# Patient Record
Sex: FEMALE | Race: BLACK OR AFRICAN AMERICAN | NOT HISPANIC OR LATINO | Employment: FULL TIME | URBAN - METROPOLITAN AREA
[De-identification: names, ages, dates, MRNs, and addresses within clinical notes are randomized per-mention and may not be internally consistent; named-entity substitution may affect disease eponyms.]

---

## 2017-01-05 ENCOUNTER — PATIENT OUTREACH (OUTPATIENT)
Dept: ADMINISTRATIVE | Facility: HOSPITAL | Age: 52
End: 2017-01-05

## 2017-01-06 ENCOUNTER — HOSPITAL ENCOUNTER (OUTPATIENT)
Dept: RADIOLOGY | Facility: HOSPITAL | Age: 52
Discharge: HOME | End: 2017-01-06
Attending: INTERNAL MEDICINE
Payer: COMMERCIAL

## 2017-01-06 DIAGNOSIS — Z29.9 PREVENTIVE MEASURE: ICD-10-CM

## 2017-01-06 PROCEDURE — 77067 SCR MAMMO BI INCL CAD: CPT | Mod: TC

## 2017-01-06 PROCEDURE — 77067 SCR MAMMO BI INCL CAD: CPT | Mod: 26,,, | Performed by: RADIOLOGY

## 2017-01-13 ENCOUNTER — OFFICE VISIT (OUTPATIENT)
Dept: INTERNAL MEDICINE | Facility: CLINIC | Age: 52
End: 2017-01-13
Payer: COMMERCIAL

## 2017-01-13 VITALS
BODY MASS INDEX: 48.82 KG/M2 | WEIGHT: 293 LBS | DIASTOLIC BLOOD PRESSURE: 90 MMHG | SYSTOLIC BLOOD PRESSURE: 132 MMHG | OXYGEN SATURATION: 97 % | TEMPERATURE: 95 F | HEART RATE: 104 BPM | HEIGHT: 65 IN

## 2017-01-13 DIAGNOSIS — E66.01 MORBID OBESITY WITH BMI OF 50.0-59.9, ADULT: ICD-10-CM

## 2017-01-13 DIAGNOSIS — N39.41 URGENCY INCONTINENCE: ICD-10-CM

## 2017-01-13 DIAGNOSIS — Z00.00 ENCOUNTER FOR PREVENTIVE HEALTH EXAMINATION: ICD-10-CM

## 2017-01-13 DIAGNOSIS — E78.00 PURE HYPERCHOLESTEROLEMIA: ICD-10-CM

## 2017-01-13 DIAGNOSIS — F41.8 MIXED ANXIETY AND DEPRESSIVE DISORDER: Primary | ICD-10-CM

## 2017-01-13 DIAGNOSIS — R60.0 BILATERAL EDEMA OF LOWER EXTREMITY: ICD-10-CM

## 2017-01-13 DIAGNOSIS — E11.9 NEW ONSET TYPE 2 DIABETES MELLITUS: ICD-10-CM

## 2017-01-13 PROCEDURE — 99215 OFFICE O/P EST HI 40 MIN: CPT | Mod: S$GLB,,, | Performed by: INTERNAL MEDICINE

## 2017-01-13 PROCEDURE — 3060F POS MICROALBUMINURIA REV: CPT | Mod: S$GLB,,, | Performed by: INTERNAL MEDICINE

## 2017-01-13 PROCEDURE — 99999 PR PBB SHADOW E&M-EST. PATIENT-LVL III: CPT | Mod: PBBFAC,,, | Performed by: INTERNAL MEDICINE

## 2017-01-13 PROCEDURE — 1159F MED LIST DOCD IN RCRD: CPT | Mod: S$GLB,,, | Performed by: INTERNAL MEDICINE

## 2017-01-13 PROCEDURE — 3044F HG A1C LEVEL LT 7.0%: CPT | Mod: S$GLB,,, | Performed by: INTERNAL MEDICINE

## 2017-01-13 RX ORDER — PRAVASTATIN SODIUM 20 MG/1
20 TABLET ORAL DAILY
Qty: 90 TABLET | Refills: 3 | Status: SHIPPED | OUTPATIENT
Start: 2017-01-13 | End: 2017-10-11 | Stop reason: SDUPTHER

## 2017-01-13 NOTE — PROGRESS NOTES
"Subjective:       Patient ID: Ac Hayden is a 51 y.o. female.    Chief Complaint: Follow-up    HPI Comments: Here for f/u medical problems and preventive exam, and f/u new dx diabetes and full female exam with pelvic.  Stress doing ok.  Working on diet.  Lost 6#.  No f/c/sw.  Some cough and congestion x 1d.  Taking flonase. No cp/sob/aplp.  BMs and urine normal.  Taking vit D.  Edema doing well on hctz.    HM: 11/16 fluvax, 11/16 [id number], 12/10 TDaP, 12/16 mmg, 7/11 Cscope rep 5y, 6/16 HCV neg, 3/15 Eye Dr. Ayala.          Review of Systems   Constitutional: Negative for chills, diaphoresis and fever.   Respiratory: Negative for cough and shortness of breath.    Cardiovascular: Negative for chest pain, palpitations and leg swelling.   Gastrointestinal: Negative for blood in stool, constipation, diarrhea, nausea and vomiting.   Genitourinary: Negative for dysuria, frequency and hematuria.   Psychiatric/Behavioral: The patient is not nervous/anxious.        Objective:     Visit Vitals    BP (!) 132/90 (BP Location: Right arm)    Pulse 104    Temp (!) 95.4 °F (35.2 °C) (Tympanic)    Ht 5' 5" (1.651 m)    Wt (!) 154.8 kg (341 lb 4.4 oz)    SpO2 97%    BMI 56.79 kg/m2       Physical Exam   Constitutional: She is oriented to person, place, and time. She appears well-developed and well-nourished.   HENT:   Right Ear: External ear normal. Tympanic membrane is not injected.   Left Ear: External ear normal. Tympanic membrane is not injected.   Mouth/Throat: Oropharynx is clear and moist.   Eyes: Conjunctivae are normal.   Neck: Normal range of motion. Neck supple. Carotid bruit is not present. No thyroid mass and no thyromegaly present.   Cardiovascular: Normal rate, regular rhythm and intact distal pulses.  Exam reveals no gallop and no friction rub.    No murmur heard.  Pulses:       Dorsalis pedis pulses are 2+ on the right side, and 2+ on the left side.        Posterior tibial pulses are 2+ on the right " side, and 2+ on the left side.   Pulmonary/Chest: Effort normal and breath sounds normal. She has no wheezes. She has no rales. Right breast exhibits no mass, no nipple discharge, no skin change and no tenderness. Left breast exhibits no mass, no nipple discharge, no skin change and no tenderness.   Abdominal: Soft. Bowel sounds are normal. She exhibits no mass. There is no hepatosplenomegaly. There is no tenderness.   Genitourinary: Vagina normal and uterus normal. There is no lesion on the right labia. There is no lesion on the left labia. Uterus is not tender. Cervix exhibits no motion tenderness and no discharge. Right adnexum displays no mass, no tenderness and no fullness. Left adnexum displays no mass, no tenderness and no fullness. No tenderness in the vagina. No vaginal discharge found.   Musculoskeletal: She exhibits no edema.   Feet:   Right Foot:   Protective Sensation: 10 sites tested. 10 sites sensed.   Skin Integrity: Negative for ulcer, blister, skin breakdown, erythema, warmth, callus or dry skin.   Left Foot:   Protective Sensation: 10 sites tested. 10 sites sensed.   Skin Integrity: Negative for ulcer, blister, skin breakdown, erythema, warmth, callus or dry skin.   Lymphadenopathy:     She has no cervical adenopathy.     She has no axillary adenopathy.   Neurological: She is alert and oriented to person, place, and time.   Skin: Skin is warm. No rash noted.   Psychiatric: She has a normal mood and affect.     Results for AMY ESTEBAN (MRN [id number]) as of 1/13/2017 14:30   Ref. Range 1/6/2017 08:26 1/6/2017 08:40   WBC Latest Ref Range: 3.90 - 12.70 K/uL  7.79   RBC Latest Ref Range: 4.00 - 5.40 M/uL  4.47   Hemoglobin Latest Ref Range: 12.0 - 16.0 g/dL  13.8   Hematocrit Latest Ref Range: 37.0 - 48.5 %  41.1   MCV Latest Ref Range: 82 - 98 fL  92   MCH Latest Ref Range: 27.0 - 31.0 pg  30.9   MCHC Latest Ref Range: 32.0 - 36.0 %  33.6   RDW Latest Ref Range: 11.5 - 14.5 %  12.6   Platelets  Latest Ref Range: 150 - 350 K/uL  346   MPV Latest Ref Range: 9.2 - 12.9 fL  10.2   Gran% Latest Ref Range: 38.0 - 73.0 %  65.1   Gran # Latest Ref Range: 1.8 - 7.7 K/uL  5.1   Lymph% Latest Ref Range: 18.0 - 48.0 %  27.1   Lymph # Latest Ref Range: 1.0 - 4.8 K/uL  2.1   Mono% Latest Ref Range: 4.0 - 15.0 %  6.8   Mono # Latest Ref Range: 0.3 - 1.0 K/uL  0.5   Eosinophil% Latest Ref Range: 0.0 - 8.0 %  0.5   Eos # Latest Ref Range: 0.0 - 0.5 K/uL  0.0   Basophil% Latest Ref Range: 0.0 - 1.9 %  0.1   Baso # Latest Ref Range: 0.00 - 0.20 K/uL  0.01   Sodium Latest Ref Range: 136 - 145 mmol/L  140   Potassium Latest Ref Range: 3.5 - 5.1 mmol/L  4.1   Chloride Latest Ref Range: 95 - 110 mmol/L  106   CO2 Latest Ref Range: 23 - 29 mmol/L  28   Anion Gap Latest Ref Range: 8 - 16 mmol/L  6 (L)   BUN, Bld Latest Ref Range: 6 - 20 mg/dL  10   Creatinine Latest Ref Range: 0.5 - 1.4 mg/dL  0.8   eGFR if non African American Latest Ref Range: >60 mL/min/1.73 m^2  >60.0   eGFR if African American Latest Ref Range: >60 mL/min/1.73 m^2  >60.0   Glucose Latest Ref Range: 70 - 110 mg/dL  95   Calcium Latest Ref Range: 8.7 - 10.5 mg/dL  9.0   Alkaline Phosphatase Latest Ref Range: 55 - 135 U/L  114   Total Protein Latest Ref Range: 6.0 - 8.4 g/dL  7.4   Albumin Latest Ref Range: 3.5 - 5.2 g/dL  3.2 (L)   Total Bilirubin Latest Ref Range: 0.1 - 1.0 mg/dL  0.2   AST Latest Ref Range: 10 - 40 U/L  20   ALT Latest Ref Range: 10 - 44 U/L  21   Triglycerides Latest Ref Range: 30 - 150 mg/dL  111   Cholesterol Latest Ref Range: 120 - 199 mg/dL  188   HDL Latest Ref Range: 40 - 75 mg/dL  38 (L)   LDL Cholesterol Latest Ref Range: 63.0 - 159.0 mg/dL  127.8   Total Cholesterol/HDL Ratio Latest Ref Range: 2.0 - 5.0   4.9   Vit D, 25-Hydroxy Latest Ref Range: 30 - 96 ng/mL  31   Hemoglobin A1C Latest Ref Range: 4.5 - 6.2 %  6.8 (H)   Estimated Avg Glucose Latest Ref Range: 68 - 131 mg/dL  148 (H)   TSH Latest Ref Range: 0.400 - 4.000 uIU/mL   0.945   Microalbum.,U,Random Latest Units: ug/mL 26.0    Microalb Creat Ratio Latest Ref Range: 0.0 - 30.0 ug/mg 18.4      Assessment:       1. Mixed anxiety and depressive disorder    2. New onset type 2 diabetes mellitus    3. Encounter for preventive health examination    4. Morbid obesity with BMI of 50.0-59.9, adult    5. Urgency incontinence    6. Bilateral edema of lower extremity    7. Pure hypercholesterolemia        Plan:       Ac was seen today for follow-up.    Diagnoses and all orders for this visit:    Mixed anxiety and depressive disorder- cont present regimen/ doing well.    New onset type 2 diabetes mellitus- cont diet, add metformin and increase as tolerated.  -     Hemoglobin A1c; Future    Encounter for preventive health examination- utd.    HLP- start statin, recheck 3 mo.    Morbid obesity with BMI of 50.0-59.9, adult- cont wt loss.    Urgency incontinence- doing well on rx, cont.    rTC 3 mo.

## 2017-01-13 NOTE — MR AVS SNAPSHOT
[facility] Internal Medicine  [address] [place] [address]  Phone: [phone]  Fax: [phone]                  Ac Hayden   2017 2:00 PM   Office Visit    Description:  Female : 1965   Provider:  Tati Hansen MD   Department:  [facility] - Internal Medicine           Reason for Visit     Follow-up           Diagnoses this Visit        Comments    Mixed anxiety and depressive disorder    -  Primary     New onset type 2 diabetes mellitus         Encounter for preventive health examination         Morbid obesity with BMI of 50.0-59.9, adult         Urgency incontinence         Bilateral edema of lower extremity         Pure hypercholesterolemia                To Do List           Future Appointments        Provider Department Dept Phone    3/22/2017 8:00 AM EVA Ayala OD [facility] Ophthalmology [phone]    2017 8:30 AM LABORATORY, [facility] [phone]    2017 3:40 PM Tati Hansen MD [facility] Internal Medicine [phone]      Goals (5 Years of Data)     None      Follow-Up and Disposition     Return in about 3 months (around 2017).    Follow-up and Disposition History       These Medications        Disp Refills Start End    pravastatin (PRAVACHOL) 20 MG tablet 90 tablet 3 2017    Take 1 tablet (20 mg total) by mouth once daily. - Oral    Pharmacy: [address] [place] [address] Ph #: [phone]         [facility]     [facility] Nurse Care Line -  Assistance  Registered nurses in the [facility] provide clinical advisement, health education, appointment booking, and other advisory services.  Call for this free service at [phone].             Medications           Message regarding Medications     Verify the changes and/or additions to your medication regime listed below are the same as discussed with your clinician  today.  If any of these changes or additions are incorrect, please notify your healthcare provider.        START taking these NEW medications        Refills    pravastatin (PRAVACHOL) 20 MG tablet 3    Sig: Take 1 tablet (20 mg total) by mouth once daily.    Class: Normal    Route: Oral           Verify that the below list of medications is an accurate representation of the medications you are currently taking.  If none reported, the list may be blank. If incorrect, please contact your healthcare provider. Carry this list with you in case of emergency.           Current Medications     albuterol 90 mcg/actuation inhaler Inhale 1-2 puffs into the lungs every 6 (six) hours as needed for Wheezing.    blood sugar diagnostic (ONETOUCH VERIO) Strp 1 strip by Misc.(Non-Drug; Combo Route) route once daily.    cholecalciferol, vitamin D3, 1,000 unit capsule Take 1 capsule (1,000 Units total) by mouth 2 (two) times daily.    fluticasone (FLONASE) 50 mcg/actuation nasal spray USE TWO SPRAY(S) IN EACH NOSTRIL ONCE DAILY    hydrochlorothiazide (HYDRODIURIL) 25 MG tablet TAKE ONE TABLET BY MOUTH ONCE DAILY    lancets 33 gauge Misc 1 lancet by Misc.(Non-Drug; Combo Route) route once daily.    metformin (GLUCOPHAGE) 1000 MG tablet TAKE ONE TABLET BY MOUTH TWICE DAILY WITH MEALS    mirabegron 50 mg Tb24 Take 2 tablets (100 mg total) by mouth every morning.    naproxen (NAPROSYN) 500 MG tablet Take 1 tablet (500 mg total) by mouth 2 (two) times daily with meals.    ondansetron (ZOFRAN-ODT) 4 MG TbDL Take 1 tablet (4 mg total) by mouth every 4 to 6 hours as needed.    oxycodone-acetaminophen (PERCOCET) 7.5-325 mg per tablet Take 1 tablet by mouth every 4 to 6 hours as needed for Pain.    promethazine (PHENERGAN) 25 MG tablet Take 25 mg by mouth Every 8 hours as needed. 1 Tablet Oral Every 8 hours as needed.    tramadol (ULTRAM) 50 mg tablet Take 50 mg by mouth 2 (two) times daily.    venlafaxine 150 mg TR24 TAKE TWO TABLETS BY MOUTH  "ONCE DAILY    zafirlukast (ACCOLATE) 20 MG tablet TAKE ONE TABLET BY MOUTH TWICE DAILY    pravastatin (PRAVACHOL) 20 MG tablet Take 1 tablet (20 mg total) by mouth once daily.    triamcinolone acetonide 0.025% (KENALOG) 0.025 % cream Apply topically 2 (two) times daily as needed.           Clinical Reference Information           Vital Signs - Last Recorded  Most recent update: 1/13/2017  1:58 PM by Ekaterina James MA    BP Pulse Temp Ht Wt SpO2    (!) 132/90 (BP Location: Right arm) 104 (!) 95.4 °F (35.2 °C) (Tympanic) 5' 5" (1.651 m) (!) 154.8 kg (341 lb 4.4 oz) 97%    BMI                56.79 kg/m2          Blood Pressure          Most Recent Value    BP  (!)  132/90      Allergies as of 1/13/2017     Codeine    Pcn [Penicillins]    Sulfa (Sulfonamide Antibiotics)      Immunizations Administered on Date of Encounter - 1/13/2017     None      Orders Placed During Today's Visit     Future Labs/Procedures Expected by Expires    ALT (SGPT)  1/13/2017 3/14/2018    Hemoglobin A1c  1/13/2017 3/14/2018    Lipid panel  1/13/2017 1/13/2018      "

## 2017-01-16 ENCOUNTER — PATIENT MESSAGE (OUTPATIENT)
Dept: URGENT CARE | Facility: CLINIC | Age: 52
End: 2017-01-16

## 2017-01-16 ENCOUNTER — TELEPHONE (OUTPATIENT)
Dept: INTERNAL MEDICINE | Facility: CLINIC | Age: 52
End: 2017-01-16

## 2017-01-16 DIAGNOSIS — J01.00 ACUTE MAXILLARY SINUSITIS, RECURRENCE NOT SPECIFIED: ICD-10-CM

## 2017-01-16 RX ORDER — DOXYCYCLINE HYCLATE 100 MG
100 TABLET ORAL EVERY 12 HOURS
Qty: 20 TABLET | Refills: 0 | Status: SHIPPED | OUTPATIENT
Start: 2017-01-16 | End: 2017-01-26

## 2017-01-16 NOTE — TELEPHONE ENCOUNTER
Pt c/o productive cough with green sputum,states it has been on-going since Saturday 1/14/17. No other issues. Asking for the medication that has been previously prescribed for this issue.

## 2017-01-16 NOTE — TELEPHONE ENCOUNTER
----- Message from Teresa Rojas sent at 1/16/2017  2:35 PM CST -----  Contact: Patient  Patient is requesting a prescription for a dry cough- [facility]. Please call patient back if needed at [phone]. Thank you

## 2017-07-23 RX ORDER — HYDROCHLOROTHIAZIDE 25 MG/1
TABLET ORAL
Qty: 30 TABLET | Refills: 11 | Status: SHIPPED | OUTPATIENT
Start: 2017-07-23 | End: 2018-09-12

## 2017-07-23 RX ORDER — ZAFIRLUKAST 20 MG/1
TABLET, FILM COATED ORAL
Qty: 60 TABLET | Refills: 11 | Status: SHIPPED | OUTPATIENT
Start: 2017-07-23 | End: 2020-01-22

## 2017-07-23 RX ORDER — VENLAFAXINE HYDROCHLORIDE 150 MG/1
TABLET, EXTENDED RELEASE ORAL
Qty: 180 EACH | Refills: 3 | Status: SHIPPED | OUTPATIENT
Start: 2017-07-23 | End: 2017-10-04 | Stop reason: ALTCHOICE

## 2017-09-20 ENCOUNTER — PATIENT OUTREACH (OUTPATIENT)
Dept: ADMINISTRATIVE | Facility: HOSPITAL | Age: 52
End: 2017-09-20

## 2017-09-20 NOTE — LETTER
September 20, 2017    Ac Hayden  [address] [address]             [facility]  [address]  [facility] [address]  Phone: [phone] Dear Mrs. Hayden:    [facility] is committed to your overall health.  To help you get the most out of each of your visits, we will review your information to make sure you are up to date on all of your recommended tests and/or procedures.      Tati Luis MD has found that you may be due for    Pneumococcal PPSV23 (Medium Risk) (1)    Colonoscopy     Influenza Vaccine         If you have had any of the above done at another facility, please bring the records or information with you so that your record at [facility] will be complete.    If you are currently taking medication, please bring it with you to your appointment for review.    We will be happy to assist you with scheduling any necessary appointments or you may contact the [facility] appointment desk at [phone] to schedule at your convenience.     Thank you for choosing [facility] for your healthcare needs,      EDGAR Good  Care Coordination Department  [facility]  [phone]

## 2017-09-27 ENCOUNTER — LAB VISIT (OUTPATIENT)
Dept: LAB | Facility: HOSPITAL | Age: 52
End: 2017-09-27
Attending: INTERNAL MEDICINE
Payer: COMMERCIAL

## 2017-09-27 ENCOUNTER — OFFICE VISIT (OUTPATIENT)
Dept: OPHTHALMOLOGY | Facility: CLINIC | Age: 52
End: 2017-09-27
Payer: COMMERCIAL

## 2017-09-27 DIAGNOSIS — Z13.5 SCREENING FOR GLAUCOMA: ICD-10-CM

## 2017-09-27 DIAGNOSIS — E78.00 PURE HYPERCHOLESTEROLEMIA: ICD-10-CM

## 2017-09-27 DIAGNOSIS — H52.7 REFRACTIVE ERROR: ICD-10-CM

## 2017-09-27 DIAGNOSIS — E11.9 DIABETES MELLITUS TYPE 2 WITHOUT RETINOPATHY: Primary | ICD-10-CM

## 2017-09-27 DIAGNOSIS — E11.9 NEW ONSET TYPE 2 DIABETES MELLITUS: ICD-10-CM

## 2017-09-27 LAB
ALT SERPL W/O P-5'-P-CCNC: 18 U/L
CHOLEST SERPL-MCNC: 185 MG/DL
CHOLEST/HDLC SERPL: 5.8 {RATIO}
ESTIMATED AVG GLUCOSE: 212 MG/DL
HBA1C MFR BLD HPLC: 9 %
HDLC SERPL-MCNC: 32 MG/DL
HDLC SERPL: 17.3 %
LDLC SERPL CALC-MCNC: 119.8 MG/DL
NONHDLC SERPL-MCNC: 153 MG/DL
TRIGL SERPL-MCNC: 166 MG/DL

## 2017-09-27 PROCEDURE — 80061 LIPID PANEL: CPT

## 2017-09-27 PROCEDURE — 83036 HEMOGLOBIN GLYCOSYLATED A1C: CPT

## 2017-09-27 PROCEDURE — 92014 COMPRE OPH EXAM EST PT 1/>: CPT | Mod: S$GLB,,, | Performed by: OPTOMETRIST

## 2017-09-27 PROCEDURE — 99999 PR PBB SHADOW E&M-EST. PATIENT-LVL I: CPT | Mod: PBBFAC,,, | Performed by: OPTOMETRIST

## 2017-09-27 PROCEDURE — 36415 COLL VENOUS BLD VENIPUNCTURE: CPT | Mod: PO

## 2017-09-27 PROCEDURE — 92015 DETERMINE REFRACTIVE STATE: CPT | Mod: S$GLB,,, | Performed by: OPTOMETRIST

## 2017-09-27 PROCEDURE — 84460 ALANINE AMINO (ALT) (SGPT): CPT

## 2017-09-27 NOTE — PROGRESS NOTES
HPI     Last [facility] exam 03/21/2016  Diabetic/NIDDM  Screening for glaucoma  RE  Update CL Rx   No visual complaints    Last edited by Neno Loomis MA on 9/27/2017  2:07 PM. (History)            Assessment /Plan     For exam results, see Encounter Report.    Diabetes mellitus type 2 without retinopathy    Screening for glaucoma    Refractive error      No diabetic retinopathy OU.  OH OK OU.  Spec and CL Rx given.  RTC one year.

## 2017-10-04 ENCOUNTER — TELEPHONE (OUTPATIENT)
Dept: INTERNAL MEDICINE | Facility: CLINIC | Age: 52
End: 2017-10-04

## 2017-10-04 ENCOUNTER — OFFICE VISIT (OUTPATIENT)
Dept: INTERNAL MEDICINE | Facility: CLINIC | Age: 52
End: 2017-10-04
Payer: COMMERCIAL

## 2017-10-04 ENCOUNTER — HOSPITAL ENCOUNTER (OUTPATIENT)
Dept: RADIOLOGY | Facility: HOSPITAL | Age: 52
Discharge: HOME | End: 2017-10-04
Attending: PHYSICIAN ASSISTANT
Payer: COMMERCIAL

## 2017-10-04 VITALS
DIASTOLIC BLOOD PRESSURE: 94 MMHG | SYSTOLIC BLOOD PRESSURE: 142 MMHG | OXYGEN SATURATION: 96 % | BODY MASS INDEX: 48.82 KG/M2 | HEART RATE: 96 BPM | HEIGHT: 65 IN | TEMPERATURE: 96 F | WEIGHT: 293 LBS

## 2017-10-04 DIAGNOSIS — R30.0 DYSURIA: ICD-10-CM

## 2017-10-04 DIAGNOSIS — F41.8 MIXED ANXIETY AND DEPRESSIVE DISORDER: ICD-10-CM

## 2017-10-04 DIAGNOSIS — R19.8 ABNORMAL BOWEL HABITS: ICD-10-CM

## 2017-10-04 DIAGNOSIS — R10.9 ABDOMINAL PAIN, UNSPECIFIED ABDOMINAL LOCATION: ICD-10-CM

## 2017-10-04 DIAGNOSIS — M54.2 NECK PAIN ON RIGHT SIDE: ICD-10-CM

## 2017-10-04 DIAGNOSIS — E11.9 NEW ONSET TYPE 2 DIABETES MELLITUS: Primary | ICD-10-CM

## 2017-10-04 DIAGNOSIS — M79.605 LEFT LEG PAIN: ICD-10-CM

## 2017-10-04 DIAGNOSIS — Z12.11 SCREEN FOR COLON CANCER: ICD-10-CM

## 2017-10-04 DIAGNOSIS — E78.00 PURE HYPERCHOLESTEROLEMIA: ICD-10-CM

## 2017-10-04 DIAGNOSIS — R11.2 NAUSEA AND VOMITING, INTRACTABILITY OF VOMITING NOT SPECIFIED, UNSPECIFIED VOMITING TYPE: ICD-10-CM

## 2017-10-04 DIAGNOSIS — N39.0 URINARY TRACT INFECTION WITHOUT HEMATURIA, SITE UNSPECIFIED: Primary | ICD-10-CM

## 2017-10-04 DIAGNOSIS — Z23 NEED FOR 23-POLYVALENT PNEUMOCOCCAL POLYSACCHARIDE VACCINE: ICD-10-CM

## 2017-10-04 PROCEDURE — 99215 OFFICE O/P EST HI 40 MIN: CPT | Mod: 25,S$GLB,, | Performed by: PHYSICIAN ASSISTANT

## 2017-10-04 PROCEDURE — 90732 PPSV23 VACC 2 YRS+ SUBQ/IM: CPT | Mod: S$GLB,,, | Performed by: PHYSICIAN ASSISTANT

## 2017-10-04 PROCEDURE — 99999 PR PBB SHADOW E&M-EST. PATIENT-LVL V: CPT | Mod: PBBFAC,,, | Performed by: PHYSICIAN ASSISTANT

## 2017-10-04 PROCEDURE — 74020 XR ABDOMEN FLAT AND ERECT: CPT | Mod: TC,PO

## 2017-10-04 PROCEDURE — 90686 IIV4 VACC NO PRSV 0.5 ML IM: CPT | Mod: S$GLB,,, | Performed by: PHYSICIAN ASSISTANT

## 2017-10-04 PROCEDURE — 90471 IMMUNIZATION ADMIN: CPT | Mod: S$GLB,,, | Performed by: PHYSICIAN ASSISTANT

## 2017-10-04 PROCEDURE — 90472 IMMUNIZATION ADMIN EACH ADD: CPT | Mod: S$GLB,,, | Performed by: PHYSICIAN ASSISTANT

## 2017-10-04 PROCEDURE — 74020 XR ABDOMEN FLAT AND ERECT: CPT | Mod: 26,,, | Performed by: RADIOLOGY

## 2017-10-04 RX ORDER — DULOXETIN HYDROCHLORIDE 60 MG/1
60 CAPSULE, DELAYED RELEASE ORAL DAILY
Qty: 30 CAPSULE | Refills: 0 | Status: SHIPPED | OUTPATIENT
Start: 2017-10-04 | End: 2017-11-05 | Stop reason: SDUPTHER

## 2017-10-04 RX ORDER — NITROFURANTOIN 25; 75 MG/1; MG/1
100 CAPSULE ORAL 2 TIMES DAILY
Qty: 14 CAPSULE | Refills: 0 | Status: SHIPPED | OUTPATIENT
Start: 2017-10-04 | End: 2017-10-11

## 2017-10-04 NOTE — PROGRESS NOTES
"Subjective:       Patient ID: Ac Hayden is a 51 y.o. female.    Chief Complaint: Follow-up    51 year old female presents to clinic for f/u appt after labs ordered by PCP Dr. Hansen (ALT, lipid panel, and A1C). Pt last saw PCP Jan 2017 and is now returning for her 3 month f/u appt. Pt was late to PCP appt today and so is here today for that appt. Lab results reviewed with pt today. She has new onset DM and reports she has not been checking her glucose at home. She reports having intermittent N/V and soft/loose stools X one year. She reports every time she eats or urinates, she has to have a BM. She reports being afraid to eat so has been going all day without eating. She reports grabbing a snack occasionally and drinks Sprite regularly because it eases her GI discomfort. She reports spicy and red-colored foods worsen her sxs. She reports intermittent diffuse lower abdominal pain as well. She reports no blood in stool, known injury, CP, SOB, or other associated sxs. She stopped her metformin Nov 2016 without improvement of sxs. She had Cscope in 2011 with recommended repeat in 5 years - pt agreeable to getting this scheduled. She also reports intermittent R posterior upper leg / hip pain X one month. She reports this pain worsens when she gets up (from sitting) or with the action of sitting down. She reports walking and weight bearing does not worsen her pain. She reports no radiation of the pain, spine pain, known injury/falls, swelling, rash, numbness/tingling, muscular weakness, or other associated sxs. She also c/o knot to R neck under jaw line X 2 weeks. She reports having "sinuses" during this time - reports sinus sxs and knot have significantly improved since onset. She still feels the knot a little and reports associated TTP. She reports no sore throat, fever, chills, cough, CP, SOB, ear pain, other masses elsewhere, or other associated sxs. She requests thyroid U/S. She also c/o possible " "intermittent dysuria vs lower abdominal "soreness" X 1.5 months. She reports possible B flank discomfort at times. She reports having urinary retention in the past and feels she needs to see urology again (saw Dr. Jain in past). She reports no hematuria, urinary urgency/frequency, fever, chills, or other associated sxs. She also reports having a lot of stress currently and is not sleeping well. She reports feeling as though her anxiety and depression are not well-controlled. She would like to change her venlafaxine to another medication. She reports no SI/HI. She reports having access to counseling at work but is not currently participating in it. She would like her flu and pneumonia vaccines today while in clinic.    Patient Active Problem List:     Degeneration of lumbar or lumbosacral intervertebral disc     Mixed anxiety and depressive disorder     Rotator cuff tendinitis     Impingement syndrome, shoulder     Osteoarthritis of acromioclavicular joint     Urgency-frequency syndrome     Urgency incontinence     New onset type 2 diabetes mellitus     Morbid obesity with BMI of 50.0-59.9, adult     Pulmonary nodules     Bilateral edema of lower extremity     Pure hypercholesterolemia      Review of Systems   Constitutional: Negative for chills and fever.   Respiratory: Negative for cough and shortness of breath.    Cardiovascular: Negative for chest pain, palpitations and leg swelling.   Gastrointestinal: Positive for abdominal pain, diarrhea, nausea and vomiting. Negative for blood in stool and constipation.   Genitourinary: Positive for dysuria and flank pain. Negative for frequency, hematuria and urgency.   Skin: Negative for rash.   Neurological: Negative for dizziness, weakness, numbness and headaches.   Psychiatric/Behavioral: Negative for confusion and suicidal ideas.       Objective:       Vitals:    10/04/17 0857   BP: (!) 142/94   BP Location: Right arm   Patient Position: Sitting   BP Method: Small " "(Manual)   Pulse: 96   Temp: (!) 95.9 °F (35.5 °C)   TempSrc: Tympanic   SpO2: 96%   Weight: (!) 155.5 kg (342 lb 13 oz)   Height: 5' 5" (1.651 m)     Physical Exam   Constitutional: She is oriented to person, place, and time. She appears well-developed and well-nourished. No distress.   HENT:   Head: Normocephalic and atraumatic.   Mouth/Throat: Oropharynx is clear and moist and mucous membranes are normal. No oropharyngeal exudate.   No abnormal cervical masses; no parotid swelling or TTP   Eyes: EOM are normal. No scleral icterus.   Neck: Neck supple.   Cardiovascular: Normal rate and regular rhythm.    Pulmonary/Chest: Effort normal and breath sounds normal. No respiratory distress. She has no decreased breath sounds. She has no wheezes. She has no rhonchi. She has no rales.   Abdominal: Soft. Bowel sounds are normal. She exhibits no distension and no mass. There is generalized tenderness (mild). There is no rigidity, no rebound, no guarding, no CVA tenderness, no tenderness at McBurney's point and negative Renner's sign.   Musculoskeletal: Normal range of motion. She exhibits no edema.   FROM BLEs; no midline spine TTP or step-offs; mild TTP to lateral proximal upper L leg; NV intact; 5+ strength   Lymphadenopathy:     She has no cervical adenopathy.   Neurological: She is alert and oriented to person, place, and time. She has normal strength. No cranial nerve deficit.   Skin: Skin is warm and dry. No rash noted.   Psychiatric: She has a normal mood and affect. Her speech is normal and behavior is normal. Judgment and thought content normal.       Component      Latest Ref Rng & Units 9/27/2017   Cholesterol      120 - 199 mg/dL 185   Triglycerides      30 - 150 mg/dL 166 (H)   HDL      40 - 75 mg/dL 32 (L)   LDL Cholesterol      63.0 - 159.0 mg/dL 119.8   HDL/Chol Ratio      20.0 - 50.0 % 17.3 (L)   Total Cholesterol/HDL Ratio      2.0 - 5.0 5.8 (H)   Non-HDL Cholesterol      mg/dL 153   Hemoglobin A1C      " 4.0 - 5.6 % 9.0 (H)   Estimated Avg Glucose      68 - 131 mg/dL 212 (H)   ALT      10 - 44 U/L 18     Assessment:       1. New onset type 2 diabetes mellitus    2. Pure hypercholesterolemia    3. Abdominal pain, unspecified abdominal location    4. Nausea and vomiting, intractability of vomiting not specified, unspecified vomiting type    5. Abnormal bowel habits    6. Left leg pain    7. Neck pain on right side    8. Dysuria    9. Screen for colon cancer    10. Need for 23-polyvalent pneumococcal polysaccharide vaccine    11. Mixed anxiety and depressive disorder        Plan:         Ac was seen today for follow-up.    Diagnoses and all orders for this visit:    New onset type 2 diabetes mellitus  -     Comprehensive metabolic panel; Future  Monitor glucose and keep record. Healthy diet as discussed. F/u with PCP next week for glucose review and further management.    Pure hypercholesterolemia  Healthy diet. Pt taking statin.    Abdominal pain, Nausea and vomiting, Abnormal bowel habits  -     CBC auto differential; Future  -     Comprehensive metabolic panel; Future  -     X-Ray Abdomen Flat And Erect; Future  -     Ambulatory referral to Gastroenterology  -     Ambulatory referral to Urology  -     Urinalysis; Future  -     Urine culture; Future    Left leg pain  Monitor. Rest and NSAID PRN. F/u with PCP next week for recheck and further management.    Neck pain on right side  -     CBC auto differential; Future  -     Comprehensive metabolic panel; Future  -     US Soft Tissue Head Neck Thyroid per pt request  Monitor and f/u with PCP next week for recheck. May consider ENT if pt feels mass does not resolve.    Dysuria  -     Ambulatory referral to Urology  -     Urinalysis; Future  -     Urine culture; Future  Stay hydrated with water.     Screen for colon cancer  -     Case request GI: COLONOSCOPY    Need for 23-polyvalent pneumococcal polysaccharide vaccine, Other orders  -     (In Office Administered)  Pneumococcal Polysaccharide Vaccine (23 Valent) (SQ/IM)        -     Influenza - Quadrivalent (3 years & older) (PF)    Mixed anxiety and depressive disorder  -     TSH; Future  -     duloxetine (CYMBALTA) 60 MG capsule; Take 1 capsule (60 mg total) by mouth once daily.  Discussed with PCP. Stop venlafaxine and start Cymbalta. Avoid taking Ultram while taking Cymbalta. Monitor sxs and f/u with PCP next week for further management. Refer to counseling - pt reports she will participate in counseling provided by her employer.    Pt would like to change PCPs. Establish care with PCP next week for further management of above. Appt scheduled with Dr. Esparza for pt. ER if any severe sxs occur.

## 2017-10-04 NOTE — TELEPHONE ENCOUNTER
----- Message from BHAVESH Washburn sent at 10/4/2017 12:52 PM CDT -----  Urine shows infection. I sent in the antibiotic Macrobid for this. Urine culture pending.

## 2017-10-05 ENCOUNTER — TELEPHONE (OUTPATIENT)
Dept: INTERNAL MEDICINE | Facility: CLINIC | Age: 52
End: 2017-10-05

## 2017-10-05 ENCOUNTER — HOSPITAL ENCOUNTER (OUTPATIENT)
Dept: RADIOLOGY | Facility: HOSPITAL | Age: 52
Discharge: HOME | End: 2017-10-05
Attending: PHYSICIAN ASSISTANT
Payer: COMMERCIAL

## 2017-10-05 DIAGNOSIS — M54.2 NECK PAIN ON RIGHT SIDE: ICD-10-CM

## 2017-10-05 PROCEDURE — 76536 US EXAM OF HEAD AND NECK: CPT | Mod: 26,,, | Performed by: RADIOLOGY

## 2017-10-05 PROCEDURE — 76536 US EXAM OF HEAD AND NECK: CPT | Mod: TC,PO

## 2017-10-05 NOTE — TELEPHONE ENCOUNTER
----- Message from BHAVESH Washburn sent at 10/5/2017  8:09 AM CDT -----  Blood work normal except for elevated sugar at 174. Thyroid level normal.

## 2017-10-06 ENCOUNTER — TELEPHONE (OUTPATIENT)
Dept: INTERNAL MEDICINE | Facility: CLINIC | Age: 52
End: 2017-10-06

## 2017-10-06 DIAGNOSIS — R59.0 ENLARGED LYMPH NODE IN NECK: Primary | ICD-10-CM

## 2017-10-06 NOTE — TELEPHONE ENCOUNTER
----- Message from BHAVESH Washburn sent at 10/6/2017  8:03 AM CDT -----  Neck ultrasound shows enlarged lymph node. Recommend ENT consult soon. May need further imaging. Referral placed.

## 2017-10-11 ENCOUNTER — OFFICE VISIT (OUTPATIENT)
Dept: GASTROENTEROLOGY | Facility: CLINIC | Age: 52
End: 2017-10-11
Payer: COMMERCIAL

## 2017-10-11 ENCOUNTER — OFFICE VISIT (OUTPATIENT)
Dept: OTOLARYNGOLOGY | Facility: CLINIC | Age: 52
End: 2017-10-11
Payer: COMMERCIAL

## 2017-10-11 ENCOUNTER — TELEPHONE (OUTPATIENT)
Dept: INTERNAL MEDICINE | Facility: CLINIC | Age: 52
End: 2017-10-11

## 2017-10-11 ENCOUNTER — OFFICE VISIT (OUTPATIENT)
Dept: INTERNAL MEDICINE | Facility: CLINIC | Age: 52
End: 2017-10-11
Payer: COMMERCIAL

## 2017-10-11 VITALS
TEMPERATURE: 98 F | WEIGHT: 293 LBS | HEART RATE: 76 BPM | BODY MASS INDEX: 56.5 KG/M2 | SYSTOLIC BLOOD PRESSURE: 152 MMHG | DIASTOLIC BLOOD PRESSURE: 98 MMHG

## 2017-10-11 VITALS
DIASTOLIC BLOOD PRESSURE: 94 MMHG | SYSTOLIC BLOOD PRESSURE: 148 MMHG | HEIGHT: 65 IN | WEIGHT: 293 LBS | BODY MASS INDEX: 48.82 KG/M2 | HEART RATE: 68 BPM

## 2017-10-11 VITALS
DIASTOLIC BLOOD PRESSURE: 86 MMHG | BODY MASS INDEX: 48.82 KG/M2 | TEMPERATURE: 96 F | SYSTOLIC BLOOD PRESSURE: 136 MMHG | HEIGHT: 65 IN | WEIGHT: 293 LBS

## 2017-10-11 DIAGNOSIS — E11.69 HYPERLIPIDEMIA ASSOCIATED WITH TYPE 2 DIABETES MELLITUS: Chronic | ICD-10-CM

## 2017-10-11 DIAGNOSIS — E78.00 PURE HYPERCHOLESTEROLEMIA: ICD-10-CM

## 2017-10-11 DIAGNOSIS — J30.89 CHRONIC NON-SEASONAL ALLERGIC RHINITIS, UNSPECIFIED TRIGGER: ICD-10-CM

## 2017-10-11 DIAGNOSIS — Z86.010 HISTORY OF COLON POLYPS: ICD-10-CM

## 2017-10-11 DIAGNOSIS — R19.7 DIARRHEA, UNSPECIFIED TYPE: Primary | ICD-10-CM

## 2017-10-11 DIAGNOSIS — F41.8 MIXED ANXIETY AND DEPRESSIVE DISORDER: Chronic | ICD-10-CM

## 2017-10-11 DIAGNOSIS — N30.01 ACUTE CYSTITIS WITH HEMATURIA: ICD-10-CM

## 2017-10-11 DIAGNOSIS — Z12.11 COLON CANCER SCREENING: ICD-10-CM

## 2017-10-11 DIAGNOSIS — E11.65 TYPE 2 DIABETES MELLITUS WITH HYPERGLYCEMIA, WITHOUT LONG-TERM CURRENT USE OF INSULIN: Primary | ICD-10-CM

## 2017-10-11 DIAGNOSIS — E66.01 MORBID OBESITY WITH BMI OF 50.0-59.9, ADULT: ICD-10-CM

## 2017-10-11 DIAGNOSIS — R11.2 NON-INTRACTABLE VOMITING WITH NAUSEA, UNSPECIFIED VOMITING TYPE: ICD-10-CM

## 2017-10-11 DIAGNOSIS — E78.5 HYPERLIPIDEMIA ASSOCIATED WITH TYPE 2 DIABETES MELLITUS: Chronic | ICD-10-CM

## 2017-10-11 DIAGNOSIS — R59.0 REACTIVE CERVICAL LYMPHADENOPATHY: Primary | ICD-10-CM

## 2017-10-11 PROCEDURE — 99204 OFFICE O/P NEW MOD 45 MIN: CPT | Mod: S$GLB,,, | Performed by: NURSE PRACTITIONER

## 2017-10-11 PROCEDURE — 99215 OFFICE O/P EST HI 40 MIN: CPT | Mod: S$GLB,,, | Performed by: FAMILY MEDICINE

## 2017-10-11 PROCEDURE — 99999 PR PBB SHADOW E&M-EST. PATIENT-LVL IV: CPT | Mod: PBBFAC,,, | Performed by: PHYSICIAN ASSISTANT

## 2017-10-11 PROCEDURE — 99999 PR PBB SHADOW E&M-EST. PATIENT-LVL III: CPT | Mod: PBBFAC,,, | Performed by: NURSE PRACTITIONER

## 2017-10-11 PROCEDURE — 99243 OFF/OP CNSLTJ NEW/EST LOW 30: CPT | Mod: S$GLB,,, | Performed by: PHYSICIAN ASSISTANT

## 2017-10-11 PROCEDURE — 99999 PR PBB SHADOW E&M-EST. PATIENT-LVL III: CPT | Mod: PBBFAC,,, | Performed by: FAMILY MEDICINE

## 2017-10-11 RX ORDER — ASPIRIN 81 MG/1
81 TABLET ORAL DAILY
Refills: 0
Start: 2017-10-11 | End: 2018-09-12

## 2017-10-11 RX ORDER — OMEPRAZOLE 40 MG/1
40 CAPSULE, DELAYED RELEASE ORAL DAILY
Qty: 30 CAPSULE | Refills: 3 | Status: SHIPPED | OUTPATIENT
Start: 2017-10-11 | End: 2017-10-24 | Stop reason: SDUPTHER

## 2017-10-11 RX ORDER — SODIUM, POTASSIUM,MAG SULFATES 17.5-3.13G
SOLUTION, RECONSTITUTED, ORAL ORAL
Qty: 354 ML | Refills: 0 | Status: ON HOLD | OUTPATIENT
Start: 2017-10-11 | End: 2017-10-19 | Stop reason: CLARIF

## 2017-10-11 RX ORDER — PRAVASTATIN SODIUM 40 MG/1
40 TABLET ORAL DAILY
Qty: 90 TABLET | Refills: 0 | Status: SHIPPED | OUTPATIENT
Start: 2017-10-11 | End: 2018-01-09 | Stop reason: SDUPTHER

## 2017-10-11 NOTE — PROGRESS NOTES
"Subjective:       Patient ID: Ac Hayden is a 51 y.o. female.    Chief Complaint: Consult (swollen lymph node in right side of neck x3wks)    Patient is a very pleasant 51 year old female here to see me today for the first time in consultation at the request of Jody Barbour PA-C for evaluation of right cervical lymphadenopathy.  Patient reports obvious swelling of right neck with a palpable lymph node, noticed about 3 weeks ago.  She denies any associated tenderness but says when she palpated the node, she felt mild soreness "inside" her neck but denies sore throat.  She denies any redness of the overlying skin or any drainage thru the skin.  She says her allergies had flared up about the same time that she first noticed the lymph node.  She says it's already decreased in size and she's no longer noticing any swelling when looking in mirror.  Denies any dysphagia or odynophagia with solids or liquids.  Denies respiratory distress.  Denies fever.  She says her weight has fluctuated 10# up and down over past 6 months.  She's scheduled to see GI later today for recurrent vomiting and diarrhea for past 12 months.  She traveled to [place] to [facility] in April; denies exposure or visiting any high-risk TB populations.  Denies ear pain or drainage.  She previously smoked; quit 17 years ago (smoked < 0.5 packs per day for 15 years).  She denies any previous issues with neck swelling or lymphadenopathy.      Review of Systems   Constitutional: Negative for activity change, appetite change, diaphoresis, fever and unexpected weight change (fluctuates 10#).   HENT: Positive for congestion, nosebleeds, postnasal drip (occasional), rhinorrhea and sneezing. Negative for ear discharge, ear pain, hearing loss, sinus pain, sinus pressure, sore throat, tinnitus, trouble swallowing and voice change.    Eyes: Negative for discharge.   Respiratory: Negative for cough and shortness of breath.    Cardiovascular: Negative for chest pain and " palpitations.   Gastrointestinal: Positive for diarrhea, nausea and vomiting.   Musculoskeletal: Positive for arthralgias and back pain.   Allergic/Immunologic: Positive for environmental allergies. Negative for food allergies.   Neurological: Negative for dizziness, light-headedness and headaches.   Hematological: Positive for adenopathy (right cervical per HPI).   Psychiatric/Behavioral: The patient is nervous/anxious (anxiety).        Objective:      Physical Exam   Constitutional: She is oriented to person, place, and time. She appears well-developed and well-nourished. She is cooperative. No distress.   HENT:   Head: Normocephalic and atraumatic.   Right Ear: Tympanic membrane, external ear and ear canal normal. No middle ear effusion.   Left Ear: Tympanic membrane, external ear and ear canal normal.  No middle ear effusion.   Nose: Mucosal edema present. No rhinorrhea, nasal deformity or septal deviation. No epistaxis. Right sinus exhibits no maxillary sinus tenderness and no frontal sinus tenderness. Left sinus exhibits no maxillary sinus tenderness and no frontal sinus tenderness.   Mouth/Throat: Uvula is midline, oropharynx is clear and moist and mucous membranes are normal. Mucous membranes are not pale and not dry. No trismus in the jaw. Normal dentition. No uvula swelling. No oropharyngeal exudate or posterior oropharyngeal erythema.   Eyes: Conjunctivae, EOM and lids are normal. Pupils are equal, round, and reactive to light. Right eye exhibits no chemosis. Left eye exhibits no chemosis. Right conjunctiva is not injected. Left conjunctiva is not injected. No scleral icterus. Right eye exhibits normal extraocular motion and no nystagmus. Left eye exhibits normal extraocular motion and no nystagmus.   Neck: Trachea normal and phonation normal. No tracheal tenderness present. No tracheal deviation present. No thyroid mass and no thyromegaly present.   Cardiovascular: Intact distal pulses.     Pulmonary/Chest: Effort normal. No stridor. No respiratory distress.   Abdominal: She exhibits no distension.   Lymphadenopathy:        Head (right side): No submental, no submandibular, no preauricular and no posterior auricular adenopathy present.        Head (left side): No submental, no submandibular, no preauricular and no posterior auricular adenopathy present.     She has cervical adenopathy.        Right cervical: Superficial cervical (anterior < 1cm; nontender) adenopathy present.   Neurological: She is alert and oriented to person, place, and time. No cranial nerve deficit.   Skin: Skin is warm and dry. No rash noted. No erythema.   Psychiatric: She has a normal mood and affect. Her behavior is normal.           Neck US dated 10/5/17:  Targeted soft tissue ultrasound was performed of the area palpable concern involving the right lateral neck.  There is a large lymph node in the right neck at the area of palpable concern which measures 2.7 x 0.9 x 1.6 cm.  No definite fatty hilum demonstrated.  At least 3 additional more morphologically normal appearing lymph node is noted in the right neck.      Assessment:       1. Reactive cervical lymphadenopathy        Plan:         Reviewed results of US Neck with patient and with Dr. Negron.  Patient has noted dramatic improvement in the lymph node size since her allergy symptoms have improved.  Discussed with her that her exam today is very reassuring and it's likely it was reactive lymphadenopathy which has already improved.  No need for further imaging at this time.  Recommend she RTC if any recurrence of her neck swelling; otherwise will see her back in clinic in 3 months for repeat exam.      Thanks for the referral Jody Barbour PA-C.  Report returned via EPIC.

## 2017-10-11 NOTE — LETTER
October 11, 2017      BHAVESH Washburn  [address] [place] [address]           [facility]  [address] [place] [address]  Phone: [phone]  Fax: [phone]          Patient: Ac Hayden   MR Number: [id number]   YOB: 1965   Date of Visit: 10/11/2017       Dear Jody Barbour:    Thank you for referring Ac Hayden to me for evaluation. Attached you will find relevant portions of my assessment and plan of care.    If you have questions, please do not hesitate to call me. I look forward to following Ac Hayden along with you.    Sincerely,    Dominique Floyd PA-C    Enclosure  CC:  No Recipients    If you would like to receive this communication electronically, please contact [email or web address] or [phone] to request more information on [employer] access.    For providers and/or their staff who would like to refer a patient to [facility], please contact us through our one-stop-shop provider referral line, [facility] , at [phone].    If you feel you have received this communication in error or would no longer like to receive these types of communications, please e-mail [email or web address]

## 2017-10-11 NOTE — PROGRESS NOTES
Subjective:   Patient ID: Ac Hayden is a 51 y.o. female.  Chief Complaint:  Establish Care; Diarrhea; and Nasal Congestion      Presents to establish care.  Multiple vague psychosomatic complaints.  He does chronic issue is GI related.  Has appointment with GI scheduled.  Review of recent labs and medical history shows:  Diabetes mellitus.  Uncontrolled.  A1c 9%.  Hyperlipidemia.  On pravastatin 20 mg daily.  No aspirin daily.  Not at goal LDL is 120.  Urinalysis with increased white cells and red cells.  Suspicion for infection.  Treated Macrobid.  Needs repeat UA and urine culture.  Recent TSH, CBC, microalbumin, and vitamin D level all normal.  Complains of chronic ALLERGIC rhinitis symptoms.  Only using Flonase sporadically.  Regarding diabetes.  Metformin previously uncomfortable due to GI side effects.        Current Outpatient Prescriptions:     albuterol 90 mcg/actuation inhaler, Inhale 1-2 puffs into the lungs every 6 (six) hours as needed for Wheezing., Disp: 1 Inhaler, Rfl: 0    cholecalciferol, vitamin D3, 1,000 unit capsule, Take 1 capsule (1,000 Units total) by mouth 2 (two) times daily., Disp: 100 capsule, Rfl: 0    duloxetine (CYMBALTA) 60 MG capsule, Take 1 capsule (60 mg total) by mouth once daily., Disp: 30 capsule, Rfl: 0    hydrochlorothiazide (HYDRODIURIL) 25 MG tablet, TAKE ONE TABLET BY MOUTH ONCE DAILY, Disp: 30 tablet, Rfl: 11    naproxen (NAPROSYN) 500 MG tablet, Take 1 tablet (500 mg total) by mouth 2 (two) times daily with meals., Disp: 40 tablet, Rfl: 2    nitrofurantoin, macrocrystal-monohydrate, (MACROBID) 100 MG capsule, Take 1 capsule (100 mg total) by mouth 2 (two) times daily., Disp: 14 capsule, Rfl: 0    pravastatin (PRAVACHOL) 40 MG tablet, Take 1 tablet (40 mg total) by mouth once daily., Disp: 90 tablet, Rfl: 0    zafirlukast (ACCOLATE) 20 MG tablet, TAKE ONE TABLET BY MOUTH TWICE DAILY, Disp: 60 tablet, Rfl: 11    aspirin (ECOTRIN) 81 MG EC tablet, Take 1  tablet (81 mg total) by mouth once daily., Disp: , Rfl: 0    dulaglutide (TRULICITY) 0.75 mg/0.5 mL PnIj, Inject 0.5 mLs (0.75 mg total) into the skin every 7 days., Disp: 4 Syringe, Rfl: 1    fluticasone (FLONASE) 50 mcg/actuation nasal spray, USE TWO SPRAY(S) IN EACH NOSTRIL ONCE DAILY, Disp: 1 Bottle, Rfl: 6    mirabegron 50 mg Tb24, Take 2 tablets (100 mg total) by mouth every morning., Disp: 60 tablet, Rfl: 11    ondansetron (ZOFRAN-ODT) 4 MG TbDL, Take 1 tablet (4 mg total) by mouth every 4 to 6 hours as needed., Disp: 12 tablet, Rfl: 1    tramadol (ULTRAM) 50 mg tablet, Take 50 mg by mouth 2 (two) times daily as needed. , Disp: , Rfl:     triamcinolone acetonide 0.025% (KENALOG) 0.025 % cream, Apply topically 2 (two) times daily as needed., Disp: 30 g, Rfl: 2     Review of Systems   Constitutional: Positive for fatigue. Negative for activity change, appetite change, chills, diaphoresis and fever.   HENT: Positive for congestion, postnasal drip and rhinorrhea. Negative for ear pain, sinus pressure, sore throat and trouble swallowing.    Eyes: Negative for visual disturbance.   Respiratory: Negative for cough, chest tightness, shortness of breath and wheezing.    Cardiovascular: Positive for leg swelling. Negative for chest pain and palpitations.   Gastrointestinal: Positive for abdominal pain, constipation, diarrhea and nausea. Negative for abdominal distention, blood in stool and vomiting.   Endocrine: Positive for polydipsia, polyphagia and polyuria.   Genitourinary: Positive for frequency. Negative for decreased urine volume, difficulty urinating, dysuria, enuresis, flank pain, hematuria, pelvic pain and urgency.   Musculoskeletal: Negative for arthralgias, back pain, myalgias and neck pain.   Skin: Negative for rash.   Neurological: Negative for dizziness, tremors, syncope, weakness, light-headedness, numbness and headaches.   Hematological: Negative for adenopathy.   Psychiatric/Behavioral: Negative  "for agitation, behavioral problems, confusion, decreased concentration, dysphoric mood, hallucinations, self-injury, sleep disturbance and suicidal ideas. The patient is not nervous/anxious and is not hyperactive.      Objective:   /86 (BP Location: Right arm, Patient Position: Sitting, BP Method: X-Large (Manual))   Temp 96 °F (35.6 °C) (Tympanic)   Ht 5' 5" (1.651 m)   Wt (!) 154.1 kg (339 lb 11.7 oz)   BMI 56.53 kg/m²     Physical Exam   Constitutional: She appears well-developed and well-nourished. No distress.   Morbid obesity   HENT:   Nose: Mucosal edema and rhinorrhea present. Right sinus exhibits no maxillary sinus tenderness and no frontal sinus tenderness. Left sinus exhibits no maxillary sinus tenderness and no frontal sinus tenderness.   Eyes: Right conjunctiva is injected. Left conjunctiva is injected. No scleral icterus.   Neck: No JVD present. Carotid bruit is not present. No thyroid mass and no thyromegaly present.   Cardiovascular: Normal rate, regular rhythm and normal heart sounds.  Exam reveals no gallop and no friction rub.    No murmur heard.  Pulmonary/Chest: Effort normal and breath sounds normal. She has no wheezes. She has no rhonchi. She has no rales.   Abdominal: Soft. Bowel sounds are normal. She exhibits no distension. There is no hepatosplenomegaly. There is generalized tenderness. There is no rebound, no guarding and no CVA tenderness.   Musculoskeletal: She exhibits no edema.   Neurological: She displays a negative Romberg sign. Coordination and gait normal.   Skin: Skin is warm and dry. No rash noted.   Psychiatric: Judgment and thought content normal. Her mood appears not anxious. Her affect is not angry, not blunt, not labile and not inappropriate. Her speech is delayed. Her speech is not rapid and/or pressured, not tangential and not slurred. She is slowed and withdrawn. She is not agitated, not aggressive, not hyperactive, not actively hallucinating and not combative. " Thought content is not paranoid and not delusional. Cognition and memory are normal. She does not express impulsivity or inappropriate judgment. She exhibits a depressed mood. She expresses no homicidal and no suicidal ideation. She is communicative. She is inattentive.   Nursing note and vitals reviewed.    Assessment:     1. Type 2 diabetes mellitus with hyperglycemia, without long-term current use of insulin    2. Morbid obesity with BMI of 50.0-59.9, adult    3. Hyperlipidemia associated with type 2 diabetes mellitus    4. Mixed anxiety and depressive disorder    5. Pure hypercholesterolemia    6. Acute cystitis with hematuria    7. Chronic non-seasonal allergic rhinitis, unspecified trigger      Plan:   Type 2 diabetes mellitus with hyperglycemia, without long-term current use of insulin  -     dulaglutide (TRULICITY) 0.75 mg/0.5 mL PnIj; Inject 0.5 mLs (0.75 mg total) into the skin every 7 days.  Dispense: 4 Syringe; Refill: 1  Uncontrolled diabetes.  Intolerant of metformin.  Trial Trulicity.    Morbid obesity with BMI of 50.0-59.9, adult  Discussed increased BMI, Increased health risks, Need for weight loss, Lifestyle modifications.    Hyperlipidemia associated with type 2 diabetes mellitus  -     pravastatin (PRAVACHOL) 40 MG tablet; Take 1 tablet (40 mg total) by mouth once daily.  Dispense: 90 tablet; Refill: 0  -     aspirin (ECOTRIN) 81 MG EC tablet; Take 1 tablet (81 mg total) by mouth once daily.; Refill: 0  Not at goal LDL less than 100 for diabetic.  Increase pravastatin 40 mg daily.  Recheck LFTs one month.  Recheck lipid panel and LFTs in 4 months.    Mixed anxiety and depressive disorder  Reasonable control on Cymbalta.    Acute cystitis with hematuria  -     Urinalysis; Future; Expected date: 10/11/2017  -     Urine culture; Future; Expected date: 10/11/2017  Repeat UA/urine culture 1 week.  Additional treatment or evaluation is indicated.    ARS stressed need for compliance with daily Flonase  and Accolate.    Return to clinic 1 month.

## 2017-10-11 NOTE — LETTER
October 15, 2017      BHAVESH Washburn  [address]  [place] [facility] [address]           [facility]  [id number] [place] [address]  Phone: [phone]  Fax: [phone]          Patient: Ac Hayden   MR Number: [id number]   YOB: 1965   Date of Visit: 10/11/2017       Dear Jody Barbour:    Thank you for referring Ac Hayden to me for evaluation. Attached you will find relevant portions of my assessment and plan of care.    If you have questions, please do not hesitate to call me. I look forward to following Ac Hayden along with you.    Sincerely,    Rayna Knight, NOEL    Enclosure  CC:  No Recipients    If you would like to receive this communication electronically, please contact [email or web address] or [phone] to request more information on [facility] [employer] access.    For providers and/or their staff who would like to refer a patient to [facility], please contact us through our one-stop-shop provider referral line, [facility], at [phone].    If you feel you have received this communication in error or would no longer like to receive these types of communications, please e-mail [email or web address]

## 2017-10-12 ENCOUNTER — TELEPHONE (OUTPATIENT)
Dept: GASTROENTEROLOGY | Facility: CLINIC | Age: 52
End: 2017-10-12

## 2017-10-12 NOTE — TELEPHONE ENCOUNTER
----- Message from Yamilet Knox sent at 10/11/2017  4:36 PM CDT -----  Call pt at [phone]//pt say she has a question please give her a call//eleno ht

## 2017-10-12 NOTE — TELEPHONE ENCOUNTER
Patient requesting a cheaper prep to be sent to [facility]

## 2017-10-13 ENCOUNTER — TELEPHONE (OUTPATIENT)
Dept: GASTROENTEROLOGY | Facility: CLINIC | Age: 52
End: 2017-10-13

## 2017-10-13 RX ORDER — POLYETHYLENE GLYCOL 3350, SODIUM SULFATE ANHYDROUS, SODIUM BICARBONATE, SODIUM CHLORIDE, POTASSIUM CHLORIDE 236; 22.74; 6.74; 5.86; 2.97 G/4L; G/4L; G/4L; G/4L; G/4L
POWDER, FOR SOLUTION ORAL
Qty: 4000 ML | Refills: 0 | Status: ON HOLD | OUTPATIENT
Start: 2017-10-13 | End: 2017-10-19 | Stop reason: CLARIF

## 2017-10-15 NOTE — PROGRESS NOTES
Clinic Consult:  [facility] Consultation Note    Reason for Consult:  The primary encounter diagnosis was Diarrhea, unspecified type. Diagnoses of Non-intractable vomiting with nausea, unspecified vomiting type, Colon cancer screening, and History of colon polyps were also pertinent to this visit.    PCP: Tati Luis   [address] / [place] [address]    HPI:  This is a 51 y.o. female here for evaluation of the above issues. She was referred to me by BHAVESH Breen. Onset of symptoms began 1 year ago. She reports having diarrhea that occurs after eating. Episodes are worse after eating spicy foods. No associated abdominal pain, hematochezia, or melena. She reports nausea and vomiting that is intermittent. Symptoms are worse in the morning. No recent NSAID use. She has had a colonoscopy before that was done 6 years ago. She reports polyps.     Review of Systems   Constitutional: Negative for fever, malaise/fatigue and weight loss.   HENT: Negative for sore throat.    Respiratory: Negative for cough and wheezing.    Cardiovascular: Negative for chest pain and palpitations.   Gastrointestinal: Positive for diarrhea, nausea and vomiting. Negative for abdominal pain, blood in stool, constipation, heartburn and melena.   Genitourinary: Negative for dysuria and frequency.   Musculoskeletal: Negative for back pain, joint pain, myalgias and neck pain.   Skin: Negative for itching and rash.   Neurological: Negative for dizziness, speech change, seizures, loss of consciousness and headaches.   Psychiatric/Behavioral: Negative for depression and substance abuse. The patient is not nervous/anxious.        Medical History:  has a past medical history of Allergic rhinitis; Colon polyps; Diabetes mellitus; Hypertension; Metabolic syndrome; Mixed anxiety and depressive disorder; Prediabetes; and Urine incontinence.    Surgical History:  has a past surgical history that includes Plantar fascia surgery; Partial  hysterectomy; and breast reduction.    Family History: family history includes Cancer in her father; Diabetes in her brother, mother, and sister; Eczema in her other; Lupus in her other; Stroke in her mother..     Social History:  reports that she has quit smoking. She has never used smokeless tobacco. She reports that she does not drink alcohol or use drugs.    Allergies: Reviewed    Home Medications:   Current Outpatient Prescriptions on File Prior to Visit   Medication Sig Dispense Refill    albuterol 90 mcg/actuation inhaler Inhale 1-2 puffs into the lungs every 6 (six) hours as needed for Wheezing. 1 Inhaler 0    cholecalciferol, vitamin D3, 1,000 unit capsule Take 1 capsule (1,000 Units total) by mouth 2 (two) times daily. 100 capsule 0    duloxetine (CYMBALTA) 60 MG capsule Take 1 capsule (60 mg total) by mouth once daily. 30 capsule 0    fluticasone (FLONASE) 50 mcg/actuation nasal spray USE TWO SPRAY(S) IN EACH NOSTRIL ONCE DAILY 1 Bottle 6    hydrochlorothiazide (HYDRODIURIL) 25 MG tablet TAKE ONE TABLET BY MOUTH ONCE DAILY 30 tablet 11    mirabegron 50 mg Tb24 Take 2 tablets (100 mg total) by mouth every morning. 60 tablet 11    naproxen (NAPROSYN) 500 MG tablet Take 1 tablet (500 mg total) by mouth 2 (two) times daily with meals. 40 tablet 2    ondansetron (ZOFRAN-ODT) 4 MG TbDL Take 1 tablet (4 mg total) by mouth every 4 to 6 hours as needed. 12 tablet 1    pravastatin (PRAVACHOL) 40 MG tablet Take 1 tablet (40 mg total) by mouth once daily. 90 tablet 0    tramadol (ULTRAM) 50 mg tablet Take 50 mg by mouth 2 (two) times daily as needed.       triamcinolone acetonide 0.025% (KENALOG) 0.025 % cream Apply topically 2 (two) times daily as needed. 30 g 2    zafirlukast (ACCOLATE) 20 MG tablet TAKE ONE TABLET BY MOUTH TWICE DAILY 60 tablet 11    aspirin (ECOTRIN) 81 MG EC tablet Take 1 tablet (81 mg total) by mouth once daily.  0    dulaglutide (TRULICITY) 0.75 mg/0.5 mL PnIj Inject 0.5 mLs  "(0.75 mg total) into the skin every 7 days. 4 Syringe 1     No current facility-administered medications on file prior to visit.        Physical Exam:  Vital Signs:  BP (!) 148/94   Pulse 68   Ht 5' 5" (1.651 m)   Wt (!) 153.9 kg (339 lb 4.6 oz)   BMI 56.46 kg/m²   Body mass index is 56.46 kg/m².  Physical Exam   Constitutional: She is oriented to person, place, and time and well-developed, well-nourished, and in no distress. No distress.   HENT:   Head: Normocephalic.   Eyes: Conjunctivae are normal. Pupils are equal, round, and reactive to light.   Cardiovascular: Normal rate, regular rhythm and normal heart sounds.    Pulmonary/Chest: Effort normal and breath sounds normal. No respiratory distress.   Abdominal: Soft. Bowel sounds are normal. She exhibits no distension. There is no tenderness.   Neurological: She is alert and oriented to person, place, and time. No cranial nerve deficit.   Skin: Skin is warm and dry. No rash noted.   Psychiatric: Mood and affect normal.       Labs: Pertinent labs reviewed.     CRC Screening: See HPI    Assessment:  1. Diarrhea, unspecified type    2. Non-intractable vomiting with nausea, unspecified vomiting type    3. Colon cancer screening    4. History of colon polyps           Recommendations:  Patient with diarrhea and nausea/vomiting. She is also due for colon cancer screening colonoscopy. Will plan for EGD/Colonoscopy for further evaluation.   -     Case request GI: COLONOSCOPY, ESOPHAGOGASTRODUODENOSCOPY (EGD)  -     SUPREP BOWEL PREP KIT 17.5-3.13-1.6 gram SolR; Use as directed  Dispense: 354 mL; Refill: 0    Return in about 6 weeks (around 11/22/2017).    Thank you so much for allowing me to participate in the care of POOJA Santiago  "

## 2017-10-19 ENCOUNTER — ANESTHESIA EVENT (OUTPATIENT)
Dept: ENDOSCOPY | Facility: HOSPITAL | Age: 52
End: 2017-10-19
Payer: COMMERCIAL

## 2017-10-19 ENCOUNTER — HOSPITAL ENCOUNTER (OUTPATIENT)
Facility: HOSPITAL | Age: 52
Discharge: HOME | End: 2017-10-19
Attending: INTERNAL MEDICINE | Admitting: INTERNAL MEDICINE
Payer: COMMERCIAL

## 2017-10-19 ENCOUNTER — SURGERY (OUTPATIENT)
Age: 52
End: 2017-10-19

## 2017-10-19 ENCOUNTER — ANESTHESIA (OUTPATIENT)
Dept: ENDOSCOPY | Facility: HOSPITAL | Age: 52
End: 2017-10-19
Payer: COMMERCIAL

## 2017-10-19 VITALS
WEIGHT: 293 LBS | BODY MASS INDEX: 48.82 KG/M2 | OXYGEN SATURATION: 97 % | SYSTOLIC BLOOD PRESSURE: 134 MMHG | TEMPERATURE: 98 F | DIASTOLIC BLOOD PRESSURE: 69 MMHG | RESPIRATION RATE: 16 BRPM | HEART RATE: 84 BPM | HEIGHT: 65 IN

## 2017-10-19 VITALS — RESPIRATION RATE: 13 BRPM

## 2017-10-19 DIAGNOSIS — R19.7 DIARRHEA: ICD-10-CM

## 2017-10-19 LAB — POCT GLUCOSE: 139 MG/DL (ref 70–110)

## 2017-10-19 PROCEDURE — 82962 GLUCOSE BLOOD TEST: CPT | Performed by: INTERNAL MEDICINE

## 2017-10-19 PROCEDURE — 45380 COLONOSCOPY AND BIOPSY: CPT | Performed by: INTERNAL MEDICINE

## 2017-10-19 PROCEDURE — 37000008 HC ANESTHESIA 1ST 15 MINUTES: Performed by: INTERNAL MEDICINE

## 2017-10-19 PROCEDURE — 27201089 HC SNARE, DISP (ANY): Performed by: INTERNAL MEDICINE

## 2017-10-19 PROCEDURE — 88305 TISSUE EXAM BY PATHOLOGIST: CPT | Mod: 26,,, | Performed by: PATHOLOGY

## 2017-10-19 PROCEDURE — 45380 COLONOSCOPY AND BIOPSY: CPT | Mod: 59,,, | Performed by: INTERNAL MEDICINE

## 2017-10-19 PROCEDURE — 25000003 PHARM REV CODE 250: Performed by: INTERNAL MEDICINE

## 2017-10-19 PROCEDURE — 43239 EGD BIOPSY SINGLE/MULTIPLE: CPT | Performed by: INTERNAL MEDICINE

## 2017-10-19 PROCEDURE — 27201012 HC FORCEPS, HOT/COLD, DISP: Performed by: INTERNAL MEDICINE

## 2017-10-19 PROCEDURE — 45385 COLONOSCOPY W/LESION REMOVAL: CPT | Performed by: INTERNAL MEDICINE

## 2017-10-19 PROCEDURE — 63600175 PHARM REV CODE 636 W HCPCS: Performed by: NURSE ANESTHETIST, CERTIFIED REGISTERED

## 2017-10-19 PROCEDURE — 88305 TISSUE EXAM BY PATHOLOGIST: CPT | Performed by: PATHOLOGY

## 2017-10-19 PROCEDURE — 37000009 HC ANESTHESIA EA ADD 15 MINS: Performed by: INTERNAL MEDICINE

## 2017-10-19 PROCEDURE — 43239 EGD BIOPSY SINGLE/MULTIPLE: CPT | Mod: 51,,, | Performed by: INTERNAL MEDICINE

## 2017-10-19 PROCEDURE — 45385 COLONOSCOPY W/LESION REMOVAL: CPT | Mod: 33,,, | Performed by: INTERNAL MEDICINE

## 2017-10-19 PROCEDURE — 25000003 PHARM REV CODE 250: Performed by: NURSE ANESTHETIST, CERTIFIED REGISTERED

## 2017-10-19 RX ORDER — SODIUM CHLORIDE, SODIUM LACTATE, POTASSIUM CHLORIDE, CALCIUM CHLORIDE 600; 310; 30; 20 MG/100ML; MG/100ML; MG/100ML; MG/100ML
INJECTION, SOLUTION INTRAVENOUS CONTINUOUS PRN
Status: DISCONTINUED | OUTPATIENT
Start: 2017-10-19 | End: 2017-10-19

## 2017-10-19 RX ORDER — SODIUM CHLORIDE, SODIUM LACTATE, POTASSIUM CHLORIDE, CALCIUM CHLORIDE 600; 310; 30; 20 MG/100ML; MG/100ML; MG/100ML; MG/100ML
INJECTION, SOLUTION INTRAVENOUS CONTINUOUS
Status: DISCONTINUED | OUTPATIENT
Start: 2017-10-19 | End: 2017-10-19 | Stop reason: HOSPADM

## 2017-10-19 RX ORDER — PROPOFOL 10 MG/ML
INJECTION, EMULSION INTRAVENOUS
Status: DISCONTINUED | OUTPATIENT
Start: 2017-10-19 | End: 2017-10-19

## 2017-10-19 RX ORDER — LIDOCAINE HCL/PF 100 MG/5ML
SYRINGE (ML) INTRAVENOUS
Status: DISCONTINUED | OUTPATIENT
Start: 2017-10-19 | End: 2017-10-19

## 2017-10-19 RX ADMIN — LIDOCAINE HYDROCHLORIDE 100 MG: 20 INJECTION, SOLUTION INTRAVENOUS at 11:10

## 2017-10-19 RX ADMIN — PROPOFOL 140 MG: 10 INJECTION, EMULSION INTRAVENOUS at 11:10

## 2017-10-19 RX ADMIN — PROPOFOL 60 MG: 10 INJECTION, EMULSION INTRAVENOUS at 11:10

## 2017-10-19 RX ADMIN — PROPOFOL 30 MG: 10 INJECTION, EMULSION INTRAVENOUS at 11:10

## 2017-10-19 RX ADMIN — SODIUM CHLORIDE, POTASSIUM CHLORIDE, SODIUM LACTATE AND CALCIUM CHLORIDE: 600; 310; 30; 20 INJECTION, SOLUTION INTRAVENOUS at 10:10

## 2017-10-19 RX ADMIN — PROPOFOL 40 MG: 10 INJECTION, EMULSION INTRAVENOUS at 11:10

## 2017-10-19 RX ADMIN — SODIUM CHLORIDE, SODIUM LACTATE, POTASSIUM CHLORIDE, AND CALCIUM CHLORIDE: 600; 310; 30; 20 INJECTION, SOLUTION INTRAVENOUS at 11:10

## 2017-10-19 NOTE — ANESTHESIA RELEASE NOTE
"Anesthesia Release from PACU Note    Patient: Ac Hayden    Procedure(s) Performed: Procedure(s) (LRB):  COLONOSCOPY (N/A)  ESOPHAGOGASTRODUODENOSCOPY (EGD) (N/A)    Anesthesia type: MAC    Post pain: Adequate analgesia    Post assessment: no apparent anesthetic complications, tolerated procedure well and no evidence of recall    Last Vitals:   Visit Vitals  /61 (BP Location: Left arm, Patient Position: Lying)   Pulse 88   Temp 36.8 °C (98.2 °F)   Resp 16   Ht 5' 5" (1.651 m)   Wt (!) 149.2 kg (329 lb)   SpO2 100%   Breastfeeding? No   BMI 54.75 kg/m²       Post vital signs: stable    Level of consciousness: awake, alert  and oriented    Nausea/Vomiting: no nausea/no vomiting    Complications: none    Airway Patency: patent    Respiratory: unassisted, spontaneous ventilation, room air    Cardiovascular: stable    Hydration: euvolemic  "

## 2017-10-19 NOTE — INTERVAL H&P NOTE
The patient has been examined and the H&P has been reviewed:    I concur with the findings and no changes have occurred since H&P was written.    Anesthesia/Surgery risks, benefits and alternative options discussed and understood by patient/family.          Active Hospital Problems    Diagnosis  POA    Diarrhea [R19.7]  Yes      Resolved Hospital Problems    Diagnosis Date Resolved POA   No resolved problems to display.

## 2017-10-19 NOTE — OR NURSING
Bed repositioned. Will begin colonoscopy.     Pt adequately sedated.  Final time out done and agreed by all staff.

## 2017-10-19 NOTE — ANESTHESIA POSTPROCEDURE EVALUATION
"Anesthesia Post Evaluation    Patient: Ac Hayden    Procedure(s) Performed: Procedure(s) (LRB):  COLONOSCOPY (N/A)  ESOPHAGOGASTRODUODENOSCOPY (EGD) (N/A)    Final Anesthesia Type: MAC  Patient location during evaluation: PACU  Patient participation: Yes- Able to Participate  Level of consciousness: oriented and awake and alert  Post-procedure vital signs: reviewed and stable  Pain management: adequate  Airway patency: patent  PONV status at discharge: No PONV  Anesthetic complications: no      Cardiovascular status: blood pressure returned to baseline  Respiratory status: unassisted, room air and spontaneous ventilation  Hydration status: euvolemic  Follow-up not needed.        Visit Vitals  /61 (BP Location: Left arm, Patient Position: Lying)   Pulse 88   Temp 36.8 °C (98.2 °F)   Resp 16   Ht 5' 5" (1.651 m)   Wt (!) 149.2 kg (329 lb)   SpO2 100%   Breastfeeding? No   BMI 54.75 kg/m²       Pain/Arianne Score: Pain Assessment Performed: Yes (10/19/2017 10:35 AM)  Presence of Pain: denies (10/19/2017 11:48 AM)  Arianne Score: 9 (10/19/2017 11:48 AM)      "

## 2017-10-19 NOTE — DISCHARGE INSTRUCTIONS
Understanding Colon and Rectal Polyps    The colon (also called the large intestine) is a muscular tube that forms the last part of the digestive tract. It absorbs water and stores food waste. The colon is about 4 to 6 feet long. The rectum is the last 6 inches of the colon. The colon and rectum have a smooth lining composed of millions of cells. Changes in these cells can lead to growths in the colon that can become cancerous and should be removed. Multiple tests are available to screen for colon cancer, but the colonoscopy is the most recommended test. During colonoscopy, these polyps can be removed. How often you need this test depends on many things including your condition, your family history, symptoms, and what the findings were at the previous colonoscopy.   When the colon lining changes  Changes that happen in the cells that line the colon or rectum can lead to growths called polyps. Over a period of years, polyps can turn cancerous. Removing polyps early may prevent cancer from ever forming.  Polyps  Polyps are fleshy clumps of tissue that form on the lining of the colon or rectum. Small polyps are usually benign (not cancerous). However, over time, cells in a polyp can change and become cancerous. Certain types of polyps known as adenomatous polyps are premalignant. The risk for invasive cancer increases with the size of the polyp and certain cell and gene features. This means that they can become cancerous if they're not removed. Hyperplastic polyps are benign. They can grow quite large and not turn cancerous.   Cancer  Almost all colorectal cancers start when polyp cells begin growing abnormally. As a cancerous tumor grows, it may involve more and more of the colon or rectum. In time, cancer can also grow beyond the colon or rectum and spread to nearby organs or to glands called lymph nodes. The cells can also travel to other parts of the body. This is known as metastasis. The earlier a cancerous  tumor is removed, the better the chance of preventing its spread.    Date Last Reviewed: 8/1/2016  © 6584-6611 [employer]. [address] [place] [address], [place] [address]. All rights reserved. This information is not intended as a substitute for professional medical care. Always follow your healthcare professional's instructions.        Diverticulosis    Diverticulosis means that small pouches have formed in the wall of your large intestine (colon). Most often, this problem causes no symptoms and is common as people age. But the pouches in the colon are at risk of becoming infected. When this happens, the condition is called diverticulitis. Although most people with diverticulosis never develop diverticulitis, it is still not uncommon. Rectal bleeding can also occur and in less common situations, a type of colon inflammation called colitis.  While most people do not have symptoms, some people with diverticulosis may have:  · Abdominal cramps and pain  · Bloating  · Constipation  · Change in bowel habits  Causes  The exact cause of diverticulosis (and diverticulitis) has not been proved, but a few things are associated with the condition:  · Low-fiber diet  · Constipation  · Lack of exercise  Your healthcare provider will talk with you about how to manage your condition. Diet changes may be all that are needed to help control diverticulosis and prevent progression to diverticulitis. If you develop diverticulitis, you will likely need other treatments.  Home care  You may be told to take fiber supplements daily. Fiber adds bulk to the stool so that it passes through the colon more easily. Stool softeners may be recommended. You may also be given medications for pain relief. Be sure to take all medications as directed.  In the past, people were told to avoid corn, nuts, and seeds. This is no longer necessary.  Follow these guidelines when caring for yourself at home:  · Eat unprocessed foods that are high in  fiber. Whole grains, fruits, and vegetables are good choices.  · Drink 6 to 8 glasses of water every day unless your healthcare provider has you limit how much fluid you should have.  · Watch for changes in your bowel movements. Tell your provider if you notice any changes.  · Begin an exercise program. Ask your provider how to get started. Generally, walking is the best.  · Get plenty of rest and sleep.  Follow-up care  Follow up with your healthcare provider, or as advised. Regular visits may be needed to check on your health. Sometimes special procedures such as colonoscopy, are needed after an episode of diverticulitis or blooding. Be sure to keep all your appointments.  If a stool sample was taken, or cultures were done, you should be told if they are positive, or if your treatment needs to be changed. You can call as directed for the results.  If X-rays were done, a radiologist will look at them. You will be told if there is a change in your treatment.  If antibiotics were prescribed, be sure to finish them all.  When to seek medical advice  Call your healthcare provider right away if any of these occur:  · Fever of 100.4°F (38°C) or higher, or as directed by your healthcare provider  · Severe cramps in the lower left side of the abdomen or pain that is getting worse  · Tenderness in the lower left side of the abdomen or worsening pain throughout the abdomen  · Diarrhea or constipation that doesn't get better within 24 hours  · Nausea and vomiting  · Bleeding from the rectum  Call 911  Call emergency services if any of the following occur:  · Trouble breathing  · Confusion  · Very drowsy or trouble awakening  · Fainting or loss of consciousness  · Rapid heart rate  · Chest pain  Date Last Reviewed: 12/30/2015  © 0966-2783 [employer]. [address] [place] [address], [place] [address]. All rights reserved. This information is not intended as a substitute for professional medical care. Always follow your  healthcare professional's instructions.

## 2017-10-19 NOTE — H&P (VIEW-ONLY)
Clinic Consult:  [facility] Consultation Note    Reason for Consult:  The primary encounter diagnosis was Diarrhea, unspecified type. Diagnoses of Non-intractable vomiting with nausea, unspecified vomiting type, Colon cancer screening, and History of colon polyps were also pertinent to this visit.    PCP: Tati Luis   [id number] / [place] [id number]    HPI:  This is a 51 y.o. female here for evaluation of the above issues. She was referred to me by BHAVESH Breen. Onset of symptoms began 1 year ago. She reports having diarrhea that occurs after eating. Episodes are worse after eating spicy foods. No associated abdominal pain, hematochezia, or melena. She reports nausea and vomiting that is intermittent. Symptoms are worse in the morning. No recent NSAID use. She has had a colonoscopy before that was done 6 years ago. She reports polyps.     Review of Systems   Constitutional: Negative for fever, malaise/fatigue and weight loss.   HENT: Negative for sore throat.    Respiratory: Negative for cough and wheezing.    Cardiovascular: Negative for chest pain and palpitations.   Gastrointestinal: Positive for diarrhea, nausea and vomiting. Negative for abdominal pain, blood in stool, constipation, heartburn and melena.   Genitourinary: Negative for dysuria and frequency.   Musculoskeletal: Negative for back pain, joint pain, myalgias and neck pain.   Skin: Negative for itching and rash.   Neurological: Negative for dizziness, speech change, seizures, loss of consciousness and headaches.   Psychiatric/Behavioral: Negative for depression and substance abuse. The patient is not nervous/anxious.        Medical History:  has a past medical history of Allergic rhinitis; Colon polyps; Diabetes mellitus; Hypertension; Metabolic syndrome; Mixed anxiety and depressive disorder; Prediabetes; and Urine incontinence.    Surgical History:  has a past surgical history that includes Plantar fascia surgery; Partial  hysterectomy; and breast reduction.    Family History: family history includes Cancer in her father; Diabetes in her brother, mother, and sister; Eczema in her other; Lupus in her other; Stroke in her mother..     Social History:  reports that she has quit smoking. She has never used smokeless tobacco. She reports that she does not drink alcohol or use drugs.    Allergies: Reviewed    Home Medications:   Current Outpatient Prescriptions on File Prior to Visit   Medication Sig Dispense Refill    albuterol 90 mcg/actuation inhaler Inhale 1-2 puffs into the lungs every 6 (six) hours as needed for Wheezing. 1 Inhaler 0    cholecalciferol, vitamin D3, 1,000 unit capsule Take 1 capsule (1,000 Units total) by mouth 2 (two) times daily. 100 capsule 0    duloxetine (CYMBALTA) 60 MG capsule Take 1 capsule (60 mg total) by mouth once daily. 30 capsule 0    fluticasone (FLONASE) 50 mcg/actuation nasal spray USE TWO SPRAY(S) IN EACH NOSTRIL ONCE DAILY 1 Bottle 6    hydrochlorothiazide (HYDRODIURIL) 25 MG tablet TAKE ONE TABLET BY MOUTH ONCE DAILY 30 tablet 11    mirabegron 50 mg Tb24 Take 2 tablets (100 mg total) by mouth every morning. 60 tablet 11    naproxen (NAPROSYN) 500 MG tablet Take 1 tablet (500 mg total) by mouth 2 (two) times daily with meals. 40 tablet 2    ondansetron (ZOFRAN-ODT) 4 MG TbDL Take 1 tablet (4 mg total) by mouth every 4 to 6 hours as needed. 12 tablet 1    pravastatin (PRAVACHOL) 40 MG tablet Take 1 tablet (40 mg total) by mouth once daily. 90 tablet 0    tramadol (ULTRAM) 50 mg tablet Take 50 mg by mouth 2 (two) times daily as needed.       triamcinolone acetonide 0.025% (KENALOG) 0.025 % cream Apply topically 2 (two) times daily as needed. 30 g 2    zafirlukast (ACCOLATE) 20 MG tablet TAKE ONE TABLET BY MOUTH TWICE DAILY 60 tablet 11    aspirin (ECOTRIN) 81 MG EC tablet Take 1 tablet (81 mg total) by mouth once daily.  0    dulaglutide (TRULICITY) 0.75 mg/0.5 mL PnIj Inject 0.5 mLs  "(0.75 mg total) into the skin every 7 days. 4 Syringe 1     No current facility-administered medications on file prior to visit.        Physical Exam:  Vital Signs:  BP (!) 148/94   Pulse 68   Ht 5' 5" (1.651 m)   Wt (!) 153.9 kg (339 lb 4.6 oz)   BMI 56.46 kg/m²   Body mass index is 56.46 kg/m².  Physical Exam   Constitutional: She is oriented to person, place, and time and well-developed, well-nourished, and in no distress. No distress.   HENT:   Head: Normocephalic.   Eyes: Conjunctivae are normal. Pupils are equal, round, and reactive to light.   Cardiovascular: Normal rate, regular rhythm and normal heart sounds.    Pulmonary/Chest: Effort normal and breath sounds normal. No respiratory distress.   Abdominal: Soft. Bowel sounds are normal. She exhibits no distension. There is no tenderness.   Neurological: She is alert and oriented to person, place, and time. No cranial nerve deficit.   Skin: Skin is warm and dry. No rash noted.   Psychiatric: Mood and affect normal.       Labs: Pertinent labs reviewed.     CRC Screening: See HPI    Assessment:  1. Diarrhea, unspecified type    2. Non-intractable vomiting with nausea, unspecified vomiting type    3. Colon cancer screening    4. History of colon polyps           Recommendations:  Patient with diarrhea and nausea/vomiting. She is also due for colon cancer screening colonoscopy. Will plan for EGD/Colonoscopy for further evaluation.   -     Case request GI: COLONOSCOPY, ESOPHAGOGASTRODUODENOSCOPY (EGD)  -     SUPREP BOWEL PREP KIT 17.5-3.13-1.6 gram SolR; Use as directed  Dispense: 354 mL; Refill: 0    Return in about 6 weeks (around 11/22/2017).    Thank you so much for allowing me to participate in the care of POOJA Santiago  "

## 2017-10-19 NOTE — ANESTHESIA PREPROCEDURE EVALUATION
10/19/2017  Ac Hayden is a 51 y.o., female.    Anesthesia Evaluation    I have reviewed the Patient Summary Reports.    I have reviewed the Nursing Notes.   I have reviewed the Medications.     Review of Systems  Anesthesia Hx:  No problems with previous Anesthesia    Social:  Former Smoker, Social Alcohol Use Quit smoking 17 years ago.   Hematology/Oncology:  Hematology Normal   Oncology Normal     Cardiovascular:   Hypertension, well controlled hyperlipidemia    Pulmonary:   Pneumonia Sleep Apnea, CPAP Pulmonary nodules  Snore   Renal/:  Renal/ Normal     Hepatic/GI:   Bowel Prep. GERD, poorly controlled 0838 last drink of fluid.   Musculoskeletal:   Arthritis     Neurological:  Neurology Normal    Endocrine:   Diabetes, poorly controlled, type 2    Psych:   Psychiatric History          Physical Exam  General:  Well nourished, Morbid Obesity    Airway/Jaw/Neck:  Airway Findings: Mallampati: I                Anesthesia Plan  Type of Anesthesia, risks & benefits discussed:  Anesthesia Type:  MAC  Patient's Preference:   Intra-op Monitoring Plan:   Intra-op Monitoring Plan Comments:   Post Op Pain Control Plan:   Post Op Pain Control Plan Comments:   Induction:   IV  Beta Blocker:  Patient is not currently on a Beta-Blocker (No further documentation required).       Informed Consent: Patient understands risks and agrees with Anesthesia plan.  Questions answered. Anesthesia consent signed with patient.  ASA Score: 3     Day of Surgery Review of History & Physical: I have interviewed and examined the patient. I have reviewed the patient's H&P dated: 10/19/17. There are no significant changes.  H&P update referred to the surgeon.         Ready For Surgery From Anesthesia Perspective.

## 2017-10-19 NOTE — TRANSFER OF CARE
"Anesthesia Transfer of Care Note    Patient: Ac Hayden    Procedure(s) Performed: Procedure(s) (LRB):  COLONOSCOPY (N/A)  ESOPHAGOGASTRODUODENOSCOPY (EGD) (N/A)    Patient location: PACU    Anesthesia Type: MAC    Transport from OR: Transported from OR on room air with adequate spontaneous ventilation    Post pain: adequate analgesia    Post assessment: no apparent anesthetic complications    Post vital signs: stable    Level of consciousness: awake and alert    Nausea/Vomiting: no nausea/vomiting    Complications: none    Transfer of care protocol was followed      Last vitals:   Visit Vitals  /61 (BP Location: Left arm, Patient Position: Lying)   Pulse 88   Temp 36.8 °C (98.2 °F)   Resp 16   Ht 5' 5" (1.651 m)   Wt (!) 149.2 kg (329 lb)   SpO2 100%   Breastfeeding? No   BMI 54.75 kg/m²     "

## 2017-10-24 DIAGNOSIS — B96.81 HELICOBACTER PYLORI GASTRITIS: Primary | ICD-10-CM

## 2017-10-24 DIAGNOSIS — R11.2 NON-INTRACTABLE VOMITING WITH NAUSEA, UNSPECIFIED VOMITING TYPE: ICD-10-CM

## 2017-10-24 DIAGNOSIS — K29.70 HELICOBACTER PYLORI GASTRITIS: Primary | ICD-10-CM

## 2017-10-24 RX ORDER — METRONIDAZOLE 500 MG/1
500 TABLET ORAL EVERY 12 HOURS
Qty: 28 TABLET | Refills: 0 | Status: SHIPPED | OUTPATIENT
Start: 2017-10-24 | End: 2017-11-07

## 2017-10-24 RX ORDER — OMEPRAZOLE 40 MG/1
40 CAPSULE, DELAYED RELEASE ORAL
Qty: 60 CAPSULE | Refills: 1 | Status: SHIPPED | OUTPATIENT
Start: 2017-10-24 | End: 2017-10-30 | Stop reason: ALTCHOICE

## 2017-10-24 RX ORDER — CLARITHROMYCIN 500 MG/1
500 TABLET, FILM COATED ORAL 2 TIMES DAILY
Qty: 20 TABLET | Refills: 0 | Status: SHIPPED | OUTPATIENT
Start: 2017-10-24 | End: 2017-11-03

## 2017-10-25 ENCOUNTER — PATIENT MESSAGE (OUTPATIENT)
Dept: GASTROENTEROLOGY | Facility: CLINIC | Age: 52
End: 2017-10-25

## 2017-10-30 ENCOUNTER — TELEPHONE (OUTPATIENT)
Dept: GASTROENTEROLOGY | Facility: CLINIC | Age: 52
End: 2017-10-30

## 2017-10-30 DIAGNOSIS — A04.8 H. PYLORI INFECTION: Primary | ICD-10-CM

## 2017-10-30 RX ORDER — PANTOPRAZOLE SODIUM 40 MG/1
40 TABLET, DELAYED RELEASE ORAL 2 TIMES DAILY
Qty: 28 TABLET | Refills: 0 | Status: SHIPPED | OUTPATIENT
Start: 2017-10-30 | End: 2018-05-30

## 2017-10-30 NOTE — TELEPHONE ENCOUNTER
Received fax from [employer] stating Omeprazole not covered by insurance but will cover Pantoprazole. Please advise.

## 2017-11-05 DIAGNOSIS — F41.8 MIXED ANXIETY AND DEPRESSIVE DISORDER: ICD-10-CM

## 2017-11-06 RX ORDER — DULOXETIN HYDROCHLORIDE 60 MG/1
60 CAPSULE, DELAYED RELEASE ORAL DAILY
Qty: 90 CAPSULE | Refills: 0 | Status: SHIPPED | OUTPATIENT
Start: 2017-11-06 | End: 2017-12-04 | Stop reason: SDUPTHER

## 2017-11-22 ENCOUNTER — OFFICE VISIT (OUTPATIENT)
Dept: INTERNAL MEDICINE | Facility: CLINIC | Age: 52
End: 2017-11-22
Payer: COMMERCIAL

## 2017-11-22 ENCOUNTER — LAB VISIT (OUTPATIENT)
Dept: LAB | Facility: HOSPITAL | Age: 52
End: 2017-11-22
Attending: FAMILY MEDICINE
Payer: COMMERCIAL

## 2017-11-22 VITALS
DIASTOLIC BLOOD PRESSURE: 80 MMHG | HEIGHT: 65 IN | SYSTOLIC BLOOD PRESSURE: 130 MMHG | HEART RATE: 91 BPM | WEIGHT: 293 LBS | BODY MASS INDEX: 48.82 KG/M2 | TEMPERATURE: 94 F

## 2017-11-22 DIAGNOSIS — E11.65 TYPE 2 DIABETES MELLITUS WITH HYPERGLYCEMIA, WITHOUT LONG-TERM CURRENT USE OF INSULIN: Chronic | ICD-10-CM

## 2017-11-22 DIAGNOSIS — E78.5 HYPERLIPIDEMIA ASSOCIATED WITH TYPE 2 DIABETES MELLITUS: Chronic | ICD-10-CM

## 2017-11-22 DIAGNOSIS — E66.01 MORBID OBESITY WITH BMI OF 50.0-59.9, ADULT: ICD-10-CM

## 2017-11-22 DIAGNOSIS — N30.01 ACUTE CYSTITIS WITH HEMATURIA: ICD-10-CM

## 2017-11-22 DIAGNOSIS — E11.65 TYPE 2 DIABETES MELLITUS WITH HYPERGLYCEMIA, WITHOUT LONG-TERM CURRENT USE OF INSULIN: Primary | Chronic | ICD-10-CM

## 2017-11-22 DIAGNOSIS — E11.69 HYPERLIPIDEMIA ASSOCIATED WITH TYPE 2 DIABETES MELLITUS: Chronic | ICD-10-CM

## 2017-11-22 LAB
ALBUMIN SERPL BCP-MCNC: 3 G/DL
ALP SERPL-CCNC: 105 U/L
ALT SERPL W/O P-5'-P-CCNC: 20 U/L
AST SERPL-CCNC: 26 U/L
BILIRUB DIRECT SERPL-MCNC: 0.1 MG/DL
BILIRUB SERPL-MCNC: 0.2 MG/DL
ESTIMATED AVG GLUCOSE: 183 MG/DL
HBA1C MFR BLD HPLC: 8 %
PROT SERPL-MCNC: 7.1 G/DL

## 2017-11-22 PROCEDURE — 83036 HEMOGLOBIN GLYCOSYLATED A1C: CPT

## 2017-11-22 PROCEDURE — 80076 HEPATIC FUNCTION PANEL: CPT

## 2017-11-22 PROCEDURE — 99214 OFFICE O/P EST MOD 30 MIN: CPT | Mod: S$GLB,,, | Performed by: FAMILY MEDICINE

## 2017-11-22 PROCEDURE — 99999 PR PBB SHADOW E&M-EST. PATIENT-LVL III: CPT | Mod: PBBFAC,,, | Performed by: FAMILY MEDICINE

## 2017-11-22 PROCEDURE — 36415 COLL VENOUS BLD VENIPUNCTURE: CPT | Mod: PO

## 2017-11-22 RX ORDER — VENLAFAXINE HYDROCHLORIDE 150 MG/1
300 TABLET, EXTENDED RELEASE ORAL DAILY
COMMUNITY
Start: 2017-10-26 | End: 2017-11-22

## 2017-11-22 NOTE — PROGRESS NOTES
Subjective:   Patient ID: Ac Hayden is a 51 y.o. female.  Chief Complaint:  Follow-up      One-month follow-up for LFTs after increasing pravastatin dose to better control LDL.  Also started Trulicity for uncontrolled diabetes.  Some nausea day of injection, otherwise tolerating both medicines without side effects.  No new complaints concerns today.  Due microalbumin.  Needs repeat urine culture for previous UTI.  Urinary difficulties not improved with presumed better hyper glycemic control or resolution of infection.  Has urology appointment scheduled in the month.      Diabetes   She has type 2 diabetes mellitus. No MedicAlert identification noted. The initial diagnosis of diabetes was made 2 months ago. Pertinent negatives for hypoglycemia include no confusion, dizziness, headaches, hunger, mood changes, pallor, seizures, speech difficulty or sweats. Pertinent negatives for diabetes include no blurred vision, no chest pain, no fatigue, no foot paresthesias, no foot ulcerations, no polydipsia, no polyphagia, no polyuria, no weakness and no weight loss. Pertinent negatives for hypoglycemia complications include no blackouts, no hospitalization, no required assistance and no required glucagon injection. Symptoms are improving. Pertinent negatives for diabetic complications include no autonomic neuropathy, CVA, heart disease, nephropathy, peripheral neuropathy or retinopathy. Risk factors for coronary artery disease include dyslipidemia. Current diabetic treatment includes insulin injections. She is compliant with treatment most of the time. She is currently taking insulin at bedtime. Insulin injections are given by patient. Rotation sites for injection include the thighs. Her weight is fluctuating minimally. She is following a generally unhealthy, low fat/cholesterol and low salt diet. Meal planning includes avoidance of concentrated sweets. She has not had a previous visit with a dietitian. She  "participates in exercise intermittently. She monitors blood glucose at home 1-2 x per day. She monitors urine at home <1 x per month. Blood glucose monitoring compliance is good. She does not see a podiatrist.Eye exam is current.        Review of Systems   Constitutional: Negative for chills, diaphoresis, fatigue, fever and weight loss.   Eyes: Negative for blurred vision and visual disturbance.   Respiratory: Negative for cough, chest tightness, shortness of breath and wheezing.    Cardiovascular: Negative for chest pain, palpitations and leg swelling.   Gastrointestinal: Negative for abdominal distention, abdominal pain, constipation, diarrhea, nausea and vomiting.   Endocrine: Negative for polydipsia, polyphagia and polyuria.   Genitourinary: Negative for difficulty urinating, dysuria, flank pain, frequency, hematuria and urgency.   Musculoskeletal: Negative for myalgias.   Skin: Negative for pallor and rash.   Neurological: Negative for dizziness, seizures, syncope, speech difficulty, weakness, light-headedness, numbness and headaches.   Psychiatric/Behavioral: Negative for confusion.     Objective:   /80   Pulse 91   Temp (!) 94.4 °F (34.7 °C) (Tympanic)   Ht 5' 5" (1.651 m)   Wt (!) 153.2 kg (337 lb 11.9 oz)   BMI 56.20 kg/m²     Physical Exam   Constitutional: Vital signs are normal. She appears well-developed and well-nourished. No distress.   Morbid obesity   Eyes: No scleral icterus.   Neck: No JVD present. Carotid bruit is not present.   Cardiovascular: Normal rate, regular rhythm and normal heart sounds.  Exam reveals no gallop and no friction rub.    No murmur heard.  Pulses:       Dorsalis pedis pulses are 2+ on the right side, and 2+ on the left side.        Posterior tibial pulses are 2+ on the right side, and 2+ on the left side.   Pulmonary/Chest: Effort normal and breath sounds normal. She has no wheezes. She has no rhonchi. She has no rales.   Abdominal: Soft. She exhibits no " distension. There is no hepatosplenomegaly. There is no tenderness. There is no rebound and no guarding.   Musculoskeletal: She exhibits no edema.        Right foot: There is normal range of motion and no deformity.        Left foot: There is normal range of motion and no deformity.   Feet:   Right Foot:   Protective Sensation: 10 sites tested. 10 sites sensed.   Skin Integrity: Negative for ulcer, blister, skin breakdown, erythema, warmth, callus or dry skin.   Left Foot:   Protective Sensation: 10 sites tested. 10 sites sensed.   Skin Integrity: Negative for ulcer, blister, skin breakdown, erythema, warmth, callus or dry skin.   Neurological: She displays a negative Romberg sign. Coordination and gait normal.   Skin: Skin is warm and dry. No rash noted.   Psychiatric: She has a normal mood and affect.   Nursing note and vitals reviewed.    Assessment:     1. Type 2 diabetes mellitus with hyperglycemia, without long-term current use of insulin    2. Hyperlipidemia associated with type 2 diabetes mellitus    3. Acute cystitis with hematuria    4. Morbid obesity with BMI of 50.0-59.9, adult      Plan:   Type 2 diabetes mellitus with hyperglycemia, without long-term current use of insulin  -     Hemoglobin A1c; Future; Expected date: 11/22/2017  -     Microalbumin/creatinine urine ratio; Future; Expected date: 11/22/2017  If A1c unchanged, increased Trulicity 1.5 weekly.  Start ACE inhibitor if microalbumin positive.     Hyperlipidemia associated with type 2 diabetes mellitus  -     Hepatic function panel; Future; Expected date: 11/22/2017  If LFTs stable, continue pravastatin 40 mg daily.  Recheck lipids and LFTs in 3 months.    Acute cystitis with hematuria  -     Urine culture; Future; Expected date: 11/22/2017  [place] with antibiotics if positive.  Keep appointment scheduled with urology.    Morbid obesity with BMI of 50.0-59.9, adult  Discussed based on BMI candidate for weight loss surgery or medication.  Based  on other comorbid conditions medications, Qsymia would be the only medication advised.  If at 3 month follow-up has not lost any weight on Trulicity, continue starting above.    Return to clinic 3 months.

## 2017-11-24 ENCOUNTER — OFFICE VISIT (OUTPATIENT)
Dept: GASTROENTEROLOGY | Facility: CLINIC | Age: 52
End: 2017-11-24
Payer: COMMERCIAL

## 2017-11-24 VITALS
DIASTOLIC BLOOD PRESSURE: 74 MMHG | HEART RATE: 78 BPM | SYSTOLIC BLOOD PRESSURE: 130 MMHG | WEIGHT: 293 LBS | BODY MASS INDEX: 48.82 KG/M2 | HEIGHT: 65 IN

## 2017-11-24 DIAGNOSIS — A04.8 H. PYLORI INFECTION: Primary | ICD-10-CM

## 2017-11-24 PROCEDURE — 99999 PR PBB SHADOW E&M-EST. PATIENT-LVL III: CPT | Mod: PBBFAC,,, | Performed by: NURSE PRACTITIONER

## 2017-11-24 PROCEDURE — 99213 OFFICE O/P EST LOW 20 MIN: CPT | Mod: S$GLB,,, | Performed by: NURSE PRACTITIONER

## 2017-11-24 NOTE — PROGRESS NOTES
Clinic Follow Up:  [facility] Follow Up Note    Reason for Follow Up:  The encounter diagnosis was H. pylori infection.    PCP: Siva Esparza       HPI:  This is a 51 y.o. female here for follow up of H. Pylori infection. She was diagnosed with H. Pylori from biopsy per EGD. She completed treatment regimen but was not ever on any PPIs. She reports all symptoms improved. No further GI complaints at this time.     Review of Systems   Constitutional: Negative for activity change and appetite change.        As per interval history above   Respiratory: Negative for cough and shortness of breath.    Cardiovascular: Negative for chest pain.   Gastrointestinal: Negative for abdominal pain, anal bleeding, blood in stool, constipation, diarrhea, nausea, rectal pain and vomiting.   Skin: Negative for color change and rash.       Medical History:  Past Medical History:   Diagnosis Date    Allergic rhinitis     Arthritis     Colon polyps     Diabetes mellitus     Hypertension     Metabolic syndrome     Mixed anxiety and depressive disorder     Prediabetes     Urine incontinence        Surgical History:   Past Surgical History:   Procedure Laterality Date    breast reduction      COLONOSCOPY N/A 10/19/2017    Procedure: COLONOSCOPY;  Surgeon: Jamal Cole MD;  Location: [facility];  Service: Endoscopy;  Laterality: N/A;    PARTIAL HYSTERECTOMY      PLANTAR FASCIA SURGERY         Family History:   Family History   Problem Relation Age of Onset    Diabetes Mother     Stroke Mother     Cancer Father     Diabetes Sister     Diabetes Brother     Lupus Other     Eczema Other     Melanoma Neg Hx     Psoriasis Neg Hx        Social History:   Social History   Substance Use Topics    Smoking status: Former Smoker     Packs/day: 0.25     Years: 13.00     Quit date: 10/19/2000    Smokeless tobacco: Never Used    Alcohol use No       Allergies:   Review of patient's allergies indicates:    Allergen Reactions    Codeine Edema and Itching    Pcn [penicillins] Rash    Sulfa (sulfonamide antibiotics) Edema and Rash       Home Medications:  Current Outpatient Prescriptions on File Prior to Visit   Medication Sig Dispense Refill    aspirin (ECOTRIN) 81 MG EC tablet Take 1 tablet (81 mg total) by mouth once daily.  0    cholecalciferol, vitamin D3, 1,000 unit capsule Take 1 capsule (1,000 Units total) by mouth 2 (two) times daily. 100 capsule 0    dulaglutide (TRULICITY) 0.75 mg/0.5 mL PnIj Inject 0.5 mLs (0.75 mg total) into the skin every 7 days. 4 Syringe 1    DULoxetine (CYMBALTA) 60 MG capsule Take 1 capsule (60 mg total) by mouth once daily. 90 capsule 0    fluticasone (FLONASE) 50 mcg/actuation nasal spray USE TWO SPRAY(S) IN EACH NOSTRIL ONCE DAILY 1 Bottle 6    hydrochlorothiazide (HYDRODIURIL) 25 MG tablet TAKE ONE TABLET BY MOUTH ONCE DAILY 30 tablet 11    naproxen (NAPROSYN) 500 MG tablet Take 1 tablet (500 mg total) by mouth 2 (two) times daily with meals. 40 tablet 2    ondansetron (ZOFRAN-ODT) 4 MG TbDL Take 1 tablet (4 mg total) by mouth every 4 to 6 hours as needed. 12 tablet 1    pravastatin (PRAVACHOL) 40 MG tablet Take 1 tablet (40 mg total) by mouth once daily. 90 tablet 0    tramadol (ULTRAM) 50 mg tablet Take 50 mg by mouth 2 (two) times daily as needed.       zafirlukast (ACCOLATE) 20 MG tablet TAKE ONE TABLET BY MOUTH TWICE DAILY 60 tablet 11    albuterol 90 mcg/actuation inhaler Inhale 1-2 puffs into the lungs every 6 (six) hours as needed for Wheezing. 1 Inhaler 0    mirabegron 50 mg Tb24 Take 2 tablets (100 mg total) by mouth every morning. 60 tablet 11    pantoprazole (PROTONIX) 40 MG tablet Take 1 tablet (40 mg total) by mouth 2 (two) times daily. 28 tablet 0    triamcinolone acetonide 0.025% (KENALOG) 0.025 % cream Apply topically 2 (two) times daily as needed. 30 g 2     No current facility-administered medications on file prior to visit.        Physical  "Exam:  Vital Signs:  /74   Pulse 78   Ht 5' 5" (1.651 m)   Wt (!) 154.9 kg (341 lb 7.9 oz)   BMI 56.83 kg/m²   Body mass index is 56.83 kg/m².  Physical Exam   Constitutional: She is oriented to person, place, and time and well-developed, well-nourished, and in no distress. No distress.   HENT:   Head: Normocephalic.   Eyes: Conjunctivae are normal. Pupils are equal, round, and reactive to light.   Cardiovascular: Normal rate, regular rhythm and normal heart sounds.    Pulmonary/Chest: Effort normal and breath sounds normal. No respiratory distress.   Abdominal: Soft. Bowel sounds are normal. She exhibits no distension. There is no tenderness.   Neurological: She is alert and oriented to person, place, and time. No cranial nerve deficit.   Skin: Skin is warm and dry. No rash noted.   Psychiatric: Mood and affect normal.       Labs: Pertinent labs reviewed.    Endoscopy:  See HPI    CRC Screening: Up to date    Assessment:  1. H. pylori infection        Recommendations:  Symptoms improved.   Completed ABX. Was never on PPI.  She needs repeat H. Pylori test via stool sample   -     H. pylori antigen, stool; Future; Expected date: 11/24/2017    Return to Clinic:  Return if symptoms worsen or fail to improve.    Thank you for the opportunity to participate in the care of this patient.  POOJA Leon      "

## 2017-11-27 ENCOUNTER — LAB VISIT (OUTPATIENT)
Dept: LAB | Facility: HOSPITAL | Age: 52
End: 2017-11-27
Attending: NURSE PRACTITIONER
Payer: COMMERCIAL

## 2017-11-27 DIAGNOSIS — E11.65 TYPE 2 DIABETES MELLITUS WITH HYPERGLYCEMIA, WITHOUT LONG-TERM CURRENT USE OF INSULIN: Primary | ICD-10-CM

## 2017-11-27 DIAGNOSIS — A04.8 H. PYLORI INFECTION: ICD-10-CM

## 2017-11-27 PROCEDURE — 87338 HPYLORI STOOL AG IA: CPT

## 2017-11-29 ENCOUNTER — TELEPHONE (OUTPATIENT)
Dept: INTERNAL MEDICINE | Facility: CLINIC | Age: 52
End: 2017-11-29

## 2017-11-29 NOTE — TELEPHONE ENCOUNTER
----- Message from Siva Esparza MD sent at 11/27/2017  8:21 AM CST -----  A1c improved, still elevated.  Increased Trulicity 1.5 weekly with next refill. New Rx sent  Recheck in 3 months    Liver Function Tests are all normal.  Your urine culture is negative. No bacteria are present. No need for any additional antibiotics.

## 2017-11-30 LAB — H PYLORI AG STL QL: NOT DETECTED

## 2017-12-01 ENCOUNTER — TELEPHONE (OUTPATIENT)
Dept: GASTROENTEROLOGY | Facility: CLINIC | Age: 52
End: 2017-12-01

## 2017-12-01 NOTE — TELEPHONE ENCOUNTER
----- Message from Yasmin Conner sent at 12/1/2017 12:08 PM CST -----  Contact: self [phone]  States that she is returning call please call back at [phone]//thank you acc

## 2017-12-04 DIAGNOSIS — F41.8 MIXED ANXIETY AND DEPRESSIVE DISORDER: ICD-10-CM

## 2017-12-05 RX ORDER — DULOXETIN HYDROCHLORIDE 60 MG/1
60 CAPSULE, DELAYED RELEASE ORAL DAILY
Qty: 90 CAPSULE | Refills: 0 | Status: SHIPPED | OUTPATIENT
Start: 2017-12-05 | End: 2018-05-25 | Stop reason: SDUPTHER

## 2017-12-07 ENCOUNTER — TELEPHONE (OUTPATIENT)
Dept: INTERNAL MEDICINE | Facility: CLINIC | Age: 52
End: 2017-12-07

## 2017-12-20 ENCOUNTER — TELEPHONE (OUTPATIENT)
Dept: INTERNAL MEDICINE | Facility: CLINIC | Age: 52
End: 2017-12-20

## 2018-01-09 DIAGNOSIS — E11.69 HYPERLIPIDEMIA ASSOCIATED WITH TYPE 2 DIABETES MELLITUS: Chronic | ICD-10-CM

## 2018-01-09 DIAGNOSIS — E78.5 HYPERLIPIDEMIA ASSOCIATED WITH TYPE 2 DIABETES MELLITUS: Chronic | ICD-10-CM

## 2018-01-10 RX ORDER — PRAVASTATIN SODIUM 40 MG/1
40 TABLET ORAL DAILY
Qty: 90 TABLET | Refills: 0 | Status: SHIPPED | OUTPATIENT
Start: 2018-01-10 | End: 2018-03-19 | Stop reason: SDUPTHER

## 2018-02-07 ENCOUNTER — PATIENT OUTREACH (OUTPATIENT)
Dept: ADMINISTRATIVE | Facility: HOSPITAL | Age: 53
End: 2018-02-07

## 2018-02-07 PROBLEM — Z86.0100 HISTORY OF COLON POLYPS: Status: ACTIVE | Noted: 2017-10-19

## 2018-02-07 PROBLEM — Z86.010 HISTORY OF COLON POLYPS: Status: ACTIVE | Noted: 2017-10-19

## 2018-02-08 ENCOUNTER — TELEPHONE (OUTPATIENT)
Dept: INTERNAL MEDICINE | Facility: CLINIC | Age: 53
End: 2018-02-08

## 2018-02-08 DIAGNOSIS — Z12.39 BREAST CANCER SCREENING: Primary | ICD-10-CM

## 2018-02-08 NOTE — TELEPHONE ENCOUNTER
----- Message from Mindy Wharton LPN sent at 2/8/2018  2:07 PM CST -----  Contact: pt      ----- Message -----  From: Katherine Knox  Sent: 2/8/2018  12:15 PM  To: Isaias RAMIREZ Staff    She's calling to have mammogram scheduled, please add orders and advise [phone] (home)

## 2018-02-12 ENCOUNTER — HOSPITAL ENCOUNTER (OUTPATIENT)
Dept: RADIOLOGY | Facility: HOSPITAL | Age: 53
Discharge: HOME | End: 2018-02-12
Attending: FAMILY MEDICINE
Payer: COMMERCIAL

## 2018-02-12 VITALS — BODY MASS INDEX: 48.82 KG/M2 | HEIGHT: 65 IN | WEIGHT: 293 LBS

## 2018-02-12 DIAGNOSIS — Z12.39 BREAST CANCER SCREENING: ICD-10-CM

## 2018-02-12 PROCEDURE — 77067 SCR MAMMO BI INCL CAD: CPT | Mod: 26,,, | Performed by: RADIOLOGY

## 2018-02-12 PROCEDURE — 77067 SCR MAMMO BI INCL CAD: CPT | Mod: TC,PO

## 2018-02-21 ENCOUNTER — LAB VISIT (OUTPATIENT)
Dept: LAB | Facility: HOSPITAL | Age: 53
End: 2018-02-21
Attending: FAMILY MEDICINE
Payer: COMMERCIAL

## 2018-02-21 ENCOUNTER — OFFICE VISIT (OUTPATIENT)
Dept: INTERNAL MEDICINE | Facility: CLINIC | Age: 53
End: 2018-02-21
Payer: COMMERCIAL

## 2018-02-21 VITALS
TEMPERATURE: 96 F | WEIGHT: 293 LBS | BODY MASS INDEX: 48.82 KG/M2 | DIASTOLIC BLOOD PRESSURE: 86 MMHG | HEIGHT: 65 IN | SYSTOLIC BLOOD PRESSURE: 128 MMHG

## 2018-02-21 DIAGNOSIS — E11.69 HYPERLIPIDEMIA ASSOCIATED WITH TYPE 2 DIABETES MELLITUS: Chronic | ICD-10-CM

## 2018-02-21 DIAGNOSIS — E11.65 TYPE 2 DIABETES MELLITUS WITH HYPERGLYCEMIA, WITHOUT LONG-TERM CURRENT USE OF INSULIN: Primary | Chronic | ICD-10-CM

## 2018-02-21 DIAGNOSIS — E11.65 TYPE 2 DIABETES MELLITUS WITH HYPERGLYCEMIA, WITHOUT LONG-TERM CURRENT USE OF INSULIN: Chronic | ICD-10-CM

## 2018-02-21 DIAGNOSIS — E78.5 HYPERLIPIDEMIA ASSOCIATED WITH TYPE 2 DIABETES MELLITUS: Chronic | ICD-10-CM

## 2018-02-21 DIAGNOSIS — E66.01 MORBID OBESITY WITH BMI OF 50.0-59.9, ADULT: ICD-10-CM

## 2018-02-21 LAB
ALBUMIN SERPL BCP-MCNC: 3.4 G/DL
ALP SERPL-CCNC: 93 U/L
ALT SERPL W/O P-5'-P-CCNC: 19 U/L
AST SERPL-CCNC: 21 U/L
BILIRUB DIRECT SERPL-MCNC: 0.2 MG/DL
BILIRUB SERPL-MCNC: 0.4 MG/DL
CHOLEST SERPL-MCNC: 161 MG/DL
CHOLEST/HDLC SERPL: 4 {RATIO}
ESTIMATED AVG GLUCOSE: 140 MG/DL
HBA1C MFR BLD HPLC: 6.5 %
HDLC SERPL-MCNC: 40 MG/DL
HDLC SERPL: 24.8 %
LDLC SERPL CALC-MCNC: 102.4 MG/DL
NONHDLC SERPL-MCNC: 121 MG/DL
PROT SERPL-MCNC: 7.8 G/DL
TRIGL SERPL-MCNC: 93 MG/DL

## 2018-02-21 PROCEDURE — 3008F BODY MASS INDEX DOCD: CPT | Mod: S$GLB,,, | Performed by: FAMILY MEDICINE

## 2018-02-21 PROCEDURE — 80061 LIPID PANEL: CPT

## 2018-02-21 PROCEDURE — 80076 HEPATIC FUNCTION PANEL: CPT

## 2018-02-21 PROCEDURE — 83036 HEMOGLOBIN GLYCOSYLATED A1C: CPT

## 2018-02-21 PROCEDURE — 36415 COLL VENOUS BLD VENIPUNCTURE: CPT | Mod: PO

## 2018-02-21 PROCEDURE — 99999 PR PBB SHADOW E&M-EST. PATIENT-LVL III: CPT | Mod: PBBFAC,,, | Performed by: FAMILY MEDICINE

## 2018-02-21 PROCEDURE — 99214 OFFICE O/P EST MOD 30 MIN: CPT | Mod: S$GLB,,, | Performed by: FAMILY MEDICINE

## 2018-02-21 NOTE — PROGRESS NOTES
Subjective:   Patient ID: Ac Hayden is a 52 y.o. female.  Chief Complaint:  Follow-up (3mo)      Three-month follow-up on diabetes and hyperlipidemia.  Last visit A1c 8.0%.  Increased Trulicity 1.5 mg weekly.  Microalbumin negative.  Patient reports compliance with change.  No symptoms of hypoglycemia. Still with hyperglycemia symptoms but improved.  No side effects increased dose.  Needs repeat A1c.  Recently started on pravastatin for hyperlipidemia.  LFTs normal last visit.  No side effects.  Tolerating medication.  Reports compliance.  Needs repeat lipids and LFTs.  Urine culture reviewed from last visit was negative.  No new complaints or concerns today.  Interested in bariatric surgery.  For insurance purposes needs form completed to confirm morbidly obese with diabetes and nonsmoker.      Diabetes   She has type 2 diabetes mellitus. No MedicAlert identification noted. The initial diagnosis of diabetes was made 5 months ago. Hypoglycemia symptoms include mood changes. Pertinent negatives for hypoglycemia include no confusion, dizziness, headaches, hunger, nervousness/anxiousness, pallor, seizures, sleepiness, speech difficulty, sweats or tremors. Associated symptoms include fatigue, polydipsia, polyuria and visual change. Pertinent negatives for diabetes include no blurred vision, no chest pain, no foot paresthesias, no foot ulcerations, no polyphagia, no weakness and no weight loss. Pertinent negatives for hypoglycemia complications include no blackouts, no hospitalization, no nocturnal hypoglycemia, no required assistance and no required glucagon injection. Symptoms are stable. Diabetic complications include impotence. Pertinent negatives for diabetic complications include no autonomic neuropathy, CVA, heart disease, nephropathy, peripheral neuropathy, PVD or retinopathy. Risk factors for coronary artery disease include dyslipidemia, obesity and sedentary lifestyle. Current diabetic treatment  "includes diet. She is compliant with treatment some of the time. Her weight is fluctuating minimally. She is following a generally unhealthy diet. Meal planning includes avoidance of concentrated sweets. She has not had a previous visit with a dietitian. She participates in exercise intermittently. She monitors blood glucose at home 1-2 x per day. She monitors urine at home <1 x per month. Blood glucose monitoring compliance is good. Her home blood glucose trend is fluctuating minimally. She does not see a podiatrist.Eye exam is current.       Review of Systems   Constitutional: Positive for fatigue. Negative for chills, diaphoresis, fever and weight loss.   Eyes: Negative for blurred vision and visual disturbance.   Respiratory: Negative for cough and shortness of breath.    Cardiovascular: Negative for chest pain and leg swelling.   Gastrointestinal: Negative for abdominal distention, abdominal pain, constipation, diarrhea, nausea and vomiting.   Endocrine: Positive for polydipsia and polyuria. Negative for polyphagia.   Genitourinary: Positive for impotence. Negative for difficulty urinating, dysuria, flank pain, frequency, hematuria and urgency.   Musculoskeletal: Negative for myalgias.   Skin: Negative for pallor and rash.   Neurological: Negative for dizziness, tremors, seizures, speech difficulty, weakness, light-headedness, numbness and headaches.   Psychiatric/Behavioral: Negative for confusion. The patient is not nervous/anxious.      Objective:   /86 (BP Location: Right arm, Patient Position: Sitting, BP Method: Large (Manual))   Temp (!) 95.9 °F (35.5 °C) (Tympanic)   Ht 5' 5" (1.651 m)   Wt (!) 149.8 kg (330 lb 4 oz)   BMI 54.96 kg/m²     Physical Exam   Constitutional: Vital signs are normal. She appears well-developed and well-nourished.   Morbid Obesity   Eyes: No scleral icterus.   Neck: No JVD present. Carotid bruit is not present.   Cardiovascular: Normal rate, regular rhythm and normal " heart sounds.  Exam reveals no gallop and no friction rub.    No murmur heard.  Pulmonary/Chest: Effort normal and breath sounds normal. She has no wheezes. She has no rhonchi. She has no rales.   Abdominal: Soft. She exhibits no distension. There is no hepatosplenomegaly. There is no tenderness. There is no rebound and no guarding.   Neurological: She displays a negative Romberg sign. Coordination and gait normal.   Skin: No rash noted.   Nursing note and vitals reviewed.    Assessment:     1. Type 2 diabetes mellitus with hyperglycemia, without long-term current use of insulin    2. Hyperlipidemia associated with type 2 diabetes mellitus    3. Morbid obesity with BMI of 50.0-59.9, adult      Plan:   Type 2 diabetes mellitus with hyperglycemia, without long-term current use of insulin  -     Hemoglobin A1c; Future; Expected date: 02/21/2018  Check A1c.  Goal less than 8%.  Continue Trulicity 1.5 mg weekly.  If greater than 8%, needs additional agent.    Hyperlipidemia associated with type 2 diabetes mellitus  -     Lipid panel; Future; Expected date: 02/21/2018  -     Hepatic function panel; Future; Expected date: 02/21/2018  Check lipid panel.  Goal LDL less than 100 and diabetic.  Adjust pravastatin 40 mg daily as needed.    Morbid obesity with BMI of 50.0-59.9, adult  Form completed to verify morbid obesity with BMI 55, type 2 diabetes, nonsmoker.    Return to clinic 3 months or sooner as needed.

## 2018-03-19 DIAGNOSIS — E78.5 HYPERLIPIDEMIA ASSOCIATED WITH TYPE 2 DIABETES MELLITUS: Chronic | ICD-10-CM

## 2018-03-19 DIAGNOSIS — E11.69 HYPERLIPIDEMIA ASSOCIATED WITH TYPE 2 DIABETES MELLITUS: Chronic | ICD-10-CM

## 2018-03-20 RX ORDER — PRAVASTATIN SODIUM 40 MG/1
40 TABLET ORAL DAILY
Qty: 90 TABLET | Refills: 3 | Status: SHIPPED | OUTPATIENT
Start: 2018-03-20 | End: 2018-09-21

## 2018-03-29 ENCOUNTER — OFFICE VISIT (OUTPATIENT)
Dept: UROLOGY | Facility: CLINIC | Age: 53
End: 2018-03-29
Payer: COMMERCIAL

## 2018-03-29 VITALS — HEIGHT: 65 IN | WEIGHT: 293 LBS | BODY MASS INDEX: 48.82 KG/M2

## 2018-03-29 DIAGNOSIS — N32.81 OAB (OVERACTIVE BLADDER): Primary | ICD-10-CM

## 2018-03-29 LAB
BILIRUB SERPL-MCNC: NORMAL MG/DL
BLOOD URINE, POC: NORMAL
COLOR, POC UA: YELLOW
GLUCOSE UR QL STRIP: NORMAL
KETONES UR QL STRIP: NORMAL
LEUKOCYTE ESTERASE URINE, POC: NORMAL
NITRITE, POC UA: NORMAL
PH, POC UA: 5
PROTEIN, POC: NORMAL
SPECIFIC GRAVITY, POC UA: 1.02
UROBILINOGEN, POC UA: NORMAL

## 2018-03-29 PROCEDURE — 99214 OFFICE O/P EST MOD 30 MIN: CPT | Mod: 25,S$GLB,, | Performed by: UROLOGY

## 2018-03-29 PROCEDURE — 81002 URINALYSIS NONAUTO W/O SCOPE: CPT | Mod: S$GLB,,, | Performed by: UROLOGY

## 2018-03-29 PROCEDURE — 99999 PR PBB SHADOW E&M-EST. PATIENT-LVL II: CPT | Mod: PBBFAC,,, | Performed by: UROLOGY

## 2018-03-29 NOTE — PROGRESS NOTES
Chief Complaint: OAB    HPI:   3/29/18: 51 yo woman seen by Dr. Jain and treated with botox for OAB, without problems of retention afterward.  Was on myrbetriq with mixed results before that.  The OAB symptoms have returned.  Myrbetriq was satisfactory in the past.    Allergies:  Codeine; Pcn [penicillins]; and Sulfa (sulfonamide antibiotics)    Medications: has a current medication list which includes the following prescription(s): albuterol, aspirin, cholecalciferol (vitamin d3), dulaglutide, duloxetine, fluticasone, hydrochlorothiazide, mirabegron, naproxen, ondansetron, pantoprazole, pravastatin, tramadol, triamcinolone acetonide 0.025%, and zafirlukast.    Review of Systems:  General: No fever, chills, fatigability, or weight loss.  Skin: No rashes, itching, or changes in color or texture of skin.  Chest: Denies DONALDSON, cyanosis, wheezing, cough, and sputum production.  Abdomen: Appetite fine. No weight loss. Denies diarrhea, abdominal pain, hematemesis, or blood in stool.  Musculoskeletal: No joint stiffness or swelling. Denies back pain.  : As above.  All other review of systems negative.    PMH:   has a past medical history of Allergic rhinitis; Arthritis; Colon polyps; Diabetes mellitus; Hypertension; Metabolic syndrome; Mixed anxiety and depressive disorder; Prediabetes; and Urine incontinence.    PSH:   has a past surgical history that includes Plantar fascia surgery; Partial hysterectomy; breast reduction; Colonoscopy (N/A, 10/19/2017); Total Reduction Mammoplasty; and Hysterectomy.    FamHx: family history includes Cancer in her father; Diabetes in her brother, mother, and sister; Eczema in her other; Lupus in her other; Stroke in her mother.    SocHx:  reports that she quit smoking about 17 years ago. She has a 3.25 pack-year smoking history. She has never used smokeless tobacco. She reports that she does not drink alcohol or use drugs.     Physical Exam:  Vitals: There were no vitals filed for this  visit.  General: A&Ox3. No apparent distress. No deformities.  Neck: No masses. Normal thyroid.  Lungs: normal inspiration. No use of accessory muscles.  Heart: normal pulse. No arrhythmias.  Abdomen: Soft. NT. ND.  Skin: The skin is warm and dry. No jaundice.  Ext: No c/c/e.  : deferred    Labs/Studies: Urinalysis performed in clinic, summary: UA normal    Impression/Plan:   1. Myrbetriq today as it does relieve her symptoms but is expensive.  2. To Dr. Machado to see about getting started on Botox again.

## 2018-04-09 ENCOUNTER — HOSPITAL ENCOUNTER (OUTPATIENT)
Dept: RADIOLOGY | Facility: HOSPITAL | Age: 53
Discharge: HOME | End: 2018-04-09
Attending: FAMILY MEDICINE
Payer: COMMERCIAL

## 2018-04-09 ENCOUNTER — CLINICAL SUPPORT (OUTPATIENT)
Dept: CARDIOLOGY | Facility: CLINIC | Age: 53
End: 2018-04-09
Payer: COMMERCIAL

## 2018-04-09 ENCOUNTER — OFFICE VISIT (OUTPATIENT)
Dept: INTERNAL MEDICINE | Facility: CLINIC | Age: 53
End: 2018-04-09
Payer: COMMERCIAL

## 2018-04-09 VITALS
WEIGHT: 293 LBS | OXYGEN SATURATION: 99 % | SYSTOLIC BLOOD PRESSURE: 138 MMHG | DIASTOLIC BLOOD PRESSURE: 88 MMHG | HEART RATE: 109 BPM | TEMPERATURE: 96 F | BODY MASS INDEX: 56.13 KG/M2

## 2018-04-09 DIAGNOSIS — E66.01 MORBID OBESITY WITH BMI OF 50.0-59.9, ADULT: ICD-10-CM

## 2018-04-09 DIAGNOSIS — E11.9 TYPE 2 DIABETES MELLITUS WITHOUT COMPLICATION, WITHOUT LONG-TERM CURRENT USE OF INSULIN: ICD-10-CM

## 2018-04-09 DIAGNOSIS — F41.8 MIXED ANXIETY AND DEPRESSIVE DISORDER: Chronic | ICD-10-CM

## 2018-04-09 DIAGNOSIS — E11.69 HYPERLIPIDEMIA ASSOCIATED WITH TYPE 2 DIABETES MELLITUS: Chronic | ICD-10-CM

## 2018-04-09 DIAGNOSIS — Z01.818 PREOP EXAMINATION: ICD-10-CM

## 2018-04-09 DIAGNOSIS — E66.01 MORBID OBESITY WITH BMI OF 50.0-59.9, ADULT: Primary | ICD-10-CM

## 2018-04-09 DIAGNOSIS — N39.41 URGENCY INCONTINENCE: ICD-10-CM

## 2018-04-09 DIAGNOSIS — R60.0 BILATERAL EDEMA OF LOWER EXTREMITY: ICD-10-CM

## 2018-04-09 DIAGNOSIS — E78.5 HYPERLIPIDEMIA ASSOCIATED WITH TYPE 2 DIABETES MELLITUS: Chronic | ICD-10-CM

## 2018-04-09 PROBLEM — R19.7 DIARRHEA: Status: RESOLVED | Noted: 2017-10-19 | Resolved: 2018-04-09

## 2018-04-09 PROCEDURE — 99214 OFFICE O/P EST MOD 30 MIN: CPT | Mod: S$GLB,,, | Performed by: FAMILY MEDICINE

## 2018-04-09 PROCEDURE — 71046 X-RAY EXAM CHEST 2 VIEWS: CPT | Mod: TC,FY,PO

## 2018-04-09 PROCEDURE — 71046 X-RAY EXAM CHEST 2 VIEWS: CPT | Mod: 26,,, | Performed by: RADIOLOGY

## 2018-04-09 PROCEDURE — 99999 PR PBB SHADOW E&M-EST. PATIENT-LVL III: CPT | Mod: PBBFAC,,, | Performed by: FAMILY MEDICINE

## 2018-04-09 PROCEDURE — 93000 ELECTROCARDIOGRAM COMPLETE: CPT | Mod: S$GLB,,, | Performed by: NUCLEAR MEDICINE

## 2018-04-09 RX ORDER — FLURANDRENOLIDE 0.5 MG/ML
LOTION TOPICAL
COMMUNITY
Start: 2018-04-04 | End: 2018-04-09

## 2018-04-09 RX ORDER — OXICONAZOLE NITRATE 10 MG/G
CREAM TOPICAL
COMMUNITY
Start: 2018-04-04 | End: 2018-04-09

## 2018-04-09 NOTE — PROGRESS NOTES
Subjective:   Patient ID:  Ac Hayden is a 52 y.o. female.    Chief Complaint:  Pre-op Exam    Past Medical History:   Diagnosis Date    Allergic rhinitis     Arthritis     Colon polyps     Diabetes mellitus     Hypertension     Metabolic syndrome     Mixed anxiety and depressive disorder     Prediabetes     Urine incontinence      Past Surgical History:   Procedure Laterality Date    breast reduction      COLONOSCOPY N/A 10/19/2017    Procedure: COLONOSCOPY;  Surgeon: Jamal Cole MD;  Location: [facility];  Service: Endoscopy;  Laterality: N/A;    HYSTERECTOMY      PARTIAL HYSTERECTOMY      PLANTAR FASCIA SURGERY      TOTAL REDUCTION MAMMOPLASTY       Family History   Problem Relation Age of Onset    Diabetes Mother     Stroke Mother     Cancer Father     Diabetes Sister     Diabetes Brother     Lupus Other     Eczema Other     Melanoma Neg Hx     Psoriasis Neg Hx      Review of patient's allergies indicates:   Allergen Reactions    Codeine Edema and Itching    Pcn [penicillins] Rash    Sulfa (sulfonamide antibiotics) Edema and Rash       Current Outpatient Prescriptions:     aspirin (ECOTRIN) 81 MG EC tablet, Take 1 tablet (81 mg total) by mouth once daily., Disp: , Rfl: 0    cholecalciferol, vitamin D3, 1,000 unit capsule, Take 1 capsule (1,000 Units total) by mouth 2 (two) times daily., Disp: 100 capsule, Rfl: 0    dulaglutide (TRULICITY) 1.5 mg/0.5 mL PnIj, Inject 1.5 mg into the skin every 7 days., Disp: 4 Syringe, Rfl: 3    DULoxetine (CYMBALTA) 60 MG capsule, Take 1 capsule (60 mg total) by mouth once daily., Disp: 90 capsule, Rfl: 0    fluticasone (FLONASE) 50 mcg/actuation nasal spray, USE TWO SPRAY(S) IN EACH NOSTRIL ONCE DAILY, Disp: 1 Bottle, Rfl: 6    hydrochlorothiazide (HYDRODIURIL) 25 MG tablet, TAKE ONE TABLET BY MOUTH ONCE DAILY, Disp: 30 tablet, Rfl: 11    mirabegron (MYRBETRIQ) 50 mg Tb24, Take 1 tablet (50 mg total) by mouth once daily., Disp:  30 tablet, Rfl: 11    ondansetron (ZOFRAN-ODT) 4 MG TbDL, Take 1 tablet (4 mg total) by mouth every 4 to 6 hours as needed., Disp: 12 tablet, Rfl: 1    pravastatin (PRAVACHOL) 40 MG tablet, Take 1 tablet (40 mg total) by mouth once daily., Disp: 90 tablet, Rfl: 3    tramadol (ULTRAM) 50 mg tablet, Take 50 mg by mouth 2 (two) times daily as needed. , Disp: , Rfl:     zafirlukast (ACCOLATE) 20 MG tablet, TAKE ONE TABLET BY MOUTH TWICE DAILY, Disp: 60 tablet, Rfl: 11    albuterol 90 mcg/actuation inhaler, Inhale 1-2 puffs into the lungs every 6 (six) hours as needed for Wheezing., Disp: 1 Inhaler, Rfl: 0    pantoprazole (PROTONIX) 40 MG tablet, Take 1 tablet (40 mg total) by mouth 2 (two) times daily., Disp: 28 tablet, Rfl: 0    Presents for preoperative exam.  Gastric sleeve.  Tentatively scheduled May 28.  Has order for lab work, chest x-ray, EKG from surgeon.  No specific complaints or concerns today.  Medical history includes:  Diabetes.  Last A1c 6.5%.  Trulicity 1.5 mg weekly.  No metformin.  Hyperlipidemia with  and normal LFTs previously.  Pravastatin 40 mg daily and aspirin 81 mg daily.  Bilateral lower extremity edema.  HCTZ 25 mg daily.  Stable.  Controlling symptoms.  Nonspecific urinary incontinence.  Myrbetriq 50 mg daily.  Vitamin D insufficiency.  Improved.  On daily 1000 international unit supplement.  Mixed anxiety/depressive.  On Cymbalta 60 mg daily.  Reports stable.  No active symptoms.  GERD.  Controlled with Protonix.  ALLERGIC rhinitis/asthma.  Compliant with daily Flonase and Accolate.  Albuterol when necessary.  Overall doing well.  No significant symptoms or recent exacerbations.  Tetanus and pneumonia vaccines up-to-date.  No cardiac or pulmonary active symptoms.  No additional complaints concerns today.        Review of Systems   Constitutional: Negative for activity change, appetite change, chills, diaphoresis, fatigue and fever.   HENT: Negative for congestion, dental  problem, ear pain, postnasal drip, rhinorrhea, sinus pressure and sore throat.    Eyes: Negative for visual disturbance.   Respiratory: Negative for cough, chest tightness, shortness of breath and wheezing.    Cardiovascular: Negative for chest pain, palpitations and leg swelling.   Gastrointestinal: Negative for abdominal distention, abdominal pain, blood in stool, constipation, diarrhea, nausea and vomiting.   Endocrine: Negative for polydipsia, polyphagia and polyuria.   Genitourinary: Negative for difficulty urinating, dysuria, flank pain, frequency, hematuria, pelvic pain and urgency.   Musculoskeletal: Negative for back pain, myalgias and neck pain.   Skin: Negative for rash.   Neurological: Negative for dizziness, tremors, syncope, weakness, light-headedness, numbness and headaches.   Hematological: Negative for adenopathy.   Psychiatric/Behavioral: Negative for agitation, behavioral problems, confusion, decreased concentration, dysphoric mood, hallucinations, self-injury, sleep disturbance and suicidal ideas. The patient is not nervous/anxious and is not hyperactive.        Objective:   /88 (BP Location: Right arm, Patient Position: Sitting, BP Method: Large (Manual))   Pulse 109   Temp 96.3 °F (35.7 °C) (Tympanic)   Wt (!) 153 kg (337 lb 4.9 oz)   SpO2 99%   BMI 56.13 kg/m²     Physical Exam   Constitutional: She is oriented to person, place, and time. Vital signs are normal. She appears well-developed and well-nourished. She is cooperative. No distress.   Morbid Obesity   Eyes: No scleral icterus.   Neck: No JVD present. Carotid bruit is not present. No thyromegaly present.   Cardiovascular: Normal rate, regular rhythm and normal heart sounds.  Exam reveals no gallop and no friction rub.    No murmur heard.  Pulmonary/Chest: Effort normal and breath sounds normal. She has no wheezes. She has no rhonchi. She has no rales.   Abdominal: Soft. She exhibits no distension. There is no  hepatosplenomegaly. There is no tenderness. There is no rebound, no guarding and no CVA tenderness.   Musculoskeletal: She exhibits edema.   Neurological: She is alert and oriented to person, place, and time. She displays a negative Romberg sign. Coordination and gait normal.   Skin: Skin is warm and dry. No rash noted. No erythema.   Psychiatric: She has a normal mood and affect. Her speech is normal and behavior is normal. Judgment and thought content normal. Her mood appears not anxious. Her affect is not angry, not blunt, not labile and not inappropriate. She is not agitated, not aggressive, not hyperactive, not slowed, not withdrawn, not actively hallucinating and not combative. Thought content is not paranoid and not delusional. Cognition and memory are normal. She does not exhibit a depressed mood. She expresses no homicidal and no suicidal ideation. She is attentive.   Nursing note and vitals reviewed.    Assessment:     1. Morbid obesity with BMI of 50.0-59.9, adult    2. Preop examination    3. Type 2 diabetes mellitus without complication, without long-term current use of insulin    4. Hyperlipidemia associated with type 2 diabetes mellitus    5. Bilateral edema of lower extremity    6. Urgency incontinence    7. Mixed anxiety and depressive disorder      Plan:   Morbid obesity with BMI of 50.0-59.9, adult  Preop examination  -     CBC auto differential; Future; Expected date: 04/09/2018  -     Comprehensive metabolic panel; Future; Expected date: 04/09/2018  -     TSH; Future; Expected date: 04/09/2018  -     Hemoglobin A1c; Future; Expected date: 04/09/2018  -     EKG 12-lead; Future; Expected date: 04/09/2018  -     X-Ray Chest PA And Lateral; Future; Expected date: 04/09/2018    CBC, CMP, TSH all normal  A1c 7.0%    EKG   Sinus rhythm with Premature atrial complexes    CXR   Lungs are clear, with normal appearance of pulmonary vasculature and no pleural effusion or pneumothorax.  The cardiac  silhouette is normal in size. The hilar and mediastinal contours are unremarkable. Bones are intact.    Type 2 diabetes mellitus without complication, without long-term current use of insulin  Hyperlipidemia associated with type 2 diabetes mellitus  Bilateral edema of lower extremity  Urgency incontinence  Mixed anxiety and depressive disorder  Chronic medical conditions all stable.  Continue all medications per Osman.    Disposition  Low to Moderate risk for surgery  Medically stable and Cleared for Surgery under general Anesthesia  Hold aspirin per surgery prior to procedure    Return to clinic 4 months.

## 2018-04-11 ENCOUNTER — TELEPHONE (OUTPATIENT)
Dept: INTERNAL MEDICINE | Facility: CLINIC | Age: 53
End: 2018-04-11

## 2018-04-11 NOTE — TELEPHONE ENCOUNTER
----- Message from Siva Esparza MD sent at 4/11/2018  7:25 AM CDT -----  Blood Counts are normal. No significant anemia.  Sugar, Kidney, Liver, and Electrolyte tests are all normal.  Thyroid testing is normal.  A1c level shows controlled diabetes.  Printout copy for preop paperwork.

## 2018-04-11 NOTE — TELEPHONE ENCOUNTER
----- Message from Siva Esparza MD sent at 4/9/2018  5:45 PM CDT -----  EKG acceptable for surgery.

## 2018-04-19 ENCOUNTER — OFFICE VISIT (OUTPATIENT)
Dept: SLEEP MEDICINE | Facility: CLINIC | Age: 53
End: 2018-04-19
Payer: COMMERCIAL

## 2018-04-19 ENCOUNTER — TELEPHONE (OUTPATIENT)
Dept: PULMONOLOGY | Facility: CLINIC | Age: 53
End: 2018-04-19

## 2018-04-19 VITALS
BODY MASS INDEX: 48.82 KG/M2 | DIASTOLIC BLOOD PRESSURE: 86 MMHG | HEIGHT: 65 IN | HEART RATE: 107 BPM | RESPIRATION RATE: 18 BRPM | OXYGEN SATURATION: 97 % | WEIGHT: 293 LBS | SYSTOLIC BLOOD PRESSURE: 134 MMHG

## 2018-04-19 DIAGNOSIS — R06.02 SOB (SHORTNESS OF BREATH): Primary | ICD-10-CM

## 2018-04-19 DIAGNOSIS — R91.8 PULMONARY NODULES: ICD-10-CM

## 2018-04-19 DIAGNOSIS — G47.33 OSA ON CPAP: Primary | ICD-10-CM

## 2018-04-19 DIAGNOSIS — R91.1 LUNG NODULE: ICD-10-CM

## 2018-04-19 DIAGNOSIS — E66.01 MORBID OBESITY WITH BMI OF 50.0-59.9, ADULT: ICD-10-CM

## 2018-04-19 PROCEDURE — 99999 PR PBB SHADOW E&M-EST. PATIENT-LVL III: CPT | Mod: PBBFAC,,, | Performed by: INTERNAL MEDICINE

## 2018-04-19 PROCEDURE — 99205 OFFICE O/P NEW HI 60 MIN: CPT | Mod: S$GLB,,, | Performed by: INTERNAL MEDICINE

## 2018-04-19 NOTE — PROGRESS NOTES
Subjective:       Patient ID: Ac Hayden is a 52 y.o. female.    Chief Complaint: Sleep Apnea    Ms. Ac Hayden is 53 years old  She is referred to me by Dr. Siva Esparza.  She is a manager at [employer]  She has previously seen Dr. Art Fournier in 2012  She has obstructive sleep apnea.    On original study done in April 2012 AHI was 5.6 events per hour.    She had 1 obstructive apnea and 34 hypopneas.    She weighed 334 pounds at that time.  She was prescribed CPAP 9 cm water pressure  Apparently she was lost to follow-up  In the interim she has continued use of machine until about early this year she has had trouble with supplies.  She also is anticipating to have gastric weight loss surgery gastric sleeve done in [place] May 28, 2018.  She has no cough no wheezing no shortness of breath  Immunizations are up-to-date  I reviewed all her records in detail  A copy of her historical sleep studies were given to patient  [place] sleepiness score is 13.  Bedtime is 9 PM wakeup time is 5 AM.  Patient tells me she benefits from using the machine.  Intervention by bariatric surgery/gastric weight loss is probably going to be potentially curative for this patient  Is also probably to a high insulin resistance and diabetes    I also reviewed her historical records which show that she had 2 pulmonary nodules on seen in a CAT scan in 2016 and a follow-up was recommended in 1 year.  Former smoker about 6 cigarettes a day for about 13 years she quit in 2002.  This is 18 years ago  I recommend a follow-up CT scan.          Review of Systems   Constitutional: Positive for weight gain.   HENT: Negative.    Eyes: Negative.    Respiratory: Positive for apnea. Negative for snoring, sputum production, shortness of breath and wheezing.    Cardiovascular: Negative.    Genitourinary: Negative.    Endocrine: endocrine negative   Musculoskeletal: Negative.    Skin: Negative.    Gastrointestinal: Negative.    Neurological:  "Negative.    Psychiatric/Behavioral: Positive for sleep disturbance.       Objective:       Vitals:    04/19/18 0922   BP: 134/86   Pulse: 107   Resp: 18   SpO2: 97%   Weight: (!) 151 kg (332 lb 14.3 oz)   Height: 5' 5" (1.651 m)       Physical Exam   Constitutional: She is oriented to person, place, and time. She appears well-developed and well-nourished. She is obese.   HENT:   Head: Normocephalic.   Nose: Nose normal.   Mouth/Throat: Oropharynx is clear and moist. No oropharyngeal exudate. Mallampati Score: IV.   Neck: Normal range of motion. Neck supple. No JVD present.   Cardiovascular: Normal rate, regular rhythm and normal heart sounds.    No murmur heard.  Pulmonary/Chest: Normal expansion, effort normal and breath sounds normal. No respiratory distress. She has no wheezes. She has no rhonchi. Negative for tactile fremitus.   Abdominal: Soft. Bowel sounds are normal.   Lymphadenopathy:     She has no cervical adenopathy.   Neurological: She is alert and oriented to person, place, and time.   Skin: Skin is warm and dry. No cyanosis. Nails show no clubbing.   Psychiatric: She has a normal mood and affect.   Nursing note and vitals reviewed.    Personal Diagnostic Review  {    Chest CT  Findings: 4.9 mm nodule in the anterior right upper lobe series 2 image 23 is overall stable. 4.2 mm nodule posterior to the minor fissure series 2 image 32 also stable. No new or enlarging parenchymal nodules. Stable mild discoid type atelectatic change in the lingula.  Stable arteriosclerotic disease. No new or enlarging adenopathy within the bar or mediastinum on noncontrasted scan. No new significant finding seen in limited imaging of the upper abdomen. No pleural effusions. Stable multilevel degenerative change in the spine.   Impression      No significant six-month change in appearance of 2 subcentimeter parenchymal nodules within the right lung. Per Fleischer Society guidelines for . In a high risk patient/smoker, next " follow-up CT chest in 12 months to exclude neoplasia. If stable at that time, no further follow-up needed.       No flowsheet data found.     Diagnostic PSG  Overall summary:  This diagnostic PSG showed the presence of mild CHRIS, reflected by an AHI of 5.6 event/hour  and a SaO2 jeffery of 83%. The CHRIS showed NREM predominance but no clear position  dependency.  Recommendation:  In the presence of daytime symptoms and metabolic disorders, a CPAP /BiPAP titration and  PAP treatment would be beneficial. CPAP of 7-13 cmH2O can be tested.  Avoid nasal airway obstruction.  Weight reduction.      CPAP titration  Overall summary:  This titration study suggests that a CPAP pressure of 9 cmH2O was sufficient to treat her mild  CHRIS in lateral REM, though 7 cmH2O was already sufficient in NREM sleep.  Recommendation:  CPAP of 9 cmH2O with mask fitting.  Avoid nasal airway obstruction.  Weight reduction.      Assessment:       Problem List Items Addressed This Visit     Morbid obesity with BMI of 50.0-59.9, adult     In process for bariatric sleeve gastrectomy           Pulmonary nodules     4.9 mm nodule in the anterior right upper lobe series 2 image 23 is overall stable. 4.2 mm nodule posterior to the minor fissure series 2 image 32 also stable. No new or enlarging parenchymal nodules.         Relevant Orders    CT Chest With Contrast    CHRIS on CPAP - Primary     Mild/Borderline CHRIS based on Study 2012  Likely potential cure after weight loss surgery         Relevant Orders    CPAP FOR HOME USE    [facility] MYCHART PATIENT ENTERED CPAP USAGE      Other Visit Diagnoses     Lung nodule        Relevant Orders    CT Chest With Contrast        Plan:           Follow-up in about 4 weeks (around 5/17/2018) for Download, CPAP supplies, Chest CT, PFT, ABG on RA, 6MWD test, Elizabeth Lejeune NP.    This note was prepared using voice recognition system and is likely to have sound alike errors that may have been overlooked even after proof  reading.  Please call me with any questions    Discussed diagnosis, its evaluation, treatment and usual course. All questions answered.    Thank you for the courtesy of participating in the care of this patient    Keo Tavera MD

## 2018-04-19 NOTE — PATIENT INSTRUCTIONS
Pulmonary Nodule      A pulmonary ( lung) nodule is small, round growth in the lung. The size of a pulmonary nodule can be as small as a pencil eraser (1/5 inch or 4 millmeters) to a little bigger than your biggest toenail (1 inch or 25 millimeters). A pulmonary nodule is usually an unplanned finding. It may be found on a chest X-ray or a computed tomography (CT) scan when you have imaging tests of your lungs done. When a pulmonary nodule is found, tests will be done to determine if the nodule is benign (not cancerous) or malignant (cancerous). Follow-up treatment or testing is based on the size of the pulmonary nodule and your risk of getting lung cancer.  CAUSES  Causes of pulmonary nodules can vary.  Benign pulmonary nodules can be caused from different things. Some of these things include:  Infection. This can be a common cause of a benign pulmonary nodule. The infection may be active (a current infection) or an old infection that is no longer active. Three types of infections can cause a pulmonary nodule. These are:   Bacterial Infection.   Fungal infection.   Viral Infections.   Hematoma. This is a bruise in the lung. A hematoma can happen from an injury to your chest.   Some common diseases can lead to benign pulmonary nodules. For example, rheumatoid arthritis can be a cause of a pulmonary nodule.   Other unusual things can cause a benign pulmonary nodule. These can include:   Having had tuberculosis.   Rare diseases, such as a lung cyst.   Malignant pulmonary nodules. These are cancerous growths. The cancer may have:  Started in the lung. Some lung cancers first detected as a pulmonary nodule.   Spread to the lung from cancer somewhere else in the body. This is called metastatic cancer.   Certain risk factors make a cancerous pulmonary nodule more likely. They include:   Age. As people get older, a pulmonary nodule is more likely to be cancerous.   Cancer history. If one of your immediate family members has  had cancer, you have a higher risk of developing cancer.   Smoking. This includes people who currently smoke and those who have quit.   DIAGNOSIS  To diagnose whether a pulmonary nodule is benign or malignant, a variety of tests will be done. This includes things such as:  Health history. Questions regarding your current health, past health, and family health will be asked.   Blood tests. Results of blood work can show:   Tumor markers for cancer.   Any type of infection.   A skin test called a tuberculin (TB) test may be done. This test can tell if you have been exposed to the germ that causes tuberculosis.   Imaging tests. These take pictures of your lungs. Types of imaging tests include:   Chest X-ray. This can help in several ways. An X-ray gives a close-up look at the pulmonary nodule. A new X-ray can be compared with any X-rays you have had in the past .   Computed tomography (CT) scan. This test shows smaller pulmonary nodules more clearly than an X-ray.   Positron emission tomography (PET) scan. This is a test that uses a radioactive substance to identify a pulmonary nodule. A safe amount of radioactive substance is injected into the blood stream. Then, the scan takes a picture of the pulmonary nodule. A malignant pulmonary nodule will absorb the substance faster than a benign pulmonary nodule. The radioactive substance is eliminated from your body in your urine.   Biopsy. This removes a tiny piece of the pulmonary nodule so it can be checked under a microscope. Medicine will be given to help keep you relaxed and pain free when a biopsy is done. Types of biopsies include:   Bronchoscopy . This is a surgical procedure. It can be used for pulmonary nodules that are close to the airways in the lung. It uses a scope (a thin tube) with a tiny camera and light on the end. The scope is put in the windpipe. Your caregiver can then see inside the lung. A tiny tool put through the scope is used to take a small sample  of the pulmonary nodule tissue.   Transthoracic needle aspiration . This method is used if the pulmonary nodule is far away from the air passages in the lung. A long, thin needle is put through the chest into the lung nodule. A CT scan is done at the same time which can make it easier to locate the pulmonary nodule.   Surgical lung biopsy . This is a surgical procedure in which the pulmonary nodule is removed. This is usually recommended when the pulmonary nodule is most likely malignant or a biopsy cannot be obtained by either bronchoscopy or transthoracic needle aspiration.   PULMONARY NODULE FOLLOW-UP RECOMMENDATIONS  The frequency of pulmonary nodule follow-up is based on your risk factors and size of the pulmonary nodule. If your caregiver suspects the pulmonary nodule is cancerous or the pulmonary nodule changes during any of the follow-up CT scans, additional testing or biopsies will be done.  If you have no or low risk of getting lung cancer (non-smoker, no personal cancer history), recommended follow-up is based on the following pulmonary nodule size:   A pulmonary nodule that is < 4 mm does not require any follow-up.   A pulmonary nodule that is 4 to 6 mm should be re-imaged by CT scan in 12 months.   A pulmonary nodule that is 6 to 8 mm should be re-imaged by CT scan at 6 to 12 months and then again at 18 to 24 months if no change in size.   A pulmonary nodule > 8 mm in size should be followed closely and re-imaged by CT scan at 3, 9, and 24 months.   If you are at risk of getting lung cancer (current or former smoker, family history of cancer), recommended follow-up is based on the following pulmonary nodule size:   A pulmonary nodule that is < 4 mm in size should be re-imaged by CT scan in 12 months.   A pulmonary nodule that is 4 to 6 mm in size should be re-imaged by CT scan at 6 to 12 months and again at 18 to 24 months.   A pulmonary nodule that is 6 to 8 mm in size should be re-imaged by CT scan at  3, 9, and 24 months.   A pulmonary nodule > 8 mm in size should be followed closely and re-imaged by CT scan at 3, 9, and 24 months.   SEEK MEDICAL CARE IF:  While waiting for test results to determine what type of pulmonary nodule you have, be sure to contact your caregiver if you:  Have trouble breathing when you are active.   Feel sick or unusually tired.   Do not feel like eating.   Lose weight without trying to.   Develop chills or night sweats.   Mild or moderate fevers generally have no long-term effects and often do not require treatment. There are a few exceptions (see below).   SEEK IMMEDIATE MEDICAL CARE IF:  You cannot catch your breath or you begin wheezing.   You cannot stop coughing.   You cough up blood.   You feel like you are going to pass out or become dizzy.   You have sudden chest pain.   You have a fever or persistent symptoms for more than 72 hours.   You have a fever and your symptoms suddenly get worse.   MAKE SURE YOU  Understand these instructions.   Will watch your condition.   Will get help right away if you are not doing well or get worse.       Please call office [phone] for any questions

## 2018-04-19 NOTE — ASSESSMENT & PLAN NOTE
4.9 mm nodule in the anterior right upper lobe series 2 image 23 is overall stable. 4.2 mm nodule posterior to the minor fissure series 2 image 32 also stable. No new or enlarging parenchymal nodules.

## 2018-04-19 NOTE — TELEPHONE ENCOUNTER
----- Message from Olga Mensah sent at 4/19/2018  2:08 PM CDT -----  Contact: pt  Call pt at [phone] (home)   Regarding ct pt has found paper work that she had a ct done on 04/10/2018

## 2018-04-28 DIAGNOSIS — E11.65 TYPE 2 DIABETES MELLITUS WITH HYPERGLYCEMIA, WITHOUT LONG-TERM CURRENT USE OF INSULIN: ICD-10-CM

## 2018-05-11 ENCOUNTER — TELEPHONE (OUTPATIENT)
Dept: RADIOLOGY | Facility: HOSPITAL | Age: 53
End: 2018-05-11

## 2018-05-14 ENCOUNTER — PATIENT OUTREACH (OUTPATIENT)
Dept: ADMINISTRATIVE | Facility: HOSPITAL | Age: 53
End: 2018-05-14

## 2018-05-14 ENCOUNTER — HOSPITAL ENCOUNTER (OUTPATIENT)
Dept: RADIOLOGY | Facility: HOSPITAL | Age: 53
Discharge: HOME | End: 2018-05-14
Attending: INTERNAL MEDICINE
Payer: COMMERCIAL

## 2018-05-14 DIAGNOSIS — R91.1 LUNG NODULE: ICD-10-CM

## 2018-05-14 DIAGNOSIS — R91.8 PULMONARY NODULES: ICD-10-CM

## 2018-05-14 PROCEDURE — 71260 CT THORAX DX C+: CPT | Mod: 26,,, | Performed by: RADIOLOGY

## 2018-05-14 PROCEDURE — 71260 CT THORAX DX C+: CPT | Mod: TC,PO

## 2018-05-14 PROCEDURE — 25500020 PHARM REV CODE 255: Mod: PO | Performed by: INTERNAL MEDICINE

## 2018-05-14 RX ADMIN — IOHEXOL 75 ML: 350 INJECTION, SOLUTION INTRAVENOUS at 09:05

## 2018-05-15 ENCOUNTER — TELEPHONE (OUTPATIENT)
Dept: PULMONOLOGY | Facility: CLINIC | Age: 53
End: 2018-05-15

## 2018-05-15 NOTE — TELEPHONE ENCOUNTER
----- Message from Keo Tavera MD sent at 5/14/2018  2:15 PM CDT -----  Need to see to discuss bronch    Keo Tavera MD    ----- Message -----  From: Preeti Lucero LPN  Sent: 5/14/2018   2:04 PM  To: Keo Tavera MD    Pt has appt 5/17/18 with Mariah.  ----- Message -----  From: Keo Tavera MD  Sent: 5/14/2018   1:36 PM  To: [facility] Staff    Will need  to see to review CT, will need bronch

## 2018-05-16 ENCOUNTER — LAB VISIT (OUTPATIENT)
Dept: LAB | Facility: HOSPITAL | Age: 53
End: 2018-05-16
Attending: INTERNAL MEDICINE
Payer: COMMERCIAL

## 2018-05-16 ENCOUNTER — TELEPHONE (OUTPATIENT)
Dept: PULMONOLOGY | Facility: CLINIC | Age: 53
End: 2018-05-16

## 2018-05-16 ENCOUNTER — OFFICE VISIT (OUTPATIENT)
Dept: PULMONOLOGY | Facility: CLINIC | Age: 53
End: 2018-05-16
Payer: COMMERCIAL

## 2018-05-16 VITALS
OXYGEN SATURATION: 97 % | DIASTOLIC BLOOD PRESSURE: 62 MMHG | BODY MASS INDEX: 48.82 KG/M2 | RESPIRATION RATE: 18 BRPM | WEIGHT: 293 LBS | HEART RATE: 95 BPM | SYSTOLIC BLOOD PRESSURE: 110 MMHG | HEIGHT: 65 IN

## 2018-05-16 DIAGNOSIS — R91.8 LUNG MASS: Primary | ICD-10-CM

## 2018-05-16 DIAGNOSIS — R91.8 PULMONARY NODULES: ICD-10-CM

## 2018-05-16 DIAGNOSIS — E66.01 MORBID OBESITY WITH BMI OF 50.0-59.9, ADULT: ICD-10-CM

## 2018-05-16 DIAGNOSIS — G47.33 OSA ON CPAP: ICD-10-CM

## 2018-05-16 DIAGNOSIS — R91.8 LUNG MASS: ICD-10-CM

## 2018-05-16 LAB
CRP SERPL-MCNC: 35.8 MG/L
ERYTHROCYTE [SEDIMENTATION RATE] IN BLOOD BY WESTERGREN METHOD: 46 MM/HR
PROCALCITONIN SERPL IA-MCNC: 0.03 NG/ML

## 2018-05-16 PROCEDURE — 99999 PR PBB SHADOW E&M-EST. PATIENT-LVL III: CPT | Mod: PBBFAC,,, | Performed by: INTERNAL MEDICINE

## 2018-05-16 PROCEDURE — 86255 FLUORESCENT ANTIBODY SCREEN: CPT

## 2018-05-16 PROCEDURE — 86140 C-REACTIVE PROTEIN: CPT

## 2018-05-16 PROCEDURE — 85651 RBC SED RATE NONAUTOMATED: CPT

## 2018-05-16 PROCEDURE — 86480 TB TEST CELL IMMUN MEASURE: CPT

## 2018-05-16 PROCEDURE — 86635 COCCIDIOIDES ANTIBODY: CPT

## 2018-05-16 PROCEDURE — 3008F BODY MASS INDEX DOCD: CPT | Mod: CPTII,S$GLB,, | Performed by: INTERNAL MEDICINE

## 2018-05-16 PROCEDURE — 86038 ANTINUCLEAR ANTIBODIES: CPT

## 2018-05-16 PROCEDURE — 99214 OFFICE O/P EST MOD 30 MIN: CPT | Mod: S$GLB,,, | Performed by: INTERNAL MEDICINE

## 2018-05-16 PROCEDURE — 84145 PROCALCITONIN (PCT): CPT

## 2018-05-16 PROCEDURE — 36415 COLL VENOUS BLD VENIPUNCTURE: CPT

## 2018-05-17 ENCOUNTER — PROCEDURE VISIT (OUTPATIENT)
Dept: PULMONOLOGY | Facility: CLINIC | Age: 53
End: 2018-05-17
Payer: COMMERCIAL

## 2018-05-17 VITALS — WEIGHT: 293 LBS | HEIGHT: 65 IN | BODY MASS INDEX: 48.82 KG/M2

## 2018-05-17 DIAGNOSIS — R06.02 SOB (SHORTNESS OF BREATH): ICD-10-CM

## 2018-05-17 LAB
ALLENS TEST: ABNORMAL
ANA SER QL IF: NORMAL
BF RES PRE: 70.76 BPM
BRPFT: ABNORMAL
DELSYS: ABNORMAL
DLCO ADJ PRE: 17.22 ML/(MIN*MMHG) (ref 19.02–30.49)
DLCO PRE: 17.63 ML/(MIN*MMHG) (ref 19.02–30.49)
DLCO SINGLE BREATH LLN: 19.02
DLCO SINGLE BREATH PRE REF: 71.2 %
DLCO SINGLE BREATH REF: 24.76
DLCOC SBVA LLN: 3.35
DLCOC SBVA PRE REF: 96.7 %
DLCOC SBVA REF: 4.79
DLCOC SINGLE BREATH LLN: 19.02
DLCOC SINGLE BREATH PRE REF: 69.6 %
DLCOC SINGLE BREATH REF: 24.76
DLCOVA LLN: 3.35
DLCOVA PRE REF: 99 %
DLCOVA PRE: 4.75 ML/(MIN*MMHG*L) (ref 3.35–6.23)
DLCOVA REF: 4.79
DLVAADJ PRE: 4.64 ML/(MIN*MMHG*L) (ref 3.35–6.23)
ERV LLN: 0.93
ERV PRE REF: 76 %
ERV PRE: 0.71 L (ref 0.93–0.93)
ERV REF: 0.93
ERVN2 LLN: 0.93
ERVN2 REF: 0.93
FEF 25 75 CHG: 27.7 %
FEF 25 75 LLN: 1.13
FEF 25 75 POST REF: 116.7 %
FEF 25 75 POST: 2.78 L/S (ref 1.13–3.63)
FEF 25 75 PRE REF: 91.4 %
FEF 25 75 PRE: 2.18 L/S (ref 1.13–3.63)
FEF 25 75 REF: 2.38
FET100 CHG: -16.7 %
FET100 POST: 7.6 SEC
FET100 PRE: 9.12 SEC
FEV1 CHG: 5.9 %
FEV1 FVC CHG: 5.1 %
FEV1 FVC LLN: 70
FEV1 FVC POST REF: 101.9 %
FEV1 FVC POST: 82.05 % (ref 69.54–91.45)
FEV1 FVC PRE REF: 97 %
FEV1 FVC PRE: 78.09 % (ref 69.54–91.45)
FEV1 FVC REF: 80
FEV1 LLN: 1.83
FEV1 POST REF: 99 %
FEV1 POST: 2.41 L (ref 1.83–3.05)
FEV1 PRE REF: 93.5 %
FEV1 PRE: 2.28 L (ref 1.83–3.05)
FEV1 REF: 2.44
FEV6 CHG: 1.6 %
FEV6 LLN: 2.19
FEV6 POST REF: 99.3 %
FEV6 POST: 2.9 L (ref 2.19–3.65)
FEV6 PRE REF: 97.7 %
FEV6 PRE: 2.86 L (ref 2.19–3.65)
FEV6 REF: 2.92
FIF50 CHG: 16.2 %
FIF50 POST: 3.29 L/S
FIF50 PRE: 2.83 L/S
FIO2: 21
FRCN2 LLN: 1.95
FRCN2 REF: 2.77
FRCPL PRE: 2.94 L
FRCPLETH LLN: 1.95
FRCPLETH PREREF: 106.2 %
FRCPLETH REF: 2.77
FVC CHG: 0.8 %
FVC LLN: 2.31
FVC POST REF: 96.7 %
FVC POST: 2.94 L (ref 2.31–3.78)
FVC PRE REF: 95.9 %
FVC PRE: 2.92 L (ref 2.31–3.78)
FVC REF: 3.04
GAW LLN: 0.33
GAW PRE REF: 159.3 %
GAW PRE: 0.52 (L/S)/CMH2O (ref 0.33–0.33)
GAW REF: 0.33
HCO3 UR-SCNC: 27.9 MMOL/L (ref 24–28)
ISOFEF 25 75 CHG: 36.9 %
ISOFEF POST: 2.98 L/S
ISOFEF PRE: 2.18 L/S
IVC PRE: 2.63 L (ref 2.31–3.78)
IVC SINGLE BREATH LLN: 2.31
IVC SINGLE BREATH PRE REF: 86.5 %
IVC SINGLE BREATH REF: 3.04
MODE: ABNORMAL
MVV LLN: 90
MVV REF: 106
PCO2 BLDA: 41.9 MMHG (ref 35–45)
PEF CHG: -4.9 %
PEF LLN: 4.22
PEF POST REF: 93.8 %
PEF POST: 5.92 L/S (ref 4.22–8.4)
PEF PRE REF: 98.7 %
PEF PRE: 6.22 L/S (ref 4.22–8.4)
PEF REF: 6.31
PH SMN: 7.43 [PH] (ref 7.35–7.45)
PO2 BLDA: 71 MMHG (ref 80–100)
POC BE: 4 MMOL/L
POC SATURATED O2: 94 % (ref 95–100)
RAW LLN: 3.06
RAW PRE REF: 62.8 %
RAW PRE: 1.92 CMH2O*S/L (ref 3.06–3.06)
RAW REF: 3.06
RV LLN: 1.26
RV PRE REF: 102.9 %
RV PRE: 1.89 L (ref 1.26–2.41)
RV REF: 1.84
RVN2 LLN: 1.26
RVN2 REF: 1.84
RVN2TLCN2 LLN: 27
RVN2TLCN2 REF: 37
RVTLC LLN: 27
RVTLC PRE REF: 103.9 %
RVTLC PRE: 38.08 % (ref 27.05–46.23)
RVTLC REF: 37
SAMPLE: ABNORMAL
SGAW LLN: 0.1
SGAW PRE REF: 173.6 %
SGAW PRE: 0.18 1/(CMH2O*S) (ref 0.1–0.1)
SGAW REF: 0.1
SITE: ABNORMAL
SRAW LLN: 9.81
SRAW PRE REF: 49.8 %
SRAW PRE: 4.88 CMH2O*S (ref 9.81–9.81)
SRAW REF: 9.81
TLC LLN: 4.18
TLC PRE REF: 96 %
TLC PRE: 4.96 L (ref 4.18–6.15)
TLC REF: 5.17
TLCN2 LLN: 4.18
TLCN2 REF: 5.17
VA PRE: 3.72 L (ref 4.18–6.39)
VA SINGLE BREATH LLN: 4.18
VA SINGLE BREATH PRE REF: 70.3 %
VA SINGLE BREATH REF: 5.28
VC LLN: 2.31
VC PRE REF: 101 %
VC PRE: 3.07 L (ref 2.31–3.78)
VC REF: 3.04
VCMAXN2 LLN: 2.31
VCMAXN2 REF: 3.04
VTGRAWPRE: 2.54 L

## 2018-05-17 PROCEDURE — 94060 EVALUATION OF WHEEZING: CPT | Mod: 59,S$GLB,, | Performed by: INTERNAL MEDICINE

## 2018-05-17 PROCEDURE — 94618 PULMONARY STRESS TESTING: CPT | Mod: S$GLB,,, | Performed by: INTERNAL MEDICINE

## 2018-05-17 PROCEDURE — 94729 DIFFUSING CAPACITY: CPT | Mod: S$GLB,,, | Performed by: INTERNAL MEDICINE

## 2018-05-17 PROCEDURE — 36600 WITHDRAWAL OF ARTERIAL BLOOD: CPT | Mod: 59,S$GLB,, | Performed by: INTERNAL MEDICINE

## 2018-05-17 PROCEDURE — 82803 BLOOD GASES ANY COMBINATION: CPT | Mod: S$GLB,,, | Performed by: INTERNAL MEDICINE

## 2018-05-17 PROCEDURE — 94726 PLETHYSMOGRAPHY LUNG VOLUMES: CPT | Mod: S$GLB,,, | Performed by: INTERNAL MEDICINE

## 2018-05-17 NOTE — PROCEDURES
"[facility]  Six Minute Walk     SUMMARY     Ordering Provider: Mahesh   Interpreting Provider: Mahesh  Performing nurse/tech/RT: Juancarlos Garcia-Remedios  Diagnosis: Shortness of Breath  Height: 5' 5" (165.1 cm)  Weight: (!) 146.5 kg (323 lb)  BMI (Calculated): 53.9   Patient Race:             Phase Oxygen Assessment Supplemental O2 Heart   Rate Blood Pressure Wu Dyspnea Scale Rating   Resting 94 % Room Air 108 bpm (!) 150/97 2   Exercise        Minute        1 93 % Room Air 109 bpm     2 94 % Room Air 130 bpm     3 92 % Room Air 110 bpm     4 93 % Room Air 115 bpm     5 90 % Room Air 103 bpm     6  90 % Room Air 107 bpm (!) 151/91 5-6   Recovery        Minute        1 92 % Room Air 158 bpm     2 93 % Room Air 136 bpm     3 96 % Room Air 124 bpm     4 98 % Room Air 113 bpm (!) 133/92       Six Minute Walk Summary  6MWT Status: completed without stopping  Patient Reported: Dyspnea     Interpretation:  Did the patient stop or pause?: No        Total Time Walked (Calculated): 360 seconds  Final Partial Lap Distance (feet): 0 feet  Total Distance Meters (Calculated): 426.72 meters  Predicted Distance Meters (Calculated): 379.29 meters  Percentage of Predicted (Calculated): 112.5  Peak VO2 (Calculated): 16.78  Mets: 4.79  Has The Patient Had a Previous Six Minute Walk Test?: No       Previous 6MWT Results  Has The Patient Had a Previous Six Minute Walk Test?: No     REPORT    CLINICAL INTERPRETATION:  Six minute walk distance is 426.72m (112.5 % predicted) with light dyspnea.  During exercise, there was significant desaturation  (SpO2 jeffery 90%)while breathing room air.  Blood pressure increased significantly with walking.  Hypertension was present prior to exercise.  The patient did not report non-pulmonary symptoms during exercise.  Based upon age and body mass index, exercise capacity is normal.    Keo Tavera MD    "

## 2018-05-18 LAB
ANCA AB TITR SER IF: NORMAL TITER
MITOGEN NIL: 1.46 IU/ML
NIL: 0.03 IU/ML
P-ANCA TITR SER IF: NORMAL TITER
TB ANTIGEN NIL: 0 IU/ML
TB ANTIGEN: 0.03 IU/ML
TB GOLD: NEGATIVE

## 2018-05-20 LAB
ASPERGILLUS AB SER QL ID: NORMAL
B DERMAT AB SER QL ID: NORMAL
C IMMITIS AB SER QL ID: NORMAL
H CAPSUL AB TITR SER ID: NORMAL {TITER}

## 2018-05-23 ENCOUNTER — HOSPITAL ENCOUNTER (OUTPATIENT)
Dept: RADIOLOGY | Facility: HOSPITAL | Age: 53
Discharge: HOME | End: 2018-05-23
Attending: INTERNAL MEDICINE
Payer: COMMERCIAL

## 2018-05-23 ENCOUNTER — HOSPITAL ENCOUNTER (OUTPATIENT)
Dept: RADIOLOGY | Facility: HOSPITAL | Age: 53
Discharge: HOME | End: 2018-05-23
Attending: RADIOLOGY
Payer: COMMERCIAL

## 2018-05-23 VITALS
DIASTOLIC BLOOD PRESSURE: 69 MMHG | HEART RATE: 78 BPM | TEMPERATURE: 98 F | RESPIRATION RATE: 21 BRPM | BODY MASS INDEX: 47.09 KG/M2 | OXYGEN SATURATION: 97 % | WEIGHT: 293 LBS | HEIGHT: 66 IN | SYSTOLIC BLOOD PRESSURE: 110 MMHG

## 2018-05-23 DIAGNOSIS — R91.8 LUNG INFILTRATE ON CT: Primary | ICD-10-CM

## 2018-05-23 DIAGNOSIS — R91.8 LUNG MASS: ICD-10-CM

## 2018-05-23 LAB — KOH PREP SPEC: NORMAL

## 2018-05-23 PROCEDURE — 87102 FUNGUS ISOLATION CULTURE: CPT

## 2018-05-23 PROCEDURE — 88305 TISSUE EXAM BY PATHOLOGIST: CPT | Performed by: PATHOLOGY

## 2018-05-23 PROCEDURE — 77012 CT SCAN FOR NEEDLE BIOPSY: CPT | Mod: TC

## 2018-05-23 PROCEDURE — 71045 X-RAY EXAM CHEST 1 VIEW: CPT | Mod: TC

## 2018-05-23 PROCEDURE — 88305 TISSUE EXAM BY PATHOLOGIST: CPT | Mod: 26,,, | Performed by: PATHOLOGY

## 2018-05-23 PROCEDURE — 87205 SMEAR GRAM STAIN: CPT

## 2018-05-23 PROCEDURE — 99152 MOD SED SAME PHYS/QHP 5/>YRS: CPT

## 2018-05-23 PROCEDURE — 87206 SMEAR FLUORESCENT/ACID STAI: CPT

## 2018-05-23 PROCEDURE — 63600175 PHARM REV CODE 636 W HCPCS: Performed by: RADIOLOGY

## 2018-05-23 PROCEDURE — 87210 SMEAR WET MOUNT SALINE/INK: CPT

## 2018-05-23 PROCEDURE — 87015 SPECIMEN INFECT AGNT CONCNTJ: CPT

## 2018-05-23 PROCEDURE — 87116 MYCOBACTERIA CULTURE: CPT

## 2018-05-23 PROCEDURE — 87070 CULTURE OTHR SPECIMN AEROBIC: CPT

## 2018-05-23 RX ORDER — MIDAZOLAM HYDROCHLORIDE 1 MG/ML
INJECTION INTRAMUSCULAR; INTRAVENOUS CODE/TRAUMA/SEDATION MEDICATION
Status: COMPLETED | OUTPATIENT
Start: 2018-05-23 | End: 2018-05-23

## 2018-05-23 RX ORDER — FENTANYL CITRATE 50 UG/ML
INJECTION, SOLUTION INTRAMUSCULAR; INTRAVENOUS CODE/TRAUMA/SEDATION MEDICATION
Status: COMPLETED | OUTPATIENT
Start: 2018-05-23 | End: 2018-05-23

## 2018-05-23 RX ORDER — LEVOFLOXACIN 750 MG/1
750 TABLET ORAL DAILY
Qty: 10 TABLET | Refills: 0 | Status: SHIPPED | OUTPATIENT
Start: 2018-05-23 | End: 2018-06-02

## 2018-05-23 RX ADMIN — MIDAZOLAM HYDROCHLORIDE 0.5 MG: 1 INJECTION, SOLUTION INTRAMUSCULAR; INTRAVENOUS at 10:05

## 2018-05-23 RX ADMIN — FENTANYL CITRATE 25 MCG: 50 INJECTION, SOLUTION INTRAMUSCULAR; INTRAVENOUS at 10:05

## 2018-05-23 NOTE — DISCHARGE INSTRUCTIONS
Recovery After Procedural Sedation (Adult)  You have been given medicine by vein to make you sleep during your surgery. This may have included both a pain medicine and sleeping medicine. Most of the effects have worn off. But you may still have some drowsiness for the next 6 to 8 hours.  Home care  Follow these guidelines when you get home:  · For the next 8 hours, you should be watched by a responsible adult. This person should make sure your condition is not getting worse.  · Don't drink any alcohol for the next 24 hours.  · Don't drive, operate dangerous machinery, or make important business or personal decisions during the next 24 hours.  Note: Your healthcare provider may tell you not to take any medicine by mouth for pain or sleep in the next 4 hours. These medicines may react with the medicines you were given in the hospital. This could cause a much stronger response than usual.  Follow-up care  Follow up with your healthcare provider if you are not alert and back to your usual level of activity within 12 hours.  When to seek medical advice  Call your healthcare provider right away if any of these occur:  · Drowsiness gets worse  · Weakness or dizziness gets worse  · Repeated vomiting  · You can't be awakened   Date Last Reviewed: 10/18/2016  © 3163-3335 [employer]. [address] [place] [address], [place] [address]. All rights reserved. This information is not intended as a substitute for professional medical care. Always follow your healthcare professional's instructions.        CT-Guided Lung Biopsy  CT-guided lung biopsy is a procedure to collect small tissue samples from an abnormal area in your lung. During the procedure, an imaging method called CT (computed tomography) is used to show live pictures of your lung. Then a thin needle is used to remove the tissue samples. The samples are tested in a lab for cancer and other problems.     For the biopsy, a thin needle is used to remove samples of  tissue from an abnormal area in the lung.   Getting ready for your procedure  Follow any instructions from your healthcare provider.  Tell your provider about any medicines you are taking. It is important for your provider to know if you are taking any blood-thinning medicines or have a bleeding disorder.  You may need to stop taking all or some medicine before the procedure. This includes:  · All prescription medicines  · Blood-thinning medicines (anticoagulants)  · Over-the-counter medicines such as aspirin or ibuprofen  · Street drugs  · Herbs, vitamins, and other supplements  Also tell your provider if you:  · Are pregnant or think you may be pregnant  · Are breastfeeding  · Are allergic to or have intolerances to any medicines  · Have a chronic cough, new cough, or other illness  · Use oxygen therapy at home  · Smoke or drink alcohol on a regular basis  Follow any directions youre given for not eating or drinking before the procedure.  The day of your procedure  The procedure takes about 60 minutes. The entire procedure (including time to prepare and recover) takes several hours. Youll likely go home the same day.  Before the procedure begins:  · An IV (intravenous) line may be put into a vein in your hand or arm. This line supplies fluids and medicines.  · To keep you free of pain during the procedure, you may be given anesthesia. Depending on the type of anesthesia used, you may be awake, drowsy, or in a deep sleep for the procedure.  During the procedure:  · Youll lie on a CT scan table. You may be on your back, side, or stomach. Pictures of your lung are then taken using the CT scanner. This helps your provider find the best place to position the needle in your lungs.  · A joselyn is made on your skin where the needle will be inserted (biopsy site). The site is injected with numbing medicine.  · Using the CT pictures as a guide, the needle is passed through the numbed skin between your ribs and into your  lung. Samples of tissue are then removed from the abnormal area in your lung. The samples are sent to a lab to be checked for problems.  · When the procedure is complete, the needle is removed. Pressure is applied to the biopsy site to help stop any bleeding. The site is then bandaged.  After the procedure:  · Youll be taken to a room to rest until the anesthesia wears off.  · A chest X-ray may be done. This is to make sure there was no damage to your lungs or the area where the needle was placed.  · When its time for you to go home, have an adult family member or friend ready to drive you.  Recovering at home  You may cough up a small amount of blood shortly after the procedure. Later, you may also have some soreness around the biopsy site. Once at home, follow any instructions youre given. Be sure to:  · Take all medicines as directed.  · Care for the biopsy site as instructed.  · Check for signs of infection at the biopsy site (see below).  · Do not bathe or shower until your provider says it's OK. If you wish, you may wash with a sponge or washcloth.  · Avoid heavy lifting and other strenuous activities as directed.  · If you plan any air travel, ask your provider when you can do so. You may be told not to fly for a few weeks. This is because pressure changes may affect your lungs.  When to call your provider  Call 911 or your local emergency number if you have sudden, severe difficulty breathing. This could be a life-threatening emergency.  Call your healthcare provider for less severe symptoms that are still concerning. If you are unable to speak with your provider, go to the emergency room if you have any of the following symptoms:  · Fever of 100.4°F (38°C) or higher, or as directed by your provider  · Sudden chest pain, shortness of breath, or fainting  · Coughing up increasing amounts of blood  · Signs of infection at the biopsy site, such as increased redness or swelling, warmth, more pain, bleeding,  or bad-smelling drainage   Follow-up  The provider who ordered your test will discuss the biopsy results with you during a follow-up visit. Results are usually ready within 1 to 2 weeks. If more tests or treatments are needed, your provider will discuss these with you.  Risks and possible complications  All procedures have some risk. Possible risks of this procedure include:  · An air leak in your lung (pneumothorax). This may require a stay in the hospital and treatment to re-inflate the lung.  · Bleeding into or around the lung  · Infection in the skin or lung  · Injury to other structures in the chest  · Risks of anesthesia. This will be discussed with you before the procedure.   Date Last Reviewed: 6/11/2015  © 0255-2306 [employer]. [address] [place] [address], [place] [address]. All rights reserved. This information is not intended as a substitute for professional medical care. Always follow your healthcare professional's instructions.

## 2018-05-23 NOTE — DISCHARGE SUMMARY
Sterile technique was performed in the posterior left thorax, lidocaine was used as a local anesthetic.  Multiple samples taken from left lung mass.  Pt tolerated the procedure well without immediate complications.  Please see radiologist report for details. F/u with PCP and/or ordering physician.

## 2018-05-23 NOTE — H&P (VIEW-ONLY)
"Subjective:       Patient ID: Ac Hayden is a 52 y.o. female.    Chief Complaint: Lung Mass    Ms. Ac Hayden is 52years old  Follow up to review chest CT, had prior nodule RUL  Scans show bilateral parachymal opacities worse on left lower lobe  Asymptomatic  No cough,No wheezing, No fever, No exposure  Discussed differentials  Will get CT Biopsy  She has Gastric weight loss surgery scheduled: would postpone elective procedure  She has CHRIS on CPAP using well  Immunizations are up-to-date  I reviewed all her records in detail  Former smoker about 6 cigarettes a day for about 13 years she quit in 2002.  This is 18 years ago            Review of Systems   Constitutional: Positive for weight gain.   HENT: Negative.    Eyes: Negative.    Respiratory: Positive for apnea. Negative for snoring, sputum production, shortness of breath and wheezing.    Cardiovascular: Negative.    Genitourinary: Negative.    Endocrine: endocrine negative   Musculoskeletal: Negative.    Skin: Negative.    Gastrointestinal: Negative.    Neurological: Negative.    Psychiatric/Behavioral: Positive for sleep disturbance.       Objective:       Vitals:    05/16/18 1036   BP: 110/62   Pulse: 95   Resp: 18   SpO2: 97%   Weight: (!) 147 kg (324 lb 1.2 oz)   Height: 5' 5" (1.651 m)       Physical Exam   Constitutional: She is oriented to person, place, and time. She appears well-developed and well-nourished. She is obese.   HENT:   Head: Normocephalic.   Nose: Nose normal.   Mouth/Throat: Oropharynx is clear and moist. No oropharyngeal exudate. Mallampati Score: IV.   Neck: Normal range of motion. Neck supple. No JVD present.   Cardiovascular: Normal rate, regular rhythm and normal heart sounds.    No murmur heard.  Pulmonary/Chest: Normal expansion, effort normal and breath sounds normal. No respiratory distress. She has no wheezes. She has no rhonchi. Negative for tactile fremitus.   Abdominal: Soft. Bowel sounds are normal. "   Lymphadenopathy:     She has no cervical adenopathy.   Neurological: She is alert and oriented to person, place, and time.   Skin: Skin is warm and dry. No cyanosis. Nails show no clubbing.   Psychiatric: She has a normal mood and affect.   Nursing note and vitals reviewed.    Personal Diagnostic Review    Chest CT      Lungs: Previously described small noncalcified pulmonary nodule in the right upper lobe along the surface of the minor fissure is unchanged and favored to represent a small benign-appearing intra fissural lymph node.  Additional small noncalcified nodular opacity in the right lower lobe along the surface of the major fissure is also unchanged and also favored to represent a benign-appearing intra fissural lymph node.  There has been interval development of numerous confluent and somewhat nodular appearing consolidative densities throughout the dependent portions of the bilateral lower lobes, more severe on the left.  No pleural effusions visualized.    Upper Abdomen: No acute findings.    Bones: No acute findings.    Miscellaneous: Somewhat nodular opacity in the left breast appears unchanged from CT dated 04/28/2016   Impression       1. Interval development of numerous confluent nodular consolidative densities throughout the dependent portions of the bilateral lung bases, more severe on the left.  Findings are favored to represent multifocal infectious/inflammatory process.  Clinical correlation recommended.  Recommend short interval follow-up following therapy to ensure resolution.  2. Previously described small nodular opacities in the right lung are unchanged and favored to represent benign intra fissural lymph nodes.  This report was flagged in Epic as abnormal         Assessment:       Problem List Items Addressed This Visit     Morbid obesity with BMI of 50.0-59.9, adult    Pulmonary nodules    CHRIS on CPAP    Lung mass - Primary    Relevant Orders    Sedimentation rate, manual    C-REACTIVE  PROTEIN    FUNGAL IMMUNODIFFUSION - BLOOD    Procalcitonin    JASON    ANTI-NEUTROPHILIC CYTOPLASMIC ANTIBODY    QUANTIFERON GOLD TB    CT Biopsy Lung (xpd)        Plan:       Differential;s Inflammatory or infectious  Agrees to CT biopsy  Will differ Gastric surgery until results back      Follow-up in about 4 weeks (around 6/13/2018) for Labs today, CT guided lung biopsy.    This note was prepared using voice recognition system and is likely to have sound alike errors that may have been overlooked even after proof reading.  Please call me with any questions    Discussed diagnosis, its evaluation, treatment and usual course. All questions answered.    Thank you for the courtesy of participating in the care of this patient    Keo Tavera MD    Spoke with Dr Gregorio Lund Staff  [facility]  [address] [place] [address]  [phone] tel  Fax [phone]

## 2018-05-23 NOTE — PLAN OF CARE
Ok to discharge to home per Dr. Fabian.  Pt d/c home in stable condition via wheelchair with ride.  Verbalized understanding of d/c instructions.  Pt voiced no complaints at this time, denied sob or pain. Pt stood at side of bed, walked steps with no new motor or sensory deficits.  Neurologically intact.

## 2018-05-23 NOTE — SEDATION DOCUMENTATION
Procedure complete, pt tolerated well.  Vss.  Pt supine on ct table at this time.  Pt denied pain, discomfort or sob.

## 2018-05-24 ENCOUNTER — TELEPHONE (OUTPATIENT)
Dept: PULMONOLOGY | Facility: CLINIC | Age: 53
End: 2018-05-24

## 2018-05-24 NOTE — TELEPHONE ENCOUNTER
----- Message from Keo Tavera MD sent at 5/23/2018  4:31 PM CDT -----  Called patient left message about abx sent  Please let her know

## 2018-05-25 DIAGNOSIS — F41.8 MIXED ANXIETY AND DEPRESSIVE DISORDER: ICD-10-CM

## 2018-05-25 RX ORDER — DULOXETIN HYDROCHLORIDE 60 MG/1
CAPSULE, DELAYED RELEASE ORAL
Qty: 90 CAPSULE | Refills: 0 | Status: SHIPPED | OUTPATIENT
Start: 2018-05-25 | End: 2018-12-04 | Stop reason: SDUPTHER

## 2018-05-28 LAB
BACTERIA THROAT CULT: NO GROWTH
GRAM STN SPEC: NORMAL
GRAM STN SPEC: NORMAL

## 2018-05-30 ENCOUNTER — HOSPITAL ENCOUNTER (EMERGENCY)
Facility: HOSPITAL | Age: 53
Discharge: HOME | End: 2018-05-30
Attending: EMERGENCY MEDICINE
Payer: COMMERCIAL

## 2018-05-30 VITALS
OXYGEN SATURATION: 100 % | TEMPERATURE: 98 F | BODY MASS INDEX: 53.25 KG/M2 | HEART RATE: 90 BPM | DIASTOLIC BLOOD PRESSURE: 57 MMHG | WEIGHT: 293 LBS | RESPIRATION RATE: 17 BRPM | SYSTOLIC BLOOD PRESSURE: 116 MMHG

## 2018-05-30 DIAGNOSIS — J18.9 PNEUMONIA OF LEFT LOWER LOBE DUE TO INFECTIOUS ORGANISM: Primary | ICD-10-CM

## 2018-05-30 DIAGNOSIS — R07.9 CHEST PAIN: ICD-10-CM

## 2018-05-30 LAB
ALBUMIN SERPL BCP-MCNC: 3.5 G/DL
ALP SERPL-CCNC: 88 U/L
ALT SERPL W/O P-5'-P-CCNC: 20 U/L
ANION GAP SERPL CALC-SCNC: 9 MMOL/L
AST SERPL-CCNC: 16 U/L
BASOPHILS # BLD AUTO: 0.01 K/UL
BASOPHILS NFR BLD: 0.1 %
BILIRUB SERPL-MCNC: 0.6 MG/DL
BILIRUB UR QL STRIP: NEGATIVE
BNP SERPL-MCNC: <10 PG/ML
BUN SERPL-MCNC: 11 MG/DL
CALCIUM SERPL-MCNC: 9.8 MG/DL
CHLORIDE SERPL-SCNC: 100 MMOL/L
CK SERPL-CCNC: 71 U/L
CLARITY UR: CLEAR
CO2 SERPL-SCNC: 30 MMOL/L
COLOR UR: YELLOW
CREAT SERPL-MCNC: 0.8 MG/DL
DIFFERENTIAL METHOD: ABNORMAL
EOSINOPHIL # BLD AUTO: 0 K/UL
EOSINOPHIL NFR BLD: 0.2 %
ERYTHROCYTE [DISTWIDTH] IN BLOOD BY AUTOMATED COUNT: 12.9 %
EST. GFR  (AFRICAN AMERICAN): >60 ML/MIN/1.73 M^2
EST. GFR  (NON AFRICAN AMERICAN): >60 ML/MIN/1.73 M^2
GLUCOSE SERPL-MCNC: 114 MG/DL
GLUCOSE UR QL STRIP: NEGATIVE
HCT VFR BLD AUTO: 41.8 %
HGB BLD-MCNC: 14.3 G/DL
HGB UR QL STRIP: NEGATIVE
KETONES UR QL STRIP: NEGATIVE
LEUKOCYTE ESTERASE UR QL STRIP: NEGATIVE
LYMPHOCYTES # BLD AUTO: 2.4 K/UL
LYMPHOCYTES NFR BLD: 23.3 %
MCH RBC QN AUTO: 31.2 PG
MCHC RBC AUTO-ENTMCNC: 34.2 G/DL
MCV RBC AUTO: 91 FL
MONOCYTES # BLD AUTO: 0.5 K/UL
MONOCYTES NFR BLD: 4.4 %
NEUTROPHILS # BLD AUTO: 7.5 K/UL
NEUTROPHILS NFR BLD: 72 %
NITRITE UR QL STRIP: NEGATIVE
PH UR STRIP: 6 [PH] (ref 5–8)
PLATELET # BLD AUTO: 330 K/UL
PMV BLD AUTO: 9.1 FL
POTASSIUM SERPL-SCNC: 3.5 MMOL/L
PROT SERPL-MCNC: 7.9 G/DL
PROT UR QL STRIP: NEGATIVE
RBC # BLD AUTO: 4.58 M/UL
SODIUM SERPL-SCNC: 139 MMOL/L
SP GR UR STRIP: >=1.03 (ref 1–1.03)
TROPONIN I SERPL DL<=0.01 NG/ML-MCNC: <0.006 NG/ML
URN SPEC COLLECT METH UR: ABNORMAL
UROBILINOGEN UR STRIP-ACNC: NEGATIVE EU/DL
WBC # BLD AUTO: 10.36 K/UL

## 2018-05-30 PROCEDURE — 85025 COMPLETE CBC W/AUTO DIFF WBC: CPT

## 2018-05-30 PROCEDURE — 83880 ASSAY OF NATRIURETIC PEPTIDE: CPT

## 2018-05-30 PROCEDURE — 96374 THER/PROPH/DIAG INJ IV PUSH: CPT

## 2018-05-30 PROCEDURE — 80053 COMPREHEN METABOLIC PANEL: CPT

## 2018-05-30 PROCEDURE — 63600175 PHARM REV CODE 636 W HCPCS: Performed by: EMERGENCY MEDICINE

## 2018-05-30 PROCEDURE — 25000003 PHARM REV CODE 250: Performed by: EMERGENCY MEDICINE

## 2018-05-30 PROCEDURE — 93010 ELECTROCARDIOGRAM REPORT: CPT | Mod: ,,, | Performed by: INTERNAL MEDICINE

## 2018-05-30 PROCEDURE — 99284 EMERGENCY DEPT VISIT MOD MDM: CPT | Mod: 25

## 2018-05-30 PROCEDURE — 82550 ASSAY OF CK (CPK): CPT

## 2018-05-30 PROCEDURE — 84484 ASSAY OF TROPONIN QUANT: CPT

## 2018-05-30 PROCEDURE — 93005 ELECTROCARDIOGRAM TRACING: CPT

## 2018-05-30 PROCEDURE — 81003 URINALYSIS AUTO W/O SCOPE: CPT

## 2018-05-30 RX ORDER — AZITHROMYCIN 250 MG/1
500 TABLET, FILM COATED ORAL
Status: COMPLETED | OUTPATIENT
Start: 2018-05-30 | End: 2018-05-30

## 2018-05-30 RX ORDER — AZITHROMYCIN 250 MG/1
500 TABLET, FILM COATED ORAL DAILY
Qty: 4 TABLET | Refills: 0 | Status: SHIPPED | OUTPATIENT
Start: 2018-05-30 | End: 2018-06-03

## 2018-05-30 RX ORDER — KETOROLAC TROMETHAMINE 30 MG/ML
15 INJECTION, SOLUTION INTRAMUSCULAR; INTRAVENOUS
Status: COMPLETED | OUTPATIENT
Start: 2018-05-30 | End: 2018-05-30

## 2018-05-30 RX ORDER — MOXIFLOXACIN HYDROCHLORIDE 400 MG/1
400 TABLET ORAL
Status: DISCONTINUED | OUTPATIENT
Start: 2018-05-30 | End: 2018-05-30

## 2018-05-30 RX ADMIN — KETOROLAC TROMETHAMINE 15 MG: 30 INJECTION, SOLUTION INTRAMUSCULAR; INTRAVENOUS at 03:05

## 2018-05-30 RX ADMIN — AZITHROMYCIN 500 MG: 250 TABLET, FILM COATED ORAL at 03:05

## 2018-05-30 NOTE — ED PROVIDER NOTES
SCRIBE #1 NOTE: I, Corinne Mack, am scribing for, and in the presence of, Lucas Lloyd MD. I have scribed the entire note.      History      Chief Complaint   Patient presents with    Chest Pain     began 7:30 this morning with SOB, radiates to back and neck; worse with movement       Review of patient's allergies indicates:   Allergen Reactions    Codeine Edema and Itching    Pcn [penicillins] Rash    Sulfa (sulfonamide antibiotics) Edema and Rash        HPI   HPI    5/30/2018, 2:09 PM   History obtained from the patient      History of Present Illness: Ac Hayden is a 52 y.o. female patient with PMHx of DM and HTN who presents to the Emergency Department for CP which onset gradually today at 7:30 AM. Symptoms are constant and moderate in severity. Pt's pain radiates to her neck, back, and LUE. No mitigating or exacerbating factors reported. Associated sxs include SOB. Patient denies any diaphoresis, N/V, abd pain, HA, dizziness, and all other sxs at this time. No prior Tx reported. Pt states she had a lung biopsy done a week ago. No further complaints or concerns at this time.         Arrival mode: Personal vehicle    PCP: Siva Esparza MD       Past Medical History:  Past Medical History:   Diagnosis Date    Allergic rhinitis     Arthritis     Colon polyps     Diabetes mellitus     Hypertension     Metabolic syndrome     Mixed anxiety and depressive disorder     Prediabetes     Urine incontinence        Past Surgical History:  Past Surgical History:   Procedure Laterality Date    breast reduction      COLONOSCOPY N/A 10/19/2017    Procedure: COLONOSCOPY;  Surgeon: Jamal Cole MD;  Location: [facility];  Service: Endoscopy;  Laterality: N/A;    HYSTERECTOMY      PARTIAL HYSTERECTOMY      PLANTAR FASCIA SURGERY      TOTAL REDUCTION MAMMOPLASTY           Family History:  Family History   Problem Relation Age of Onset    Diabetes Mother     Stroke Mother     Cancer  Father     Diabetes Sister     Diabetes Brother     Lupus Other     Eczema Other     Melanoma Neg Hx     Psoriasis Neg Hx        Social History:  Social History     Social History Main Topics    Smoking status: Former Smoker     Packs/day: 0.25     Years: 13.00     Quit date: 10/19/2000    Smokeless tobacco: Never Used    Alcohol use No    Drug use: No    Sexual activity: Not on file       ROS   Review of Systems   Constitutional: Negative for chills, diaphoresis and fever.   Respiratory: Positive for shortness of breath. Negative for cough.    Cardiovascular: Positive for chest pain. Negative for leg swelling.   Gastrointestinal: Negative for abdominal pain, diarrhea, nausea and vomiting.   Musculoskeletal: Positive for back pain, myalgias (LUE) and neck pain. Negative for neck stiffness.   Skin: Negative for rash and wound.   Neurological: Negative for dizziness, light-headedness, numbness and headaches.   All other systems reviewed and are negative.    Physical Exam      Initial Vitals   BP Pulse Resp Temp SpO2   05/30/18 1355 05/30/18 1355 05/30/18 1355 05/30/18 1355 05/30/18 1430   (!) 169/99 102 18 98.2 °F (36.8 °C) 99 %      MAP       05/30/18 1355       122.33          Physical Exam  Nursing Notes and Vital Signs Reviewed.  Constitutional: Patient is in no apparent distress. Well-developed and well-nourished.  Head: Atraumatic. Normocephalic.  Eyes: PERRL. EOM intact. Conjunctivae are not pale. No scleral icterus.  ENT: Mucous membranes are moist. Oropharynx is clear and symmetric.    Neck: Supple. Full ROM. No lymphadenopathy.  Cardiovascular: Regular rate. Regular rhythm. No murmurs, rubs, or gallops. Distal pulses are 2+ and symmetric.  Pulmonary/Chest: No respiratory distress. Clear to auscultation bilaterally. No wheezing or rales.  Abdominal: Soft and non-distended.  There is no tenderness.  No rebound, guarding, or rigidity.   Musculoskeletal: Moves all extremities. No obvious deformities.  No edema. Obese.  Skin: Warm and dry.  Neurological:  Alert, awake, and appropriate.  Normal speech.  No acute focal neurological deficits are appreciated.  Psychiatric: Normal affect. Good eye contact. Appropriate in content.    ED Course    Procedures  ED Vital Signs:  Vitals:    05/30/18 1355 05/30/18 1420 05/30/18 1430 05/30/18 1500   BP: (!) 169/99 133/75 (!) 153/82 126/76   Pulse: 102 95 103 91   Resp: 18  20 20   Temp: 98.2 °F (36.8 °C)      TempSrc: Oral      SpO2:   99% 100%   Weight: (!) 147.4 kg (324 lb 15.3 oz)          Abnormal Lab Results:  Labs Reviewed   CBC W/ AUTO DIFFERENTIAL - Abnormal; Notable for the following:        Result Value    MCH 31.2 (*)     MPV 9.1 (*)     All other components within normal limits   COMPREHENSIVE METABOLIC PANEL - Abnormal; Notable for the following:     CO2 30 (*)     Glucose 114 (*)     All other components within normal limits   URINALYSIS - Abnormal; Notable for the following:     Specific Gravity, UA >=1.030 (*)     All other components within normal limits   B-TYPE NATRIURETIC PEPTIDE   CK   TROPONIN I        All Lab Results:  Results for orders placed or performed during the hospital encounter of 05/30/18   CBC auto differential   Result Value Ref Range    WBC 10.36 3.90 - 12.70 K/uL    RBC 4.58 4.00 - 5.40 M/uL    Hemoglobin 14.3 12.0 - 16.0 g/dL    Hematocrit 41.8 37.0 - 48.5 %    MCV 91 82 - 98 fL    MCH 31.2 (H) 27.0 - 31.0 pg    MCHC 34.2 32.0 - 36.0 g/dL    RDW 12.9 11.5 - 14.5 %    Platelets 330 150 - 350 K/uL    MPV 9.1 (L) 9.2 - 12.9 fL    Gran # (ANC) 7.5 1.8 - 7.7 K/uL    Lymph # 2.4 1.0 - 4.8 K/uL    Mono # 0.5 0.3 - 1.0 K/uL    Eos # 0.0 0.0 - 0.5 K/uL    Baso # 0.01 0.00 - 0.20 K/uL    Gran% 72.0 38.0 - 73.0 %    Lymph% 23.3 18.0 - 48.0 %    Mono% 4.4 4.0 - 15.0 %    Eosinophil% 0.2 0.0 - 8.0 %    Basophil% 0.1 0.0 - 1.9 %    Differential Method Automated    Comprehensive metabolic panel   Result Value Ref Range    Sodium 139 136 - 145 mmol/L     Potassium 3.5 3.5 - 5.1 mmol/L    Chloride 100 95 - 110 mmol/L    CO2 30 (H) 23 - 29 mmol/L    Glucose 114 (H) 70 - 110 mg/dL    BUN, Bld 11 6 - 20 mg/dL    Creatinine 0.8 0.5 - 1.4 mg/dL    Calcium 9.8 8.7 - 10.5 mg/dL    Total Protein 7.9 6.0 - 8.4 g/dL    Albumin 3.5 3.5 - 5.2 g/dL    Total Bilirubin 0.6 0.1 - 1.0 mg/dL    Alkaline Phosphatase 88 55 - 135 U/L    AST 16 10 - 40 U/L    ALT 20 10 - 44 U/L    Anion Gap 9 8 - 16 mmol/L    eGFR if African American >60 >60 mL/min/1.73 m^2    eGFR if non African American >60 >60 mL/min/1.73 m^2   Urinalysis   Result Value Ref Range    Specimen UA Urine, Clean Catch     Color, UA Yellow Yellow, Straw, Lyn    Appearance, UA Clear Clear    pH, UA 6.0 5.0 - 8.0    Specific Gravity, UA >=1.030 (A) 1.005 - 1.030    Protein, UA Negative Negative    Glucose, UA Negative Negative    Ketones, UA Negative Negative    Bilirubin (UA) Negative Negative    Occult Blood UA Negative Negative    Nitrite, UA Negative Negative    Urobilinogen, UA Negative <2.0 EU/dL    Leukocytes, UA Negative Negative   Brain natriuretic peptide   Result Value Ref Range    BNP <10 0 - 99 pg/mL   CK   Result Value Ref Range    CPK 71 20 - 180 U/L   Troponin I   Result Value Ref Range    Troponin I <0.006 0.000 - 0.026 ng/mL       Imaging Results:  Imaging Results          X-Ray Chest PA And Lateral (Final result)  Result time 05/30/18 14:48:10    Final result by AMELIA Ball Sr., MD (05/30/18 14:48:10)                 Impression:      There is a mild amount of alveolar consolidation in the left lower lobe.  This is characteristic of pneumonia.      Electronically signed by: Arsalan Ball MD  Date:    05/30/2018  Time:    14:48             Narrative:    EXAMINATION:  XR CHEST PA AND LATERAL    CLINICAL HISTORY:  chest pain;    COMPARISON:  05/23/2018    FINDINGS:  The size and contour of the heart are normal.  There is a mild amount of alveolar consolidation in the left lower lobe.  The right lung is  clear.  There is no pneumothorax or pleural effusion.                                 The EKG was ordered, reviewed, and independently interpreted by the ED provider.  Interpretation time: 1418  Rate: 98 BPM  Rhythm: normal sinus rhythm  Interpretation: Septal infarct. No STEMI.             The Emergency Provider reviewed the vital signs and test results, which are outlined above.    ED Discussion     3:12 PM: Reassessed pt at this time. Pt is awake, alert, and in no distress. Discussed with pt all pertinent ED information and results. Discussed pt dx and plan of tx. Gave pt all f/u and return to the ED instructions. All questions and concerns were addressed at this time. Pt expresses understanding of information and instructions, and is comfortable with plan to discharge. Pt is stable for discharge.    I discussed with patient and/or family/caretaker that evaluation in the ED does not suggest any emergent or life threatening medical conditions requiring immediate intervention beyond what was provided in the ED, and I believe patient is safe for discharge.  Regardless, an unremarkable evaluation in the ED does not preclude the development or presence of a serious of life threatening condition. As such, patient was instructed to return immediately for any worsening or change in current symptoms.    I have discussed with patient and/or family/caretaker chest pain precautions, specifically to return for worsening chest pain, shortness of breath, fever, or any concern.  I have low suspicion for cardiopulmonary, vascular, infectious, respiratory, or other emergent medical condition based on my evaluation in the ED.      ED Medication(s):  Medications   ketorolac injection 15 mg (15 mg Intravenous Given 5/30/18 1519)   azithromycin tablet 500 mg (500 mg Oral Given 5/30/18 1519)       New Prescriptions    AZITHROMYCIN (Z-MELI) 250 MG TABLET    Take 2 tablets (500 mg total) by mouth once daily.       Follow-up Information      Siva Esparza MD In 2 days.    Specialty:  Family Medicine  Contact information:  [address]  [place] [id number] [phone]                     Medical Decision Making    Medical Decision Making:   Clinical Tests:   Lab Tests: Ordered and Reviewed  Radiological Study: Ordered and Reviewed  Medical Tests: Reviewed and Ordered           Scribe Attestation:   Scribe #1: I performed the above scribed service and the documentation accurately describes the services I performed. I attest to the accuracy of the note.    Attending:   Physician Attestation Statement for Scribe #1: I, Lucas Lloyd MD, personally performed the services described in this documentation, as scribed by Corinne Mack, in my presence, and it is both accurate and complete.          Clinical Impression       ICD-10-CM ICD-9-CM   1. Pneumonia of left lower lobe due to infectious organism J18.1 486   2. Chest pain R07.9 786.50       Disposition:   Disposition: Discharged  Condition: Stable           Lucas Lloyd MD  05/30/18 1528

## 2018-05-30 NOTE — ED NOTES
Patient c/o CP that started 0730 am today with SOB; report inhaler relieved SOB. While at work CP intensity increased and pain radiated to neck and back. Patient denies dizziness, lightlessness, and blurred vision. Neuro intact.  Recent left lung biopsy last Wednesday 5/23/2018. Patient currently experiencing chest tightness to left chest. Reports if I'm still it feels better; if I'm moving it makes it worse. Currently pain 7/10. Will inform MD. Patient A/Ox4, leona phelan,resp e/u.

## 2018-05-30 NOTE — ED NOTES
Pt c/o chest pain that radiates to L neck, L arm, and back that began at 0730 while driving to work this morning.  Pt reports associated SOB with initial onset of chest pain, relieved by albuterol inhaler.  Pt states she took some Gas-X, had a BM, but still has no relief of her chest pain.  Pt reports recent L lung bx on Wednesday of last week.

## 2018-05-31 ENCOUNTER — PATIENT MESSAGE (OUTPATIENT)
Dept: PULMONOLOGY | Facility: CLINIC | Age: 53
End: 2018-05-31

## 2018-05-31 ENCOUNTER — PES CALL (OUTPATIENT)
Dept: ADMINISTRATIVE | Facility: CLINIC | Age: 53
End: 2018-05-31

## 2018-06-06 ENCOUNTER — OFFICE VISIT (OUTPATIENT)
Dept: PULMONOLOGY | Facility: CLINIC | Age: 53
End: 2018-06-06
Payer: COMMERCIAL

## 2018-06-06 ENCOUNTER — HOSPITAL ENCOUNTER (OUTPATIENT)
Dept: RADIOLOGY | Facility: HOSPITAL | Age: 53
Discharge: HOME | End: 2018-06-06
Attending: INTERNAL MEDICINE
Payer: COMMERCIAL

## 2018-06-06 VITALS
WEIGHT: 293 LBS | HEIGHT: 65 IN | HEART RATE: 101 BPM | BODY MASS INDEX: 48.82 KG/M2 | OXYGEN SATURATION: 98 % | SYSTOLIC BLOOD PRESSURE: 148 MMHG | RESPIRATION RATE: 18 BRPM | DIASTOLIC BLOOD PRESSURE: 80 MMHG

## 2018-06-06 DIAGNOSIS — R91.8 LUNG INFILTRATE ON CT: ICD-10-CM

## 2018-06-06 DIAGNOSIS — R91.8 LUNG INFILTRATE ON CT: Primary | ICD-10-CM

## 2018-06-06 DIAGNOSIS — R03.0 ELEVATED BLOOD PRESSURE READING: ICD-10-CM

## 2018-06-06 DIAGNOSIS — G47.33 OSA ON CPAP: ICD-10-CM

## 2018-06-06 DIAGNOSIS — E66.01 MORBID OBESITY WITH BMI OF 50.0-59.9, ADULT: ICD-10-CM

## 2018-06-06 PROCEDURE — 71046 X-RAY EXAM CHEST 2 VIEWS: CPT | Mod: 26,,, | Performed by: RADIOLOGY

## 2018-06-06 PROCEDURE — 3008F BODY MASS INDEX DOCD: CPT | Mod: CPTII,S$GLB,, | Performed by: INTERNAL MEDICINE

## 2018-06-06 PROCEDURE — 99214 OFFICE O/P EST MOD 30 MIN: CPT | Mod: S$GLB,,, | Performed by: INTERNAL MEDICINE

## 2018-06-06 PROCEDURE — 71046 X-RAY EXAM CHEST 2 VIEWS: CPT | Mod: TC

## 2018-06-06 PROCEDURE — 99999 PR PBB SHADOW E&M-EST. PATIENT-LVL IV: CPT | Mod: PBBFAC,,, | Performed by: INTERNAL MEDICINE

## 2018-06-06 RX ORDER — LEVOFLOXACIN 500 MG/1
500 TABLET, FILM COATED ORAL DAILY
Qty: 14 TABLET | Refills: 0 | Status: SHIPPED | OUTPATIENT
Start: 2018-06-06 | End: 2018-06-20

## 2018-06-06 NOTE — PROGRESS NOTES
"Subjective:       Patient ID: Ac Hayden is a 52 y.o. female.    Chief Complaint: Pulmonary Nodules and Sleep Apnea    Ms. Ac Hayden is 52 years old  Follow up to review chest CT, had prior nodule RUL  Scans show bilateral parachymal opacities worse on left lower lobe  This would be consistent with Pneumonia.  Other differential: , Fungal etiology  Serologies for fungus -ve.  Was seen in ER given Zithromax  Feels better  Biopsy results negative for cancer  No cough,No wheezing, No fever, No exposure  CRP, Procal, JASON, ANCA, Quantiferon -ve  Will repeat CXR  She has Gastric weight loss surgery scheduled: was postponed due to in filtrates  She has CHRIS on CPAP using well  Immunizations are up-to-date  I reviewed all her records in detail  BP was elevated in office visit               Review of Systems   Constitutional: Positive for weight gain.   HENT: Negative.    Eyes: Negative.    Respiratory: Positive for apnea. Negative for snoring, sputum production, shortness of breath and wheezing.    Cardiovascular: Negative.    Genitourinary: Negative.    Endocrine: endocrine negative   Musculoskeletal: Negative.    Skin: Negative.    Gastrointestinal: Negative.    Neurological: Negative.    Psychiatric/Behavioral: Positive for sleep disturbance.       Objective:       Vitals:    06/06/18 1309 06/06/18 1313   BP: (!) 160/90 (!) 148/80   Pulse: 101    Resp: 18    SpO2: 98%    Weight: (!) 148.4 kg (327 lb 0.8 oz)    Height: 5' 5" (1.651 m)        Physical Exam   Constitutional: She is oriented to person, place, and time. She appears well-developed and well-nourished. She is obese.   HENT:   Head: Normocephalic.   Nose: Nose normal.   Mouth/Throat: Oropharynx is clear and moist. No oropharyngeal exudate. Mallampati Score: IV.   Neck: Normal range of motion. Neck supple. No JVD present.   Cardiovascular: Normal rate, regular rhythm and normal heart sounds.    No murmur heard.  Pulmonary/Chest: Normal " expansion, effort normal and breath sounds normal. No respiratory distress. She has no wheezes. She has no rhonchi. Negative for tactile fremitus.   Abdominal: Soft. Bowel sounds are normal.   Lymphadenopathy:     She has no cervical adenopathy.   Neurological: She is alert and oriented to person, place, and time.   Skin: Skin is warm and dry. No cyanosis. Nails show no clubbing.   Psychiatric: She has a normal mood and affect.   Nursing note and vitals reviewed.    Personal Diagnostic Review    Chest CT      Lungs: Previously described small noncalcified pulmonary nodule in the right upper lobe along the surface of the minor fissure is unchanged and favored to represent a small benign-appearing intra fissural lymph node.  Additional small noncalcified nodular opacity in the right lower lobe along the surface of the major fissure is also unchanged and also favored to represent a benign-appearing intra fissural lymph node.  There has been interval development of numerous confluent and somewhat nodular appearing consolidative densities throughout the dependent portions of the bilateral lower lobes, more severe on the left.  No pleural effusions visualized.    Upper Abdomen: No acute findings.    Bones: No acute findings.    Miscellaneous: Somewhat nodular opacity in the left breast appears unchanged from CT dated 04/28/2016   Impression       1. Interval development of numerous confluent nodular consolidative densities throughout the dependent portions of the bilateral lung bases, more severe on the left.  Findings are favored to represent multifocal infectious/inflammatory process.  Clinical correlation recommended.  Recommend short interval follow-up following therapy to ensure resolution.  2. Previously described small nodular opacities in the right lung are unchanged and favored to represent benign intra fissural lymph nodes.  This report was flagged in Epic as abnormal       PATH  ORDERING PHYSICIAN(S)  MAXIMO  MARYANNE  CLINICAL DIAGNOSIS/INFORMATION  jm  PreOperative Diagnosis  Lung mass.  SPECIMEN  1) Lung mass, left side.  FINAL PATHOLOGIC DIAGNOSIS  Left lung, biopsy:  Benign alveolar lung tissue.  Negative for significant inflammation, granulomas and malignancy.  [facility]  Diagnosed by: Benito Andres M.D.  (Electronically Signed: 2018-05-28 14:00:30)  Assessment:       Problem List Items Addressed This Visit     Morbid obesity with BMI of 50.0-59.9, adult    CHRIS on CPAP    Lung infiltrate on CT - Primary    Relevant Medications    levoFLOXacin (LEVAQUIN) 500 MG tablet    Other Relevant Orders    X-Ray Chest PA And Lateral (Completed)    X-Ray Chest PA And Lateral      Other Visit Diagnoses     Elevated blood pressure reading            Plan:       Differential;s Inflammatory or infectious  Additional 14 days Po Levaquin and repeat CXR     CXR today  The cardiac and mediastinal silhouettes appear within normal limits.   Previously described patchy airspace opacities in the left lung base are unchanged.  Right lung remains clear.  No pleural effusion visualized.  Multilevel degenerative findings noted throughout the visualized spine      Follow-up in about 2 weeks (around 6/20/2018) for CXR next visit , CXR today, follow up BP reading with PCP.    This note was prepared using voice recognition system and is likely to have sound alike errors that may have been overlooked even after proof reading.  Please call me with any questions    Discussed diagnosis, its evaluation, treatment and usual course. All questions answered.    Thank you for the courtesy of participating in the care of this patient    Maryanne Tavera MD

## 2018-06-08 ENCOUNTER — TELEPHONE (OUTPATIENT)
Dept: PULMONOLOGY | Facility: CLINIC | Age: 53
End: 2018-06-08

## 2018-06-08 NOTE — TELEPHONE ENCOUNTER
Spoke with patient. She stated that she picked antibiotics up and was taking them as prescribed and feeling better.

## 2018-06-20 ENCOUNTER — HOSPITAL ENCOUNTER (OUTPATIENT)
Dept: RADIOLOGY | Facility: HOSPITAL | Age: 53
Discharge: HOME | End: 2018-06-20
Attending: INTERNAL MEDICINE
Payer: COMMERCIAL

## 2018-06-20 ENCOUNTER — OFFICE VISIT (OUTPATIENT)
Dept: PULMONOLOGY | Facility: CLINIC | Age: 53
End: 2018-06-20
Payer: COMMERCIAL

## 2018-06-20 VITALS
OXYGEN SATURATION: 99 % | RESPIRATION RATE: 18 BRPM | BODY MASS INDEX: 48.82 KG/M2 | HEART RATE: 86 BPM | DIASTOLIC BLOOD PRESSURE: 82 MMHG | HEIGHT: 65 IN | WEIGHT: 293 LBS | SYSTOLIC BLOOD PRESSURE: 124 MMHG

## 2018-06-20 DIAGNOSIS — G47.33 OSA ON CPAP: Primary | ICD-10-CM

## 2018-06-20 DIAGNOSIS — R91.8 PULMONARY NODULES: ICD-10-CM

## 2018-06-20 DIAGNOSIS — R91.8 LUNG INFILTRATE ON CT: ICD-10-CM

## 2018-06-20 DIAGNOSIS — E66.01 MORBID OBESITY WITH BMI OF 50.0-59.9, ADULT: ICD-10-CM

## 2018-06-20 PROCEDURE — 71046 X-RAY EXAM CHEST 2 VIEWS: CPT | Mod: 26,,, | Performed by: RADIOLOGY

## 2018-06-20 PROCEDURE — 71046 X-RAY EXAM CHEST 2 VIEWS: CPT | Mod: TC

## 2018-06-20 PROCEDURE — 3008F BODY MASS INDEX DOCD: CPT | Mod: CPTII,S$GLB,, | Performed by: INTERNAL MEDICINE

## 2018-06-20 PROCEDURE — 99214 OFFICE O/P EST MOD 30 MIN: CPT | Mod: S$GLB,,, | Performed by: INTERNAL MEDICINE

## 2018-06-20 PROCEDURE — 99999 PR PBB SHADOW E&M-EST. PATIENT-LVL III: CPT | Mod: PBBFAC,,, | Performed by: INTERNAL MEDICINE

## 2018-06-20 NOTE — PROGRESS NOTES
"Subjective:       Patient ID: Ac Hayden is a 52 y.o. female.    Chief Complaint: Pulmonary Nodules (rev cxr) and Sleep Apnea    MsNiles Hayden is 52 years old  Follow up to review chest x-ray., had prior nodule RUL  Scans show bilateral parachymal opacities worse on left lower lobe  This would be consistent with Pneumonia.  Other differential: , Fungal etiology  Serologies for fungus -ve.  Patient completed 3 weeks of antibiotics  Patient is a symptomatic no cough no wheezing or shortness of breath  X-ray looks stable  Patient has CPAP which she was concerned about it possibly causing this infection  I encouraged her to use it and the need properly  No current contraindications for her to proceed with gastric bypass surgery.  Procalcitonin was normal making significant infection currently unlikely.  Immunizations are up-to-date  I reviewed all her records in detail  Will inform Dr Gregorio pradhan [facility] [place] she is cleared for her elective procedure: Gastric weight loss surgery  Phone [phone]  Fax [phone]                 Review of Systems   Constitutional: Positive for weight gain.   HENT: Negative.    Eyes: Negative.    Respiratory: Positive for apnea. Negative for snoring, sputum production, shortness of breath and wheezing.    Cardiovascular: Negative.    Genitourinary: Negative.    Endocrine: endocrine negative   Musculoskeletal: Negative.    Skin: Negative.    Gastrointestinal: Negative.    Neurological: Negative.    Psychiatric/Behavioral: Positive for sleep disturbance.       Objective:       Vitals:    06/20/18 1553 06/20/18 1556   BP: (!) 158/82 124/82   Pulse: 86    Resp: 18    SpO2: 99%    Weight: (!) 149.2 kg (329 lb 0.6 oz)    Height: 5' 5" (1.651 m)        Physical Exam   Constitutional: She is oriented to person, place, and time. She appears well-developed and well-nourished. She is obese.   HENT:   Head: Normocephalic.   Nose: Nose normal.   Mouth/Throat: " Oropharynx is clear and moist. No oropharyngeal exudate. Mallampati Score: IV.   Neck: Normal range of motion. Neck supple. No JVD present.   Cardiovascular: Normal rate, regular rhythm and normal heart sounds.    No murmur heard.  Pulmonary/Chest: Normal expansion, effort normal and breath sounds normal. No respiratory distress. She has no wheezes. She has no rhonchi. Negative for tactile fremitus.   Abdominal: Soft. Bowel sounds are normal.   Lymphadenopathy:     She has no cervical adenopathy.   Neurological: She is alert and oriented to person, place, and time.   Skin: Skin is warm and dry. No cyanosis. Nails show no clubbing.   Psychiatric: She has a normal mood and affect.   Nursing note and vitals reviewed.    Personal Diagnostic Review    Chest CT      Lungs: Previously described small noncalcified pulmonary nodule in the right upper lobe along the surface of the minor fissure is unchanged and favored to represent a small benign-appearing intra fissural lymph node.  Additional small noncalcified nodular opacity in the right lower lobe along the surface of the major fissure is also unchanged and also favored to represent a benign-appearing intra fissural lymph node.  There has been interval development of numerous confluent and somewhat nodular appearing consolidative densities throughout the dependent portions of the bilateral lower lobes, more severe on the left.  No pleural effusions visualized.    Upper Abdomen: No acute findings.    Bones: No acute findings.    Miscellaneous: Somewhat nodular opacity in the left breast appears unchanged from CT dated 04/28/2016   Impression       1. Interval development of numerous confluent nodular consolidative densities throughout the dependent portions of the bilateral lung bases, more severe on the left.  Findings are favored to represent multifocal infectious/inflammatory process.  Clinical correlation recommended.  Recommend short interval follow-up following  therapy to ensure resolution.  2. Previously described small nodular opacities in the right lung are unchanged and favored to represent benign intra fissural lymph nodes.  This report was flagged in Epic as abnormal       PATH  ORDERING PHYSICIAN(S)  MARYANNE MARSH  CLINICAL DIAGNOSIS/INFORMATION  jm  PreOperative Diagnosis  Lung mass.  SPECIMEN  1) Lung mass, left side.  FINAL PATHOLOGIC DIAGNOSIS  Left lung, biopsy:  Benign alveolar lung tissue.  Negative for significant inflammation, granulomas and malignancy.  [facility]  Diagnosed by: Benito Andres M.D.  (Electronically Signed: 2018-05-28 14:00:30)      CXR today  The cardiac and mediastinal silhouettes appear within normal limits.   There are persistent patchy parenchymal opacities in the left lung base which do not appear changed from prior.  Right lung remains clear.  No definite pleural effusion visualized.  No acute osseous findings demonstrated.    Assessment:       Problem List Items Addressed This Visit     Morbid obesity with BMI of 50.0-59.9, adult    Pulmonary nodules    Relevant Orders    CT Chest With Contrast    CHRIS on CPAP - Primary    Lung infiltrate on CT    Relevant Orders    CT Chest With Contrast        Plan:             Okay to proceed with bariatric surgery.  Send message to Dr Lund ( [place])  Complete 3 weeks abx  CT in Aug 2018  Differential;s Inflammatory or infectious       Follow-up in about 2 months (around 8/20/2018) for Chest CT.    This note was prepared using voice recognition system and is likely to have sound alike errors that may have been overlooked even after proof reading.  Please call me with any questions    Discussed diagnosis, its evaluation, treatment and usual course. All questions answered.    Thank you for the courtesy of participating in the care of this patient    MD Dr Gregorio Loredo [facility] [place] she is cleared for her elective procedure: Gastric weight loss  surgery  Phone [phone]  Fax [phone]

## 2018-06-26 LAB — FUNGUS SPEC CULT: NORMAL

## 2018-07-24 ENCOUNTER — HOSPITAL ENCOUNTER (EMERGENCY)
Facility: HOSPITAL | Age: 53
Discharge: HOME | End: 2018-07-25
Attending: EMERGENCY MEDICINE
Payer: COMMERCIAL

## 2018-07-24 DIAGNOSIS — L23.9 ALLERGIC CONTACT DERMATITIS, UNSPECIFIED TRIGGER: ICD-10-CM

## 2018-07-24 DIAGNOSIS — R21 RASH DUE TO ALLERGY: Primary | ICD-10-CM

## 2018-07-24 DIAGNOSIS — T78.40XA RASH DUE TO ALLERGY: Primary | ICD-10-CM

## 2018-07-24 PROCEDURE — 99283 EMERGENCY DEPT VISIT LOW MDM: CPT | Mod: 25

## 2018-07-25 VITALS
RESPIRATION RATE: 18 BRPM | DIASTOLIC BLOOD PRESSURE: 91 MMHG | HEART RATE: 79 BPM | BODY MASS INDEX: 48.82 KG/M2 | SYSTOLIC BLOOD PRESSURE: 138 MMHG | HEIGHT: 65 IN | OXYGEN SATURATION: 96 % | WEIGHT: 293 LBS | TEMPERATURE: 98 F

## 2018-07-25 LAB
ACID FAST MOD KINY STN SPEC: NORMAL
MYCOBACTERIUM SPEC QL CULT: NORMAL

## 2018-07-25 PROCEDURE — 96372 THER/PROPH/DIAG INJ SC/IM: CPT

## 2018-07-25 PROCEDURE — 63600175 PHARM REV CODE 636 W HCPCS: Performed by: EMERGENCY MEDICINE

## 2018-07-25 PROCEDURE — 25000003 PHARM REV CODE 250: Performed by: EMERGENCY MEDICINE

## 2018-07-25 RX ORDER — METHYLPREDNISOLONE SOD SUCC 125 MG
125 VIAL (EA) INJECTION
Status: COMPLETED | OUTPATIENT
Start: 2018-07-25 | End: 2018-07-25

## 2018-07-25 RX ORDER — TRIAMCINOLONE ACETONIDE 1 MG/G
CREAM TOPICAL 2 TIMES DAILY
Qty: 1 TUBE | Refills: 1 | Status: SHIPPED | OUTPATIENT
Start: 2018-07-25 | End: 2018-08-09 | Stop reason: SDUPTHER

## 2018-07-25 RX ORDER — FAMOTIDINE 20 MG/1
20 TABLET, FILM COATED ORAL ONCE
Status: COMPLETED | OUTPATIENT
Start: 2018-07-25 | End: 2018-07-25

## 2018-07-25 RX ORDER — METHYLPREDNISOLONE 4 MG/1
TABLET ORAL
Qty: 1 PACKAGE | Refills: 0 | Status: SHIPPED | OUTPATIENT
Start: 2018-07-25 | End: 2018-08-09

## 2018-07-25 RX ORDER — EPINEPHRINE 0.3 MG/.3ML
1 INJECTION SUBCUTANEOUS
Qty: 1 DEVICE | Refills: 1 | Status: SHIPPED | OUTPATIENT
Start: 2018-07-25 | End: 2020-01-22

## 2018-07-25 RX ORDER — FAMOTIDINE 20 MG/1
20 TABLET, FILM COATED ORAL 2 TIMES DAILY
Status: DISCONTINUED | OUTPATIENT
Start: 2018-07-25 | End: 2018-07-25

## 2018-07-25 RX ORDER — FAMOTIDINE 20 MG/1
20 TABLET, FILM COATED ORAL 2 TIMES DAILY
Qty: 20 TABLET | Refills: 0 | Status: SHIPPED | OUTPATIENT
Start: 2018-07-25 | End: 2018-09-12

## 2018-07-25 RX ORDER — DIPHENHYDRAMINE HCL 25 MG
25 CAPSULE ORAL EVERY 6 HOURS PRN
Qty: 20 CAPSULE | Refills: 0 | COMMUNITY
Start: 2018-07-25 | End: 2020-01-22

## 2018-07-25 RX ORDER — DIPHENHYDRAMINE HCL 25 MG
25 CAPSULE ORAL
Status: COMPLETED | OUTPATIENT
Start: 2018-07-25 | End: 2018-07-25

## 2018-07-25 RX ADMIN — METHYLPREDNISOLONE SODIUM SUCCINATE 125 MG: 125 INJECTION, POWDER, FOR SOLUTION INTRAMUSCULAR; INTRAVENOUS at 12:07

## 2018-07-25 RX ADMIN — FAMOTIDINE 20 MG: 20 TABLET ORAL at 12:07

## 2018-07-25 RX ADMIN — DIPHENHYDRAMINE HYDROCHLORIDE 25 MG: 25 CAPSULE ORAL at 12:07

## 2018-07-25 NOTE — ED PROVIDER NOTES
SCRIBE #1 NOTE: I, Melissa Camp, am scribing for, and in the presence of, Arturo Montiel Jr., MD. I have scribed the entire note.      History      Chief Complaint   Patient presents with    Rash     Pt c/o generalized red bumpy rash since sunday, itching, tirso upper extemities and back, only contact with anything new is a new shirt worn saturday that was not washed.       Review of patient's allergies indicates:   Allergen Reactions    Codeine Edema and Itching    Pcn [penicillins] Rash    Sulfa (sulfonamide antibiotics) Edema and Rash        HPI   HPI    7/24/2018, 11:48 PM   History obtained from the patient      History of Present Illness: Ac Hayden is a 52 y.o. female patient who presents to the Emergency Department for pruritic rash to her BUE and back which onset gradually 2-3 days PTA. Pt reports wearing a new shirt without washing it prior to sx onset. Symptoms are constant and moderate in severity. No mitigating or exacerbating factors reported. No other associated sxs. Patient denies any fever, chills, new foods/detergents/medications, SOB, stridor, sore throat, voice changes, and all other sxs at this time. Prior Tx includes Benadryl with mild relief. No further complaints or concerns at this time.       Arrival mode: Personal vehicle    PCP: Siva Esparza MD       Past Medical History:  Past Medical History:   Diagnosis Date    Allergic rhinitis     Arthritis     Colon polyps     Diabetes mellitus     Hypertension     Metabolic syndrome     Mixed anxiety and depressive disorder     Pneumonia     Prediabetes     Urine incontinence        Past Surgical History:  Past Surgical History:   Procedure Laterality Date    breast reduction      COLONOSCOPY N/A 10/19/2017    Procedure: COLONOSCOPY;  Surgeon: Jamal Cole MD;  Location: [facility];  Service: Endoscopy;  Laterality: N/A;    HYSTERECTOMY      PARTIAL HYSTERECTOMY      PLANTAR FASCIA SURGERY      TOTAL REDUCTION  MAMMOPLASTY           Family History:  Family History   Problem Relation Age of Onset    Diabetes Mother     Stroke Mother     Cancer Father     Diabetes Sister     Diabetes Brother     Lupus Other     Eczema Other     Melanoma Neg Hx     Psoriasis Neg Hx        Social History:  Social History     Social History Main Topics    Smoking status: Former Smoker     Packs/day: 0.25     Years: 13.00     Quit date: 10/19/2000    Smokeless tobacco: Never Used    Alcohol use No    Drug use: No    Sexual activity: unknown       ROS   Review of Systems   Constitutional: Negative for chills and fever.   HENT: Negative for sore throat, trouble swallowing and voice change.    Respiratory: Negative for cough, shortness of breath and stridor.    Cardiovascular: Negative for chest pain.   Gastrointestinal: Negative for nausea and vomiting.   Genitourinary: Negative for dysuria.   Musculoskeletal: Negative for back pain.   Skin: Positive for rash (BUE, back).   Neurological: Negative for weakness.   Hematological: Does not bruise/bleed easily.   All other systems reviewed and are negative.    Physical Exam      Initial Vitals [07/24/18 2143]   BP Pulse Resp Temp SpO2   (!) 158/99 102 18 97.7 °F (36.5 °C) 96 %      MAP       --          Physical Exam  Nursing Notes and Vital Signs Reviewed.  Constitutional: Patient is in no acute distress. Well-developed and well-nourished.  Head: Atraumatic. Normocephalic.  Eyes: PERRL. EOM intact. Conjunctivae are not pale. No scleral icterus.  ENT: Mucous membranes are moist. Oropharynx is clear and symmetric.    Neck: Supple. Full ROM. No lymphadenopathy.  Cardiovascular: Regular rate. Regular rhythm. No murmurs, rubs, or gallops. Distal pulses are 2+ and symmetric.  Pulmonary/Chest: No respiratory distress. Clear to auscultation bilaterally. No wheezing or rales.  Abdominal: Soft and non-distended.  There is no tenderness.  No rebound, guarding, or rigidity.   Musculoskeletal: Moves  "all extremities. No obvious deformities. No edema. No calf tenderness.  Skin: Warm and dry. Sporadic maculopapular rash along upper back and upper arms bilaterally consistent with where pt's unwashed shirt was. Rash spares areas where pt's bra covered her skin. No palpable fluctuance. Skin is intact.   Neurological:  Alert, awake, and appropriate.  Normal speech.  No acute focal neurological deficits are appreciated.  Psychiatric: Normal affect. Good eye contact. Appropriate in content.    ED Course    Procedures  ED Vital Signs:  Vitals:    07/24/18 2143 07/25/18 0100   BP: (!) 158/99 (!) 138/91   Pulse: 102 79   Resp: 18 18   Temp: 97.7 °F (36.5 °C)    TempSrc: Oral    SpO2: 96% 96%   Weight: (!) 150.6 kg (332 lb)    Height: 5' 5" (1.651 m)               The Emergency Provider reviewed the vital signs and test results, which are outlined above.    ED Discussion     1:20 AM: Reassessed pt at this time. Pt's rash has improved with treatment in ER. Lungs are clear to auscultation. Pt states her condition has improved at this time. Discussed with pt all pertinent ED information. Discussed pt dx and plan of tx. Gave pt all f/u and return to the ED instructions. All questions and concerns were addressed at this time. Pt expresses understanding of information and instructions, and is comfortable with plan to discharge. Pt is stable for discharge.    Patient is safe for discharge. There is no suggestion of airway or ENT emergency. Patient is hemodynamically stable and there is no suggestion of active anaphylaxis or progressive worsening of current symptoms.      ED Medication(s):  Medications   methylPREDNISolone sodium succinate injection 125 mg (125 mg Intramuscular Given 7/25/18 0010)   diphenhydrAMINE capsule 25 mg (25 mg Oral Given 7/25/18 0010)   famotidine tablet 20 mg (20 mg Oral Given 7/25/18 0031)       Discharge Medication List as of 7/25/2018  1:21 AM      START taking these medications    Details "   diphenhydrAMINE (BENADRYL) 25 mg capsule Take 1 each (25 mg total) by mouth every 6 (six) hours as needed for Itching or Allergies., Starting Wed 7/25/2018, OTC      EPINEPHrine (EPIPEN) 0.3 mg/0.3 mL AtIn Inject 0.3 mLs (0.3 mg total) into the muscle as needed., Starting Wed 7/25/2018, Until Thu 7/25/2019, Print      famotidine (PEPCID) 20 MG tablet Take 1 tablet (20 mg total) by mouth 2 (two) times daily., Starting Wed 7/25/2018, Until Thu 7/25/2019, Print      methylPREDNISolone (MEDROL DOSEPACK) 4 mg tablet Take medrol dose pack as directed, Print      triamcinolone acetonide 0.1% (KENALOG) 0.1 % cream Apply topically 2 (two) times daily. for 10 days, Starting Wed 7/25/2018, Until Sat 8/4/2018, Print             Follow-up Information     Siva Esparza MD. Schedule an appointment as soon as possible for a visit in 1 week.    Specialty:  Family Medicine  Contact information:  [address]  [place] [address]  [phone]             [facility]    Specialty:  Emergency Medicine  Why:  As needed, If symptoms worsen  Contact information:  [address] [facility] [address] [phone]                   Medical Decision Making              Scribe Attestation:   Scribe #1: I performed the above scribed service and the documentation accurately describes the services I performed. I attest to the accuracy of the note.    Attending:   Physician Attestation Statement for Scribe #1: I, Arturo Montiel Jr., MD, personally performed the services described in this documentation, as scribed by Melissa Camp, in my presence, and it is both accurate and complete.          Clinical Impression       ICD-10-CM ICD-9-CM   1. Rash due to allergy R21 782.1   2. Allergic contact dermatitis, unspecified trigger L23.9 692.9       Disposition:   Disposition: Discharged  Condition: Stable         Arturo Montiel Jr., MD  08/01/18 0671

## 2018-07-25 NOTE — DISCHARGE INSTRUCTIONS
"Patient is safe for discharge. There is no suggestion of airway or ENT emergency. Patient is hemodynamically stable and there is no suggestion of active anaphylaxis or progressive worsening of current symptoms.    Regarding CONTACT DERMATITIS, I discussed possible causes: wearing certain metals (nickel or chromium); use of various make-ups, cleaning products, latex gloves, and medicines; contact with certain plants such as poison ivy, poison oak, or poison sumac; and use of irritants or allergens. For prevention, it is important to identify substance causing rash and avoid skin contact with the substance. For treatment: use cool, wet compresses or baths to help soothe skin; lubricate skin with "fragrance-free" or "unscented" creams and lotions often; take antihistamines for itching; and follow up with primary care provider.     "

## 2018-07-26 ENCOUNTER — PATIENT OUTREACH (OUTPATIENT)
Dept: ADMINISTRATIVE | Facility: HOSPITAL | Age: 53
End: 2018-07-26

## 2018-08-09 ENCOUNTER — OFFICE VISIT (OUTPATIENT)
Dept: INTERNAL MEDICINE | Facility: CLINIC | Age: 53
End: 2018-08-09
Payer: COMMERCIAL

## 2018-08-09 ENCOUNTER — LAB VISIT (OUTPATIENT)
Dept: LAB | Facility: HOSPITAL | Age: 53
End: 2018-08-09
Attending: FAMILY MEDICINE
Payer: COMMERCIAL

## 2018-08-09 VITALS
BODY MASS INDEX: 48.82 KG/M2 | WEIGHT: 293 LBS | TEMPERATURE: 97 F | OXYGEN SATURATION: 99 % | DIASTOLIC BLOOD PRESSURE: 76 MMHG | HEART RATE: 110 BPM | SYSTOLIC BLOOD PRESSURE: 126 MMHG | HEIGHT: 65 IN

## 2018-08-09 DIAGNOSIS — E78.5 HYPERLIPIDEMIA ASSOCIATED WITH TYPE 2 DIABETES MELLITUS: Chronic | ICD-10-CM

## 2018-08-09 DIAGNOSIS — E11.9 TYPE 2 DIABETES MELLITUS WITHOUT COMPLICATION, WITHOUT LONG-TERM CURRENT USE OF INSULIN: ICD-10-CM

## 2018-08-09 DIAGNOSIS — E11.69 HYPERLIPIDEMIA ASSOCIATED WITH TYPE 2 DIABETES MELLITUS: Chronic | ICD-10-CM

## 2018-08-09 DIAGNOSIS — L23.9 ALLERGIC CONTACT DERMATITIS, UNSPECIFIED TRIGGER: ICD-10-CM

## 2018-08-09 DIAGNOSIS — E66.01 MORBID OBESITY WITH BMI OF 50.0-59.9, ADULT: ICD-10-CM

## 2018-08-09 DIAGNOSIS — E11.9 TYPE 2 DIABETES MELLITUS WITHOUT COMPLICATION, WITHOUT LONG-TERM CURRENT USE OF INSULIN: Primary | ICD-10-CM

## 2018-08-09 LAB
CHOLEST SERPL-MCNC: 171 MG/DL
CHOLEST/HDLC SERPL: 4.8 {RATIO}
ESTIMATED AVG GLUCOSE: 183 MG/DL
HBA1C MFR BLD HPLC: 8 %
HDLC SERPL-MCNC: 36 MG/DL
HDLC SERPL: 21.1 %
LDLC SERPL CALC-MCNC: 101.4 MG/DL
NONHDLC SERPL-MCNC: 135 MG/DL
TRIGL SERPL-MCNC: 168 MG/DL

## 2018-08-09 PROCEDURE — 99214 OFFICE O/P EST MOD 30 MIN: CPT | Mod: S$GLB,,, | Performed by: FAMILY MEDICINE

## 2018-08-09 PROCEDURE — 83036 HEMOGLOBIN GLYCOSYLATED A1C: CPT

## 2018-08-09 PROCEDURE — 36415 COLL VENOUS BLD VENIPUNCTURE: CPT | Mod: PO

## 2018-08-09 PROCEDURE — 3008F BODY MASS INDEX DOCD: CPT | Mod: CPTII,S$GLB,, | Performed by: FAMILY MEDICINE

## 2018-08-09 PROCEDURE — 80061 LIPID PANEL: CPT

## 2018-08-09 PROCEDURE — 3045F PR MOST RECENT HEMOGLOBIN A1C LEVEL 7.0-9.0%: CPT | Mod: CPTII,S$GLB,, | Performed by: FAMILY MEDICINE

## 2018-08-09 PROCEDURE — 99999 PR PBB SHADOW E&M-EST. PATIENT-LVL III: CPT | Mod: PBBFAC,,, | Performed by: FAMILY MEDICINE

## 2018-08-09 RX ORDER — TRIAMCINOLONE ACETONIDE 1 MG/G
CREAM TOPICAL 2 TIMES DAILY
Qty: 80 G | Refills: 0 | Status: SHIPPED | OUTPATIENT
Start: 2018-08-09 | End: 2020-01-22

## 2018-08-09 NOTE — PROGRESS NOTES
"Subjective:   Patient ID: Ac Hayden is a 52 y.o. female.  Chief Complaint:  Follow-up      Presents for follow-up on diabetes mellitus and hyperlipidemia.    Last visit April 2018 preop clearance for gastric sleeve procedure.    A1c 7%.  On Trulicity 1.5 every 7 days.  Previous lipid panel with  on pravastatin 40 mg daily.    Developed infiltrate on chest x-ray and CT.  Saw pulmonology.  Did not have surgery.  After complete workup cancer ruled out.  Diagnosis pneumonia.  Treated and significantly better per chart/patient.    Planning to have  Gastric sleeve August 22nd.    Complains of resolving rash.  Allergic reaction due to detergent ensured.  Seen in the ER.  Significantly better.  Still with some itching.  Request refill of steroid cream.    Otherwise no new complaints or concerns today.      Review of Systems   Constitutional: Negative for chills, diaphoresis, fatigue and fever.   Eyes: Negative for visual disturbance.   Respiratory: Negative for cough and shortness of breath.    Cardiovascular: Negative for chest pain and leg swelling.   Gastrointestinal: Negative for abdominal distention, abdominal pain, constipation, diarrhea, nausea and vomiting.   Endocrine: Negative for polydipsia, polyphagia and polyuria.   Genitourinary: Negative for difficulty urinating, dysuria, flank pain, frequency, hematuria and urgency.   Musculoskeletal: Negative for myalgias.   Skin: Positive for rash.   Neurological: Negative for dizziness, weakness, light-headedness, numbness and headaches.     Objective:   /76 (BP Location: Right arm, Patient Position: Sitting, BP Method: Large (Manual))   Pulse 110   Temp 96.8 °F (36 °C) (Tympanic)   Ht 5' 5" (1.651 m)   Wt (!) 150.1 kg (330 lb 14.6 oz)   SpO2 99%   BMI 55.07 kg/m²     Physical Exam   Constitutional: Vital signs are normal. She appears well-developed and well-nourished.     Morbid obesity   Eyes: No scleral icterus.   Neck: No JVD present. " Carotid bruit is not present.   Cardiovascular: Normal rate, regular rhythm and normal heart sounds.  Exam reveals no gallop and no friction rub.    No murmur heard.  Pulmonary/Chest: Effort normal and breath sounds normal. She has no wheezes. She has no rhonchi. She has no rales.   Abdominal: Soft. She exhibits no distension. There is no hepatosplenomegaly. There is no tenderness. There is no rebound and no guarding.   Musculoskeletal: She exhibits edema.   Neurological: She displays a negative Romberg sign. Coordination and gait normal.   Skin: Rash (Resolving.  Generalized urticaria/Hives like.  In distribution of  shirt) noted.   Nursing note and vitals reviewed.    Assessment:     1. Type 2 diabetes mellitus without complication, without long-term current use of insulin    2. Hyperlipidemia associated with type 2 diabetes mellitus    3. Morbid obesity with BMI of 50.0-59.9, adult    4. Allergic contact dermatitis, unspecified trigger      Plan:   Type 2 diabetes mellitus without complication, without long-term current use of insulin  -     Hemoglobin A1c; Future; Expected date: 08/09/2018  Check A1c.    Continue Trulicity 1.5 mg injection weekly.  If A1c greater than 8%, will need additional agent.    Hyperlipidemia associated with type 2 diabetes mellitus  -     Lipid panel; Future; Expected date: 08/09/2018  Goal LDL less than 100 diabetic.  Aspirin on hold in anticipation of surgery.  Continue pravastatin 40 mg daily.  If LDL greater than 100, will change to a torus statin after procedure.      Morbid obesity with BMI of 50.0-59.9, adult  Previously cleared for surgery.  No changes other than pulmonary issues.    Reported stable in chart for surgery by pulmonology.    Therefore remains medically stable for surgery under general anesthesia.      Allergic contact dermatitis, unspecified trigger  -     triamcinolone acetonide 0.1% (KENALOG) 0.1 % cream; Apply topically 2 (two) times daily.  Dispense: 80 g;  Refill: 0  Avoid triggering agent/detergents.    Steroid cream topically as needed.      Return to clinic 3 months

## 2018-09-05 ENCOUNTER — OFFICE VISIT (OUTPATIENT)
Dept: PODIATRY | Facility: CLINIC | Age: 53
End: 2018-09-05
Payer: COMMERCIAL

## 2018-09-05 VITALS
HEIGHT: 65 IN | DIASTOLIC BLOOD PRESSURE: 74 MMHG | HEART RATE: 105 BPM | WEIGHT: 293 LBS | SYSTOLIC BLOOD PRESSURE: 115 MMHG | BODY MASS INDEX: 48.82 KG/M2

## 2018-09-05 DIAGNOSIS — E11.49 TYPE II DIABETES MELLITUS WITH NEUROLOGICAL MANIFESTATIONS: Primary | ICD-10-CM

## 2018-09-05 DIAGNOSIS — L60.0 ONYCHOCRYPTOSIS: ICD-10-CM

## 2018-09-05 DIAGNOSIS — B35.1 DERMATOPHYTOSIS OF NAIL: ICD-10-CM

## 2018-09-05 PROCEDURE — 99203 OFFICE O/P NEW LOW 30 MIN: CPT | Mod: S$GLB,,, | Performed by: PODIATRIST

## 2018-09-05 PROCEDURE — 3045F PR MOST RECENT HEMOGLOBIN A1C LEVEL 7.0-9.0%: CPT | Mod: CPTII,S$GLB,, | Performed by: PODIATRIST

## 2018-09-05 PROCEDURE — 99999 PR PBB SHADOW E&M-EST. PATIENT-LVL III: CPT | Mod: PBBFAC,,, | Performed by: PODIATRIST

## 2018-09-05 PROCEDURE — 3008F BODY MASS INDEX DOCD: CPT | Mod: CPTII,S$GLB,, | Performed by: PODIATRIST

## 2018-09-05 NOTE — PROGRESS NOTES
Subjective:     Patient ID: Ac Hayden is a 52 y.o. female.    Chief Complaint: Nail Problem (bilateral great toenails )    Ac is a 52 y.o. female who presents to the clinic upon referral from Dr. Gordillo  for evaluation and treatment of diabetic feet. Ac has a past medical history of Allergic rhinitis, Arthritis, Colon polyps, Diabetes mellitus, Diabetes mellitus, type 2, Hypertension, Metabolic syndrome, Mixed anxiety and depressive disorder, Pneumonia, Prediabetes, and Urine incontinence. Patient relates no major problem with feet. Only complaints today consist of ingrown toenails. Patient states her blood sugar runs 120-140mg/dl.     PCP: Siva Esparza MD    Date Last Seen by PCP: 8/9/18    Current shoe gear: Slip-on shoes    Hemoglobin A1C   Date Value Ref Range Status   08/09/2018 8.0 (H) 4.0 - 5.6 % Final     Comment:     ADA Screening Guidelines:  5.7-6.4%  Consistent with prediabetes  >or=6.5%  Consistent with diabetes  High levels of fetal hemoglobin interfere with the HbA1C  assay. Heterozygous hemoglobin variants (HbS, HgC, etc)do  not significantly interfere with this assay.   However, presence of multiple variants may affect accuracy.     04/09/2018 7.0 (H) 4.0 - 5.6 % Final     Comment:     According to ADA guidelines, hemoglobin A1c <7.0% represents  optimal control in non-pregnant diabetic patients. Different  metrics may apply to specific patient populations.   Standards of Medical Care in Diabetes-2016.  For the purpose of screening for the presence of diabetes:  <5.7%     Consistent with the absence of diabetes  5.7-6.4%  Consistent with increasing risk for diabetes   (prediabetes)  >or=6.5%  Consistent with diabetes  Currently, no consensus exists for use of hemoglobin A1c  for diagnosis of diabetes for children.  This Hemoglobin A1c assay has significant interference with fetal   hemoglobin   (HbF). The results are invalid for patients with abnormal amounts of   HbF,    including those with known Hereditary Persistence   of Fetal Hemoglobin. Heterozygous hemoglobin variants (HbAS, HbAC,   HbAD, HbAE, HbA2) do not significantly interfere with this assay;   however, presence of multiple variants in a sample may impact the %   interference.     02/21/2018 6.5 (H) 4.0 - 5.6 % Final     Comment:     According to ADA guidelines, hemoglobin A1c <7.0% represents  optimal control in non-pregnant diabetic patients. Different  metrics may apply to specific patient populations.   Standards of Medical Care in Diabetes-2016.  For the purpose of screening for the presence of diabetes:  <5.7%     Consistent with the absence of diabetes  5.7-6.4%  Consistent with increasing risk for diabetes   (prediabetes)  >or=6.5%  Consistent with diabetes  Currently, no consensus exists for use of hemoglobin A1c  for diagnosis of diabetes for children.  This Hemoglobin A1c assay has significant interference with fetal   hemoglobin   (HbF). The results are invalid for patients with abnormal amounts of   HbF,   including those with known Hereditary Persistence   of Fetal Hemoglobin. Heterozygous hemoglobin variants (HbAS, HbAC,   HbAD, HbAE, HbA2) do not significantly interfere with this assay;   however, presence of multiple variants in a sample may impact the %   interference.                  Patient Active Problem List   Diagnosis    Degeneration of lumbar or lumbosacral intervertebral disc    Mixed anxiety and depressive disorder    Rotator cuff tendinitis    Impingement syndrome, shoulder    Osteoarthritis of acromioclavicular joint    Urgency incontinence    Type 2 diabetes mellitus without complication, without long-term current use of insulin    Morbid obesity with BMI of 50.0-59.9, adult    Pulmonary nodules    Bilateral edema of lower extremity    Hyperlipidemia associated with type 2 diabetes mellitus    History of colon polyps    CHRIS on CPAP    Lung infiltrate on CT       Medication  List with Changes/Refills   Current Medications    ALBUTEROL 90 MCG/ACTUATION INHALER    Inhale 1-2 puffs into the lungs every 6 (six) hours as needed for Wheezing.    ASPIRIN (ECOTRIN) 81 MG EC TABLET    Take 1 tablet (81 mg total) by mouth once daily.    CHOLECALCIFEROL, VITAMIN D3, 1,000 UNIT CAPSULE    Take 1 capsule (1,000 Units total) by mouth 2 (two) times daily.    DIPHENHYDRAMINE (BENADRYL) 25 MG CAPSULE    Take 1 each (25 mg total) by mouth every 6 (six) hours as needed for Itching or Allergies.    DULAGLUTIDE (TRULICITY) 1.5 MG/0.5 ML PNIJ    Inject 1.5 mg into the skin every 7 days.    DULOXETINE (CYMBALTA) 60 MG CAPSULE    TAKE ONE CAPSULE BY MOUTH ONCE DAILY    EPINEPHRINE (EPIPEN) 0.3 MG/0.3 ML ATIN    Inject 0.3 mLs (0.3 mg total) into the muscle as needed.    FAMOTIDINE (PEPCID) 20 MG TABLET    Take 1 tablet (20 mg total) by mouth 2 (two) times daily.    FLUTICASONE (FLONASE) 50 MCG/ACTUATION NASAL SPRAY    USE TWO SPRAY(S) IN EACH NOSTRIL ONCE DAILY    HYDROCHLOROTHIAZIDE (HYDRODIURIL) 25 MG TABLET    TAKE ONE TABLET BY MOUTH ONCE DAILY    MIRABEGRON (MYRBETRIQ) 50 MG TB24    Take 1 tablet (50 mg total) by mouth once daily.    ONDANSETRON (ZOFRAN-ODT) 4 MG TBDL    Take 1 tablet (4 mg total) by mouth every 4 to 6 hours as needed.    PRAVASTATIN (PRAVACHOL) 40 MG TABLET    Take 1 tablet (40 mg total) by mouth once daily.    TRAMADOL (ULTRAM) 50 MG TABLET    Take 50 mg by mouth 2 (two) times daily as needed.     TRIAMCINOLONE ACETONIDE 0.1% (KENALOG) 0.1 % CREAM    Apply topically 2 (two) times daily.    ZAFIRLUKAST (ACCOLATE) 20 MG TABLET    TAKE ONE TABLET BY MOUTH TWICE DAILY       Review of patient's allergies indicates:   Allergen Reactions    Codeine Edema and Itching    Pcn [penicillins] Rash    Sulfa (sulfonamide antibiotics) Edema and Rash       Past Surgical History:   Procedure Laterality Date    BARIATRIC SURGERY  08/22/2018    breast reduction      HYSTERECTOMY      PARTIAL  "HYSTERECTOMY      PLANTAR FASCIA SURGERY      TOTAL REDUCTION MAMMOPLASTY         Family History   Problem Relation Age of Onset    Diabetes Mother     Stroke Mother     Cancer Father     Diabetes Sister     Diabetes Brother     Lupus Other     Eczema Other     Melanoma Neg Hx     Psoriasis Neg Hx        Social History     Socioeconomic History    Marital status:      Spouse name: Not on file    Number of children: Not on file    Years of education: Not on file    Highest education level: Not on file   Social Needs    Financial resource strain: Not on file    Food insecurity - worry: Not on file    Food insecurity - inability: Not on file    Transportation needs - medical: Not on file    Transportation needs - non-medical: Not on file   Occupational History     Employer: [id number]   Tobacco Use    Smoking status: Former Smoker     Packs/day: 0.25     Years: 13.00     Pack years: 3.25     Last attempt to quit: 10/19/2000     Years since quittin.8    Smokeless tobacco: Never Used   Substance and Sexual Activity    Alcohol use: No     Alcohol/week: 0.0 oz    Drug use: No    Sexual activity: Not on file   Other Topics Concern    Are you pregnant or think you may be? Not Asked    Breast-feeding Not Asked   Social History Narrative    Works at [employer], inventory mgmt/receiving. .       Vitals:    18 1309   BP: 115/74   Pulse: 105   Weight: (!) 140 kg (308 lb 10.3 oz)   Height: 5' 5" (1.651 m)   PainSc: 0-No pain       Hemoglobin A1C   Date Value Ref Range Status   2018 8.0 (H) 4.0 - 5.6 % Final     Comment:     ADA Screening Guidelines:  5.7-6.4%  Consistent with prediabetes  >or=6.5%  Consistent with diabetes  High levels of fetal hemoglobin interfere with the HbA1C  assay. Heterozygous hemoglobin variants (HbS, HgC, etc)do  not significantly interfere with this assay.   However, presence of multiple variants may affect accuracy.     2018 7.0 (H) " 4.0 - 5.6 % Final     Comment:     According to ADA guidelines, hemoglobin A1c <7.0% represents  optimal control in non-pregnant diabetic patients. Different  metrics may apply to specific patient populations.   Standards of Medical Care in Diabetes-2016.  For the purpose of screening for the presence of diabetes:  <5.7%     Consistent with the absence of diabetes  5.7-6.4%  Consistent with increasing risk for diabetes   (prediabetes)  >or=6.5%  Consistent with diabetes  Currently, no consensus exists for use of hemoglobin A1c  for diagnosis of diabetes for children.  This Hemoglobin A1c assay has significant interference with fetal   hemoglobin   (HbF). The results are invalid for patients with abnormal amounts of   HbF,   including those with known Hereditary Persistence   of Fetal Hemoglobin. Heterozygous hemoglobin variants (HbAS, HbAC,   HbAD, HbAE, HbA2) do not significantly interfere with this assay;   however, presence of multiple variants in a sample may impact the %   interference.     02/21/2018 6.5 (H) 4.0 - 5.6 % Final     Comment:     According to ADA guidelines, hemoglobin A1c <7.0% represents  optimal control in non-pregnant diabetic patients. Different  metrics may apply to specific patient populations.   Standards of Medical Care in Diabetes-2016.  For the purpose of screening for the presence of diabetes:  <5.7%     Consistent with the absence of diabetes  5.7-6.4%  Consistent with increasing risk for diabetes   (prediabetes)  >or=6.5%  Consistent with diabetes  Currently, no consensus exists for use of hemoglobin A1c  for diagnosis of diabetes for children.  This Hemoglobin A1c assay has significant interference with fetal   hemoglobin   (HbF). The results are invalid for patients with abnormal amounts of   HbF,   including those with known Hereditary Persistence   of Fetal Hemoglobin. Heterozygous hemoglobin variants (HbAS, HbAC,   HbAD, HbAE, HbA2) do not significantly interfere with this  assay;   however, presence of multiple variants in a sample may impact the %   interference.         ROS          Objective:   PHYSICAL EXAM: Apperance: Alert and orient in no distress,well developed, and with good attention to grooming and body habits  Patient presents ambulating in mules  LOWER EXTREMITY EXAM:  VASCULAR: Dorsalis pedis pulses 2/4 bilateral and Posterior Tibial pulses 2/4 bilateral. Capillary fill time <4 seconds bilateral. Mild edema observed bilateral. Varicosities absent bilateral. Skin temperature of the lower extremities is warm to warm, proximal to distal. Hair growth WNL bilateral.  DERMATOLOGICAL: No skin rashes, subcutaneous nodules, lesions, or ulcers observed bilateral. Nails 2,3,4,5 bilateral normal length and thickness. Right hallux nail thickened with acrylic covering. Left hallux nail incurvated at the medial and lateral borders. Webspaces 1,2,3,4 bilateral clean, dry and without evidence of break in skin integrity.   NEUROLOGICAL: Light touch, sharp-dull, proprioception all present and equal bilaterally. Vibratory sensation diminished at bilateral hallux. Protective sensation absent sites as tested with a [place]-John 5.07 monofilament.   MUSCULOSKELETAL: Muscle strength is 5/5 for foot inverters, everters, plantarflexors, and dorsiflexors. Muscle tone is normal.     Assessment:   Type II diabetes mellitus with neurological manifestations    Dermatophytosis of nail - Right Foot    Onychocryptosis - Left Foot          Plan:   Type II diabetes mellitus with neurological manifestations    Dermatophytosis of nail - Right Foot    Onychocryptosis - Left Foot      I counseled the patient on her conditions, regarding findings of my examination, my impressions, and usual treatment plan.   Greater than 50% of this visit spent on counseling and coordination of care.  Greater than 15 minutes of a 20 minute appointment spent on education about the diabetic foot, neuropathy, and prevention of  limb loss.  Shoe inspection. Diabetic Foot Education. Patient reminded of the importance of good nutrition and blood sugar control to help prevent podiatric complications of diabetes. Patient instructed on proper foot hygeine. We discussed wearing proper shoe gear, daily foot inspections, never walking without protective shoe gear, never putting sharp instruments to feet.    Patient  will continue to monitor the areas daily, inspect feet, wear protective shoe gear when ambulatory, moisturizer to maintain skin integrity. Patient reminded of the importance of good nutrition and blood sugar control to help prevent podiatric complications of diabetes.  Patient to return 1 months or sooner if needed.                 Carlton Bhatt DPM  [facility]

## 2018-09-12 RX ORDER — HYDROCODONE BITARTRATE AND ACETAMINOPHEN 7.5; 325 MG/15ML; MG/15ML
SOLUTION ORAL
COMMUNITY
Start: 2018-08-23 | End: 2018-09-12

## 2018-09-20 ENCOUNTER — OFFICE VISIT (OUTPATIENT)
Dept: PODIATRY | Facility: CLINIC | Age: 53
End: 2018-09-20
Payer: COMMERCIAL

## 2018-09-20 VITALS
HEART RATE: 96 BPM | DIASTOLIC BLOOD PRESSURE: 87 MMHG | SYSTOLIC BLOOD PRESSURE: 143 MMHG | HEIGHT: 65 IN | RESPIRATION RATE: 20 BRPM | BODY MASS INDEX: 48.82 KG/M2 | WEIGHT: 293 LBS

## 2018-09-20 DIAGNOSIS — L60.0 INGROWN LEFT BIG TOENAIL: Primary | ICD-10-CM

## 2018-09-20 DIAGNOSIS — M79.675 PAIN OF TOE OF LEFT FOOT: ICD-10-CM

## 2018-09-20 PROCEDURE — 3008F BODY MASS INDEX DOCD: CPT | Mod: CPTII,S$GLB,, | Performed by: PODIATRIST

## 2018-09-20 PROCEDURE — 99999 PR PBB SHADOW E&M-EST. PATIENT-LVL III: CPT | Mod: PBBFAC,,, | Performed by: PODIATRIST

## 2018-09-20 PROCEDURE — 99213 OFFICE O/P EST LOW 20 MIN: CPT | Mod: 25,S$GLB,, | Performed by: PODIATRIST

## 2018-09-20 PROCEDURE — 11750 EXCISION NAIL&NAIL MATRIX: CPT | Mod: TA,S$GLB,, | Performed by: PODIATRIST

## 2018-09-21 ENCOUNTER — OFFICE VISIT (OUTPATIENT)
Dept: INTERNAL MEDICINE | Facility: CLINIC | Age: 53
End: 2018-09-21
Payer: COMMERCIAL

## 2018-09-21 VITALS
HEART RATE: 84 BPM | HEIGHT: 65 IN | OXYGEN SATURATION: 99 % | WEIGHT: 293 LBS | DIASTOLIC BLOOD PRESSURE: 80 MMHG | BODY MASS INDEX: 48.82 KG/M2 | TEMPERATURE: 98 F | SYSTOLIC BLOOD PRESSURE: 110 MMHG

## 2018-09-21 DIAGNOSIS — E11.69 HYPERLIPIDEMIA ASSOCIATED WITH TYPE 2 DIABETES MELLITUS: Chronic | ICD-10-CM

## 2018-09-21 DIAGNOSIS — E78.5 HYPERLIPIDEMIA ASSOCIATED WITH TYPE 2 DIABETES MELLITUS: Chronic | ICD-10-CM

## 2018-09-21 DIAGNOSIS — E11.9 TYPE 2 DIABETES MELLITUS WITHOUT COMPLICATION, WITHOUT LONG-TERM CURRENT USE OF INSULIN: Primary | Chronic | ICD-10-CM

## 2018-09-21 DIAGNOSIS — E66.01 MORBID OBESITY WITH BMI OF 50.0-59.9, ADULT: ICD-10-CM

## 2018-09-21 PROCEDURE — 3045F PR MOST RECENT HEMOGLOBIN A1C LEVEL 7.0-9.0%: CPT | Mod: CPTII,S$GLB,, | Performed by: FAMILY MEDICINE

## 2018-09-21 PROCEDURE — 3008F BODY MASS INDEX DOCD: CPT | Mod: CPTII,S$GLB,, | Performed by: FAMILY MEDICINE

## 2018-09-21 PROCEDURE — 99999 PR PBB SHADOW E&M-EST. PATIENT-LVL III: CPT | Mod: PBBFAC,,, | Performed by: FAMILY MEDICINE

## 2018-09-21 PROCEDURE — 99214 OFFICE O/P EST MOD 30 MIN: CPT | Mod: S$GLB,,, | Performed by: FAMILY MEDICINE

## 2018-09-21 RX ORDER — ATORVASTATIN CALCIUM 20 MG/1
20 TABLET, FILM COATED ORAL DAILY
Qty: 90 TABLET | Refills: 3 | Status: SHIPPED | OUTPATIENT
Start: 2018-09-21 | End: 2020-01-22

## 2018-09-21 RX ORDER — INSULIN PUMP SYRINGE, 3 ML
EACH MISCELLANEOUS
Qty: 1 EACH | Refills: 0 | Status: SHIPPED | OUTPATIENT
Start: 2018-09-21 | End: 2019-09-21

## 2018-09-21 RX ORDER — LANCETS
EACH MISCELLANEOUS
Qty: 100 EACH | Refills: 3 | Status: SHIPPED | OUTPATIENT
Start: 2018-09-21 | End: 2024-02-06 | Stop reason: SDUPTHER

## 2018-09-21 NOTE — PROGRESS NOTES
"Subjective:   Patient ID: Ac Hayden is a 52 y.o. female.  Chief Complaint:  Follow-up (Surgery)      Patient presents follow-up on diabetes and hyperlipidemia.    Last visit A1c checked 8%.  Not controlled on Trulicity 1.5 injection weekly.  Lipid panel with LDL greater than 100.  Not at goal on pravastatin 40 mg daily.    Since visit, underwent successful gastric sleeve procedure.    Has decreased weight from 330-308 lb.  Reports doing well.  Four weeks post procedure.    Aspirin still on hold.  Trulicity also on hold.  Denies symptoms hypo hyperglycemia.  Still taking pravastatin.  No new complaints or concerns today.    Request reorder for diabetic testing machine and supplies.        Review of Systems   Constitutional: Negative for chills, diaphoresis, fatigue and fever.   Eyes: Negative for visual disturbance.   Respiratory: Negative for cough and shortness of breath.    Cardiovascular: Negative for chest pain and leg swelling.   Gastrointestinal: Negative for abdominal distention, abdominal pain, constipation, diarrhea, nausea and vomiting.   Endocrine: Negative for polydipsia, polyphagia and polyuria.   Genitourinary: Negative for difficulty urinating, dysuria, flank pain, frequency, hematuria and urgency.   Musculoskeletal: Negative for myalgias.   Skin: Negative for rash.   Neurological: Negative for dizziness, weakness, light-headedness, numbness and headaches.     Objective:   /80 (BP Location: Right arm, Patient Position: Sitting, BP Method: Large (Manual))   Pulse 84   Temp 98 °F (36.7 °C) (Oral)   Ht 5' 5" (1.651 m)   Wt (!) 139.9 kg (308 lb 6.8 oz)   SpO2 99%   BMI 51.32 kg/m²     Physical Exam   Constitutional: Vital signs are normal. She appears well-developed and well-nourished.   Morbid obesity, but weight decreased 20+ lb since last visit.   Eyes: No scleral icterus.   Neck: No JVD present. Carotid bruit is not present.   Cardiovascular: Normal rate, regular rhythm and normal " heart sounds. Exam reveals no gallop and no friction rub.   No murmur heard.  Pulmonary/Chest: Effort normal and breath sounds normal. She has no wheezes. She has no rhonchi. She has no rales.   Neurological: She displays a negative Romberg sign. Coordination and gait normal.   Skin: No rash noted.   Psychiatric: She has a normal mood and affect.   Nursing note and vitals reviewed.    Assessment:     1. Type 2 diabetes mellitus without complication, without long-term current use of insulin    2. Hyperlipidemia associated with type 2 diabetes mellitus    3. Morbid obesity with BMI of 50.0-59.9, adult      Plan:   Type 2 diabetes mellitus without complication, without long-term current use of insulin  -     Ambulatory referral to Ophthalmology  -     blood-glucose meter kit; To check BG one time daily, to use with insurance preferred meter  Dispense: 1 each; Refill: 0  -     lancets Misc; To check BG one time daily, to use with insurance preferred meter  Dispense: 100 each; Refill: 3  -     blood sugar diagnostic Strp; To check BG one time daily, to use with insurance preferred meter  Dispense: 100 each; Refill: 3  Previous A1c not at goal treatment.    Restart Trulicity if okay with bariatric surgery.    If not recommended,  start oral agent.  With significant weight loss, hopefully will not need additional agent with the Trulicity to decrease A1c less than 8%.    Urine micro albumin needed next visit.    Eye exam scheduled.  Recheck A1c 3 months.      Hyperlipidemia associated with type 2 diabetes mellitus  -     atorvastatin (LIPITOR) 20 MG tablet; Take 1 tablet (20 mg total) by mouth once daily.  Dispense: 90 tablet; Refill: 3  LDL not at goal less than 100 and diabetic.    Discontinue pravastatin.    Start Lipitor 20 mg daily.    Recheck lipid panel LFTs in 3 months.      Morbid obesity with BMI of 50.0-59.9, adult  Status post gastric sleeve procedure.    Doing well.    Significant weight loss to date.  Hope to  decrease BMI less than 50 by next visit.      Return to clinic 3 months or sooner as needed.

## 2018-09-25 ENCOUNTER — HOSPITAL ENCOUNTER (OUTPATIENT)
Dept: RADIOLOGY | Facility: HOSPITAL | Age: 53
Discharge: HOME | End: 2018-09-25
Attending: INTERNAL MEDICINE
Payer: COMMERCIAL

## 2018-09-25 ENCOUNTER — OFFICE VISIT (OUTPATIENT)
Dept: PULMONOLOGY | Facility: CLINIC | Age: 53
End: 2018-09-25
Payer: COMMERCIAL

## 2018-09-25 VITALS
SYSTOLIC BLOOD PRESSURE: 130 MMHG | DIASTOLIC BLOOD PRESSURE: 80 MMHG | RESPIRATION RATE: 22 BRPM | HEIGHT: 65 IN | OXYGEN SATURATION: 98 % | HEART RATE: 78 BPM | WEIGHT: 293 LBS | BODY MASS INDEX: 48.82 KG/M2

## 2018-09-25 DIAGNOSIS — G47.33 OSA ON CPAP: Primary | ICD-10-CM

## 2018-09-25 DIAGNOSIS — R91.8 LUNG INFILTRATE ON CT: ICD-10-CM

## 2018-09-25 DIAGNOSIS — Z98.890 S/P GASTRIC SURGERY: ICD-10-CM

## 2018-09-25 DIAGNOSIS — R91.8 PULMONARY NODULES: ICD-10-CM

## 2018-09-25 DIAGNOSIS — E66.01 MORBID OBESITY WITH BMI OF 50.0-59.9, ADULT: ICD-10-CM

## 2018-09-25 PROCEDURE — 71260 CT THORAX DX C+: CPT | Mod: TC

## 2018-09-25 PROCEDURE — 99999 PR PBB SHADOW E&M-EST. PATIENT-LVL III: CPT | Mod: PBBFAC,,, | Performed by: INTERNAL MEDICINE

## 2018-09-25 PROCEDURE — 25500020 PHARM REV CODE 255: Performed by: INTERNAL MEDICINE

## 2018-09-25 PROCEDURE — 3008F BODY MASS INDEX DOCD: CPT | Mod: CPTII,S$GLB,, | Performed by: INTERNAL MEDICINE

## 2018-09-25 PROCEDURE — 99214 OFFICE O/P EST MOD 30 MIN: CPT | Mod: S$GLB,,, | Performed by: INTERNAL MEDICINE

## 2018-09-25 RX ORDER — BLOOD-GLUCOSE METER
EACH MISCELLANEOUS
COMMUNITY
Start: 2018-09-21 | End: 2024-02-06 | Stop reason: SDUPTHER

## 2018-09-25 RX ADMIN — IOHEXOL 75 ML: 350 INJECTION, SOLUTION INTRAVENOUS at 08:09

## 2018-09-25 NOTE — PROGRESS NOTES
"Subjective:       Patient ID: Ac Hayden is a 52 y.o. female.    Chief Complaint: Pulmonary Nodules and Sleep Apnea    Ms. Ac Hayden is 52 years old  Follow up to review chest CT and   prior nodule RUL  Scans show bilateral parachymal opacities worse on left lower lobe  These have RESOLVED..  Patient completed 3 weeks of antibiotics  Patient is a symptomatic no cough no wheezing or shortness of breath  Patient has CPAP 6-20  Immunizations are up-to-date  I reviewed all her records in detail  I have reviewed the patient's medical history in detail and updated the computerized patient record.                 Review of Systems   Constitutional: Positive for weight loss. Negative for weight gain.   HENT: Negative.    Eyes: Negative.    Respiratory: Negative for apnea, snoring, sputum production, shortness of breath and wheezing.    Cardiovascular: Negative.    Genitourinary: Negative.    Endocrine: endocrine negative   Musculoskeletal: Negative.    Skin: Negative.    Gastrointestinal: Negative.    Neurological: Negative.    Psychiatric/Behavioral: Negative for sleep disturbance.       Objective:       Vitals:    09/25/18 0905   BP: 130/80   Pulse: 78   Resp: (!) 22   SpO2: 98%   Weight: (!) 139.8 kg (308 lb 5 oz)   Height: 5' 5" (1.651 m)       Physical Exam   Constitutional: She is oriented to person, place, and time. She appears well-developed and well-nourished. She is obese.   HENT:   Head: Normocephalic.   Mouth/Throat: Mallampati Score: IV.   Neck: Normal range of motion. Neck supple.   Neurological: She is alert and oriented to person, place, and time.   Skin: Skin is warm and dry.   Psychiatric: She has a normal mood and affect.   Nursing note and vitals reviewed.    Personal Diagnostic Review    Chest       The size of heart is within normal limits.  There is a mild amount of atherosclerosis.  There is a 5 mm noncalcified pulmonary nodule in the posterior aspect of the right upper lobe.  There " are several noncalcified intrapulmonary lymph nodes in the right lung.  There are mild dependent atelectatic changes in both lungs.  There has been significant interval improvement in the appearance of both lungs.  There is no pneumothorax or pleural effusion.  There are surgical changes in the stomach.  The liver and spleen are heterogeneous in appearance.  There are mild degenerative changes in the thoracic spine.      Impression       1. There has been significant interval improvement in the appearance of both lungs.  2. There is a 5 mm noncalcified pulmonary nodule in the posterior aspect of the right upper lobe. There are several noncalcified intrapulmonary lymph nodes in the right lung.  3. There are mild dependent atelectatic changes in both lungs.  4. The liver and spleen are heterogeneous in appearance.  This may be secondary to the timing of the contrast.  5. There are mild degenerative changes in the thoracic spine.  6. There are surgical changes in the stomach.  All CT scans at this facility use dose modulation, iterative reconstruction, and/or weight base dosing when appropriate to reduce radiation dose when appropriate to reduce radiation dose to as low as reasonably achievable.         Assessment:       Problem List Items Addressed This Visit     Morbid obesity with BMI of 50.0-59.9, adult     SP Sleeve gastrectomy  Has just lost 42 lb         Pulmonary nodules     5.0 mm nodule in the anterior right upper lobe   No new or enlarging parenchymal nodules.           Relevant Orders    CT Chest With Contrast    CHRIS on CPAP - Primary     [place] 8  [place] time 9 pm  [place] time 0530am  Using CPAP and benefits           S/P gastric surgery     Sp gastric sleeve Aug 22nd 2018         RESOLVED: Lung infiltrate on CT        Plan:         LLL opacity resolved likely was  reolved post abx  Nodule RUL will be followed: Low risk patient, quit smoking 18 years ago   Monthly APAP download to adjust with weight  loss    Follow-up in about 1 year (around 9/25/2019) for Chest CT: for mass/infiltrate/GGO, Weight loss and exercise, Download CPAP/ APAP/ TRIOLOG/ BIPA, CPA.       TIME SPENT WITH PATIENT:     Time spent: 20 minutes in face to face  discussion concerning diagnosis, prognosis, review of lab and test results, benefits of treatment as well as management of disease, counseling of patient and coordination of care between various health  care providers . Greater than half the time spent was used for coordination of care and counseling of patient.     Keo Tavera    Pulmonary/Critical care/Sleepmedicine

## 2018-09-28 NOTE — PROGRESS NOTES
Subjective:      Patient ID: Ac Hayden is a 52 y.o. female.    Chief Complaint: Ingrown Toenail (left great toe)    Ac is a 52 y.o. female who presents to the clinic complaining of painful ingrown toenail on the left foot. Patient states the nail feels like its digging into the side of her skin. Patient states the pain has been present for several months. Patient does not relate a history of trauma. Patient rates pain 4/10. When asked to indicate where the pain is located, patient points to medial and lateral left nail border. Patient denies other complaints at this time.      Patient Active Problem List   Diagnosis    Degeneration of lumbar or lumbosacral intervertebral disc    Mixed anxiety and depressive disorder    Rotator cuff tendinitis    Impingement syndrome, shoulder    Osteoarthritis of acromioclavicular joint    Urgency incontinence    Type 2 diabetes mellitus without complication, without long-term current use of insulin    Morbid obesity with BMI of 50.0-59.9, adult    Pulmonary nodules    Bilateral edema of lower extremity    Hyperlipidemia associated with type 2 diabetes mellitus    History of colon polyps    CHRIS on CPAP    S/P gastric surgery          Medication List           Accurate as of 9/20/18 11:59 PM. If you have any questions, ask your nurse or doctor.               CONTINUE taking these medications    albuterol 90 mcg/actuation inhaler  Commonly known as:  PROVENTIL/VENTOLIN HFA  Inhale 1-2 puffs into the lungs every 6 (six) hours as needed for Wheezing.     diphenhydrAMINE 25 mg capsule  Commonly known as:  BENADRYL  Take 1 each (25 mg total) by mouth every 6 (six) hours as needed for Itching or Allergies.     DULoxetine 60 MG capsule  Commonly known as:  CYMBALTA  TAKE ONE CAPSULE BY MOUTH ONCE DAILY     EPINEPHrine 0.3 mg/0.3 mL Atin  Commonly known as:  EPIPEN  Inject 0.3 mLs (0.3 mg total) into the muscle as needed.     fluticasone 50 mcg/actuation nasal  spray  Commonly known as:  FLONASE  USE TWO SPRAY(S) IN EACH NOSTRIL ONCE DAILY     mirabegron 50 mg Tb24  Commonly known as:  MYRBETRIQ  Take 1 tablet (50 mg total) by mouth once daily.     pravastatin 40 MG tablet  Commonly known as:  PRAVACHOL  Take 1 tablet (40 mg total) by mouth once daily.     triamcinolone acetonide 0.1% 0.1 % cream  Commonly known as:  KENALOG  Apply topically 2 (two) times daily.     zafirlukast 20 MG tablet  Commonly known as:  ACCOLATE  TAKE ONE TABLET BY MOUTH TWICE DAILY            Review of patient's allergies indicates:   Allergen Reactions    Codeine Edema and Itching    Pcn [penicillins] Rash    Sulfa (sulfonamide antibiotics) Edema and Rash       Past Surgical History:   Procedure Laterality Date    BARIATRIC SURGERY  08/22/2018    BAR[facility]ATR[facility] SURGERY  08/22/2018    [facility]    breast reduction      COLONOSCOPY N/A 10/19/2017    Procedure: COLONOSCOPY;  Surgeon: Jamal Cole MD;  Location: [facility];  Service: Endoscopy;  Laterality: N/A;    COLONOSCOPY N/A 10/19/2017    [facility]    ESOPHAGOGASTRODUODENOSCOPY (EGD) N/A 10/19/2017    [facility]    HYSTERECTOMY      PARTIAL HYSTERECTOMY      PLANTAR FASCIA SURGERY      TOTAL REDUCTION MAMMOPLASTY         Family History   Problem Relation Age of Onset    Diabetes Mother     Stroke Mother     Cancer Father     Diabetes Sister     Diabetes Brother     Lupus Other     Eczema Other     Melanoma Neg Hx     Psoriasis Neg Hx        Social History     Socioeconomic History    Marital status:      Spouse name: Not on file    Number of children: Not on file    Years of education: Not on file    Highest education level: Not on file   Social Needs    Financial resource strain: Not on file    Food insecurity - worry: Not on file    Food insecurity - inability: Not on file    Transportation needs - medical:  "Not on file    Transportation needs - non-medical: Not on file   Occupational History     Employer: [id number]   Tobacco Use    Smoking status: Former Smoker     Packs/day: 0.25     Years: 13.00     Pack years: 3.25     Last attempt to quit: 10/19/2000     Years since quittin.9    Smokeless tobacco: Never Used   Substance and Sexual Activity    Alcohol use: No     Alcohol/week: 0.0 oz    Drug use: No    Sexual activity: Not on file   Other Topics Concern    Are you pregnant or think you may be? Not Asked    Breast-feeding Not Asked   Social History Narrative    Works at [employer], inventory mgmt/receiving. .       Vitals:    18 1627   BP: (!) 143/87   Pulse: 96   Resp: 20   Weight: (!) 140 kg (308 lb 10.3 oz)   Height: 5' 5" (1.651 m)   PainSc: 0-No pain       Hemoglobin A1C   Date Value Ref Range Status   2018 8.0 (H) 4.0 - 5.6 % Final     Comment:     ADA Screening Guidelines:  5.7-6.4%  Consistent with prediabetes  >or=6.5%  Consistent with diabetes  High levels of fetal hemoglobin interfere with the HbA1C  assay. Heterozygous hemoglobin variants (HbS, HgC, etc)do  not significantly interfere with this assay.   However, presence of multiple variants may affect accuracy.     2018 7.0 (H) 4.0 - 5.6 % Final     Comment:     According to ADA guidelines, hemoglobin A1c <7.0% represents  optimal control in non-pregnant diabetic patients. Different  metrics may apply to specific patient populations.   Standards of Medical Care in Diabetes-2016.  For the purpose of screening for the presence of diabetes:  <5.7%     Consistent with the absence of diabetes  5.7-6.4%  Consistent with increasing risk for diabetes   (prediabetes)  >or=6.5%  Consistent with diabetes  Currently, no consensus exists for use of hemoglobin A1c  for diagnosis of diabetes for children.  This Hemoglobin A1c assay has significant interference with fetal   hemoglobin   (HbF). The results are invalid for " patients with abnormal amounts of   HbF,   including those with known Hereditary Persistence   of Fetal Hemoglobin. Heterozygous hemoglobin variants (HbAS, HbAC,   HbAD, HbAE, HbA2) do not significantly interfere with this assay;   however, presence of multiple variants in a sample may impact the %   interference.     02/21/2018 6.5 (H) 4.0 - 5.6 % Final     Comment:     According to ADA guidelines, hemoglobin A1c <7.0% represents  optimal control in non-pregnant diabetic patients. Different  metrics may apply to specific patient populations.   Standards of Medical Care in Diabetes-2016.  For the purpose of screening for the presence of diabetes:  <5.7%     Consistent with the absence of diabetes  5.7-6.4%  Consistent with increasing risk for diabetes   (prediabetes)  >or=6.5%  Consistent with diabetes  Currently, no consensus exists for use of hemoglobin A1c  for diagnosis of diabetes for children.  This Hemoglobin A1c assay has significant interference with fetal   hemoglobin   (HbF). The results are invalid for patients with abnormal amounts of   HbF,   including those with known Hereditary Persistence   of Fetal Hemoglobin. Heterozygous hemoglobin variants (HbAS, HbAC,   HbAD, HbAE, HbA2) do not significantly interfere with this assay;   however, presence of multiple variants in a sample may impact the %   interference.         Review of Systems   Constitutional: Negative for chills and fever.   Respiratory: Negative for shortness of breath.    Cardiovascular: Negative for chest pain, palpitations, orthopnea, claudication and leg swelling.   Gastrointestinal: Negative for diarrhea, nausea and vomiting.   Musculoskeletal: Negative for joint pain.   Skin: Negative for rash.   Neurological: Negative for dizziness, tingling, sensory change, focal weakness and weakness.   Psychiatric/Behavioral: Negative.            Objective:   PHYSICAL EXAM: Apperance: Alert and orient in no distress,well developed, and with good  attention to grooming and body habits  Lower Extremity Exam   VASCULAR: Dorsalis pedis pulses 2/4 left and Posterior Tibial pulses 2/4 left. Capillary fill time <4 seconds left. No edema observed left. Varicosities absent left. Skin temperature of the lower extremities is warm to warm, proximal to distal. Hair growth WNL left.  DERMATOLOGICAL: No skin rash, subcutaneous nodules, lesions or ulcers observed. Left hallux nail observed to be mildly incurvated at medial and lateral borders and slight obstructed in the nail grooves with soft tissue. The left hallux medial and lateral nail fold is grossly edematous and firm from chronic inflammation and scarring. No purulent drainage, no odor, and no increased temperature observed to left hallux.   NEUROLOGICAL: Light touch, sharp-dull, proprioception all present and equal bilaterally.    MUSCULOSKELETAL: Muscle strength 5/5 for all foot inverters, everters, plantarflexors, and  dorsiflexors bilateral. Pain on palpation of left hallux medial and lateral nail border. No pain on palpation of dorsal nail plate left hallux.        Assessment:       There are no diagnoses linked to this encounter.      Plan:   There are no diagnoses linked to this encounter.  I counseled the patient on her conditions, regarding findings of my examination, my impressions, and usual treatment plan.   Patient consented verbal and written to permanent partial nail avulsion of left hallux.  Procedure performed: A local digital everett was administered to the left hallux of  6cc of 1% Lidocaine plain. Attention was then directed to the left hallux to check for adequate anesthesia. A penrose drain tourniquet was applied to the base of the left hallux for hemostasis.  Attention was then directed the medial and lateral nail border to separate using a spatula the most proximal nail border at the eponychium was released to the area of the matrix. Then the border was released at the medial and lateral aspect  and at the plantar aspect distally to proximally. Using the English anvil, a cut was then made approximately 3mm lateral and medial to the medial and lateral nail fold in a longitudinal manner at the distal aspect extending to the proximal end of the nail plate. Using a straight hemostat, the free nail plate segment was clamped and removed. Using a tissue nipper, residual tissue was then removed. The nail groove area was inspected and probed proximally with a curette for any spicules. Next a 60 second application of phenol was applied to the medial and lateral nail border. The area was flushed with copious amounts of sterile normal saline. Betadine was applied to the area and dry sterile dressing of gauze and Coflex. The penrose drain tourniquet was released. Neurovascular status was assessed and noted to be intact. Patient tolerated procedure well.   The patient was given oral and written instructions for post-op care including BID soaks of water and Epson salt followed by application of antibiotic ointment  and light dressing.  The patient was instructed to take Tylenol over-the-counter q4h prn pain and to call clinic immediately if there is increased pain, increased redness, pus, fever, chills, nausea, or vomiting present.  Patient to return 2 weeks or sooner if needed.                 Carlton Bhatt DPM  [facility]

## 2018-10-12 ENCOUNTER — OFFICE VISIT (OUTPATIENT)
Dept: OPHTHALMOLOGY | Facility: CLINIC | Age: 53
End: 2018-10-12
Payer: COMMERCIAL

## 2018-10-12 ENCOUNTER — OFFICE VISIT (OUTPATIENT)
Dept: DERMATOLOGY | Facility: CLINIC | Age: 53
End: 2018-10-12
Payer: COMMERCIAL

## 2018-10-12 ENCOUNTER — OFFICE VISIT (OUTPATIENT)
Dept: PODIATRY | Facility: CLINIC | Age: 53
End: 2018-10-12
Payer: COMMERCIAL

## 2018-10-12 VITALS
WEIGHT: 293 LBS | HEIGHT: 65 IN | BODY MASS INDEX: 48.82 KG/M2 | HEART RATE: 75 BPM | DIASTOLIC BLOOD PRESSURE: 82 MMHG | SYSTOLIC BLOOD PRESSURE: 139 MMHG

## 2018-10-12 DIAGNOSIS — H52.4 PRESBYOPIA: ICD-10-CM

## 2018-10-12 DIAGNOSIS — H52.13 MYOPIC ASTIGMATISM OF BOTH EYES: ICD-10-CM

## 2018-10-12 DIAGNOSIS — L82.1 DERMATOSIS PAPULOSA NIGRA: Primary | ICD-10-CM

## 2018-10-12 DIAGNOSIS — M79.674 PAIN OF TOE OF RIGHT FOOT: ICD-10-CM

## 2018-10-12 DIAGNOSIS — L60.0 INGROWN LEFT BIG TOENAIL: ICD-10-CM

## 2018-10-12 DIAGNOSIS — H52.203 MYOPIC ASTIGMATISM OF BOTH EYES: ICD-10-CM

## 2018-10-12 DIAGNOSIS — E11.9 DIABETES MELLITUS TYPE 2 WITHOUT RETINOPATHY: Primary | ICD-10-CM

## 2018-10-12 DIAGNOSIS — Z13.5 SCREENING FOR GLAUCOMA: ICD-10-CM

## 2018-10-12 DIAGNOSIS — B35.1 FUNGAL INFECTION OF TOENAIL: Primary | ICD-10-CM

## 2018-10-12 PROCEDURE — 99999 PR PBB SHADOW E&M-EST. PATIENT-LVL III: CPT | Mod: PBBFAC,,, | Performed by: PODIATRIST

## 2018-10-12 PROCEDURE — 99999 PR PBB SHADOW E&M-EST. PATIENT-LVL II: CPT | Mod: PBBFAC,,, | Performed by: STUDENT IN AN ORGANIZED HEALTH CARE EDUCATION/TRAINING PROGRAM

## 2018-10-12 PROCEDURE — 99213 OFFICE O/P EST LOW 20 MIN: CPT | Mod: 25,S$GLB,, | Performed by: PODIATRIST

## 2018-10-12 PROCEDURE — 11750 EXCISION NAIL&NAIL MATRIX: CPT | Mod: T5,S$GLB,, | Performed by: PODIATRIST

## 2018-10-12 PROCEDURE — 3008F BODY MASS INDEX DOCD: CPT | Mod: CPTII,S$GLB,, | Performed by: PODIATRIST

## 2018-10-12 PROCEDURE — 92015 DETERMINE REFRACTIVE STATE: CPT | Mod: S$GLB,,, | Performed by: OPTOMETRIST

## 2018-10-12 PROCEDURE — 92014 COMPRE OPH EXAM EST PT 1/>: CPT | Mod: S$GLB,,, | Performed by: OPTOMETRIST

## 2018-10-12 PROCEDURE — 99499 UNLISTED E&M SERVICE: CPT | Mod: S$GLB,,, | Performed by: STUDENT IN AN ORGANIZED HEALTH CARE EDUCATION/TRAINING PROGRAM

## 2018-10-12 PROCEDURE — 99999 PR PBB SHADOW E&M-EST. PATIENT-LVL II: CPT | Mod: PBBFAC,,, | Performed by: OPTOMETRIST

## 2018-10-12 RX ORDER — DOXYCYCLINE 100 MG/1
100 CAPSULE ORAL 2 TIMES DAILY
Qty: 20 CAPSULE | Refills: 0 | Status: SHIPPED | OUTPATIENT
Start: 2018-10-12 | End: 2018-10-22

## 2018-10-12 NOTE — PROGRESS NOTES
HPI     Last [facility] exam 09/27/2017  Diabetic/NIDDM  Screening for glaucoma  RE  Update CL Rx  Decreased distance and near vision      Last edited by Neno Loomis MA on 10/12/2018  1:44 PM. (History)            Assessment /Plan     For exam results, see Encounter Report.    Diabetes mellitus type 2 without retinopathy    Screening for glaucoma    Myopic astigmatism of both eyes    Presbyopia      No diabetic retinopathy OU.  OH OK OU.  Spec and Cl Rx updated.  RTC one year or prn.

## 2018-10-12 NOTE — PROGRESS NOTES
Patient here for cosmetic removal of DPNs on the cheeks. Discussed with patient that insurance will not cover procedure to remove lesions and that she can make a cosmetic appointment which she will have to pay out of pocket, and we will remove her DPNs as desired. She expressed understanding and will schedule as such.

## 2018-10-22 NOTE — PROGRESS NOTES
Subjective:      Patient ID: Ac Hayden is a 52 y.o. female.    Chief Complaint: Ingrown Toenail (diabetic patient, left great toenail avulsion follow up, right great toenail avulsion today, patient is accompanied by her  )    Ac is a 52 y.o. female who presents to the clinic complaining of painful right great toenails. Patient state she would like to go ahead and proceed with having the right great toenail removed. Patient rates pain 4/10. When asked to indicate where the pain is located, patient points to proximal right nail border. Patient returns to the clinic 2 weeks post nail procedure of left.  Patient has no complaints of fever chills or sweats.  Patient denies pain.  Patient has been dressing as instructed. Patient denies other complaints at this time.      Patient Active Problem List   Diagnosis    Degeneration of lumbar or lumbosacral intervertebral disc    Mixed anxiety and depressive disorder    Rotator cuff tendinitis    Impingement syndrome, shoulder    Osteoarthritis of acromioclavicular joint    Urgency incontinence    Type 2 diabetes mellitus without complication, without long-term current use of insulin    Morbid obesity with BMI of 50.0-59.9, adult    Pulmonary nodules    Bilateral edema of lower extremity    Hyperlipidemia associated with type 2 diabetes mellitus    History of colon polyps    CHRIS on CPAP    S/P gastric surgery          Medication List           Accurate as of 10/12/18 11:59 PM. If you have any questions, ask your nurse or doctor.               START taking these medications    doxycycline 100 MG Cap  Commonly known as:  VIBRAMYCIN  Take 1 capsule (100 mg total) by mouth 2 (two) times daily. for 10 days  Started by:  Carlton Bhatt DPM        CONTINUE taking these medications    albuterol 90 mcg/actuation inhaler  Commonly known as:  PROVENTIL/VENTOLIN HFA  Inhale 1-2 puffs into the lungs every 6 (six) hours as needed for Wheezing.      atorvastatin 20 MG tablet  Commonly known as:  LIPITOR  Take 1 tablet (20 mg total) by mouth once daily.     blood sugar diagnostic Strp  To check BG one time daily, to use with insurance preferred meter     * blood-glucose meter kit  To check BG one time daily, to use with insurance preferred meter     * RELION PRIME METER Misc  Generic drug:  blood-glucose meter     diphenhydrAMINE 25 mg capsule  Commonly known as:  BENADRYL  Take 1 each (25 mg total) by mouth every 6 (six) hours as needed for Itching or Allergies.     DULoxetine 60 MG capsule  Commonly known as:  CYMBALTA  TAKE ONE CAPSULE BY MOUTH ONCE DAILY     EPINEPHrine 0.3 mg/0.3 mL Atin  Commonly known as:  EPIPEN  Inject 0.3 mLs (0.3 mg total) into the muscle as needed.     fluticasone 50 mcg/actuation nasal spray  Commonly known as:  FLONASE  USE TWO SPRAY(S) IN EACH NOSTRIL ONCE DAILY     lancets Misc  To check BG one time daily, to use with insurance preferred meter     mirabegron 50 mg Tb24  Commonly known as:  MYRBETRIQ  Take 1 tablet (50 mg total) by mouth once daily.     triamcinolone acetonide 0.1% 0.1 % cream  Commonly known as:  KENALOG  Apply topically 2 (two) times daily.     zafirlukast 20 MG tablet  Commonly known as:  ACCOLATE  TAKE ONE TABLET BY MOUTH TWICE DAILY         * This list has 2 medication(s) that are the same as other medications prescribed for you. Read the directions carefully, and ask your doctor or other care provider to review them with you.               Where to Get Your Medications      These medications were sent to [facility] - [place] - [address] [address]  [address] [address] [place] [address]    Phone:  [phone]   · doxycycline 100 MG Cap         Review of patient's allergies indicates:   Allergen Reactions    Codeine Edema and Itching    Pcn [penicillins] Rash    Sulfa (sulfonamide antibiotics) Edema and Rash       Past Surgical History:   Procedure Laterality Date     BARIATRIC SURGERY  2018    BARIATRIC SURGERY  2018    [facility]    breast reduction      COLONOSCOPY N/A 10/19/2017    Procedure: COLONOSCOPY;  Surgeon: Jamal Cole MD;  Location: [facility];  Service: Endoscopy;  Laterality: N/A;    COLONOSCOPY N/A 10/19/2017    [facility]    ESOPHAGOGASTRODUODENOSCOPY (EGD) N/A 10/19/2017    Per[facility] by [facility] at [facility]    HYSTERECTOMY      PARTIAL HYSTERECTOMY      PLANTAR FASCIA SURGERY      TOTAL REDUCTION MAMMOPLASTY         Family History   Problem Relation Age of Onset    Diabetes Mother     Stroke Mother     Cancer Father     Diabetes Sister     Diabetes Brother     Lupus Other     Eczema Other     Melanoma Neg Hx     Psoriasis Neg Hx        Social History     Socioeconomic History    Marital status:      Spouse name: Not on file    Number of children: Not on file    Years of education: Not on file    Highest education level: Not on file   Social Needs    Financial resource strain: Not on file    Food insecurity - worry: Not on file    Food insecurity - inability: Not on file    Transportation needs - medical: Not on file    Transportation needs - non-medical: Not on file   Occupational History     Employer: [id number]   Tobacco Use    Smoking status: Former Smoker     Packs/day: 0.25     Years: 13.00     Pack years: 3.25     Last attempt to quit: 10/19/2000     Years since quittin.0    Smokeless tobacco: Never Used   Substance and Sexual Activity    Alcohol use: No     Alcohol/week: 0.0 oz    Drug use: No    Sexual activity: Not on file   Other Topics Concern    Are you pregnant or think you may be? Not Asked    Breast-feeding Not Asked   Social History Narrative    Works at [employer], [employer] mgmt/receiving. .       Vitals:    10/12/18 1005   BP: 139/82   Pulse: 75   Weight: (!) 136.3 kg (300 lb 7.8 oz)   Height:  "5' 5" (1.651 m)   PainSc:   4       Hemoglobin A1C   Date Value Ref Range Status   08/09/2018 8.0 (H) 4.0 - 5.6 % Final     Comment:     ADA Screening Guidelines:  5.7-6.4%  Consistent with prediabetes  >or=6.5%  Consistent with diabetes  High levels of fetal hemoglobin interfere with the HbA1C  assay. Heterozygous hemoglobin variants (HbS, HgC, etc)do  not significantly interfere with this assay.   However, presence of multiple variants may affect accuracy.     04/09/2018 7.0 (H) 4.0 - 5.6 % Final     Comment:     According to ADA guidelines, hemoglobin A1c <7.0% represents  optimal control in non-pregnant diabetic patients. Different  metrics may apply to specific patient populations.   Standards of Medical Care in Diabetes-2016.  For the purpose of screening for the presence of diabetes:  <5.7%     Consistent with the absence of diabetes  5.7-6.4%  Consistent with increasing risk for diabetes   (prediabetes)  >or=6.5%  Consistent with diabetes  Currently, no consensus exists for use of hemoglobin A1c  for diagnosis of diabetes for children.  This Hemoglobin A1c assay has significant interference with fetal   hemoglobin   (HbF). The results are invalid for patients with abnormal amounts of   HbF,   including those with known Hereditary Persistence   of Fetal Hemoglobin. Heterozygous hemoglobin variants (HbAS, HbAC,   HbAD, HbAE, HbA2) do not significantly interfere with this assay;   however, presence of multiple variants in a sample may impact the %   interference.     02/21/2018 6.5 (H) 4.0 - 5.6 % Final     Comment:     According to ADA guidelines, hemoglobin A1c <7.0% represents  optimal control in non-pregnant diabetic patients. Different  metrics may apply to specific patient populations.   Standards of Medical Care in Diabetes-2016.  For the purpose of screening for the presence of diabetes:  <5.7%     Consistent with the absence of diabetes  5.7-6.4%  Consistent with increasing risk for diabetes "   (prediabetes)  >or=6.5%  Consistent with diabetes  Currently, no consensus exists for use of hemoglobin A1c  for diagnosis of diabetes for children.  This Hemoglobin A1c assay has significant interference with fetal   hemoglobin   (HbF). The results are invalid for patients with abnormal amounts of   HbF,   including those with known Hereditary Persistence   of Fetal Hemoglobin. Heterozygous hemoglobin variants (HbAS, HbAC,   HbAD, HbAE, HbA2) do not significantly interfere with this assay;   however, presence of multiple variants in a sample may impact the %   interference.         Review of Systems   Constitutional: Negative for chills and fever.   Respiratory: Negative for shortness of breath.    Cardiovascular: Negative for chest pain, palpitations, orthopnea, claudication and leg swelling.   Gastrointestinal: Negative for diarrhea, nausea and vomiting.   Musculoskeletal: Negative for joint pain.   Skin: Negative for rash.   Neurological: Negative for dizziness, tingling, sensory change, focal weakness and weakness.   Psychiatric/Behavioral: Negative.              Objective:    PHYSICAL EXAM: Apperance: Alert and orient in no distress,well developed, and with good attention to grooming and body habits  Lower Extremity Exam   VASCULAR: Dorsalis pedis pulses 2/4 bilateral and Posterior Tibial pulses 2/4 bilateral Capillary fill time <3 seconds bilateral. No edema observed bilateral . Varicosities absent bilateral  Skin temperature of the lower extremities is warm to  Warm, proximal to distal. Hair growth WNL bilateral   DERMATOLOGICAL: No skin rash, subcutaneous nodules, lesions or ulcers observed. Right hallux nail observed to be mildly thickened and discolored, incurvated at proximal borders and slight obstructed in the nail grooves with soft tissue. No purulent drainage, no odor, and no increased temperature observed to bilateral hallux. Left hallux medial and lateral nail borders clean with epithelaized  tissue noted. .   NEUROLOGICAL: Light touch, sharp-dull, proprioception all present and equal bilaterally.   MUSCULOSKELETAL: Muscle strength 5/5 for all foot inverters, everters, plantarflexors, and  dorsiflexors bilateral. Pain on palpation of right hallux proxiimal nail border. Pain on palpation of dorsal nail plate right hallux.          Assessment:       Fungal infection of toenail - Right Foot  -     doxycycline (VIBRAMYCIN) 100 MG Cap; Take 1 capsule (100 mg total) by mouth 2 (two) times daily. for 10 days  Dispense: 20 capsule; Refill: 0    Pain of toe of right foot    Ingrown left big toenail          Plan:   Fungal infection of toenail - Right Foot  -     doxycycline (VIBRAMYCIN) 100 MG Cap; Take 1 capsule (100 mg total) by mouth 2 (two) times daily. for 10 days  Dispense: 20 capsule; Refill: 0    Pain of toe of right foot    Ingrown left big toenail      I counseled the patient on her conditions, regarding findings of my examination, my impressions, and usual treatment plan.   Patient consented verbal and written to permanent total nail avulsion of right hallux.   Procedure performed: A local digital everett was administered to the right hallux of  6cc of 1% Lidocaine plain. Attention was then directed to the right hallux to check for adequate anesthesia. A penrose drain tourniquet was applied to the base of the right hallux for hemostasis.  Attention was then directed the medial and lateral nail border to separate using a spatula the most proximal nail border at the eponychium was released to the area of the matrix. Then the border was released at the medial and lateral aspect and at the plantar aspect distally to proximally. Using a straight hemostat, the free nail plate was clamped and removed. Using a tissue nipper, residual tissue was then removed. The nail bed area was inspected and probed proximally with a curette for any spicules. Next a 60 second application of phenol was applied to the entire nail  bed. The area was flushed with copious amounts of sterile normal saline. Betadine was applied to the area and dry sterile dressing of gauze and Coflex. The penrose drain tourniquet was released. Neurovascular status was assessed and noted to be intact. Patient tolerated procedure well.   The patient was given oral and written instructions for post-op care including daily soaks of water and Epson salt followed by application of antibiotic ointment  and light dressing.  Prescription written for Doxycyline 100mg to be taken 2 times daily.   The patient was instructed to take Tylenol over-the-counter q4h prn pain and to call clinic immediately if there is increased pain, increased redness, pus, fever, chills, nausea, or vomiting present.   Patient to return 2 weeks or sooner if needed.                   Carlton Bhatt DPM  [facility]

## 2018-12-02 DIAGNOSIS — B35.1 FUNGAL INFECTION OF TOENAIL: ICD-10-CM

## 2018-12-02 RX ORDER — DOXYCYCLINE 100 MG/1
CAPSULE ORAL
Qty: 20 CAPSULE | Refills: 0 | OUTPATIENT
Start: 2018-12-02

## 2018-12-04 DIAGNOSIS — F41.8 MIXED ANXIETY AND DEPRESSIVE DISORDER: ICD-10-CM

## 2018-12-04 DIAGNOSIS — B35.1 FUNGAL INFECTION OF TOENAIL: ICD-10-CM

## 2018-12-04 RX ORDER — DOXYCYCLINE 100 MG/1
CAPSULE ORAL
Qty: 20 CAPSULE | Refills: 0 | OUTPATIENT
Start: 2018-12-04

## 2018-12-04 NOTE — TELEPHONE ENCOUNTER
----- Message from Viet Weaver sent at 12/4/2018  4:26 PM CST -----  Contact: pt   Pt states pharmacy told her that her refill was denied for anxiety medication, pt would like cb to discuss what steps she needs to take.         ..[phone] (home)         ..  [address] [place] - [address] [facility]  [address] [facility]  [place] [address]  Phone: [phone] Fax: [phone]

## 2018-12-05 DIAGNOSIS — F41.8 MIXED ANXIETY AND DEPRESSIVE DISORDER: ICD-10-CM

## 2018-12-05 RX ORDER — DULOXETIN HYDROCHLORIDE 60 MG/1
60 CAPSULE, DELAYED RELEASE ORAL DAILY
Qty: 90 CAPSULE | Refills: 3 | OUTPATIENT
Start: 2018-12-05 | End: 2019-12-05

## 2018-12-05 RX ORDER — DULOXETIN HYDROCHLORIDE 60 MG/1
CAPSULE, DELAYED RELEASE ORAL
Qty: 90 CAPSULE | Refills: 0 | OUTPATIENT
Start: 2018-12-05

## 2018-12-05 RX ORDER — DULOXETIN HYDROCHLORIDE 60 MG/1
60 CAPSULE, DELAYED RELEASE ORAL DAILY
Qty: 90 CAPSULE | Refills: 3 | Status: SHIPPED | OUTPATIENT
Start: 2018-12-05 | End: 2019-12-02 | Stop reason: SDUPTHER

## 2018-12-17 ENCOUNTER — PATIENT OUTREACH (OUTPATIENT)
Dept: ADMINISTRATIVE | Facility: HOSPITAL | Age: 53
End: 2018-12-17

## 2018-12-17 DIAGNOSIS — E11.9 TYPE 2 DIABETES MELLITUS WITHOUT COMPLICATION, WITHOUT LONG-TERM CURRENT USE OF INSULIN: Primary | Chronic | ICD-10-CM

## 2019-01-25 DIAGNOSIS — E11.9 TYPE 2 DIABETES MELLITUS WITHOUT COMPLICATION: ICD-10-CM

## 2019-04-18 ENCOUNTER — PATIENT OUTREACH (OUTPATIENT)
Dept: ADMINISTRATIVE | Facility: HOSPITAL | Age: 54
End: 2019-04-18

## 2019-04-18 NOTE — PROGRESS NOTES
Call to schedule diabetic and lab appointment. Patient not available, number no longer in service.

## 2019-05-27 ENCOUNTER — PATIENT OUTREACH (OUTPATIENT)
Dept: ADMINISTRATIVE | Facility: HOSPITAL | Age: 54
End: 2019-05-27

## 2019-07-16 ENCOUNTER — PATIENT OUTREACH (OUTPATIENT)
Dept: ADMINISTRATIVE | Facility: HOSPITAL | Age: 54
End: 2019-07-16

## 2019-08-08 ENCOUNTER — PATIENT OUTREACH (OUTPATIENT)
Dept: ADMINISTRATIVE | Facility: HOSPITAL | Age: 54
End: 2019-08-08

## 2019-08-08 NOTE — PROGRESS NOTES
DIABETES REPORT. Attempting to contact pt to schedule labs & PCP appt. Unable to reach pt.  Unable to LVM.

## 2019-08-16 ENCOUNTER — PATIENT OUTREACH (OUTPATIENT)
Dept: ADMINISTRATIVE | Facility: HOSPITAL | Age: 54
End: 2019-08-16

## 2019-08-23 ENCOUNTER — PATIENT OUTREACH (OUTPATIENT)
Dept: ADMINISTRATIVE | Facility: HOSPITAL | Age: 54
End: 2019-08-23

## 2019-09-06 ENCOUNTER — PATIENT OUTREACH (OUTPATIENT)
Dept: ADMINISTRATIVE | Facility: HOSPITAL | Age: 54
End: 2019-09-06

## 2019-09-17 ENCOUNTER — PATIENT OUTREACH (OUTPATIENT)
Dept: ADMINISTRATIVE | Facility: HOSPITAL | Age: 54
End: 2019-09-17

## 2019-09-17 NOTE — PROGRESS NOTES
DIABETES REPORT. Attempting to contact pt to schedule labs &  DM/PCP appt. Unable to reach pt. LVM       LAST HA1c:DATE: 8-9-18     LAST Ha1c:   8.0                            DUE DATE: Now     LAST LIPID PANEL: DATE:   8-9-18                        DUE DATE:Now     LAST MICRO ALBUMIN:DATE:      11-22-17               DUE DATE: Now        No show pcp appt -12-21-18

## 2019-10-17 NOTE — ASSESSMENT & PLAN NOTE
5.0 mm nodule in the anterior right upper lobe   No new or enlarging parenchymal nodules.     [FreeTextEntry1] : medically stable  continue all meds\par   depression  improved

## 2019-10-30 ENCOUNTER — TELEPHONE (OUTPATIENT)
Dept: FAMILY MEDICINE | Facility: CLINIC | Age: 54
End: 2019-10-30

## 2019-10-30 NOTE — TELEPHONE ENCOUNTER
----- Message from Marta Ryan sent at 10/30/2019  8:11 AM CDT -----  Contact: Self  Patient requesting a call back from nurse regarding information that was spoke about. She states that she forgot the name of it. Please call patient back at [phone]

## 2019-11-13 ENCOUNTER — PATIENT OUTREACH (OUTPATIENT)
Dept: ADMINISTRATIVE | Facility: HOSPITAL | Age: 54
End: 2019-11-13

## 2019-11-18 NOTE — PROGRESS NOTES
"Subjective:      Patient ID: Ac Hayden is a 53 y.o. female.    Chief Complaint: Establish Care      HPI  Here for f/u medical problems and preventive exam.  S/p gastric sleeve 15mo ago, has lost 90#.  AC breakfast sugars 95.  Walking on treadmill, on and off.  No f/c/sw/cough.  No cp/sob/palp.  BMs normal.  On mobic for right shoulder pain.  BPs never high in the past.  Feeling depressed, despite cymbalta 60mg daily.    Updated/ not seen since 1/17:  HM: 12/19 fluvax, 12/19 today HAV, 11/16 bacxiq11, 12/10 TDaP, 12/16 mmg, 10/17 Cscope rep 5y, 6/16 HCV neg, 12/19 Eye Dr. Ayala.       Review of Systems   Constitutional: Negative for appetite change, chills, diaphoresis and fever.   HENT: Negative for congestion, ear pain, rhinorrhea, sinus pressure and sore throat.    Respiratory: Negative for cough, chest tightness and shortness of breath.    Cardiovascular: Negative for chest pain and palpitations.   Gastrointestinal: Negative for blood in stool, constipation, diarrhea, nausea and vomiting.   Genitourinary: Negative for dysuria, frequency, hematuria, menstrual problem, urgency and vaginal discharge.   Musculoskeletal: Negative for arthralgias.   Skin: Negative for rash.   Neurological: Negative for dizziness and headaches.   Psychiatric/Behavioral: Negative for sleep disturbance. The patient is not nervous/anxious.          Objective:   BP (!) 140/98 (BP Location: Left arm, Patient Position: Sitting, BP Method: Thigh Cuff (Manual))   Pulse 102   Temp 98.2 °F (36.8 °C) (Oral)   Ht 5' 6" (1.676 m)   Wt 129.3 kg (285 lb 0.9 oz)   SpO2 98%   BMI 46.01 kg/m²     Physical Exam   Constitutional: She is oriented to person, place, and time. She appears well-developed and well-nourished.   HENT:   Right Ear: External ear normal. Tympanic membrane is not injected.   Left Ear: External ear normal. Tympanic membrane is not injected.   Mouth/Throat: Oropharynx is clear and moist.   Eyes: Conjunctivae are normal. "   Neck: Normal range of motion. Neck supple. No thyromegaly present.   Cardiovascular: Normal rate, regular rhythm and intact distal pulses. Exam reveals no gallop and no friction rub.   No murmur heard.  Pulses:       Dorsalis pedis pulses are 2+ on the right side, and 2+ on the left side.        Posterior tibial pulses are 2+ on the right side, and 2+ on the left side.   Pulmonary/Chest: Effort normal and breath sounds normal. She has no wheezes. She has no rales.   Abdominal: Soft. Bowel sounds are normal. She exhibits no mass. There is no tenderness.   Musculoskeletal: She exhibits no edema.   Feet:   Right Foot:   Protective Sensation: 10 sites tested. 10 sites sensed.   Skin Integrity: Negative for ulcer, blister, skin breakdown, erythema, warmth, callus or dry skin.   Left Foot:   Protective Sensation: 10 sites tested. 10 sites sensed.   Skin Integrity: Negative for ulcer, blister, skin breakdown, erythema, warmth, callus or dry skin.   Lymphadenopathy:     She has no cervical adenopathy.   Neurological: She is alert and oriented to person, place, and time.   Skin: Skin is warm. No rash noted.   Psychiatric: She has a normal mood and affect.           Assessment:       1. Encounter for preventive health examination    2. Type 2 diabetes mellitus without complication, without long-term current use of insulin    3. CHRIS on CPAP    4. Morbid obesity with body mass index (BMI) of 45.0 to 49.9 in adult    5. Mixed anxiety and depressive disorder    6. Hyperlipidemia associated with type 2 diabetes mellitus    7. S/P gastric surgery    8. Elevated BP without diagnosis of hypertension          Plan:     Encounter for preventive health examination- emily, HAV, sched mmg/gyn.  Opto today.  Lab now.  Monitor BPs for rtc 6wk.  -     CBC auto differential; Future; Expected date: 12/02/2019  -     Comprehensive metabolic panel; Future; Expected date: 12/02/2019  -     Lipid panel; Future; Expected date: 12/02/2019  -      TSH; Future; Expected date: 12/02/2019  -     Vitamin D; Future  -     Hemoglobin A1c; Future; Expected date: 12/02/2019  -     Microalbumin/creatinine urine ratio; Future; Expected date: 12/02/2019  -     Mammo Digital Screening Bilat; Future; Expected date: 12/02/2019  -     Ambulatory consult to Gynecology  -     Hepatitis A Vaccine (Adult) (IM)    Type 2 diabetes mellitus without complication, without long-term current use of insulin- check lab.  -     Hemoglobin A1c; Future; Expected date: 12/02/2019  -     Microalbumin/creatinine urine ratio; Future; Expected date: 12/02/2019    CHRIS on CPAP, doing well.    Morbid obesity with BMI of 50.0-59.9, adult- doing better, s/p 90# loss.  Increase exercise.    Mixed anxiety and depressive disorder- increase to 90mg, recheck 6wk.    Hyperlipidemia associated with type 2 diabetes mellitus- check lab.    S/P gastric surgery  -     Vitamin B12; Future; Expected date: 12/02/2019  -     Ferritin; Future; Expected date: 12/02/2019  -     Iron and TIBC; Future; Expected date: 12/02/2019  -     VITAMIN B1; Future; Expected date: 12/02/2019

## 2019-11-27 ENCOUNTER — HOSPITAL ENCOUNTER (EMERGENCY)
Facility: HOSPITAL | Age: 54
Discharge: HOME | End: 2019-11-27
Attending: FAMILY MEDICINE
Payer: COMMERCIAL

## 2019-11-27 VITALS
RESPIRATION RATE: 18 BRPM | BODY MASS INDEX: 44.54 KG/M2 | OXYGEN SATURATION: 100 % | TEMPERATURE: 99 F | HEART RATE: 74 BPM | HEIGHT: 66 IN | WEIGHT: 277.13 LBS | DIASTOLIC BLOOD PRESSURE: 74 MMHG | SYSTOLIC BLOOD PRESSURE: 158 MMHG

## 2019-11-27 DIAGNOSIS — M25.519 SHOULDER PAIN: ICD-10-CM

## 2019-11-27 DIAGNOSIS — M25.511 ACUTE PAIN OF RIGHT SHOULDER: Primary | ICD-10-CM

## 2019-11-27 LAB
HAV IGM SERPL QL IA: ABNORMAL
HBV CORE IGM SERPL QL IA: NEGATIVE
HBV SURFACE AG SERPL QL IA: NEGATIVE
HCV AB SERPL QL IA: NEGATIVE
HIV 1+2 AB+HIV1 P24 AG SERPL QL IA: NEGATIVE

## 2019-11-27 PROCEDURE — 86703 HIV-1/HIV-2 1 RESULT ANTBDY: CPT

## 2019-11-27 PROCEDURE — 36415 COLL VENOUS BLD VENIPUNCTURE: CPT

## 2019-11-27 PROCEDURE — 93010 ELECTROCARDIOGRAM REPORT: CPT | Mod: ,,, | Performed by: INTERNAL MEDICINE

## 2019-11-27 PROCEDURE — 93010 EKG 12-LEAD: ICD-10-PCS | Mod: ,,, | Performed by: INTERNAL MEDICINE

## 2019-11-27 PROCEDURE — 80074 ACUTE HEPATITIS PANEL: CPT

## 2019-11-27 PROCEDURE — 99284 EMERGENCY DEPT VISIT MOD MDM: CPT | Mod: 25

## 2019-11-27 PROCEDURE — 93005 ELECTROCARDIOGRAM TRACING: CPT

## 2019-11-27 PROCEDURE — 96372 THER/PROPH/DIAG INJ SC/IM: CPT

## 2019-11-27 PROCEDURE — 63600175 PHARM REV CODE 636 W HCPCS: Performed by: FAMILY MEDICINE

## 2019-11-27 RX ORDER — MELOXICAM 15 MG/1
15 TABLET ORAL DAILY
Qty: 20 TABLET | Refills: 0 | Status: SHIPPED | OUTPATIENT
Start: 2019-11-27 | End: 2020-01-22

## 2019-11-27 RX ORDER — KETOROLAC TROMETHAMINE 30 MG/ML
60 INJECTION, SOLUTION INTRAMUSCULAR; INTRAVENOUS
Status: COMPLETED | OUTPATIENT
Start: 2019-11-27 | End: 2019-11-27

## 2019-11-27 RX ORDER — CYCLOBENZAPRINE HCL 10 MG
10 TABLET ORAL 3 TIMES DAILY PRN
Qty: 15 TABLET | Refills: 0 | Status: SHIPPED | OUTPATIENT
Start: 2019-11-27 | End: 2019-12-02

## 2019-11-27 RX ADMIN — KETOROLAC TROMETHAMINE 60 MG: 30 INJECTION, SOLUTION INTRAMUSCULAR at 04:11

## 2019-11-27 NOTE — ED PROVIDER NOTES
SCRIBE #1 NOTE: I, Liat Barreto, am scribing for, and in the presence of, Ximena Holland MD. I have scribed the entire note.       History     Chief Complaint   Patient presents with    Shoulder Pain     right shoulder pain x4 days radiating down arm      Review of patient's allergies indicates:   Allergen Reactions    Codeine Edema and Itching    Pcn [penicillins] Rash    Sulfa (sulfonamide antibiotics) Edema and Rash         History of Present Illness     HPI    11/27/2019, 4:13 AM  History obtained from the patient      History of Present Illness: Ac Hayden is a 53 y.o. female patient with a PMHx of DM, HTN, arthritis who presents to the Emergency Department for evaluation of R shoulder pain which onset gradually a few days ago. Symptoms are constant and moderate in severity.  No mitigating or exacerbating factors reported. No other sxs reported. Patient denies any fever, chills, N/V, extremity numbness/weakness, recent trauma, CP, SOB, and all other sxs at this time. No further complaints or concerns at this time.         Arrival mode: Personal vehicle      PCP: Siva Esparza MD        Past Medical History:  Past Medical History:   Diagnosis Date    Allergic rhinitis     Arthritis     Colon polyps     Diabetes mellitus     Diabetes mellitus, type 2     Hypertension     Metabolic syndrome     Mixed anxiety and depressive disorder     Pneumonia     Prediabetes     Urine incontinence        Past Surgical History:  Past Surgical History:   Procedure Laterality Date    BARIATRIC SURGERY  08/22/2018    BARIATRIC SURGERY  08/22/2018    [facility])    breast reduction      COLONOSCOPY N/A 10/19/2017    Procedure: COLONOSCOPY;  Surgeon: Jamal Cole MD;  Location: [facility];  Service: Endoscopy;  Laterality: N/A;    HYSTERECTOMY      PARTIAL HYSTERECTOMY      PLANTAR FASCIA SURGERY      TOTAL REDUCTION MAMMOPLASTY           Family History:  Family History    Problem Relation Age of Onset    Diabetes Mother     Stroke Mother     Cancer Father     Diabetes Sister     Diabetes Brother     Lupus Other     Eczema Other     Melanoma Neg Hx     Psoriasis Neg Hx        Social History:  Social History     Tobacco Use    Smoking status: Former Smoker     Packs/day: 0.25     Years: 13.00     Pack years: 3.25     Last attempt to quit: 10/19/2000     Years since quittin.1    Smokeless tobacco: Never Used   Substance and Sexual Activity    Alcohol use: No     Alcohol/week: 0.0 standard drinks    Drug use: No    Sexual activity: Unknown        Review of Systems     Review of Systems   Constitutional: Negative for fever.        - trauma   HENT: Negative for sore throat.    Respiratory: Negative for shortness of breath.    Cardiovascular: Negative for chest pain.   Gastrointestinal: Negative for nausea.   Genitourinary: Negative for dysuria.   Musculoskeletal: Positive for arthralgias (R shoulder). Negative for back pain.   Skin: Negative for rash.   Neurological: Negative for weakness and numbness.   Hematological: Does not bruise/bleed easily.   All other systems reviewed and are negative.       Physical Exam     Initial Vitals [19 0310]   BP Pulse Resp Temp SpO2   (!) 156/81 93 18 98.2 °F (36.8 °C) 99 %      MAP       --          Physical Exam  Nursing Notes and Vital Signs Reviewed.  Constitutional: Patient is in no acute distress. Well-developed and well-nourished.  Head: Atraumatic. Normocephalic.  Eyes: PERRL. EOM intact. Conjunctivae are not pale. No scleral icterus.  ENT: Mucous membranes are moist. Oropharynx is clear and symmetric.    Neck: Supple. Full ROM. No lymphadenopathy.  Cardiovascular: Regular rate. Regular rhythm. No murmurs, rubs, or gallops. Distal pulses are 2+ and symmetric.  Pulmonary/Chest: No respiratory distress. Clear to auscultation bilaterally. No wheezing or rales.  Abdominal: Soft and non-distended.  There is no tenderness.   "No rebound, guarding, or rigidity. Good bowel sounds.  Genitourinary: No CVA tenderness  Musculoskeletal: Moves all extremities. No obvious deformities. No edema. No calf tenderness.  LUE: Tenderness to anterior and lateral shoulder. Decreased ROM secondary to pain. NVI. Cap refill distally is <2 seconds. Radial pulses are equal and 2+ bilaterally. No motor deficit. No distal sensory deficit.  Skin: Warm and dry.  Neurological:  Alert, awake, and appropriate.  Normal speech.  No acute focal neurological deficits are appreciated.  Psychiatric: Normal affect. Good eye contact. Appropriate in content.     ED Course   Procedures  ED Vital Signs:  Vitals:    11/27/19 0310 11/27/19 0428 11/27/19 0529   BP: (!) 156/81 (!) 173/89 (!) 158/74   Pulse: 93 78 74   Resp: 18 18 18   Temp: 98.2 °F (36.8 °C)  98.6 °F (37 °C)   TempSrc: Oral  Oral   SpO2: 99% 100% 100%   Weight: 125.7 kg (277 lb 1.9 oz)     Height: 5' 6" (1.676 m)         Abnormal Lab Results:  Labs Reviewed   HEPATITIS PANEL, ACUTE - Abnormal; Notable for the following components:       Result Value    Hep A IgM [place] (*)     All other components within normal limits   HIV 1 / 2 ANTIBODY        All Lab Results:  none    Imaging Results:  Imaging Results    None          The EKG was ordered, reviewed, and independently interpreted by the ED provider.  Interpretation time: 0441  Rate: 79 BPM  Rhythm: normal sinus rhythm  Interpretation: No acute ST changes. No STEMI.         The Emergency Provider reviewed the vital signs and test results, which are outlined above.     ED Discussion       4:49 AM: Reassessed pt at this time.  Pt states her condition has improved at this time. Discussed with pt all pertinent ED information and results. Discussed pt dx and plan of tx. Gave pt all f/u and return to the ED instructions. All questions and concerns were addressed at this time. Pt expresses understanding of information and instructions, and is comfortable with plan to " discharge. Pt is stable for discharge.    I discussed with patient and/or family/caretaker that evaluation in the ED does not suggest any emergent or life threatening medical conditions requiring immediate intervention beyond what was provided in the ED, and I believe patient is safe for discharge.  Regardless, an unremarkable evaluation in the ED does not preclude the development or presence of a serious of life threatening condition. As such, patient was instructed to return immediately for any worsening or change in current symptoms.         Medical Decision Making:   Clinical Tests:   Medical Tests: Ordered and Reviewed           ED Medication(s):  Medications   ketorolac injection 60 mg (60 mg Intramuscular Given 11/27/19 0418)       Discharge Medication List as of 11/27/2019  4:43 AM      START taking these medications    Details   cyclobenzaprine (FLEXERIL) 10 MG tablet Take 1 tablet (10 mg total) by mouth 3 (three) times daily as needed., Starting Wed 11/27/2019, Until Mon 12/2/2019, Print      meloxicam (MOBIC) 15 MG tablet Take 1 tablet (15 mg total) by mouth once daily., Starting Wed 11/27/2019, Print                     Scribe Attestation:   Scribe #1: I performed the above scribed service and the documentation accurately describes the services I performed. I attest to the accuracy of the note.     Attending:   Physician Attestation Statement for Scribe #1: I, Ximena Holland MD, personally performed the services described in this documentation, as scribed by Liat Barreto, in my presence, and it is both accurate and complete.           Clinical Impression       ICD-10-CM ICD-9-CM   1. Acute pain of right shoulder M25.511 719.41   2. Shoulder pain M25.519 719.41       Disposition:   Disposition: Discharged  Condition: Stable         Ximena Holland MD  11/27/19 1945

## 2019-11-27 NOTE — ED NOTES
Patient identifiers verified and correct for Ac Hayden.    LOC: The patient is awake, alert and aware of environment with an appropriate affect, the patient is oriented x 3 and speaking appropriately.  APPEARANCE: Patient resting comfortably and in no acute distress, patient is clean and well groomed, patient's clothing is properly fastened.  SKIN: The skin is warm and dry, color consistent with ethnicity, patient has normal skin turgor and moist mucus membranes, skin intact, no breakdown or bruising noted.  MUSCULOSKELETAL: Patient moving all extremities spontaneously.  RESPIRATORY: Airway is open and patent, respirations are spontaneous.  CARDIAC: Patient has a normal rate, no periphreal edema noted, capillary refill < 3 seconds.  ABDOMEN: Soft and non tender to palpation.    R shoulder pain that began on Saturday night. Denies injury or fall. No deformity noted. Decrease range on motion due to pain.

## 2019-12-02 ENCOUNTER — OFFICE VISIT (OUTPATIENT)
Dept: FAMILY MEDICINE | Facility: CLINIC | Age: 54
End: 2019-12-02
Payer: COMMERCIAL

## 2019-12-02 ENCOUNTER — OFFICE VISIT (OUTPATIENT)
Dept: OPHTHALMOLOGY | Facility: CLINIC | Age: 54
End: 2019-12-02
Payer: COMMERCIAL

## 2019-12-02 ENCOUNTER — LAB VISIT (OUTPATIENT)
Dept: LAB | Facility: HOSPITAL | Age: 54
End: 2019-12-02
Attending: INTERNAL MEDICINE
Payer: COMMERCIAL

## 2019-12-02 VITALS
HEIGHT: 66 IN | OXYGEN SATURATION: 98 % | HEART RATE: 102 BPM | SYSTOLIC BLOOD PRESSURE: 140 MMHG | DIASTOLIC BLOOD PRESSURE: 98 MMHG | TEMPERATURE: 98 F | BODY MASS INDEX: 45.81 KG/M2 | WEIGHT: 285.06 LBS

## 2019-12-02 DIAGNOSIS — Z13.5 GLAUCOMA SCREENING: ICD-10-CM

## 2019-12-02 DIAGNOSIS — Z01.00 VISIT FOR EYE AND VISION EXAM: Primary | ICD-10-CM

## 2019-12-02 DIAGNOSIS — E11.9 TYPE 2 DIABETES MELLITUS WITHOUT COMPLICATION, WITHOUT LONG-TERM CURRENT USE OF INSULIN: Chronic | ICD-10-CM

## 2019-12-02 DIAGNOSIS — Z98.890 S/P GASTRIC SURGERY: ICD-10-CM

## 2019-12-02 DIAGNOSIS — E66.01 MORBID OBESITY WITH BODY MASS INDEX (BMI) OF 45.0 TO 49.9 IN ADULT: ICD-10-CM

## 2019-12-02 DIAGNOSIS — Z00.00 ENCOUNTER FOR PREVENTIVE HEALTH EXAMINATION: ICD-10-CM

## 2019-12-02 DIAGNOSIS — F41.8 MIXED ANXIETY AND DEPRESSIVE DISORDER: Chronic | ICD-10-CM

## 2019-12-02 DIAGNOSIS — E11.69 HYPERLIPIDEMIA ASSOCIATED WITH TYPE 2 DIABETES MELLITUS: Chronic | ICD-10-CM

## 2019-12-02 DIAGNOSIS — E78.5 HYPERLIPIDEMIA ASSOCIATED WITH TYPE 2 DIABETES MELLITUS: Chronic | ICD-10-CM

## 2019-12-02 DIAGNOSIS — R03.0 ELEVATED BP WITHOUT DIAGNOSIS OF HYPERTENSION: ICD-10-CM

## 2019-12-02 DIAGNOSIS — Z00.00 ENCOUNTER FOR PREVENTIVE HEALTH EXAMINATION: Primary | ICD-10-CM

## 2019-12-02 DIAGNOSIS — H52.7 REFRACTIVE ERROR: ICD-10-CM

## 2019-12-02 DIAGNOSIS — G47.33 OSA ON CPAP: ICD-10-CM

## 2019-12-02 LAB
BASOPHILS # BLD AUTO: 0.02 K/UL (ref 0–0.2)
BASOPHILS NFR BLD: 0.3 % (ref 0–1.9)
DIFFERENTIAL METHOD: ABNORMAL
EOSINOPHIL # BLD AUTO: 0 K/UL (ref 0–0.5)
EOSINOPHIL NFR BLD: 0.6 % (ref 0–8)
ERYTHROCYTE [DISTWIDTH] IN BLOOD BY AUTOMATED COUNT: 12.4 % (ref 11.5–14.5)
HCT VFR BLD AUTO: 42.2 % (ref 37–48.5)
HGB BLD-MCNC: 13.2 G/DL (ref 12–16)
IMM GRANULOCYTES # BLD AUTO: 0.01 K/UL (ref 0–0.04)
IMM GRANULOCYTES NFR BLD AUTO: 0.2 % (ref 0–0.5)
LYMPHOCYTES # BLD AUTO: 1.9 K/UL (ref 1–4.8)
LYMPHOCYTES NFR BLD: 30.4 % (ref 18–48)
MCH RBC QN AUTO: 29.9 PG (ref 27–31)
MCHC RBC AUTO-ENTMCNC: 31.3 G/DL (ref 32–36)
MCV RBC AUTO: 96 FL (ref 82–98)
MONOCYTES # BLD AUTO: 0.4 K/UL (ref 0.3–1)
MONOCYTES NFR BLD: 6.9 % (ref 4–15)
NEUTROPHILS # BLD AUTO: 3.8 K/UL (ref 1.8–7.7)
NEUTROPHILS NFR BLD: 61.6 % (ref 38–73)
NRBC BLD-RTO: 0 /100 WBC
PLATELET # BLD AUTO: 349 K/UL (ref 150–350)
PMV BLD AUTO: 9.7 FL (ref 9.2–12.9)
RBC # BLD AUTO: 4.41 M/UL (ref 4–5.4)
WBC # BLD AUTO: 6.22 K/UL (ref 3.9–12.7)

## 2019-12-02 PROCEDURE — 92014 COMPRE OPH EXAM EST PT 1/>: CPT | Mod: S$GLB,,, | Performed by: OPTOMETRIST

## 2019-12-02 PROCEDURE — 82728 ASSAY OF FERRITIN: CPT

## 2019-12-02 PROCEDURE — 85025 COMPLETE CBC W/AUTO DIFF WBC: CPT

## 2019-12-02 PROCEDURE — 82607 VITAMIN B-12: CPT

## 2019-12-02 PROCEDURE — 80053 COMPREHEN METABOLIC PANEL: CPT

## 2019-12-02 PROCEDURE — 83036 HEMOGLOBIN GLYCOSYLATED A1C: CPT

## 2019-12-02 PROCEDURE — 90472 HEPATITIS A VACCINE ADULT IM: ICD-10-PCS | Mod: S$GLB,,, | Performed by: INTERNAL MEDICINE

## 2019-12-02 PROCEDURE — 92015 DETERMINE REFRACTIVE STATE: CPT | Mod: S$GLB,,, | Performed by: OPTOMETRIST

## 2019-12-02 PROCEDURE — 90472 IMMUNIZATION ADMIN EACH ADD: CPT | Mod: S$GLB,,, | Performed by: INTERNAL MEDICINE

## 2019-12-02 PROCEDURE — 92014 PR EYE EXAM, EST PATIENT,COMPREHESV: ICD-10-PCS | Mod: S$GLB,,, | Performed by: OPTOMETRIST

## 2019-12-02 PROCEDURE — 99999 PR PBB SHADOW E&M-EST. PATIENT-LVL V: ICD-10-PCS | Mod: PBBFAC,,, | Performed by: INTERNAL MEDICINE

## 2019-12-02 PROCEDURE — 36415 COLL VENOUS BLD VENIPUNCTURE: CPT | Mod: PO

## 2019-12-02 PROCEDURE — 83540 ASSAY OF IRON: CPT

## 2019-12-02 PROCEDURE — 99999 PR PBB SHADOW E&M-EST. PATIENT-LVL V: CPT | Mod: PBBFAC,,, | Performed by: INTERNAL MEDICINE

## 2019-12-02 PROCEDURE — 92015 PR REFRACTION: ICD-10-PCS | Mod: S$GLB,,, | Performed by: OPTOMETRIST

## 2019-12-02 PROCEDURE — 99999 PR PBB SHADOW E&M-EST. PATIENT-LVL I: CPT | Mod: PBBFAC,,, | Performed by: OPTOMETRIST

## 2019-12-02 PROCEDURE — 99396 PR PREVENTIVE VISIT,EST,40-64: ICD-10-PCS | Mod: 25,S$GLB,, | Performed by: INTERNAL MEDICINE

## 2019-12-02 PROCEDURE — 90632 HEPATITIS A VACCINE ADULT IM: ICD-10-PCS | Mod: S$GLB,,, | Performed by: INTERNAL MEDICINE

## 2019-12-02 PROCEDURE — 99396 PREV VISIT EST AGE 40-64: CPT | Mod: 25,S$GLB,, | Performed by: INTERNAL MEDICINE

## 2019-12-02 PROCEDURE — 90686 FLU VACCINE (QUAD) GREATER THAN OR EQUAL TO 3YO PRESERVATIVE FREE IM: ICD-10-PCS | Mod: S$GLB,,, | Performed by: INTERNAL MEDICINE

## 2019-12-02 PROCEDURE — 84425 ASSAY OF VITAMIN B-1: CPT

## 2019-12-02 PROCEDURE — 90471 FLU VACCINE (QUAD) GREATER THAN OR EQUAL TO 3YO PRESERVATIVE FREE IM: ICD-10-PCS | Mod: S$GLB,,, | Performed by: INTERNAL MEDICINE

## 2019-12-02 PROCEDURE — 82306 VITAMIN D 25 HYDROXY: CPT

## 2019-12-02 PROCEDURE — 90632 HEPA VACCINE ADULT IM: CPT | Mod: S$GLB,,, | Performed by: INTERNAL MEDICINE

## 2019-12-02 PROCEDURE — 84443 ASSAY THYROID STIM HORMONE: CPT

## 2019-12-02 PROCEDURE — 99999 PR PBB SHADOW E&M-EST. PATIENT-LVL I: ICD-10-PCS | Mod: PBBFAC,,, | Performed by: OPTOMETRIST

## 2019-12-02 PROCEDURE — 90471 IMMUNIZATION ADMIN: CPT | Mod: S$GLB,,, | Performed by: INTERNAL MEDICINE

## 2019-12-02 PROCEDURE — 90686 IIV4 VACC NO PRSV 0.5 ML IM: CPT | Mod: S$GLB,,, | Performed by: INTERNAL MEDICINE

## 2019-12-02 PROCEDURE — 80061 LIPID PANEL: CPT

## 2019-12-02 RX ORDER — DULOXETIN HYDROCHLORIDE 30 MG/1
90 CAPSULE, DELAYED RELEASE ORAL DAILY
Qty: 270 CAPSULE | Refills: 3 | Status: SHIPPED | OUTPATIENT
Start: 2019-12-02 | End: 2020-12-01

## 2019-12-02 NOTE — PROGRESS NOTES
HPI     Last [facility] exam 10/12/2018  Diabetic/NIDDM  Screening for glaucoma  RE  Update CL Rx     Last edited by Neno Loomis MA on 12/2/2019  3:00 PM. (History)            Assessment /Plan     For exam results, see Encounter Report.    Visit for eye and vision exam    Glaucoma screening    Refractive error      No diabetic retinopathy OU.  OH OK OU.  Spec and Cl Rx updated.  RTC one year or prn.

## 2019-12-03 LAB
25(OH)D3+25(OH)D2 SERPL-MCNC: 25 NG/ML (ref 30–96)
ALBUMIN SERPL BCP-MCNC: 3.4 G/DL (ref 3.5–5.2)
ALP SERPL-CCNC: 98 U/L (ref 55–135)
ALT SERPL W/O P-5'-P-CCNC: 14 U/L (ref 10–44)
ANION GAP SERPL CALC-SCNC: 9 MMOL/L (ref 8–16)
AST SERPL-CCNC: 18 U/L (ref 10–40)
BILIRUB SERPL-MCNC: 0.3 MG/DL (ref 0.1–1)
BUN SERPL-MCNC: 10 MG/DL (ref 6–20)
CALCIUM SERPL-MCNC: 9.4 MG/DL (ref 8.7–10.5)
CHLORIDE SERPL-SCNC: 105 MMOL/L (ref 95–110)
CHOLEST SERPL-MCNC: 188 MG/DL (ref 120–199)
CHOLEST/HDLC SERPL: 3.8 {RATIO} (ref 2–5)
CO2 SERPL-SCNC: 29 MMOL/L (ref 23–29)
CREAT SERPL-MCNC: 0.7 MG/DL (ref 0.5–1.4)
EST. GFR  (AFRICAN AMERICAN): >60 ML/MIN/1.73 M^2
EST. GFR  (NON AFRICAN AMERICAN): >60 ML/MIN/1.73 M^2
ESTIMATED AVG GLUCOSE: 114 MG/DL (ref 68–131)
FERRITIN SERPL-MCNC: 524 NG/ML (ref 20–300)
GLUCOSE SERPL-MCNC: 65 MG/DL (ref 70–110)
HBA1C MFR BLD HPLC: 5.6 % (ref 4–5.6)
HDLC SERPL-MCNC: 50 MG/DL (ref 40–75)
HDLC SERPL: 26.6 % (ref 20–50)
IRON SERPL-MCNC: 148 UG/DL (ref 30–160)
LDLC SERPL CALC-MCNC: 118.8 MG/DL (ref 63–159)
NONHDLC SERPL-MCNC: 138 MG/DL
POTASSIUM SERPL-SCNC: 3.6 MMOL/L (ref 3.5–5.1)
PROT SERPL-MCNC: 7.3 G/DL (ref 6–8.4)
SATURATED IRON: 62 % (ref 20–50)
SODIUM SERPL-SCNC: 143 MMOL/L (ref 136–145)
TOTAL IRON BINDING CAPACITY: 237 UG/DL (ref 250–450)
TRANSFERRIN SERPL-MCNC: 160 MG/DL (ref 200–375)
TRIGL SERPL-MCNC: 96 MG/DL (ref 30–150)
TSH SERPL DL<=0.005 MIU/L-ACNC: 0.82 UIU/ML (ref 0.4–4)
VIT B12 SERPL-MCNC: 326 PG/ML (ref 210–950)

## 2019-12-05 LAB — VIT B1 BLD-MCNC: 48 UG/L (ref 38–122)

## 2019-12-11 ENCOUNTER — HOSPITAL ENCOUNTER (OUTPATIENT)
Dept: RADIOLOGY | Facility: HOSPITAL | Age: 54
Discharge: HOME | End: 2019-12-11
Attending: INTERNAL MEDICINE
Payer: COMMERCIAL

## 2019-12-11 VITALS — HEIGHT: 66 IN | WEIGHT: 285.06 LBS | BODY MASS INDEX: 45.81 KG/M2

## 2019-12-11 DIAGNOSIS — Z00.00 ENCOUNTER FOR PREVENTIVE HEALTH EXAMINATION: ICD-10-CM

## 2019-12-11 PROCEDURE — 77067 SCR MAMMO BI INCL CAD: CPT | Mod: TC

## 2019-12-11 PROCEDURE — 77067 SCR MAMMO BI INCL CAD: CPT | Mod: 26,,, | Performed by: RADIOLOGY

## 2019-12-11 PROCEDURE — 77067 MAMMO DIGITAL SCREENING BILAT WITH CAD: ICD-10-PCS | Mod: 26,,, | Performed by: RADIOLOGY

## 2019-12-30 NOTE — PROGRESS NOTES
"Subjective:      Patient ID: Ac Hayden is a 54 y.o. female.    Chief Complaint: Follow-up (6 wks )      HPI  Here for follow up of medical problems.  BP at home 120/80, not running high.  AC breakfast sugar 105, AC dinner 119, HS 120s.  No trulicity x 1y.  Doing much better on 90mg cymbalta.   No cp/sob/palp.  BMs normal.  S/p 90# wt loss, gastric bypass.    Updated/ annual due 12/20:  HM: 12/19 fluvax, 12/19 HAV, 11/16 [id number], 12/10 TDaP, 12/19 mmg, 10/17 Cscope rep 5y, 6/16 HCV neg, 12/19 Eye Dr. Ayala.     Review of Systems   Constitutional: Negative for chills, diaphoresis and fever.   Respiratory: Negative for cough and shortness of breath.    Cardiovascular: Negative for chest pain, palpitations and leg swelling.   Gastrointestinal: Negative for blood in stool, constipation, diarrhea, nausea and vomiting.   Genitourinary: Negative for dysuria, frequency and hematuria.   Psychiatric/Behavioral: The patient is not nervous/anxious.          Objective:   /80   Pulse 99   Temp 98.2 °F (36.8 °C) (Oral)   Ht 5' 6" (1.676 m)   Wt 129.5 kg (285 lb 7.9 oz)   SpO2 98%   BMI 46.08 kg/m²     Physical Exam   Constitutional: She is oriented to person, place, and time. She appears well-developed.   HENT:   Mouth/Throat: Oropharynx is clear and moist.   Neck: Neck supple. Carotid bruit is not present. No thyroid mass present.   Cardiovascular: Normal rate, regular rhythm and intact distal pulses. Exam reveals no gallop and no friction rub.   No murmur heard.  Pulmonary/Chest: Effort normal and breath sounds normal. She has no wheezes. She has no rales.   Abdominal: Soft. Bowel sounds are normal. She exhibits no mass. There is no hepatosplenomegaly. There is no tenderness.   Musculoskeletal: She exhibits no edema.   Lymphadenopathy:     She has no cervical adenopathy.   Neurological: She is alert and oriented to person, place, and time.   Psychiatric: She has a normal mood and affect.       Results for " EULALIAAMY (MRN [id number]) as of 1/13/2020 15:35   Ref. Range 12/2/2019 13:44 12/2/2019 13:48   WBC Latest Ref Range: 3.90 - 12.70 K/uL  6.22   RBC Latest Ref Range: 4.00 - 5.40 M/uL  4.41   Hemoglobin Latest Ref Range: 12.0 - 16.0 g/dL  13.2   Hematocrit Latest Ref Range: 37.0 - 48.5 %  42.2   MCV Latest Ref Range: 82 - 98 fL  96   MCH Latest Ref Range: 27.0 - 31.0 pg  29.9   MCHC Latest Ref Range: 32.0 - 36.0 g/dL  31.3 (L)   RDW Latest Ref Range: 11.5 - 14.5 %  12.4   Platelets Latest Ref Range: 150 - 350 K/uL  349   MPV Latest Ref Range: 9.2 - 12.9 fL  9.7   Gran% Latest Ref Range: 38.0 - 73.0 %  61.6   Gran # (ANC) Latest Ref Range: 1.8 - 7.7 K/uL  3.8   Lymph% Latest Ref Range: 18.0 - 48.0 %  30.4   Lymph # Latest Ref Range: 1.0 - 4.8 K/uL  1.9   Mono% Latest Ref Range: 4.0 - 15.0 %  6.9   Mono # Latest Ref Range: 0.3 - 1.0 K/uL  0.4   Eosinophil% Latest Ref Range: 0.0 - 8.0 %  0.6   Eos # Latest Ref Range: 0.0 - 0.5 K/uL  0.0   Basophil% Latest Ref Range: 0.0 - 1.9 %  0.3   Baso # Latest Ref Range: 0.00 - 0.20 K/uL  0.02   nRBC Latest Ref Range: 0 /100 WBC  0   Differential Method Unknown  Automated   Immature Grans (Abs) Latest Ref Range: 0.00 - 0.04 K/uL  0.01   Immature Granulocytes Latest Ref Range: 0.0 - 0.5 %  0.2   Iron Latest Ref Range: 30 - 160 ug/dL  148   TIBC Latest Ref Range: 250 - 450 ug/dL  237 (L)   Saturated Iron Latest Ref Range: 20 - 50 %  62 (H)   Transferrin Latest Ref Range: 200 - 375 mg/dL  160 (L)   Ferritin Latest Ref Range: 20.0 - 300.0 ng/mL  524 (H)   Vitamin B-12 Latest Ref Range: 210 - 950 pg/mL  326   Sodium Latest Ref Range: 136 - 145 mmol/L  143   Potassium Latest Ref Range: 3.5 - 5.1 mmol/L  3.6   Chloride Latest Ref Range: 95 - 110 mmol/L  105   CO2 Latest Ref Range: 23 - 29 mmol/L  29   Anion Gap Latest Ref Range: 8 - 16 mmol/L  9   BUN, Bld Latest Ref Range: 6 - 20 mg/dL  10   Creatinine Latest Ref Range: 0.5 - 1.4 mg/dL  0.7   eGFR if non  Latest  Ref Range: >60 mL/min/1.73 m^2  >60.0   eGFR if African American Latest Ref Range: >60 mL/min/1.73 m^2  >60.0   Glucose Latest Ref Range: 70 - 110 mg/dL  65 (L)   Calcium Latest Ref Range: 8.7 - 10.5 mg/dL  9.4   Alkaline Phosphatase Latest Ref Range: 55 - 135 U/L  98   PROTEIN TOTAL Latest Ref Range: 6.0 - 8.4 g/dL  7.3   Albumin Latest Ref Range: 3.5 - 5.2 g/dL  3.4 (L)   BILIRUBIN TOTAL Latest Ref Range: 0.1 - 1.0 mg/dL  0.3   AST Latest Ref Range: 10 - 40 U/L  18   ALT Latest Ref Range: 10 - 44 U/L  14   Triglycerides Latest Ref Range: 30 - 150 mg/dL  96   Cholesterol Latest Ref Range: 120 - 199 mg/dL  188   HDL Latest Ref Range: 40 - 75 mg/dL  50   Hdl/Cholesterol Ratio Latest Ref Range: 20.0 - 50.0 %  26.6   LDL Cholesterol External Latest Ref Range: 63.0 - 159.0 mg/dL  118.8   Non-HDL Cholesterol Latest Units: mg/dL  138   Total Cholesterol/HDL Ratio Latest Ref Range: 2.0 - 5.0   3.8   Thiamine Latest Ref Range: 38 - 122 ug/L  48   Vit D, 25-Hydroxy Latest Ref Range: 30 - 96 ng/mL  25 (L)   Hemoglobin A1C External Latest Ref Range: 4.0 - 5.6 %  5.6   Estimated Avg Glucose Latest Ref Range: 68 - 131 mg/dL  114   TSH Latest Ref Range: 0.400 - 4.000 uIU/mL  0.821   Microalbum.,U,Random Latest Units: ug/mL 28.0    Creatinine, Random Ur Latest Ref Range: 15.0 - 325.0 mg/dL 202.0    MICROALB/CREAT RATIO Latest Ref Range: 0.0 - 30.0 ug/mg 13.9        Assessment:       1. Mixed anxiety and depressive disorder    2. Type 2 diabetes mellitus without complication, without long-term current use of insulin    3. Hyperlipidemia associated with type 2 diabetes mellitus    4. Morbid obesity with body mass index (BMI) of 45.0 to 49.9 in adult    5. Vitamin D deficiency    6. Preventive measure          Plan:     Mixed anxiety and depressive disorder- doing well, cont rx.    Type 2 diabetes mellitus without complication, without long-term current use of insulin- poss resolved with wt loss.  -     Hemoglobin A1c; Future;  Expected date: 01/13/2020    Hyperlipidemia associated with type 2 diabetes mellitus- doesn't qualify for statin.    Morbid obesity with body mass index (BMI) of 45.0 to 49.9 in adult    Vitamin D deficiency  -     Vitamin D; Future    Preventive measure  -     Ambulatory consult to Gynecology

## 2020-01-13 ENCOUNTER — OFFICE VISIT (OUTPATIENT)
Dept: FAMILY MEDICINE | Facility: CLINIC | Age: 55
End: 2020-01-13
Payer: COMMERCIAL

## 2020-01-13 ENCOUNTER — TELEPHONE (OUTPATIENT)
Dept: FAMILY MEDICINE | Facility: CLINIC | Age: 55
End: 2020-01-13

## 2020-01-13 VITALS
BODY MASS INDEX: 45.88 KG/M2 | OXYGEN SATURATION: 98 % | HEART RATE: 99 BPM | SYSTOLIC BLOOD PRESSURE: 120 MMHG | TEMPERATURE: 98 F | HEIGHT: 66 IN | WEIGHT: 285.5 LBS | DIASTOLIC BLOOD PRESSURE: 80 MMHG

## 2020-01-13 DIAGNOSIS — E11.69 HYPERLIPIDEMIA ASSOCIATED WITH TYPE 2 DIABETES MELLITUS: Chronic | ICD-10-CM

## 2020-01-13 DIAGNOSIS — Z29.9 PREVENTIVE MEASURE: ICD-10-CM

## 2020-01-13 DIAGNOSIS — F41.8 MIXED ANXIETY AND DEPRESSIVE DISORDER: Primary | Chronic | ICD-10-CM

## 2020-01-13 DIAGNOSIS — E78.5 HYPERLIPIDEMIA ASSOCIATED WITH TYPE 2 DIABETES MELLITUS: Chronic | ICD-10-CM

## 2020-01-13 DIAGNOSIS — E55.9 VITAMIN D DEFICIENCY: ICD-10-CM

## 2020-01-13 DIAGNOSIS — E66.01 MORBID OBESITY WITH BODY MASS INDEX (BMI) OF 45.0 TO 49.9 IN ADULT: ICD-10-CM

## 2020-01-13 DIAGNOSIS — E11.9 TYPE 2 DIABETES MELLITUS WITHOUT COMPLICATION, WITHOUT LONG-TERM CURRENT USE OF INSULIN: Chronic | ICD-10-CM

## 2020-01-13 PROCEDURE — 3008F BODY MASS INDEX DOCD: CPT | Mod: CPTII,S$GLB,, | Performed by: INTERNAL MEDICINE

## 2020-01-13 PROCEDURE — 99999 PR PBB SHADOW E&M-EST. PATIENT-LVL III: ICD-10-PCS | Mod: PBBFAC,,, | Performed by: INTERNAL MEDICINE

## 2020-01-13 PROCEDURE — 99999 PR PBB SHADOW E&M-EST. PATIENT-LVL III: CPT | Mod: PBBFAC,,, | Performed by: INTERNAL MEDICINE

## 2020-01-13 PROCEDURE — 99214 PR OFFICE/OUTPT VISIT, EST, LEVL IV, 30-39 MIN: ICD-10-PCS | Mod: S$GLB,,, | Performed by: INTERNAL MEDICINE

## 2020-01-13 PROCEDURE — 3008F PR BODY MASS INDEX (BMI) DOCUMENTED: ICD-10-PCS | Mod: CPTII,S$GLB,, | Performed by: INTERNAL MEDICINE

## 2020-01-13 PROCEDURE — 99214 OFFICE O/P EST MOD 30 MIN: CPT | Mod: S$GLB,,, | Performed by: INTERNAL MEDICINE

## 2020-01-13 PROCEDURE — 3044F PR MOST RECENT HEMOGLOBIN A1C LEVEL <7.0%: ICD-10-PCS | Mod: CPTII,S$GLB,, | Performed by: INTERNAL MEDICINE

## 2020-01-13 PROCEDURE — 3044F HG A1C LEVEL LT 7.0%: CPT | Mod: CPTII,S$GLB,, | Performed by: INTERNAL MEDICINE

## 2020-01-13 NOTE — TELEPHONE ENCOUNTER
----- Message from Yasmin Conner sent at 1/13/2020  2:47 PM CST -----  Contact: self [phone]  States that she is running about 10-15 mins late for appt. Please call back at [phone]//thank you acc

## 2020-01-22 ENCOUNTER — OFFICE VISIT (OUTPATIENT)
Dept: OBSTETRICS AND GYNECOLOGY | Facility: CLINIC | Age: 55
End: 2020-01-22
Payer: COMMERCIAL

## 2020-01-22 VITALS
DIASTOLIC BLOOD PRESSURE: 80 MMHG | HEIGHT: 65 IN | SYSTOLIC BLOOD PRESSURE: 140 MMHG | WEIGHT: 292.13 LBS | BODY MASS INDEX: 48.67 KG/M2

## 2020-01-22 DIAGNOSIS — R68.82 DECREASED LIBIDO: ICD-10-CM

## 2020-01-22 DIAGNOSIS — N39.46 MIXED INCONTINENCE: ICD-10-CM

## 2020-01-22 DIAGNOSIS — Z01.419 WELL WOMAN EXAM WITH ROUTINE GYNECOLOGICAL EXAM: Primary | ICD-10-CM

## 2020-01-22 PROCEDURE — 99999 PR PBB SHADOW E&M-EST. PATIENT-LVL II: CPT | Mod: PBBFAC,,, | Performed by: OBSTETRICS & GYNECOLOGY

## 2020-01-22 PROCEDURE — 99999 PR PBB SHADOW E&M-EST. PATIENT-LVL II: ICD-10-PCS | Mod: PBBFAC,,, | Performed by: OBSTETRICS & GYNECOLOGY

## 2020-01-22 PROCEDURE — 99386 PR PREVENTIVE VISIT,NEW,40-64: ICD-10-PCS | Mod: S$GLB,,, | Performed by: OBSTETRICS & GYNECOLOGY

## 2020-01-22 PROCEDURE — 99386 PREV VISIT NEW AGE 40-64: CPT | Mod: S$GLB,,, | Performed by: OBSTETRICS & GYNECOLOGY

## 2020-01-22 NOTE — PROGRESS NOTES
Subjective:       Patient ID: Ac Hayden is a 54 y.o. female.    Chief Complaint:  Well Woman      History of Present Illness  HPI  Annual Exam-Postmenopausal  Patient presents for annual exam. The patient has no complaints today. The patient is sexually active but reports having no desire for intercourse. GYN screening history: last pap: approximate date  and was normal and last mammogram: approximate date 2019 and was normal. The patient is not taking hormone replacement therapy. Patient denies post-menopausal vaginal bleeding. The patient wears seatbelts: yes. The patient participates in regular exercise: yes. Has the patient ever been transfused or tattooed?: yes. The patient reports that there is not domestic violence in her life.  Pt had a hysterectomy for benign indications 12 yrs ago.  Still has both ovaries.  Denies history of Gyn malignancy or dysplasia.  Reports history of Mixed Urinary incontinencen followed by Urology.         GYN & OB History  No LMP recorded. Patient has had a hysterectomy.   Date of Last Pap: No result found    OB History    Para Term  AB Living   2 2 2         SAB TAB Ectopic Multiple Live Births                  # Outcome Date GA Lbr Johann/2nd Weight Sex Delivery Anes PTL Lv   2 Term            1 Term                Review of Systems  Review of Systems   Constitutional: Negative for activity change, appetite change, chills, fatigue, fever and unexpected weight change.   Respiratory: Negative for shortness of breath.    Cardiovascular: Negative for chest pain, palpitations and leg swelling.   Gastrointestinal: Negative for abdominal pain, bloating, blood in stool, constipation, diarrhea, nausea and vomiting.   Genitourinary: Positive for decreased libido, urgency and urinary incontinence. Negative for dyspareunia, dysuria, flank pain, frequency, genital sores, hematuria, hot flashes, pelvic pain, vaginal bleeding, vaginal discharge, vaginal pain,  postcoital bleeding, vaginal dryness and vaginal odor.   Musculoskeletal: Negative for back pain.   Integumentary:  Negative for breast mass, nipple discharge, breast skin changes and breast tenderness.   Neurological: Negative for syncope and headaches.   Breast: Negative for asymmetry, lump, mass, mastodynia, nipple discharge, skin changes and tenderness          Objective:    Physical Exam:   Constitutional: She is oriented to person, place, and time. She appears well-developed and well-nourished. No distress.    HENT:   Head: Normocephalic and atraumatic.    Eyes: Pupils are equal, round, and reactive to light. EOM are normal.    Neck: Normal range of motion.    Cardiovascular: Normal rate, regular rhythm and normal heart sounds.     Pulmonary/Chest: Effort normal and breath sounds normal.        Abdominal: Soft. Bowel sounds are normal. She exhibits no distension. There is no tenderness.     Genitourinary: Vagina normal. Pelvic exam was performed with patient supine. There is no rash, tenderness, lesion or injury on the right labia. There is no rash, tenderness, lesion or injury on the left labia. Uterus is absent. Right adnexum displays no mass, no tenderness and no fullness. Left adnexum displays no mass, no tenderness and no fullness. No erythema, tenderness or bleeding in the vagina. No foreign body in the vagina. No signs of injury around the vagina. No vaginal discharge found. Vaginal cuff normal.Cervix exhibits absence.           Musculoskeletal: Normal range of motion and moves all extremeties. She exhibits no edema or tenderness.       Neurological: She is alert and oriented to person, place, and time.    Skin: Skin is warm and dry.    Psychiatric: She has a normal mood and affect. Her behavior is normal. Thought content normal.          Assessment:        1. Well woman exam with routine gynecological exam    2. Decreased libido    3. Mixed incontinence              Plan:      Well woman exam with  routine gynecological exam  -     Pt was counseled on cervical/vaginal screening guidelines and recommendations.  As per current recommendations, pt no longer requires routine pap screening or routine screening pelvic examinations.  If pt still desires screening Gyn exams, would recommend minimum 2 year interval.    -     Pt may follow with PCP for routine health maintenance needs.  -     Pt was advised on current breast cancer screening recommendations.  Pt desires to proceed with screening MMG alone - is up to date (negative).    Decreased libido  -     Pt was counseled on libido, including the many factors that influence it.  Pt has several risk factors for decreased libido: history of depression, DM, menopause, stress, marital problems.  Pt was counseled on stress reduction and lifestyle modifications (healthy diet, exercise, consistent sleep hours, etc).  Pt also advised on option to pursue marital counseling (pt already sees counselor through her Religious).  Hormone testing or treatment not indicated at this time.  Pt voiced understanding.    Mixed incontinence  -     Followed by Urology.  Pt advised to make appointment to discuss treatment alternatives (desires a different medication).        Follow up in about 2 years (around 1/22/2022).

## 2020-01-22 NOTE — LETTER
January 22, 2020      Tati Hansen MD  [address]  [facility] [address]           [facility]  [id number] [place], [place] [address] [address]  Phone: [phone]  Fax: [phone]          Patient: Ac Hayden   MR Number: [id number]   YOB: 1965   Date of Visit: 1/22/2020       Dear Dr. Tati Hansen:    Thank you for referring Ac Hayden to me for evaluation. Attached you will find relevant portions of my assessment and plan of care.    If you have questions, please do not hesitate to call me. I look forward to following Ac Hayden along with you.    Sincerely,    Kp Tovar MD    Enclosure  CC:  No Recipients    If you would like to receive this communication electronically, please contact [email or web address] or [phone] to request more information on [facility] access.    For providers and/or their staff who would like to refer a patient to [facility], please contact us through our one-stop-shop provider referral line, [facility], at [phone].    If you feel you have received this communication in error or would no longer like to receive these types of communications, please e-mail [email or web address]

## 2020-02-21 ENCOUNTER — HOSPITAL ENCOUNTER (OUTPATIENT)
Dept: RADIOLOGY | Facility: HOSPITAL | Age: 55
Discharge: HOME | End: 2020-02-21
Attending: INTERNAL MEDICINE
Payer: COMMERCIAL

## 2020-02-21 ENCOUNTER — OFFICE VISIT (OUTPATIENT)
Dept: INTERNAL MEDICINE | Facility: CLINIC | Age: 55
End: 2020-02-21
Payer: COMMERCIAL

## 2020-02-21 VITALS
SYSTOLIC BLOOD PRESSURE: 160 MMHG | DIASTOLIC BLOOD PRESSURE: 92 MMHG | OXYGEN SATURATION: 95 % | HEART RATE: 78 BPM | BODY MASS INDEX: 48.3 KG/M2 | WEIGHT: 289.88 LBS | HEIGHT: 65 IN

## 2020-02-21 DIAGNOSIS — M25.561 CHRONIC PAIN OF RIGHT KNEE: Primary | ICD-10-CM

## 2020-02-21 DIAGNOSIS — G89.29 CHRONIC PAIN OF RIGHT KNEE: ICD-10-CM

## 2020-02-21 DIAGNOSIS — G89.29 CHRONIC PAIN OF RIGHT KNEE: Primary | ICD-10-CM

## 2020-02-21 DIAGNOSIS — M25.561 CHRONIC PAIN OF RIGHT KNEE: ICD-10-CM

## 2020-02-21 PROCEDURE — 3008F PR BODY MASS INDEX (BMI) DOCUMENTED: ICD-10-PCS | Mod: CPTII,S$GLB,, | Performed by: INTERNAL MEDICINE

## 2020-02-21 PROCEDURE — 73562 X-RAY EXAM OF KNEE 3: CPT | Mod: TC,LT

## 2020-02-21 PROCEDURE — 3008F BODY MASS INDEX DOCD: CPT | Mod: CPTII,S$GLB,, | Performed by: INTERNAL MEDICINE

## 2020-02-21 PROCEDURE — 73562 XR KNEE ORTHO RIGHT WITH FLEXION: ICD-10-PCS | Mod: 26,LT,, | Performed by: RADIOLOGY

## 2020-02-21 PROCEDURE — 99999 PR PBB SHADOW E&M-EST. PATIENT-LVL III: ICD-10-PCS | Mod: PBBFAC,,, | Performed by: INTERNAL MEDICINE

## 2020-02-21 PROCEDURE — 73564 XR KNEE ORTHO RIGHT WITH FLEXION: ICD-10-PCS | Mod: 26,RT,, | Performed by: RADIOLOGY

## 2020-02-21 PROCEDURE — 73562 X-RAY EXAM OF KNEE 3: CPT | Mod: 26,LT,, | Performed by: RADIOLOGY

## 2020-02-21 PROCEDURE — 99214 OFFICE O/P EST MOD 30 MIN: CPT | Mod: S$GLB,,, | Performed by: INTERNAL MEDICINE

## 2020-02-21 PROCEDURE — 99999 PR PBB SHADOW E&M-EST. PATIENT-LVL III: CPT | Mod: PBBFAC,,, | Performed by: INTERNAL MEDICINE

## 2020-02-21 PROCEDURE — 73564 X-RAY EXAM KNEE 4 OR MORE: CPT | Mod: 26,RT,, | Performed by: RADIOLOGY

## 2020-02-21 PROCEDURE — 99214 PR OFFICE/OUTPT VISIT, EST, LEVL IV, 30-39 MIN: ICD-10-PCS | Mod: S$GLB,,, | Performed by: INTERNAL MEDICINE

## 2020-02-21 RX ORDER — MELOXICAM 15 MG/1
15 TABLET ORAL DAILY
Qty: 15 TABLET | Refills: 0 | Status: SHIPPED | OUTPATIENT
Start: 2020-02-21 | End: 2020-03-18 | Stop reason: SDUPTHER

## 2020-02-23 NOTE — PROGRESS NOTES
"Subjective:      Patient ID: Ac Hayden is a 54 y.o. female.    Chief Complaint: Knee Pain (r knee x 2months 9/10 pain scale )    Knee Pain    There was no injury mechanism. The pain is present in the right knee. Pain severity now: moderate to severe. The pain has been worsening since onset. Pertinent negatives include no inability to bear weight, muscle weakness or numbness. She reports no foreign bodies present. Exacerbated by: walking. Treatments tried: tylenol and aleve. The treatment provided no relief.      53 yo with   Patient Active Problem List   Diagnosis    Degeneration of lumbar or lumbosacral intervertebral disc    Mixed anxiety and depressive disorder    Rotator cuff tendinitis    Impingement syndrome, shoulder    Osteoarthritis of acromioclavicular joint    Urgency incontinence    Type 2 diabetes mellitus without complication, without long-term current use of insulin    Morbid obesity with body mass index (BMI) of 45.0 to 49.9 in adult    Pulmonary nodules    Bilateral edema of lower extremity    Hyperlipidemia associated with type 2 diabetes mellitus    History of colon polyps    CHRIS on CPAP    S/P gastric surgery     Past Medical History:   Diagnosis Date    Allergic rhinitis     Arthritis     Colon polyps     Diabetes mellitus     Diabetes mellitus, type 2     Hypertension     Metabolic syndrome     Mixed anxiety and depressive disorder     Pneumonia     Prediabetes     Urine incontinence      Here today c/o knee pain.   Review of Systems   Constitutional: Negative for chills and fever.   Respiratory: Negative for cough.    Cardiovascular: Negative for chest pain.   Gastrointestinal: Negative for abdominal pain.   Neurological: Negative for numbness.     Objective:   BP (!) 160/92   Pulse 78   Ht 5' 5" (1.651 m)   Wt 131.5 kg (289 lb 14.5 oz)   SpO2 95%   BMI 48.24 kg/m²     Physical Exam   Constitutional: She appears well-developed and well-nourished. No " distress.   Cardiovascular: Normal rate.   Pulmonary/Chest: Effort normal.   Musculoskeletal: She exhibits no edema.        Right knee: She exhibits ecchymosis. She exhibits normal range of motion, no swelling and no erythema. Tenderness found. Lateral joint line tenderness noted.   Neurological: She is alert.   Skin: Skin is warm and dry.   Psychiatric: She has a normal mood and affect. Her behavior is normal.       Assessment:     1. Chronic pain of right knee      Plan:   Chronic pain of right knee  -     Cancel: X-Ray Knee Complete 4 Or More Views Right; Future; Expected date: 02/21/2020  -     meloxicam (MOBIC) 15 MG tablet; Take 1 tablet (15 mg total) by mouth once daily. For pain and inflammation  Dispense: 15 tablet; Refill: 0    ortho referral  RICE as discussed  Lab Frequency Next Occurrence   Microalbumin/creatinine urine ratio Once 07/26/2019   Hemoglobin A1c Once 01/13/2020   Cystourethroscopy         Problem List Items Addressed This Visit     None      Visit Diagnoses     Chronic pain of right knee    -  Primary    Relevant Medications    meloxicam (MOBIC) 15 MG tablet          Follow up in about 1 week (around 2/28/2020), or if symptoms worsen or fail to improve.

## 2020-02-25 ENCOUNTER — OFFICE VISIT (OUTPATIENT)
Dept: ORTHOPEDICS | Facility: CLINIC | Age: 55
End: 2020-02-25
Payer: COMMERCIAL

## 2020-02-25 VITALS
BODY MASS INDEX: 48.15 KG/M2 | HEIGHT: 65 IN | WEIGHT: 289 LBS | SYSTOLIC BLOOD PRESSURE: 174 MMHG | HEART RATE: 84 BPM | DIASTOLIC BLOOD PRESSURE: 95 MMHG

## 2020-02-25 DIAGNOSIS — M22.2X1 PATELLOFEMORAL PAIN SYNDROME OF RIGHT KNEE: ICD-10-CM

## 2020-02-25 DIAGNOSIS — M17.11 OSTEOARTHRITIS OF RIGHT PATELLOFEMORAL JOINT: ICD-10-CM

## 2020-02-25 DIAGNOSIS — M25.561 RIGHT KNEE PAIN, UNSPECIFIED CHRONICITY: Primary | ICD-10-CM

## 2020-02-25 DIAGNOSIS — E66.01 MORBID OBESITY WITH BMI OF 45.0-49.9, ADULT: ICD-10-CM

## 2020-02-25 DIAGNOSIS — M62.81 QUADRICEPS WEAKNESS: ICD-10-CM

## 2020-02-25 PROCEDURE — 3008F PR BODY MASS INDEX (BMI) DOCUMENTED: ICD-10-PCS | Mod: CPTII,S$GLB,, | Performed by: ORTHOPAEDIC SURGERY

## 2020-02-25 PROCEDURE — 99999 PR PBB SHADOW E&M-EST. PATIENT-LVL IV: ICD-10-PCS | Mod: PBBFAC,,, | Performed by: ORTHOPAEDIC SURGERY

## 2020-02-25 PROCEDURE — 99999 PR PBB SHADOW E&M-EST. PATIENT-LVL IV: CPT | Mod: PBBFAC,,, | Performed by: ORTHOPAEDIC SURGERY

## 2020-02-25 PROCEDURE — 3008F BODY MASS INDEX DOCD: CPT | Mod: CPTII,S$GLB,, | Performed by: ORTHOPAEDIC SURGERY

## 2020-02-25 PROCEDURE — 99204 OFFICE O/P NEW MOD 45 MIN: CPT | Mod: S$GLB,,, | Performed by: ORTHOPAEDIC SURGERY

## 2020-02-25 PROCEDURE — 99204 PR OFFICE/OUTPT VISIT, NEW, LEVL IV, 45-59 MIN: ICD-10-PCS | Mod: S$GLB,,, | Performed by: ORTHOPAEDIC SURGERY

## 2020-02-25 RX ORDER — DULOXETINE HYDROCHLORIDE 30 MG/1
CAPSULE, DELAYED RELEASE ORAL
COMMUNITY
Start: 2019-12-03 | End: 2021-03-23

## 2020-02-25 RX ORDER — CYCLOBENZAPRINE HCL 10 MG
10 TABLET ORAL 3 TIMES DAILY PRN
COMMUNITY
Start: 2019-11-27 | End: 2020-12-28

## 2020-02-25 NOTE — PROGRESS NOTES
Subjective:     Patient ID: Ac Hayden is a 54 y.o. female.    Chief Complaint: Pain of the Right Knee    02/25/2020  Pt is here for an evaluation of her right knee. She states that her pain is a 0/10 today. She does states that she has medial knee pain, even with touch.   She started experiencing knee pain around the end of Decmeber 2019 and got progressively worse.     Knee Pain    The pain is present in the right knee. This is a new problem. The current episode started more than 1 month ago. The problem occurs constantly. The problem has been unchanged. The quality of the pain is described as aching and dull. The pain is at a severity of 0/10. Pertinent negatives include no fever, itching, joint locking, joint swelling or stiffness. The symptoms are aggravated by activity, exercise, touching and walking. She has tried NSAIDs for the symptoms. The treatment provided mild relief. Physical therapy was not tried.      Past Medical History:   Diagnosis Date    Allergic rhinitis     Arthritis     Colon polyps     Diabetes mellitus     Diabetes mellitus, type 2     Hypertension     Metabolic syndrome     Mixed anxiety and depressive disorder     Pneumonia     Prediabetes     Urine incontinence      Past Surgical History:   Procedure Laterality Date    BARIATR[facility] SURGERY  [facility]/22/2018    [facility])    COLONOSCOPY N/A 10/19/2017    Procedure: COLONOSCOPY;  Surgeon: Jamal Cole MD;  Location: [facility];  Service: Endoscopy;  Laterality: N/A;    HYSTERECTOMY      PARTIAL HYSTERECTOMY      PLANTAR FASCIA SURGERY      TOTAL REDUCTION MAMMOPLASTY  2014     Family History   Problem Relation Age of Onset    Diabetes Mother     Stroke Mother     Cancer Father     Diabetes Sister     Ovarian cancer Sister     Diabetes Brother     Lupus Other     Eczema Other     Melanoma Neg Hx     Psoriasis Neg Hx      Social History     Socioeconomic History    Marital status:       Spouse name: Not on file    Number of children: Not on file    Years of education: Not on file    Highest education level: Not on file   Occupational History     Employer: [id number]   Social Needs    Financial resource strain: Not on file    Food insecurity:     Worry: Not on file     Inability: Not on file    Transportation needs:     Medical: Not on file     Non-medical: Not on file   Tobacco Use    Smoking status: Former Smoker     Packs/day: 0.25     Years: 13.00     Pack years: 3.25     Last attempt to quit: 10/19/2000     Years since quittin.3    Smokeless tobacco: Never Used   Substance and Sexual Activity    Alcohol use: No     Alcohol/week: 0.0 standard drinks    Drug use: No    Sexual activity: Never   Lifestyle    Physical activity:     Days per week: Not on file     Minutes per session: Not on file    Stress: Not on file   Relationships    Social connections:     Talks on phone: Not on file     Gets together: Not on file     Attends Synagogue service: Not on file     Active member of club or organization: Not on file     Attends meetings of clubs or organizations: Not on file     Relationship status: Not on file   Other Topics Concern    Are you pregnant or think you may be? Not Asked    Breast-feeding Not Asked   Social History Narrative    Works at [employer], inventory mgmt/receiving. .     Medication List with Changes/Refills   Current Medications    ALBUTEROL 90 MCG/ACTUATION INHALER    Inhale 1-2 puffs into the lungs every 6 (six) hours as needed for Wheezing.    BLOOD SUGAR DIAGNOSTIC STRP    To check BG one time daily, to use with insurance preferred meter    CYCLOBENZAPRINE (FLEXERIL) 10 MG TABLET        CYMBALTA 30 MG CAPSULE        DULOXETINE (CYMBALTA) 30 MG CAPSULE    Take 3 capsules (90 mg total) by mouth once daily.    FLUTICASONE (FLONASE) 50 MCG/ACTUATION NASAL SPRAY    USE TWO SPRAY(S) IN EACH NOSTRIL ONCE DAILY    LANCETS MISC    To check BG one time  daily, to use with insurance preferred meter    MELOXICAM (MOBIC) 15 MG TABLET    Take 1 tablet (15 mg total) by mouth once daily. For pain and inflammation    MIRABEGRON (MYRBETRIQ) 50 MG TB24    Take 1 tablet (50 mg total) by mouth once daily.    RELION PRIME METER MISC         Review of patient's allergies indicates:   Allergen Reactions    Codeine Edema and Itching    Pcn [penicillins] Rash    Sulfa (sulfonamide antibiotics) Edema and Rash     Review of Systems   Constitution: Negative for fever.   HENT: Negative for sore throat.    Eyes: Negative for blurred vision.   Cardiovascular: Negative for dyspnea on exertion.   Respiratory: Negative for shortness of breath.    Hematologic/Lymphatic: Does not bruise/bleed easily.   Skin: Negative for itching.   Musculoskeletal: Positive for joint pain. Negative for joint swelling and stiffness.   Gastrointestinal: Negative for vomiting.   Genitourinary: Negative for dysuria.   Neurological: Negative for dizziness and loss of balance.   Psychiatric/Behavioral: The patient does not have insomnia.        Objective:   Body mass index is 48.09 kg/m².  Vitals:    02/25/20 0854   BP: (!) 174/95   Pulse: 84                General    Nursing note and vitals reviewed.  Constitutional: She is oriented to person, place, and time. She appears well-developed. No distress.   HENT:   Head: Normocephalic and atraumatic.   Eyes: EOM are normal.   Cardiovascular: Normal rate.    Pulmonary/Chest: Effort normal. No stridor.   Neurological: She is alert and oriented to person, place, and time.   Psychiatric: She has a normal mood and affect. Her behavior is normal.           Right Knee Exam     Inspection   Scars: absent  Effusion: absent    Tenderness   The patient is tender to palpation of the patella.    Crepitus   The patient has crepitus of the patella.    Range of Motion   Extension: 0   Flexion: 120     Tests   Meniscus   Mamie:  Medial - negative Lateral - negative  Ligament  Examination Lachman: normal (-1 to 2mm) PCL-Posterior Drawer: normal (0 to 2mm)     MCL - Valgus: normal (0 to 2mm)  LCL - Varus: normal  Patella   Patellar Tracking: normal  Patellar Grind: positive  J-Sign: none    Other   Sensation: normal    Comments:  WWP foot    Left Knee Exam     Inspection   Scars: absent  Effusion: absent    Range of Motion   Extension: 0   Flexion: 120     Muscle Strength   Right Lower Extremity   Right quadriceps strength: Mild decreased quad tone.   Left Lower Extremity   Left quadriceps strength: good quad tone.       Relevant imaging results reviewed and interpreted by me, discussed with the patient and / or family today X-ray Knee Ortho Right with Flexion  Narrative: EXAMINATION:  XR KNEE ORTHO RIGHT WITH FLEXION    CLINICAL HISTORY:  Pain in right knee    TECHNIQUE:  AP standing as well as PA flexion standing and Merchant views of both knees were performed.  A lateral view of the right knee is also performed.    COMPARISON:  12/26/2013    FINDINGS:  A 2 cm calcified density or loose body projecting within the soft tissues posteromedially on the right, also present on the study from 12/26/2013.  No fracture or dislocation.  Mild bilateral degenerative changes again seen with medial compartment narrowing and adjacent marginal spurring.  No fracture or dislocation.  Small joint effusion is noted.  Impression: As above    Electronically signed by: Garo Carpio MD  Date:    02/21/2020  Time:    16:20      Assessment:     Encounter Diagnoses   Name Primary?    Right knee pain, unspecified chronicity Yes    Patellofemoral pain syndrome of right knee     Osteoarthritis of right patellofemoral joint     Quadriceps weakness     Morbid obesity with BMI of 45.0-49.9, adult         Plan:       I had an in depth discussion today with Ac guerin regarding her right knee problem, going over her radiographs and the model to help further her understanding. I explained the anatomy and  pathophysiology of the problem. I told Ac  that I believe the problem relates to above noted diagnoses . We had an in depth discussion regarding appropriate treatment and management of her condition.     From a treatment standpoint, the decision was made to go forward with :     Physical Therapy to RIGHT knee, evaluate and treat. Include focus on knee range of motion, stretching, strengthening quadriceps, strengthening hamstrings, strengthening hip abductors. Modalities as indicated  Recommend 1-2 visits with formal Physical Therapy evaluation, please use these visits to develop, teach and demonstrate Home Exercise Program    Right knee sleeve PRN comfort    Discussed future options including:  CSI  Cane  NSAIDs and or Tylenol only if okay with her PCP Dr. Luis  Geniculate nerve block    She would like to follow up in 3 months for recheck    Ac Hayden is very agreeable with above noted plan, and all questions answered to full satisfaction today.                         Disclaimer: This note was prepared using a voice recognition system and is likely to have sound alike errors within the text.

## 2020-03-02 ENCOUNTER — CLINICAL SUPPORT (OUTPATIENT)
Dept: REHABILITATION | Facility: HOSPITAL | Age: 55
End: 2020-03-02
Attending: ORTHOPAEDIC SURGERY
Payer: COMMERCIAL

## 2020-03-02 DIAGNOSIS — M25.661 DECREASED ROM OF RIGHT KNEE: ICD-10-CM

## 2020-03-02 DIAGNOSIS — R29.898 WEAKNESS OF RIGHT LOWER EXTREMITY: ICD-10-CM

## 2020-03-02 DIAGNOSIS — M25.561 RIGHT KNEE PAIN, UNSPECIFIED CHRONICITY: ICD-10-CM

## 2020-03-02 PROCEDURE — 97110 THERAPEUTIC EXERCISES: CPT

## 2020-03-02 PROCEDURE — 97161 PT EVAL LOW COMPLEX 20 MIN: CPT

## 2020-03-02 NOTE — PLAN OF CARE
[facility]  Physical Therapy Initial Evaluation    Name: Ac Hayden  Clinic Number: [id number]    Therapy Diagnosis:   Encounter Diagnoses   Name Primary?    Right knee pain, unspecified chronicity     Weakness of right lower extremity     Decreased ROM of right knee      Physician: Quincy Miller MD    Physician Orders: PT Eval and Treat   Medical Diagnosis from Referral: M25.561 (ICD-10-CM) - Right knee pain, unspecified chronicity  Evaluation Date: 3/2/2020  Authorization Period Expiration: 12/31/2020  Plan of Care Expiration: 4/2/2020  Visit # / Visits authorized: 1/ 20    Time In: 2:55  Time Out: 3:33  Total Billable Time: 30 minutes    Precautions: Standard and Diabetes    Subjective   Date of onset: 2/25/2020  History of current condition - Ac reports: medial knee pain since October that gradually gotten worse. She reports currently taking melaxicam medication for roughly a week now in which patient reports significant relief since then.     Pain:  Current 1/10, worst 9/10, best 0/10   Location: right knee   Description: Aching and Dull  Aggravating Factors: Walking  Easing Factors: meditation and ice    Prior Therapy: PT on her foot  Social History: lives with their spouse  Occupation: manager at [employer]  Prior Level of Function: independent with all activities  Current Level of Function: difficulty walking without medication and difficulty with heavy house hold chores    Imaging, x-ray: calcified density or loose body projecting within the soft tissues posteromedially on the right;Mild bilateral degenerative changes again seen with medial compartment narrowing and adjacent marginal spurring    Medical History:   Past Medical History:   Diagnosis Date    Allergic rhinitis     Arthritis     Colon polyps     Diabetes mellitus     Diabetes mellitus, type 2     Hypertension     Metabolic syndrome     Mixed anxiety and depressive disorder     Pneumonia      Prediabetes     Urine incontinence        Surgical History:   Ac Hayden  has a past surgical history that includes Plantar fascia surgery; Partial hysterectomy; Colonoscopy (N/A, 10/19/2017); Bariatric Surgery (08/22/2018); Hysterectomy; and Total Reduction Mammoplasty (2014).    Medications:   Ac has a current medication list which includes the following prescription(s): albuterol, blood sugar diagnostic, cyclobenzaprine, cymbalta, duloxetine, fluticasone propionate, lancets, meloxicam, mirabegron, and relion prime meter.    Allergies:   Review of patient's allergies indicates:   Allergen Reactions    Codeine Edema and Itching    Pcn [penicillins] Rash    Sulfa (sulfonamide antibiotics) Edema and Rash      Pts goals: be able to receive long benefits    Objective       CMS Impairment/Limitation/Restriction for FOTO Knee Survey    Therapist reviewed FOTO scores for Ac Hayden on 3/2/2020.   FOTO documents entered into [employer] - see Media section.    Limitation Score: 61%  Category: Other    Current : CL = least 60% but < 80% impaired, limited or restricted  Goal: CK = at least 40% but < 60% impaired, limited or restricted  Discharge: n/a       Gait: WNL    Reflex/Sensation: Sensation WNL to light touch B LE's .     Knee AROM:     (R) (L)     Flexion   108 120     Extension  0 0        Hip AROM:  Flexion   WNL BLE      ER   WNL BLE      IR   Moderately limited RLE      ABD   WNL BLE    Ankle AROM:  Ankle DF  WNL BLE     Strength:     R L     Hip flexors  4/5 4-/5  Quadriceps  4+/5 5/5      Hamstrings  4/5 5/5     DF   5/5 5/5     PF   5/5 5/5     IR (sitting)  4+/5 4+/5     ER (sitting)  4/5 4/5     Gluteus Medius 4-/5 5/5     Gluteus Rafa 4+/5 4+/5    Joint Mobility: mild hypomobility of patella and tibiofemoral joint    Muscle Length: WNL    Palpation: Tender to touch along distal adductors, distal quadriceps, proximal medial gastroc head as well as medial joint line.    TREATMENT    Treatment Time In: 2:20  Treatment Time Out: 2:30  Total Treatment time separate from Evaluation: 10 minutes    Ac received therapeutic exercises to develop strength, endurance, ROM and flexibility for 10 minutes including:  Prone quad stretch 30 sec 3x  LAQ RTB 3 x 10  SL clamshells RTB 3 x 10  Hip adductor isometrics with pillow 3 x 10    Ac received the following manual therapy techniques: Joint mobilizations and Soft tissue Mobilization were applied to the: R knee for 0 minutes, including:  Distraction   Joint mobs into flexion and extension   STM with instrumental tool to distal adductors, distal quadriceps and medial hamstrings    Ac received the following supervised modalities after being cleared for contradictions: IFC Electrical Stimulation:  Ac received IFC Electrical Stimulation for pain control applied to the R knee. Pt received stimulation  for 0 minutes. Ac tolderated treatment well without any adverse effects.      Ac received hot pack for 0 minutes to R knee.    Ac received cold pack for 0 minutes to R knee.    Home Exercises and Patient Education Provided    Education provided:   -Education on condition, HEP, and correct exercise technique    Written Home Exercises Provided: yes.  Exercises were reviewed and Ac was able to demonstrate them prior to the end of the session.  Ac demonstrated good  understanding of the education provided.     See EMR under Patient Instructions for exercises provided 3/2/2020.    Assessment   Ac is a 54 y.o. female referred to outpatient Physical Therapy with a medical diagnosis of R knee pain. The patient presents with signs and symptoms consistent with diagnosis  and impairments which include decreased ROM, decreased strength, decreased joint mobility and decreased overall function.  These impairments are limiting patient's ability to stand and walk for prolonged periods.     Pt prognosis is Excellent.   Pt will benefit from  skilled outpatient Physical Therapy to address the deficits stated above and in the chart below, provide pt/family education, and to maximize pt's level of independence.     Plan of care discussed with patient: Yes  Pt's spiritual, cultural and educational needs considered and patient is agreeable to the plan of care and goals as stated below:     Anticipated Barriers for therapy: none    Medical Necessity is demonstrated by the following  History  Co-morbidities and personal factors that may impact the plan of care Co-morbidities:   anxiety, depression and diabetes    Personal Factors:   no deficits     high   Examination  Body Structures and Functions, activity limitations and participation restrictions that may impact the plan of care Body Regions:   lower extremities    Body Systems:    ROM  strength  transitions    Participation Restrictions:   none    Activity limitations:   Learning and applying knowledge  no deficits    General Tasks and Commands  no deficits    Communication  no deficits    Mobility  walking    Self care  dressing    Domestic Life  cooking  doing house work (cleaning house, washing dishes, laundry)    Interactions/Relationships  no deficits    Life Areas  employment    Community and Social Life  recreation and leisure         moderate   Clinical Presentation stable and uncomplicated low   Decision Making/ Complexity Score: low     Goals:  Short Term Goals: In 3 weeks:  1.Pt to be educated on HEP.  2.Patient to demo increased AROM to WNL to improve functional mobility.  3.Patient to increase strength R LE by 1/2 grade to improve functional tasks.  4.Patient to have decreased pain to 4/10 to improve quality of movement.  5.Patient to increase LE balance to 5 sec.  6.Patient to improve score on the FOTO by 10%.    Long Term Goals: In 6 weeks  1. Patient to perform daily activities including prolonged walking without limitation.  2. Patient to demonstrate increased LE strength to 5/5 to improve  functional tasks..  3. Patient to have decreased pain to 1/20 to improve quality of movement.  4. Patient to improve score on the FOTO to 20.      Plan   Plan of care Certification: 3/2/2020 to 4/2/2020.    Outpatient Physical Therapy 2 times weekly for 6 weeks to include the following interventions: Electrical Stimulation IFC, Gait Training, Manual Therapy, Moist Heat/ Ice, Neuromuscular Re-ed, Patient Education, Self Care and Therapeutic Exercise.     Bindu Jimenez, PT    Thank you for this referral.    These services are reasonable and necessary for the conditions set forth above while under my care.

## 2020-03-06 ENCOUNTER — CLINICAL SUPPORT (OUTPATIENT)
Dept: REHABILITATION | Facility: HOSPITAL | Age: 55
End: 2020-03-06
Attending: ORTHOPAEDIC SURGERY
Payer: COMMERCIAL

## 2020-03-06 DIAGNOSIS — R29.898 WEAKNESS OF RIGHT LOWER EXTREMITY: ICD-10-CM

## 2020-03-06 DIAGNOSIS — M25.661 DECREASED ROM OF RIGHT KNEE: ICD-10-CM

## 2020-03-06 PROCEDURE — 97140 MANUAL THERAPY 1/> REGIONS: CPT | Mod: CQ

## 2020-03-06 PROCEDURE — 97110 THERAPEUTIC EXERCISES: CPT | Mod: CQ

## 2020-03-06 NOTE — PROGRESS NOTES
"  Physical Therapy Assistant Daily Treatment Note     Name: Ac Hayden  Clinic Number: [id number]    Therapy Diagnosis:   Encounter Diagnoses   Name Primary?    Weakness of right lower extremity     Decreased ROM of right knee      Physician: Quincy Miller MD    Visit Date: 3/6/2020    Physician Orders: PT Eval and Treat   Medical Diagnosis from Referral: M25.561 (ICD-10-CM) - Right knee pain, unspecified chronicity  Evaluation Date: 3/2/2020  Authorization Period Expiration: 12/31/2020  Plan of Care Expiration: 4/2/2020  Visit # / Visits authorized: 2/ 20    Time In: 2:00  Time Out: 3:00  Total Billable Time: 55 minutes    Precautions: Standard and Diabetes    Subjective     Pt reports: soreness in knee and medial knee pain. She recently stopped taking medication.  She was compliant with home exercise program.  Response to previous treatment: soreness  Functional change: nothing significant    Pain: 2/10  Location: right knee      Objective     Ac received therapeutic exercises to develop strength, endurance and flexibility for 35 minutes including:  Bike 6'  SAQ/ LAQ 2/10/3#  Shuttle Squats 5 bands 2 min.  Standing hip abduction  Seated HSS  Slantboard stretch 3x30"  Step ups 2 min.  Prone HS Curls 3/10/3# increase next visit.  Tailgaters 5# 2'    Ac received the following manual therapy techniques: Joint mobilizations and Soft tissue Mobilization were applied to the: R knee for 10 minutes, including:  Distraction   Joint mobs into flexion and extension   STM with instrumental tool to distal adductors, distal quadriceps and medial hamstrings- not today    Ac received cold pack for 10 minutes to right knee.      Home Exercises and Patient Education Provided     Education provided:   -Education on condition, HEP, and correct exercise technique     Written Home Exercises Provided: yes.  Exercises were reviewed and Ac was able to demonstrate them prior to the end of the session.  Ac " demonstrated good  understanding of the education provided.      See EMR under Patient Instructions for exercises provided 3/2/2020.      Assessment     Pt. Tolerated session without any significant complaints. Pt. Reports increased HS tightness today. She will benefit from continued quad strengthening and LE flexibility.    Ac is progressing well towards her goals.   Pt prognosis is Excellent.     Pt will continue to benefit from skilled outpatient physical therapy to address the deficits listed in the problem list box on initial evaluation, provide pt/family education and to maximize pt's level of independence in the home and community environment.     Pt's spiritual, cultural and educational needs considered and pt agreeable to plan of care and goals.     Anticipated barriers to physical therapy: none    Goals: Short Term Goals: In 3 weeks:  1.Pt to be educated on HEP.  2.Patient to demo increased AROM to WNL to improve functional mobility.  3.Patient to increase strength R LE by 1/2 grade to improve functional tasks.  4.Patient to have decreased pain to 4/10 to improve quality of movement.  5.Patient to increase LE balance to 5 sec.  6.Patient to improve score on the FOTO by 10%.     Long Term Goals: In 6 weeks  1. Patient to perform daily activities including prolonged walking without limitation.  2. Patient to demonstrate increased LE strength to 5/5 to improve functional tasks..  3. Patient to have decreased pain to 1/20 to improve quality of movement.  4. Patient to improve score on the FOTO to 20.      Plan        Plan of care Certification: 3/2/2020 to 4/2/2020.     Outpatient Physical Therapy 2 times weekly for 6 weeks to include the following interventions: Electrical Stimulation IFC, Gait Training, Manual Therapy, Moist Heat/ Ice, Neuromuscular Re-ed, Patient Education, Self Care and Therapeutic Exercise.          Gladys Fernandez, PTA

## 2020-03-09 ENCOUNTER — DOCUMENTATION ONLY (OUTPATIENT)
Dept: REHABILITATION | Facility: HOSPITAL | Age: 55
End: 2020-03-09

## 2020-03-09 NOTE — PROGRESS NOTES
PT/PTA met face to face to discuss pt's treatment plan and progress towards established goals. Pt will be seen by a physical therapist minimally every 6th visit or every 30 days.    Gladys Fernandez PTA

## 2020-03-11 ENCOUNTER — CLINICAL SUPPORT (OUTPATIENT)
Dept: REHABILITATION | Facility: HOSPITAL | Age: 55
End: 2020-03-11
Attending: ORTHOPAEDIC SURGERY
Payer: COMMERCIAL

## 2020-03-11 DIAGNOSIS — M25.661 DECREASED ROM OF RIGHT KNEE: ICD-10-CM

## 2020-03-11 DIAGNOSIS — R29.898 WEAKNESS OF RIGHT LOWER EXTREMITY: ICD-10-CM

## 2020-03-11 PROCEDURE — 97140 MANUAL THERAPY 1/> REGIONS: CPT

## 2020-03-11 PROCEDURE — 97014 ELECTRIC STIMULATION THERAPY: CPT

## 2020-03-11 PROCEDURE — 97110 THERAPEUTIC EXERCISES: CPT

## 2020-03-11 NOTE — PROGRESS NOTES
"  Physical Therapy Assistant Daily Treatment Note     Name: Ac Hayden  Clinic Number: [id number]    Therapy Diagnosis:   Encounter Diagnoses   Name Primary?    Weakness of right lower extremity     Decreased ROM of right knee      Physician: Quincy Miller MD    Visit Date: 3/11/2020    Physician Orders: PT Eval and Treat   Medical Diagnosis from Referral: M25.561 (ICD-10-CM) - Right knee pain, unspecified chronicity  Evaluation Date: 3/2/2020  Authorization Period Expiration: 12/31/2020  Plan of Care Expiration: 4/2/2020  Visit # / Visits authorized: 3/ 20    Time In: 4:00  Time Out: 5:15  Total Billable Time: 55 minutes    Precautions: Standard and Diabetes    Subjective     Pt reports: soreness in knee and medial knee pain. Feeling better overall.  She was compliant with home exercise program.  Response to previous treatment: soreness  Functional change: nothing significant    Pain: 2/10  Location: right knee      Objective     Ac received therapeutic exercises to develop strength, endurance and flexibility for 45 minutes including:  Bike 8'  SAQ/ LAQ 2 min each 4#  Shuttle Squats 7 bands 2 min.  Standing hip abduction, flexion, adduction and extension RTB 2 x 10  Seated HSS  Slantboard stretch 3x30"  Step ups 2 min.  Prone HS Curls 3/10/4# increase next visit.  Tailgaters 10# 2'  Standing marches 2 min  Minisquats 2 min    Ac received the following manual therapy techniques: Joint mobilizations and Soft tissue Mobilization were applied to the: R knee for 10 minutes, including:  Distraction   Joint mobs into flexion and extension   STM with instrumental tool to distal adductors, distal quadriceps and medial hamstrings      Ac received the following supervised modalities after being cleared for contradictions: IFC Electrical Stimulation:  Ac received IFC Electrical Stimulation for pain control applied to the R knee. Pt received stimulation for 15 minutes. Ac tolderated treatment " well without any adverse effects.      Ac received cold pack for 15 minutes to right knee.      Home Exercises and Patient Education Provided     Education provided:   -Education on condition, HEP, and correct exercise technique     Written Home Exercises Provided: yes.  Exercises were reviewed and Ac was able to demonstrate them prior to the end of the session.  Ac demonstrated good  understanding of the education provided.      See EMR under Patient Instructions for exercises provided 3/2/2020.      Assessment     Patient presents with medial knee pain. Tolerated session without provoking and s/s. She demonstrated improve quad strength as evidence by her ability to increase resistance with SAQ/LAQ. Mild fatigue noted with weightbearing therex.  She will benefit from continued quad strengthening and LE flexibility.    Ac is progressing well towards her goals.   Pt prognosis is Excellent.     Pt will continue to benefit from skilled outpatient physical therapy to address the deficits listed in the problem list box on initial evaluation, provide pt/family education and to maximize pt's level of independence in the home and community environment.     Pt's spiritual, cultural and educational needs considered and pt agreeable to plan of care and goals.     Anticipated barriers to physical therapy: none    Goals: Short Term Goals: In 3 weeks:  1.Pt to be educated on HEP.  2.Patient to demo increased AROM to WNL to improve functional mobility.  3.Patient to increase strength R LE by 1/2 grade to improve functional tasks.  4.Patient to have decreased pain to 4/10 to improve quality of movement.  5.Patient to increase LE balance to 5 sec.  6.Patient to improve score on the FOTO by 10%.     Long Term Goals: In 6 weeks  1. Patient to perform daily activities including prolonged walking without limitation.  2. Patient to demonstrate increased LE strength to 5/5 to improve functional tasks..  3. Patient to have  decreased pain to 1/20 to improve quality of movement.  4. Patient to improve score on the FOTO to 20.      Plan        Plan of care Certification: 3/2/2020 to 4/2/2020.   Monitor response to tx and progress with PT POC as indicated.      Outpatient Physical Therapy 2 times weekly for 6 weeks to include the following interventions: Electrical Stimulation IFC, Gait Training, Manual Therapy, Moist Heat/ Ice, Neuromuscular Re-ed, Patient Education, Self Care and Therapeutic Exercise.          Bindu Jimneez, PT

## 2020-03-13 NOTE — TELEPHONE ENCOUNTER
----- Message from Maury Zamudio sent at 3/13/2020  2:39 PM CDT -----  ..Type:  RX Refill Request    Who Called:  Pt   Refill or New Rx: refill   RX Name and Strength: nasal spray  How is the patient currently taking it? (ex. 1XDay):  Is this a 30 day or 90 day RX: 30  Preferred Pharmacy with phone number  ..  [address] [address]  Phone: [phone] Fax: [phone]    Local or Mail Order: local   Ordering Provider:  Would the patient rather a call back or a response via [facility]? Call back   Best Call Back Number:[phone]  Additional Information:

## 2020-03-16 RX ORDER — FLUTICASONE PROPIONATE 50 MCG
SPRAY, SUSPENSION (ML) NASAL
Qty: 17 G | Refills: 3 | Status: SHIPPED | OUTPATIENT
Start: 2020-03-16 | End: 2020-07-13 | Stop reason: SDUPTHER

## 2020-03-18 DIAGNOSIS — G89.29 CHRONIC PAIN OF RIGHT KNEE: ICD-10-CM

## 2020-03-18 DIAGNOSIS — M25.561 CHRONIC PAIN OF RIGHT KNEE: ICD-10-CM

## 2020-03-18 RX ORDER — MELOXICAM 15 MG/1
15 TABLET ORAL DAILY
Qty: 30 TABLET | Refills: 3 | Status: SHIPPED | OUTPATIENT
Start: 2020-03-18 | End: 2022-04-29

## 2020-03-18 NOTE — TELEPHONE ENCOUNTER
Last refill from Dr. Gray for 15 tablets 2/21/20.  You haven't prescribed this for her before.    LV  1/13/20  NV  7/13/20

## 2020-03-20 ENCOUNTER — TELEPHONE (OUTPATIENT)
Dept: REHABILITATION | Facility: HOSPITAL | Age: 55
End: 2020-03-20

## 2020-03-20 NOTE — TELEPHONE ENCOUNTER
Postponed Appointments    Patient: Ac Hayden  Date: 3/20/2020  Diagnosis: R knee pain  MRN: [id number]    Spoke with patient due to therapy following updates regarding COVID-19 closely and taking every precaution to ensure the safety of our patients, staff and community.  In an abundance of caution and in an effort to help reduce risk and limit community spread, we have decided to temporarily postpone appointments for patients who may be at increased risk to attend in-person therapy or where therapy is not critically needed at this time. Plan of care and home exercise program were reviewed and patient has what they need to continue therapy at home. All patient questions were answered. Also stated to patient that we are exploring virtual methods of providing care and will be in touch over the next few weeks. Patient verbalized understanding to all.    Bindu Bose, PT, DPT  3/20/2020

## 2020-04-02 ENCOUNTER — TELEPHONE (OUTPATIENT)
Dept: REHABILITATION | Facility: HOSPITAL | Age: 55
End: 2020-04-02

## 2020-04-14 ENCOUNTER — OFFICE VISIT (OUTPATIENT)
Dept: OPHTHALMOLOGY | Facility: CLINIC | Age: 55
End: 2020-04-14
Payer: COMMERCIAL

## 2020-04-14 ENCOUNTER — TELEPHONE (OUTPATIENT)
Dept: OPHTHALMOLOGY | Facility: CLINIC | Age: 55
End: 2020-04-14

## 2020-04-14 DIAGNOSIS — H10.13 ALLERGIC CONJUNCTIVITIS, BILATERAL: Primary | ICD-10-CM

## 2020-04-14 PROCEDURE — 92012 PR EYE EXAM, EST PATIENT,INTERMED: ICD-10-PCS | Mod: S$GLB,,, | Performed by: OPTOMETRIST

## 2020-04-14 PROCEDURE — 92012 INTRM OPH EXAM EST PATIENT: CPT | Mod: S$GLB,,, | Performed by: OPTOMETRIST

## 2020-04-14 PROCEDURE — 99999 PR PBB SHADOW E&M-EST. PATIENT-LVL I: ICD-10-PCS | Mod: PBBFAC,,, | Performed by: OPTOMETRIST

## 2020-04-14 PROCEDURE — 99999 PR PBB SHADOW E&M-EST. PATIENT-LVL I: CPT | Mod: PBBFAC,,, | Performed by: OPTOMETRIST

## 2020-04-14 RX ORDER — OLOPATADINE HYDROCHLORIDE 1 MG/ML
1 SOLUTION/ DROPS OPHTHALMIC 2 TIMES DAILY PRN
Qty: 10 ML | Refills: 6 | Status: SHIPPED | OUTPATIENT
Start: 2020-04-14 | End: 2022-01-31

## 2020-04-14 NOTE — PROGRESS NOTES
HPI     Right eye sore, red, swollen since Sunday 04/12//2020.  Patient had a nose bleed on Saturday 04/11/2020.  Patient wears contact lenses.  Patient states when turning head eye hurts.  Pain scale 6.  Last eye exam 12/02/2019 [facility].  New patient to [facility].  BS 97 this morning.    Last edited by Star Winslow, OD on 4/14/2020 11:04 AM. (History)            Assessment /Plan     For exam results, see Encounter Report.    Allergic conjunctivitis, bilateral  -     olopatadine (PATANOL) 0.1 % ophthalmic solution; Place 1 drop into both eyes 2 (two) times daily as needed for Allergies.  Dispense: 10 mL; Refill: 6      [place] bid OU if patanol not cover by insurance.    RTC if wosens or no improvement.

## 2020-04-14 NOTE — TELEPHONE ENCOUNTER
----- Message from Meena Llanos sent at 4/14/2020  7:48 AM CDT -----  Contact: pt  Pt believes she has a busted blood vessel because of allergies.  Pt wears contacts and cannot at this time and has no glasses. Pt would like to be seen.

## 2020-04-14 NOTE — TELEPHONE ENCOUNTER
Pt complains of bright red eye past 2 days.  Has tearing and soreness as well.  She is a CL wearer.  Appt today with Dr. Winslow.

## 2020-04-17 ENCOUNTER — TELEPHONE (OUTPATIENT)
Dept: REHABILITATION | Facility: HOSPITAL | Age: 55
End: 2020-04-17

## 2020-05-11 ENCOUNTER — TELEPHONE (OUTPATIENT)
Dept: ORTHOPEDICS | Facility: CLINIC | Age: 55
End: 2020-05-11

## 2020-05-12 ENCOUNTER — TELEPHONE (OUTPATIENT)
Dept: ORTHOPEDICS | Facility: CLINIC | Age: 55
End: 2020-05-12

## 2020-05-13 ENCOUNTER — TELEPHONE (OUTPATIENT)
Dept: REHABILITATION | Facility: HOSPITAL | Age: 55
End: 2020-05-13

## 2020-05-15 ENCOUNTER — TELEPHONE (OUTPATIENT)
Dept: ORTHOPEDICS | Facility: CLINIC | Age: 55
End: 2020-05-15

## 2020-05-26 ENCOUNTER — HOSPITAL ENCOUNTER (EMERGENCY)
Facility: HOSPITAL | Age: 55
Discharge: HOME | End: 2020-05-26
Attending: EMERGENCY MEDICINE
Payer: COMMERCIAL

## 2020-05-26 VITALS
OXYGEN SATURATION: 97 % | TEMPERATURE: 99 F | SYSTOLIC BLOOD PRESSURE: 199 MMHG | BODY MASS INDEX: 49.09 KG/M2 | WEIGHT: 293 LBS | HEART RATE: 84 BPM | DIASTOLIC BLOOD PRESSURE: 97 MMHG | RESPIRATION RATE: 18 BRPM

## 2020-05-26 DIAGNOSIS — H10.32 ACUTE BACTERIAL CONJUNCTIVITIS OF LEFT EYE: Primary | ICD-10-CM

## 2020-05-26 DIAGNOSIS — H57.12 PAIN OF LEFT EYE: ICD-10-CM

## 2020-05-26 PROCEDURE — 96372 THER/PROPH/DIAG INJ SC/IM: CPT

## 2020-05-26 PROCEDURE — 99284 EMERGENCY DEPT VISIT MOD MDM: CPT | Mod: 25

## 2020-05-26 PROCEDURE — 63600175 PHARM REV CODE 636 W HCPCS: Performed by: NURSE PRACTITIONER

## 2020-05-26 PROCEDURE — 25000003 PHARM REV CODE 250: Performed by: NURSE PRACTITIONER

## 2020-05-26 RX ORDER — TOBRAMYCIN AND DEXAMETHASONE 3; 1 MG/ML; MG/ML
1 SUSPENSION/ DROPS OPHTHALMIC EVERY 6 HOURS
Qty: 1 BOTTLE | Refills: 0 | Status: SHIPPED | OUTPATIENT
Start: 2020-05-26 | End: 2022-01-31

## 2020-05-26 RX ORDER — KETOROLAC TROMETHAMINE 30 MG/ML
60 INJECTION, SOLUTION INTRAMUSCULAR; INTRAVENOUS
Status: COMPLETED | OUTPATIENT
Start: 2020-05-26 | End: 2020-05-26

## 2020-05-26 RX ORDER — TRAMADOL HYDROCHLORIDE 50 MG/1
50 TABLET ORAL EVERY 6 HOURS PRN
Qty: 12 TABLET | Refills: 0 | Status: SHIPPED | OUTPATIENT
Start: 2020-05-26 | End: 2020-05-30

## 2020-05-26 RX ORDER — HYDROCODONE BITARTRATE AND ACETAMINOPHEN 10; 325 MG/1; MG/1
1 TABLET ORAL
Status: COMPLETED | OUTPATIENT
Start: 2020-05-26 | End: 2020-05-26

## 2020-05-26 RX ORDER — TOBRAMYCIN AND DEXAMETHASONE 3; 1 MG/ML; MG/ML
2 SUSPENSION/ DROPS OPHTHALMIC
Status: COMPLETED | OUTPATIENT
Start: 2020-05-26 | End: 2020-05-26

## 2020-05-26 RX ADMIN — HYDROCODONE BITARTRATE AND ACETAMINOPHEN 1 TABLET: 10; 325 TABLET ORAL at 09:05

## 2020-05-26 RX ADMIN — KETOROLAC TROMETHAMINE 60 MG: 60 INJECTION, SOLUTION INTRAMUSCULAR at 09:05

## 2020-05-26 RX ADMIN — TOBRAMYCIN AND DEXAMETHASONE 2 DROP: 3; 1 SUSPENSION/ DROPS OPHTHALMIC at 10:05

## 2020-05-27 NOTE — ED PROVIDER NOTES
HISTORY     Chief Complaint   Patient presents with    Eye Pain     L eye redness and swelling since this morning. States she has taken Benadryl & Zertec but has worsened.      Review of patient's allergies indicates:   Allergen Reactions    Codeine Edema and Itching    Pcn [penicillins] Rash    Sulfa (sulfonamide antibiotics) Edema and Rash        HPI   The history is provided by the patient.   Conjunctivitis    The current episode started today. The problem occurs rarely. The problem has been gradually improving. The problem is mild. Associated symptoms include eye itching, eye discharge, eye pain and eye redness. Pertinent negatives include no fever, no nausea, no sore throat and no rash. She has been behaving normally. She has been eating and drinking normally. The infant is normal consumption. Urine output has been normal. The last void occurred less than 6 hours ago. There were sick contacts none. She has received no recent medical care.        PCP: Tati Luis MD     Past Medical History:  Past Medical History:   Diagnosis Date    Allergic rhinitis     Arthritis     Colon polyps     Diabetes mellitus     Diabetes mellitus, type 2     Hypertension     Metabolic syndrome     Mixed anxiety and depressive disorder     Pneumonia     Prediabetes     Urine incontinence         Past Surgical History:  Past Surgical History:   Procedure Laterality Date    BAR[facility]  [facility]22/[facility]    COLONOSCOPY N/A 10/19/2017    Procedure: COLONOSCOPY;  Surgeon: Jamal Cole MD;  Location[facility];  Service: Endoscopy;  Laterality: N/A;    HYSTERECTOMY      PARTIAL HYSTERECTOMY      PLANTAR FASCIA SURGERY      TOTAL REDUCTION MAMMOPLASTY  2014        Family History:  Family History   Problem Relation Age of Onset    Diabetes Mother     Stroke Mother     Cancer Father     Diabetes Sister     Ovarian cancer Sister     Strabismus Sister     Diabetes  Brother     Lupus Other     Eczema Other     Melanoma Neg Hx     Psoriasis Neg Hx         Social History:  Social History     Tobacco Use    Smoking status: Former Smoker     Packs/day: 0.25     Years: 13.00     Pack years: 3.25     Last attempt to quit: 10/19/2000     Years since quittin.6    Smokeless tobacco: Never Used   Substance and Sexual Activity    Alcohol use: No     Alcohol/week: 0.0 standard drinks    Drug use: No    Sexual activity: Never         ROS   Review of Systems   Constitutional: Negative for fever.   HENT: Negative for sore throat.    Eyes: Positive for pain, discharge, redness and itching.   Respiratory: Negative for shortness of breath.    Cardiovascular: Negative for chest pain.   Gastrointestinal: Negative for nausea.   Genitourinary: Negative for dysuria.   Musculoskeletal: Negative for back pain.   Skin: Negative for rash.   Neurological: Negative for weakness.   Hematological: Does not bruise/bleed easily.       PHYSICAL EXAM     Initial Vitals [20]   BP Pulse Resp Temp SpO2   (!) 199/97 84 18 98.5 °F (36.9 °C) 97 %      MAP       --           Physical Exam    Constitutional: She appears well-developed and well-nourished. No distress.   HENT:   Head: Normocephalic and atraumatic.   Eyes: EOM are normal. Pupils are equal, round, and reactive to light. Left eye exhibits discharge. Left conjunctiva is injected. Left conjunctiva has no hemorrhage.   Neck: Normal range of motion. Neck supple.   Cardiovascular: Normal rate, regular rhythm and normal heart sounds.   Pulmonary/Chest: Breath sounds normal.   Abdominal: Soft. Bowel sounds are normal. She exhibits no distension. There is no tenderness. There is no rebound.   Musculoskeletal: Normal range of motion. She exhibits no edema.   Neurological: She is alert and oriented to person, place, and time. She has normal strength.   Skin: Skin is warm and dry.   Psychiatric: She has a normal mood and affect.          ED  COURSE   Procedures  ED ONGOING VITALS:  Vitals:    05/26/20 2127   BP: (!) 199/97   Pulse: 84   Resp: 18   Temp: 98.5 °F (36.9 °C)   TempSrc: Oral   SpO2: 97%   Weight: 133.8 kg (294 lb 15.6 oz)         ABNORMAL LAB VALUES:  Labs Reviewed - No data to display      ALL LAB VALUES:        RADIOLOGY STUDIES:  Imaging Results    None                   The above vital signs and test results have been reviewed by the emergency provider.     ED Medications:  Discharge Medication List as of 5/26/2020 10:48 PM      START taking these medications    Details   tobramycin-dexamethasone 0.3-0.1% (TOBRADEX) 0.3-0.1 % DrpS Place 1 drop into the left eye every 6 (six) hours., Starting Tue 5/26/2020, Print      traMADoL (ULTRAM) 50 mg tablet Take 1 tablet (50 mg total) by mouth every 6 (six) hours as needed for Pain., Starting Tue 5/26/2020, Until Sat 5/30/2020, Print           Discharge Medications:  Discharge Medication List as of 5/26/2020 10:48 PM      START taking these medications    Details   tobramycin-dexamethasone 0.3-0.1% (TOBRADEX) 0.3-0.1 % DrpS Place 1 drop into the left eye every 6 (six) hours., Starting Tue 5/26/2020, Print      traMADoL (ULTRAM) 50 mg tablet Take 1 tablet (50 mg total) by mouth every 6 (six) hours as needed for Pain., Starting Tue 5/26/2020, Until Sat 5/30/2020, Print            Follow-up Information     Schedule an appointment as soon as possible for a visit  with Tati Luis MD.    Specialty:  Internal Medicine  Contact information:  [address]  [place] [address] [phone]             Schedule an appointment as soon as possible for a visit  with Star Winslow OD.    Specialty:  Optometry  Contact information:  [address]  [place] [id number] [phone]             [facility]    Specialty:  Emergency Medicine  Why:  As needed, If symptoms worsen  Contact information:  [address]  [facility]  [address]  [phone]                I discussed with patient and/or family/caretaker that evaluation in the ED does not suggest any emergent or life threatening medical conditions requiring immediate intervention beyond what was provided in the ED, and I believe patient is safe for discharge. Regardless, an unremarkable evaluation in the ED does not preclude the development or presence of a serious or life threatening condition. As such, patient was instructed to return immediately for any worsening or change in current symptoms.    Pre-hypertension/Hypertension: The pt has been informed that they may have pre-hypertension or hypertension based on a blood pressure reading in the ED. I recommend that the pt call the PCP listed on their discharge instructions or a physician of their choice this week to arrange f/u for further evaluation of possible pre-hypertension or hypertension.       Regarding CONJUNCTIVITIS, I recommended simple precautions to reduce risk of cross contamination such as: never share personal items such as washcloths, hand towels or tissues; cover nose and mouth when coughing or sneezing, and avoid rubbing or touching eyes; never (EVER) share color contact lenses or special effect contacts with friends; wash hands frequently, especially when spending time at school or in other public places; keep a hand disinfectant (e.g., Purell) handy and use it frequently; frequently clean surfaces such as countertops, bathroom vanities, faucet handles and shared phones with an appropriate antiseptic ; if contacts are used be sure to follow optometrist's instructions for lens care and replacement; and use contact lens solutions properly or consider switching to daily disposable contacts.  For treatment, I instructed patient to use medications as prescribed and to avoid attending school or work for 24 hours AFTER onset of treatment of bacterial conjunctivitis.      MEDICAL DECISION MAKING                  CLINICAL IMPRESSION       ICD-10-CM ICD-9-CM   1. Acute bacterial conjunctivitis of left eye H10.32 372.03   2. Pain of left eye H57.12 379.91       Disposition:   Disposition: Discharged  Condition: Stable         Oscar Monae NP  05/27/20 4201

## 2020-05-27 NOTE — ED NOTES
Patient identifiers verified and correct for Ac Hayden.    LOC: The patient is awake, alert and aware of environment with an appropriate affect, the patient is oriented x 3 and speaking appropriately.  APPEARANCE: Patient resting comfortably and in no acute distress, patient is clean and well groomed, patient's clothing is properly fastened. EX L eye pain and swelling with drainage.  SKIN: The skin is warm and dry, color consistent with ethnicity, patient has normal skin turgor and moist mucus membranes, skin intact, no breakdown or bruising noted.  MUSCULOSKELETAL: Patient moving all extremities spontaneously.  RESPIRATORY: Airway is open and patent, respirations are spontaneous.  CARDIAC: Patient has a normal rate, no periphreal edema noted, capillary refill < 3 seconds.  ABDOMEN: Soft and non tender to palpation.

## 2020-05-29 ENCOUNTER — OFFICE VISIT (OUTPATIENT)
Dept: OPHTHALMOLOGY | Facility: CLINIC | Age: 55
End: 2020-05-29
Payer: COMMERCIAL

## 2020-05-29 DIAGNOSIS — H16.002 CORNEAL ULCER, LEFT: Primary | ICD-10-CM

## 2020-05-29 PROCEDURE — 92014 PR EYE EXAM, EST PATIENT,COMPREHESV: ICD-10-PCS | Mod: S$GLB,,, | Performed by: OPTOMETRIST

## 2020-05-29 PROCEDURE — 99999 PR PBB SHADOW E&M-EST. PATIENT-LVL I: ICD-10-PCS | Mod: PBBFAC,,, | Performed by: OPTOMETRIST

## 2020-05-29 PROCEDURE — 99999 PR PBB SHADOW E&M-EST. PATIENT-LVL I: CPT | Mod: PBBFAC,,, | Performed by: OPTOMETRIST

## 2020-05-29 PROCEDURE — 92014 COMPRE OPH EXAM EST PT 1/>: CPT | Mod: S$GLB,,, | Performed by: OPTOMETRIST

## 2020-05-29 RX ORDER — MOXIFLOXACIN 5 MG/ML
1 SOLUTION/ DROPS OPHTHALMIC
Qty: 3 ML | Refills: 3 | Status: SHIPPED | OUTPATIENT
Start: 2020-05-29

## 2020-05-29 NOTE — PROGRESS NOTES
"HPI     Conjunctivitis      Additional comments: ER Follow UP              Comments     Pt was diagnosed with allergic conjuctivitis 4/14/20 by [facility] & again by   the NP at the ER 5/27/20  Pt is currently wearing contacts and cant see with the -3.25 [facility]   prescribed  Currently taking Tobradex OS every 6 hours  Pt states that her eyes are feeling better since the ER visit but it is   still sore and feels like she has "alligator skin". Hasn't had any   discharge since ER visit but her eye is tearing excessively. Blurry   vision.            Last edited by Star Winslow, OD on 5/29/2020 12:43 PM. (History)            Assessment /Plan     For exam results, see Encounter Report.    Corneal ulcer, left  -     moxifloxacin (VIGAMOX) 0.5 % ophthalmic solution; Place 1 drop into the left eye every 2 (two) hours.  Dispense: 3 mL; Refill: 3      Continue Tobradex q6h OS    Start Vigamox q1h today then start q2h at bedtime until next visit.    RTC 2 days recheck, sooner if symptoms worsen,                  "

## 2020-06-01 ENCOUNTER — OFFICE VISIT (OUTPATIENT)
Dept: OPHTHALMOLOGY | Facility: CLINIC | Age: 55
End: 2020-06-01
Payer: COMMERCIAL

## 2020-06-01 DIAGNOSIS — H16.002 CORNEAL ULCER, LEFT: Primary | ICD-10-CM

## 2020-06-01 PROCEDURE — 92012 PR EYE EXAM, EST PATIENT,INTERMED: ICD-10-PCS | Mod: S$GLB,,, | Performed by: OPTOMETRIST

## 2020-06-01 PROCEDURE — 92012 INTRM OPH EXAM EST PATIENT: CPT | Mod: S$GLB,,, | Performed by: OPTOMETRIST

## 2020-06-01 PROCEDURE — 99999 PR PBB SHADOW E&M-EST. PATIENT-LVL I: ICD-10-PCS | Mod: PBBFAC,,, | Performed by: OPTOMETRIST

## 2020-06-01 PROCEDURE — 99999 PR PBB SHADOW E&M-EST. PATIENT-LVL I: CPT | Mod: PBBFAC,,, | Performed by: OPTOMETRIST

## 2020-06-01 NOTE — PROGRESS NOTES
HPI     corneal ulcer      Additional comments: os              Comments     Pt states her eye feels better  States her alligator skin  Denies any pain  OS: Vigamox every 2 hours & Tobradex every 6 hours          Last edited by Lidya Stein on 6/1/2020 12:15 PM. (History)            Assessment /Plan     For exam results, see Encounter Report.    Corneal ulcer, left      Continued epi defect with infiltrates OS     Continue vigamox q2h, tobradex q6h OS    RTC 4 days recheck

## 2020-06-04 ENCOUNTER — OFFICE VISIT (OUTPATIENT)
Dept: OPHTHALMOLOGY | Facility: CLINIC | Age: 55
End: 2020-06-04
Payer: COMMERCIAL

## 2020-06-04 DIAGNOSIS — H16.002 CORNEAL ULCER, LEFT: Primary | ICD-10-CM

## 2020-06-04 PROCEDURE — 99999 PR PBB SHADOW E&M-EST. PATIENT-LVL I: CPT | Mod: PBBFAC,,, | Performed by: OPTOMETRIST

## 2020-06-04 PROCEDURE — 99999 PR PBB SHADOW E&M-EST. PATIENT-LVL I: ICD-10-PCS | Mod: PBBFAC,,, | Performed by: OPTOMETRIST

## 2020-06-04 PROCEDURE — 92012 PR EYE EXAM, EST PATIENT,INTERMED: ICD-10-PCS | Mod: S$GLB,,, | Performed by: OPTOMETRIST

## 2020-06-04 PROCEDURE — 92012 INTRM OPH EXAM EST PATIENT: CPT | Mod: S$GLB,,, | Performed by: OPTOMETRIST

## 2020-06-04 NOTE — PROGRESS NOTES
HPI     4 days cornea ulcer check left eye.  Left eye feels much better.  No pain, watering.  Patient states vision is good.  Last eye visit 06/01/2020 [facility]    Last edited by Tahira Floyd on 6/4/2020  8:11 AM. (History)            Assessment /Plan     For exam results, see Encounter Report.    Corneal ulcer, left      Much improved K ulcer OS  No epi defect. Small infiltrates remain.    D/C Vigamox  Continue Tobradex q6h OS x 4 days    RTC prn, okay to resume CL in 5 days, fresh packages of contacts. Samples of Optifree with new cases dispensed.

## 2020-06-12 ENCOUNTER — TELEPHONE (OUTPATIENT)
Dept: ORTHOPEDICS | Facility: CLINIC | Age: 55
End: 2020-06-12

## 2020-07-07 NOTE — PROGRESS NOTES
"Subjective:      Patient ID: Ac Hayden is a 54 y.o. female.    Chief Complaint: Follow-up      HPI  Here for follow up of medical problems.  AC breakfast 97, after dinner 127-140.  Not checking BPs lately.  Morning sinus sx, not using flonase lately.  No f/c/sw/cough.  Clears nasal drainage.  Anxiety doing well on cymbalta 90mg daily.  BMs normal.  Takes mobic only prn, not daily.  No cp/sob/palp.    Updated/ annual due 12/20:  HM: 12/19 fluvax, 12/19 HAV, 11/16 [id number], 7/20 today Td, 12/10 TDaP, 12/19 mmg, 10/17 Cscope rep 5y, 6/16 HCV neg, 12/19 Eye Dr. Ayala.     Review of Systems   Constitutional: Negative for chills, diaphoresis and fever.   Respiratory: Negative for cough and shortness of breath.    Cardiovascular: Negative for chest pain, palpitations and leg swelling.   Gastrointestinal: Negative for blood in stool, constipation, diarrhea, nausea and vomiting.   Genitourinary: Negative for dysuria, frequency and hematuria.   Psychiatric/Behavioral: The patient is not nervous/anxious.          Objective:   BP (!) 146/88   Pulse 89   Temp 98.2 °F (36.8 °C)   Ht 5' 5" (1.651 m)   Wt (!) 136.7 kg (301 lb 5.9 oz)   SpO2 98%   BMI 50.15 kg/m²     Physical Exam  Constitutional:       Appearance: She is well-developed.   HENT:      Right Ear: External ear normal. Tympanic membrane is not injected.      Left Ear: External ear normal. Tympanic membrane is not injected.   Eyes:      Conjunctiva/sclera: Conjunctivae normal.   Neck:      Musculoskeletal: Neck supple.      Thyroid: No thyroid mass or thyromegaly.      Vascular: No carotid bruit.   Cardiovascular:      Rate and Rhythm: Normal rate and regular rhythm.      Heart sounds: No murmur. No friction rub. No gallop.    Pulmonary:      Effort: Pulmonary effort is normal.      Breath sounds: Normal breath sounds. No wheezing or rales.   Abdominal:      General: Bowel sounds are normal.      Palpations: Abdomen is soft. There is no mass.      " Tenderness: There is no abdominal tenderness.   Lymphadenopathy:      Cervical: No cervical adenopathy.   Neurological:      Mental Status: She is alert and oriented to person, place, and time.             Assessment:       1. Type 2 diabetes mellitus without complication, without long-term current use of insulin    2. Mixed anxiety and depressive disorder    3. Hyperlipidemia associated with type 2 diabetes mellitus    4. Morbid obesity with body mass index (BMI) of 45.0 to 49.9 in adult    5. CHRIS on CPAP    6. Preventive measure    7. Seasonal allergic rhinitis due to pollen    8. Elevated BP without diagnosis of hypertension          Plan:     Type 2 diabetes mellitus without complication, without long-term current use of insulin- doing well, monitor BPs and send to me in 2 weeks.  -     Hemoglobin A1C; Future; Expected date: 07/13/2020  -     Microalbumin/creatinine urine ratio; Future; Expected date: 07/13/2020    Mixed anxiety and depressive disorder- doing well, cont rx.    Hyperlipidemia associated with type 2 diabetes mellitus    Morbid obesity with body mass index (BMI) of 45.0 to 49.9 in adult    CHRIS on CPAP    Preventive measure- due in 12/20.  -     CBC auto differential; Future; Expected date: 07/13/2020  -     Comprehensive metabolic panel; Future; Expected date: 07/13/2020  -     Lipid Panel; Future; Expected date: 07/13/2020  -     TSH; Future; Expected date: 07/13/2020  -     Vitamin D; Future  -     Hemoglobin A1C; Future; Expected date: 07/13/2020  -     Microalbumin/creatinine urine ratio; Future; Expected date: 07/13/2020  -     Td Vaccine (Adult) - Preservative Free  -     Mammo Digital Screening Bilat; Future; Expected date: 07/13/2020    Seasonal allergic rhinitis due to pollen  -     fluticasone propionate (FLONASE) 50 mcg/actuation nasal spray; 1USE TWO SPRAY(S) IN EACH NOSTRIL ONCE DAILY  Dispense: 48 g; Refill: 3

## 2020-07-08 ENCOUNTER — DOCUMENTATION ONLY (OUTPATIENT)
Dept: REHABILITATION | Facility: HOSPITAL | Age: 55
End: 2020-07-08

## 2020-07-13 ENCOUNTER — OFFICE VISIT (OUTPATIENT)
Dept: FAMILY MEDICINE | Facility: CLINIC | Age: 55
End: 2020-07-13
Payer: COMMERCIAL

## 2020-07-13 VITALS
DIASTOLIC BLOOD PRESSURE: 88 MMHG | HEART RATE: 89 BPM | BODY MASS INDEX: 48.82 KG/M2 | HEIGHT: 65 IN | WEIGHT: 293 LBS | SYSTOLIC BLOOD PRESSURE: 146 MMHG | OXYGEN SATURATION: 98 % | TEMPERATURE: 98 F

## 2020-07-13 DIAGNOSIS — E11.69 HYPERLIPIDEMIA ASSOCIATED WITH TYPE 2 DIABETES MELLITUS: Chronic | ICD-10-CM

## 2020-07-13 DIAGNOSIS — G47.33 OSA ON CPAP: ICD-10-CM

## 2020-07-13 DIAGNOSIS — E78.5 HYPERLIPIDEMIA ASSOCIATED WITH TYPE 2 DIABETES MELLITUS: Chronic | ICD-10-CM

## 2020-07-13 DIAGNOSIS — E66.01 MORBID OBESITY WITH BODY MASS INDEX (BMI) OF 45.0 TO 49.9 IN ADULT: ICD-10-CM

## 2020-07-13 DIAGNOSIS — J30.1 SEASONAL ALLERGIC RHINITIS DUE TO POLLEN: ICD-10-CM

## 2020-07-13 DIAGNOSIS — R03.0 ELEVATED BP WITHOUT DIAGNOSIS OF HYPERTENSION: ICD-10-CM

## 2020-07-13 DIAGNOSIS — Z29.9 PREVENTIVE MEASURE: ICD-10-CM

## 2020-07-13 DIAGNOSIS — F41.8 MIXED ANXIETY AND DEPRESSIVE DISORDER: Chronic | ICD-10-CM

## 2020-07-13 DIAGNOSIS — E11.9 TYPE 2 DIABETES MELLITUS WITHOUT COMPLICATION, WITHOUT LONG-TERM CURRENT USE OF INSULIN: Primary | Chronic | ICD-10-CM

## 2020-07-13 PROCEDURE — 3044F PR MOST RECENT HEMOGLOBIN A1C LEVEL <7.0%: ICD-10-PCS | Mod: CPTII,S$GLB,, | Performed by: INTERNAL MEDICINE

## 2020-07-13 PROCEDURE — 3008F PR BODY MASS INDEX (BMI) DOCUMENTED: ICD-10-PCS | Mod: CPTII,S$GLB,, | Performed by: INTERNAL MEDICINE

## 2020-07-13 PROCEDURE — 90471 IMMUNIZATION ADMIN: CPT | Mod: S$GLB,,, | Performed by: INTERNAL MEDICINE

## 2020-07-13 PROCEDURE — 99999 PR PBB SHADOW E&M-EST. PATIENT-LVL IV: CPT | Mod: PBBFAC,,, | Performed by: INTERNAL MEDICINE

## 2020-07-13 PROCEDURE — 90714 TD VACCINE GREATER THAN OR EQUAL TO 7YO PRESERVATIVE FREE IM: ICD-10-PCS | Mod: S$GLB,,, | Performed by: INTERNAL MEDICINE

## 2020-07-13 PROCEDURE — 90714 TD VACC NO PRESV 7 YRS+ IM: CPT | Mod: S$GLB,,, | Performed by: INTERNAL MEDICINE

## 2020-07-13 PROCEDURE — 3044F HG A1C LEVEL LT 7.0%: CPT | Mod: CPTII,S$GLB,, | Performed by: INTERNAL MEDICINE

## 2020-07-13 PROCEDURE — 99214 PR OFFICE/OUTPT VISIT, EST, LEVL IV, 30-39 MIN: ICD-10-PCS | Mod: 25,S$GLB,, | Performed by: INTERNAL MEDICINE

## 2020-07-13 PROCEDURE — 3008F BODY MASS INDEX DOCD: CPT | Mod: CPTII,S$GLB,, | Performed by: INTERNAL MEDICINE

## 2020-07-13 PROCEDURE — 99214 OFFICE O/P EST MOD 30 MIN: CPT | Mod: 25,S$GLB,, | Performed by: INTERNAL MEDICINE

## 2020-07-13 PROCEDURE — 90471 TD VACCINE GREATER THAN OR EQUAL TO 7YO PRESERVATIVE FREE IM: ICD-10-PCS | Mod: S$GLB,,, | Performed by: INTERNAL MEDICINE

## 2020-07-13 PROCEDURE — 99999 PR PBB SHADOW E&M-EST. PATIENT-LVL IV: ICD-10-PCS | Mod: PBBFAC,,, | Performed by: INTERNAL MEDICINE

## 2020-07-13 RX ORDER — FLUTICASONE PROPIONATE 50 MCG
SPRAY, SUSPENSION (ML) NASAL
Qty: 48 G | Refills: 3 | Status: SHIPPED | OUTPATIENT
Start: 2020-07-13 | End: 2022-03-21 | Stop reason: SDUPTHER

## 2020-08-07 ENCOUNTER — OFFICE VISIT (OUTPATIENT)
Dept: CARDIOLOGY | Facility: CLINIC | Age: 55
End: 2020-08-07
Payer: COMMERCIAL

## 2020-08-07 ENCOUNTER — HOSPITAL ENCOUNTER (OUTPATIENT)
Dept: CARDIOLOGY | Facility: HOSPITAL | Age: 55
Discharge: HOME | End: 2020-08-07
Attending: INTERNAL MEDICINE
Payer: COMMERCIAL

## 2020-08-07 ENCOUNTER — LAB VISIT (OUTPATIENT)
Dept: LAB | Facility: HOSPITAL | Age: 55
End: 2020-08-07
Attending: INTERNAL MEDICINE
Payer: COMMERCIAL

## 2020-08-07 VITALS
SYSTOLIC BLOOD PRESSURE: 156 MMHG | BODY MASS INDEX: 49.76 KG/M2 | DIASTOLIC BLOOD PRESSURE: 100 MMHG | HEART RATE: 80 BPM | WEIGHT: 293 LBS | OXYGEN SATURATION: 98 %

## 2020-08-07 DIAGNOSIS — R09.89 LABILE BLOOD PRESSURE: ICD-10-CM

## 2020-08-07 DIAGNOSIS — E66.01 MORBID OBESITY WITH BODY MASS INDEX (BMI) OF 45.0 TO 49.9 IN ADULT: ICD-10-CM

## 2020-08-07 DIAGNOSIS — E78.5 HYPERLIPIDEMIA ASSOCIATED WITH TYPE 2 DIABETES MELLITUS: Chronic | ICD-10-CM

## 2020-08-07 DIAGNOSIS — E11.69 HYPERLIPIDEMIA ASSOCIATED WITH TYPE 2 DIABETES MELLITUS: Chronic | ICD-10-CM

## 2020-08-07 DIAGNOSIS — Z76.89 ENCOUNTER TO ESTABLISH CARE: ICD-10-CM

## 2020-08-07 DIAGNOSIS — F41.8 MIXED ANXIETY AND DEPRESSIVE DISORDER: Chronic | ICD-10-CM

## 2020-08-07 DIAGNOSIS — I10 ESSENTIAL HYPERTENSION: ICD-10-CM

## 2020-08-07 DIAGNOSIS — I10 ESSENTIAL HYPERTENSION: Primary | ICD-10-CM

## 2020-08-07 DIAGNOSIS — E11.9 TYPE 2 DIABETES MELLITUS WITHOUT COMPLICATION, WITHOUT LONG-TERM CURRENT USE OF INSULIN: Chronic | ICD-10-CM

## 2020-08-07 DIAGNOSIS — G47.33 OSA ON CPAP: ICD-10-CM

## 2020-08-07 DIAGNOSIS — Z76.89 ENCOUNTER TO ESTABLISH CARE: Primary | ICD-10-CM

## 2020-08-07 PROCEDURE — 82088 ASSAY OF ALDOSTERONE: CPT

## 2020-08-07 PROCEDURE — 83835 ASSAY OF METANEPHRINES: CPT

## 2020-08-07 PROCEDURE — 93005 ELECTROCARDIOGRAM TRACING: CPT

## 2020-08-07 PROCEDURE — 93010 ELECTROCARDIOGRAM REPORT: CPT | Mod: ,,, | Performed by: INTERNAL MEDICINE

## 2020-08-07 PROCEDURE — 36415 COLL VENOUS BLD VENIPUNCTURE: CPT

## 2020-08-07 PROCEDURE — 93010 EKG 12-LEAD: ICD-10-PCS | Mod: ,,, | Performed by: INTERNAL MEDICINE

## 2020-08-07 PROCEDURE — 99244 PR OFFICE CONSULTATION,LEVEL IV: ICD-10-PCS | Mod: S$GLB,,, | Performed by: INTERNAL MEDICINE

## 2020-08-07 PROCEDURE — 99999 PR PBB SHADOW E&M-EST. PATIENT-LVL IV: ICD-10-PCS | Mod: PBBFAC,,, | Performed by: INTERNAL MEDICINE

## 2020-08-07 PROCEDURE — 99999 PR PBB SHADOW E&M-EST. PATIENT-LVL IV: CPT | Mod: PBBFAC,,, | Performed by: INTERNAL MEDICINE

## 2020-08-07 PROCEDURE — 99244 OFF/OP CNSLTJ NEW/EST MOD 40: CPT | Mod: S$GLB,,, | Performed by: INTERNAL MEDICINE

## 2020-08-07 RX ORDER — LOSARTAN POTASSIUM 50 MG/1
50 TABLET ORAL DAILY
Qty: 30 TABLET | Refills: 11 | Status: SHIPPED | OUTPATIENT
Start: 2020-08-07 | End: 2020-12-14

## 2020-08-07 NOTE — LETTER
August 8, 2020      Tati Hansen MD  [address] [place] [address]           [facility]  [address] [address] [facility]  [place] [address]  Phone: [phone]  Fax: [phone]          Patient: Ac Hayden   MR Number: [id number]   YOB: 1965   Date of Visit: 8/7/2020       Dear Dr. Tati Hansen:    Thank you for referring Ac Hayden to me for evaluation. Attached you will find relevant portions of my assessment and plan of care.    If you have questions, please do not hesitate to call me. I look forward to following Ac Hayden along with you.    Sincerely,    Prieto Walker MD    Enclosure  CC:  No Recipients    If you would like to receive this communication electronically, please contact [email or web address] or [phone] to request more information on [employer] access.    For providers and/or their staff who would like to refer a patient to [facility], please contact us through our one-stop-shop provider referral line, [facility], at [phone].    If you feel you have received this communication in error or would no longer like to receive these types of communications, please e-mail [email or web address]

## 2020-08-07 NOTE — PROGRESS NOTES
Subjective:   Patient ID:  Ac Hayden is a 54 y.o. female who presents for evaluation of No chief complaint on file.      53 yo female referred for uncontrolled HTN  PMH DM 7 yrs, HTN, HLD anxiety obesity BNI 50 s/p sleeve in 2018, CHRIS not tolerate CPAP (given an used one and refused to use it). Quit smoking 20yrs and no drink  No chest pain, DONALDSON, dizziness, palpitation, leg swelling  Back muscles pain and knee pain,   Med compliance  No regular exercise  Works at [employer]  EKG NSR  No f./h premature CAD  BP high  A1c controlled. Off DM med after weight procedure        Past Medical History:   Diagnosis Date    Allergic rhinitis     Arthritis     Colon polyps     Diabetes mellitus     Diabetes mellitus, type 2     Hypertension     Metabolic syndrome     Mixed anxiety and depressive disorder     Pneumonia     Prediabetes     Urine incontinence        Past Surgical History:   Procedure Laterality Date    BAR[facility] SURGERY  2018    [facility])    COLONOSCOPY N/A 10/19/2017    Procedure: COLONOSCOPY;  Surgeon: Jamal Cole MD;  Location: [facility];  Service: Endoscopy;  Laterality: N/A;    HYSTERECTOMY      PARTIAL HYSTERECTOMY      PLANTAR FASCIA SURGERY      TOTAL REDUCTION MAMMOPLASTY         Social History     Tobacco Use    Smoking status: Former Smoker     Packs/day: 0.25     Years: 13.00     Pack years: 3.25     Quit date: 10/19/2000     Years since quittin.8    Smokeless tobacco: Never Used   Substance Use Topics    Alcohol use: No     Alcohol/week: 0.0 standard drinks    Drug use: No       Family History   Problem Relation Age of Onset    Diabetes Mother     Stroke Mother     Cancer Father     Diabetes Sister     Ovarian cancer Sister     Strabismus Sister     Diabetes Brother     Lupus Other     Eczema Other     Melanoma Neg Hx     Psoriasis Neg Hx        Review of Systems   Constitution: Negative for decreased appetite,  diaphoresis, fever, malaise/fatigue and night sweats.   HENT: Negative for nosebleeds.    Eyes: Negative for blurred vision and double vision.   Cardiovascular: Negative for chest pain, claudication, dyspnea on exertion, irregular heartbeat, leg swelling, near-syncope, orthopnea, palpitations, paroxysmal nocturnal dyspnea and syncope.   Respiratory: Negative for cough, shortness of breath, sleep disturbances due to breathing, snoring, sputum production and wheezing.    Endocrine: Negative for cold intolerance and polyuria.   Hematologic/Lymphatic: Does not bruise/bleed easily.   Skin: Negative for rash.   Musculoskeletal: Positive for arthritis, back pain and joint pain. Negative for falls, joint swelling and neck pain.   Gastrointestinal: Negative for abdominal pain, heartburn, nausea and vomiting.   Genitourinary: Negative for dysuria, frequency and hematuria.   Neurological: Negative for difficulty with concentration, dizziness, focal weakness, headaches, light-headedness, numbness, seizures and weakness.   Psychiatric/Behavioral: Negative for depression, memory loss and substance abuse. The patient does not have insomnia.    Allergic/Immunologic: Negative for HIV exposure and hives.       Objective:   Physical Exam   Constitutional: She is oriented to person, place, and time. She appears well-nourished.   HENT:   Head: Normocephalic.   Eyes: Pupils are equal, round, and reactive to light.   Neck: Normal carotid pulses and no JVD present. Carotid bruit is not present. No thyromegaly present.   Cardiovascular: Normal rate, regular rhythm, normal heart sounds and normal pulses.  No extrasystoles are present. PMI is not displaced. Exam reveals no gallop and no S3.   No murmur heard.  Pulmonary/Chest: Breath sounds normal. No stridor. No respiratory distress.   Abdominal: Soft. Bowel sounds are normal. There is no abdominal tenderness. There is no rebound.   Musculoskeletal: Normal range of motion.   Neurological:  She is alert and oriented to person, place, and time.   Skin: Skin is intact. No rash noted.   Psychiatric: Her behavior is normal.       Lab Results   Component Value Date    CHOL 188 12/02/2019    CHOL 171 08/09/2018    CHOL 161 02/21/2018     Lab Results   Component Value Date    HDL 50 12/02/2019    HDL 36 (L) 08/09/2018    HDL 40 02/21/2018     Lab Results   Component Value Date    LDLCALC 118.8 12/02/2019    LDLCALC 101.4 08/09/2018    LDLCALC 102.4 02/21/2018     Lab Results   Component Value Date    TRIG 96 12/02/2019    TRIG 168 (H) 08/09/2018    TRIG 93 02/21/2018     Lab Results   Component Value Date    CHOLHDL 26.6 12/02/2019    CHOLHDL 21.1 08/09/2018    CHOLHDL 24.8 02/21/2018       Chemistry        Component Value Date/Time     12/02/2019 1348    K 3.6 12/02/2019 1348     12/02/2019 1348    CO2 29 12/02/2019 1348    BUN 10 12/02/2019 1348    CREATININE 0.7 12/02/2019 1348    GLU 65 (L) 12/02/2019 1348        Component Value Date/Time    CALCIUM 9.4 12/02/2019 1348    ALKPHOS 98 12/02/2019 1348    AST 18 12/02/2019 1348    ALT 14 12/02/2019 1348    BILITOT 0.3 12/02/2019 1348    ESTGFRAFRICA >60.0 12/02/2019 1348    EGFRNONAA >60.0 12/02/2019 1348          Lab Results   Component Value Date    HGBA1C 5.6 12/02/2019     Lab Results   Component Value Date    TSH 0.821 12/02/2019     Lab Results   Component Value Date    INR 1.0 05/23/2018     Lab Results   Component Value Date    WBC 6.22 12/02/2019    HGB 13.2 12/02/2019    HCT 42.2 12/02/2019    MCV 96 12/02/2019     12/02/2019     BNP  @LABRCNTIP(BNP,BNPTRIAGEBLO)@  CrCl cannot be calculated (Patient's most recent lab result is older than the maximum 7 days allowed.).  No results found in the last 24 hours.  No results found in the last 24 hours.  No results found in the last 24 hours.    Assessment:      1. Essential hypertension    2. Labile blood pressure    3. Hyperlipidemia associated with type 2 diabetes mellitus    4. Mixed  anxiety and depressive disorder    5. Morbid obesity with body mass index (BMI) of 45.0 to 49.9 in adult    6. Type 2 diabetes mellitus without complication, without long-term current use of insulin    7. CHRIS on CPAP        Plan:   Add losartan 50 mg daily  Echo  Renal artery US  renin and Metanephrine levels  Counseled DASH  Check Lipid profile in 6 months  Recommend heart-healthy diet, weight control and regular exercise.  Iggy. Risk modification.   F/u with PCP    I have reviewed all pertinent labs and cardiac studies independently. Plans and recommendations have been formulated under my direct supervision. All questions answered and patient voiced understanding.   If symptoms persist go to the ED

## 2020-08-11 LAB
ALDOST SERPL-MCNC: 4 NG/DL
ALDOST/RENIN PLAS-RTO: 13.2 RATIO
RENIN PLAS-CCNC: 0.3 NG/ML/HR

## 2020-08-13 ENCOUNTER — TELEPHONE (OUTPATIENT)
Dept: RADIOLOGY | Facility: HOSPITAL | Age: 55
End: 2020-08-13

## 2020-08-13 LAB
METANEPH FREE SERPL-MCNC: <25 PG/ML
METANEPHS SERPL-MCNC: 38 PG/ML
NORMETANEPH FREE SERPL-MCNC: 38 PG/ML

## 2020-08-14 ENCOUNTER — HOSPITAL ENCOUNTER (OUTPATIENT)
Dept: RADIOLOGY | Facility: HOSPITAL | Age: 55
Discharge: HOME | End: 2020-08-14
Attending: INTERNAL MEDICINE
Payer: COMMERCIAL

## 2020-08-14 DIAGNOSIS — I10 ESSENTIAL HYPERTENSION: ICD-10-CM

## 2020-08-14 PROCEDURE — 93975 US RENAL ARTERY STENOSIS HYPERTEN (XPD): ICD-10-PCS | Mod: 26,,, | Performed by: RADIOLOGY

## 2020-08-14 PROCEDURE — 93975 VASCULAR STUDY: CPT | Mod: TC

## 2020-08-14 PROCEDURE — 93975 VASCULAR STUDY: CPT | Mod: 26,,, | Performed by: RADIOLOGY

## 2020-08-17 ENCOUNTER — TELEPHONE (OUTPATIENT)
Dept: CARDIOLOGY | Facility: CLINIC | Age: 55
End: 2020-08-17

## 2020-08-17 NOTE — TELEPHONE ENCOUNTER
Pt contacted about results, pt verbalized understanding            ----- Message from Prieto Walker MD sent at 8/14/2020  2:21 PM CDT -----  Renal artery US wnl  Lab revealed no 2nd HTN

## 2020-08-18 ENCOUNTER — PATIENT OUTREACH (OUTPATIENT)
Dept: ADMINISTRATIVE | Facility: OTHER | Age: 55
End: 2020-08-18

## 2020-08-18 ENCOUNTER — OFFICE VISIT (OUTPATIENT)
Dept: OTOLARYNGOLOGY | Facility: CLINIC | Age: 55
End: 2020-08-18
Payer: COMMERCIAL

## 2020-08-18 ENCOUNTER — CLINICAL SUPPORT (OUTPATIENT)
Dept: AUDIOLOGY | Facility: CLINIC | Age: 55
End: 2020-08-18
Payer: COMMERCIAL

## 2020-08-18 VITALS
HEART RATE: 71 BPM | WEIGHT: 293 LBS | SYSTOLIC BLOOD PRESSURE: 133 MMHG | DIASTOLIC BLOOD PRESSURE: 83 MMHG | TEMPERATURE: 98 F | BODY MASS INDEX: 49.12 KG/M2

## 2020-08-18 DIAGNOSIS — H69.92 ETD (EUSTACHIAN TUBE DYSFUNCTION), LEFT: ICD-10-CM

## 2020-08-18 DIAGNOSIS — H65.92 MEE (MIDDLE EAR EFFUSION), LEFT: Primary | ICD-10-CM

## 2020-08-18 DIAGNOSIS — H90.A32 MIXED CONDUCTIVE AND SENSORINEURAL HEARING LOSS OF LEFT EAR WITH RESTRICTED HEARING OF RIGHT EAR: ICD-10-CM

## 2020-08-18 PROCEDURE — 92567 TYMPANOMETRY: CPT | Mod: S$GLB,,, | Performed by: AUDIOLOGIST-HEARING AID FITTER

## 2020-08-18 PROCEDURE — 99214 OFFICE O/P EST MOD 30 MIN: CPT | Mod: S$GLB,,, | Performed by: PHYSICIAN ASSISTANT

## 2020-08-18 PROCEDURE — 3008F PR BODY MASS INDEX (BMI) DOCUMENTED: ICD-10-PCS | Mod: CPTII,S$GLB,, | Performed by: PHYSICIAN ASSISTANT

## 2020-08-18 PROCEDURE — 92567 PR TYMPA2METRY: ICD-10-PCS | Mod: S$GLB,,, | Performed by: AUDIOLOGIST-HEARING AID FITTER

## 2020-08-18 PROCEDURE — 92557 COMPREHENSIVE HEARING TEST: CPT | Mod: S$GLB,,, | Performed by: AUDIOLOGIST-HEARING AID FITTER

## 2020-08-18 PROCEDURE — 92557 PR COMPREHENSIVE HEARING TEST: ICD-10-PCS | Mod: S$GLB,,, | Performed by: AUDIOLOGIST-HEARING AID FITTER

## 2020-08-18 PROCEDURE — 3008F BODY MASS INDEX DOCD: CPT | Mod: CPTII,S$GLB,, | Performed by: PHYSICIAN ASSISTANT

## 2020-08-18 PROCEDURE — 99999 PR PBB SHADOW E&M-EST. PATIENT-LVL III: CPT | Mod: PBBFAC,,, | Performed by: PHYSICIAN ASSISTANT

## 2020-08-18 PROCEDURE — 99214 PR OFFICE/OUTPT VISIT, EST, LEVL IV, 30-39 MIN: ICD-10-PCS | Mod: S$GLB,,, | Performed by: PHYSICIAN ASSISTANT

## 2020-08-18 PROCEDURE — 99999 PR PBB SHADOW E&M-EST. PATIENT-LVL III: ICD-10-PCS | Mod: PBBFAC,,, | Performed by: PHYSICIAN ASSISTANT

## 2020-08-18 RX ORDER — PREDNISONE 20 MG/1
TABLET ORAL
Qty: 21 TABLET | Refills: 0 | Status: SHIPPED | OUTPATIENT
Start: 2020-08-18 | End: 2020-12-28

## 2020-08-18 NOTE — PROGRESS NOTES
Referring Provider:Dr. Jade Shen Domonique Hayden was seen 08/18/2020 for an audiological evaluation.  Patient complains of noticing that she has trouble hearing since people are having to wear face masks currently.  She is unable to lip read and notices hearing trouble more.  She was seen in 2010 for left eustachian tube dysfunction.  She was seen in primary care recently who noted that her eardrums seemed retracted.  Patient complains of fullness and pulsing in her left ear.  She takes Flonase daily without relief.     Results reveal a mild sensorineural hearing loss 250-8000 Hz for the right ear, and a mild-to-moderate mixed hearing loss 250-8000 Hz for the left ear.   Speech Reception Thresholds were  35 dBHL for the right ear and 45 dBHL for the left ear.   Word recognition scores were excellent for the right ear and excellent for the left ear.   Tympanograms were Type A, normal for the right ear and Type C, abnormal for the left ear.    Patient was counseled on the above findings.    Recommendations:  1. ENT: SAM AS

## 2020-08-18 NOTE — PROGRESS NOTES
Health Maintenance Due   Topic Date Due    Low Dose Statin  12/27/1986    Shingles Vaccine (1 of 2) 12/27/2015    Hemoglobin A1c  06/02/2020     Updates were requested from care everywhere.  Chart was reviewed for overdue Proactive [facility] Encounters ([facility]WE[facility]) topics (CRS, Breast Cancer Screening, Eye exam)  Health Maintenance has been updated.  LINKS immunization registry triggered.  Immunizations were reconciled.

## 2020-08-18 NOTE — PROGRESS NOTES
Subjective:   Patient ID: Ac Hayden is a 54 y.o. female.    Chief Complaint: Hearing Loss      Ac Hayden is a very pleasant 55 yo female here to see me today with complaints  of noticing  trouble hearing  people since they are having to wear face masks currently.  She is unable to lip read and notices hearing trouble more.  She was seen in 2010 for left eustachian tube dysfunction.  She was seen in primary care recently who noted that her eardrums seemed retracted.  Patient complains of fullness and pulsing in her left ear.  She takes Flonase daily without relief.     Review of patient's allergies indicates:   Allergen Reactions    Codeine Edema and Itching    Pcn [penicillins] Rash    Sulfa (sulfonamide antibiotics) Edema and Rash           Review of Systems   Constitutional: Negative for chills, fatigue, fever and unexpected weight change.   HENT: Positive for hearing loss. Negative for congestion, dental problem, ear discharge, ear pain, facial swelling, nosebleeds, postnasal drip, rhinorrhea, sinus pressure, sneezing, sore throat, tinnitus, trouble swallowing and voice change.    Eyes: Negative for redness, itching and visual disturbance.   Respiratory: Negative for cough, choking, shortness of breath and wheezing.    Cardiovascular: Negative for chest pain and palpitations.   Gastrointestinal: Negative for abdominal pain.        No reflux.   Musculoskeletal: Negative for gait problem.   Skin: Negative for rash.   Neurological: Negative for dizziness, light-headedness and headaches.         Objective:   /83   Pulse 71   Temp 97.6 °F (36.4 °C)   Wt 133.9 kg (295 lb 3.1 oz)   BMI 49.12 kg/m²     Physical Exam  Constitutional:       General: She is not in acute distress.     Appearance: She is well-developed.   HENT:      Head: Normocephalic and atraumatic.      Right Ear: Tympanic membrane, ear canal and external ear normal.      Left Ear: Ear canal and external ear normal. A middle  ear effusion is present.      Nose: Nose normal. No nasal deformity, septal deviation, mucosal edema or rhinorrhea.      Right Sinus: No maxillary sinus tenderness or frontal sinus tenderness.      Left Sinus: No maxillary sinus tenderness or frontal sinus tenderness.      Mouth/Throat:      Mouth: Mucous membranes are not pale and not dry.      Dentition: No dental caries.      Pharynx: Uvula midline. No oropharyngeal exudate or posterior oropharyngeal erythema.   Eyes:      General: Lids are normal. No scleral icterus.     Extraocular Movements:      Right eye: Normal extraocular motion and no nystagmus.      Left eye: Normal extraocular motion and no nystagmus.      Conjunctiva/sclera: Conjunctivae normal.      Right eye: Right conjunctiva is not injected. No chemosis.     Left eye: Left conjunctiva is not injected. No chemosis.     Pupils: Pupils are equal, round, and reactive to light.   Neck:      Thyroid: No thyroid mass or thyromegaly.      Trachea: Trachea and phonation normal. No tracheal tenderness or tracheal deviation.   Pulmonary:      Effort: Pulmonary effort is normal. No respiratory distress.      Breath sounds: No stridor.   Abdominal:      General: There is no distension.   Lymphadenopathy:      Head:      Right side of head: No submental, submandibular, preauricular, posterior auricular or occipital adenopathy.      Left side of head: No submental, submandibular, preauricular, posterior auricular or occipital adenopathy.      Cervical: No cervical adenopathy.   Skin:     General: Skin is warm and dry.      Findings: No erythema or rash.   Neurological:      Mental Status: She is alert and oriented to person, place, and time.      Cranial Nerves: No cranial nerve deficit.   Psychiatric:         Behavior: Behavior normal.                 Assessment:     1. SAM (middle ear effusion), left        Plan:     SAM (middle ear effusion), left    Other orders  -     predniSONE (DELTASONE) 20 MG tablet;  Take 3 tab in am x 3d, then 2 tab in am x 3d, then 1 tab in am x 3d, then 1/2 tab in am x 3d  Dispense: 21 tablet; Refill: 0      SAM: She will continue Flonase twice a day and I added oral steroid taper. She will return to clinic in 2-3 weeks for recheck.

## 2020-09-01 ENCOUNTER — TELEPHONE (OUTPATIENT)
Dept: OTOLARYNGOLOGY | Facility: CLINIC | Age: 55
End: 2020-09-01

## 2020-09-01 ENCOUNTER — OFFICE VISIT (OUTPATIENT)
Dept: OTOLARYNGOLOGY | Facility: CLINIC | Age: 55
End: 2020-09-01
Payer: COMMERCIAL

## 2020-09-01 ENCOUNTER — CLINICAL SUPPORT (OUTPATIENT)
Dept: AUDIOLOGY | Facility: CLINIC | Age: 55
End: 2020-09-01
Payer: COMMERCIAL

## 2020-09-01 VITALS
BODY MASS INDEX: 50.08 KG/M2 | SYSTOLIC BLOOD PRESSURE: 144 MMHG | WEIGHT: 293 LBS | DIASTOLIC BLOOD PRESSURE: 82 MMHG | HEART RATE: 103 BPM | TEMPERATURE: 96 F

## 2020-09-01 DIAGNOSIS — H65.92 MEE (MIDDLE EAR EFFUSION), LEFT: Primary | ICD-10-CM

## 2020-09-01 DIAGNOSIS — H69.92 DYSFUNCTION OF LEFT EUSTACHIAN TUBE: Primary | ICD-10-CM

## 2020-09-01 PROCEDURE — 99213 OFFICE O/P EST LOW 20 MIN: CPT | Mod: S$GLB,,, | Performed by: PHYSICIAN ASSISTANT

## 2020-09-01 PROCEDURE — 99999 PR PBB SHADOW E&M-EST. PATIENT-LVL III: ICD-10-PCS | Mod: PBBFAC,,, | Performed by: PHYSICIAN ASSISTANT

## 2020-09-01 PROCEDURE — 99999 PR PBB SHADOW E&M-EST. PATIENT-LVL III: CPT | Mod: PBBFAC,,, | Performed by: PHYSICIAN ASSISTANT

## 2020-09-01 PROCEDURE — 3008F BODY MASS INDEX DOCD: CPT | Mod: CPTII,S$GLB,, | Performed by: PHYSICIAN ASSISTANT

## 2020-09-01 PROCEDURE — 92567 PR TYMPA2METRY: ICD-10-PCS | Mod: S$GLB,,, | Performed by: AUDIOLOGIST

## 2020-09-01 PROCEDURE — 3008F PR BODY MASS INDEX (BMI) DOCUMENTED: ICD-10-PCS | Mod: CPTII,S$GLB,, | Performed by: PHYSICIAN ASSISTANT

## 2020-09-01 PROCEDURE — 99213 PR OFFICE/OUTPT VISIT, EST, LEVL III, 20-29 MIN: ICD-10-PCS | Mod: S$GLB,,, | Performed by: PHYSICIAN ASSISTANT

## 2020-09-01 PROCEDURE — 92567 TYMPANOMETRY: CPT | Mod: S$GLB,,, | Performed by: AUDIOLOGIST

## 2020-09-01 NOTE — PROGRESS NOTES
Tympanometry revealed Type A, normal in the right ear, and Type B, flat in the left ear.     Right Ear:  0.6ml@-55 daPa, with ear canal volume of: 1.6    Left Ear:  No peak, with ear canal volume of: 1.3      Patient was counseled with results.

## 2020-09-01 NOTE — PROGRESS NOTES
Subjective:   Patient ID: Ac Hayden is a 54 y.o. female.    Chief Complaint: Follow-up    Ms. Hayden is a very pleasant 55 yo female here to see me for follow up of left SAM. She was treated with prednisone for SAM but states the ear is not better.     Review of patient's allergies indicates:   Allergen Reactions    Codeine Edema and Itching    Pcn [penicillins] Rash    Sulfa (sulfonamide antibiotics) Edema and Rash           Review of Systems   Constitutional: Negative for chills, fatigue, fever and unexpected weight change.   HENT: Positive for hearing loss (left ear). Negative for congestion, dental problem, ear discharge, ear pain, facial swelling, nosebleeds, postnasal drip, rhinorrhea, sinus pressure, sneezing, sore throat, tinnitus, trouble swallowing and voice change.    Eyes: Negative for redness, itching and visual disturbance.   Respiratory: Negative for cough, choking, shortness of breath and wheezing.    Cardiovascular: Negative for chest pain and palpitations.   Gastrointestinal: Negative for abdominal pain.        No reflux.   Musculoskeletal: Negative for gait problem.   Skin: Negative for rash.   Neurological: Negative for dizziness, light-headedness and headaches.         Objective:   BP (!) 144/82 (BP Location: Right arm, Patient Position: Sitting, BP Method: Medium (Automatic))   Pulse 103   Temp 96.1 °F (35.6 °C) (Tympanic)   Wt (!) 136.5 kg (300 lb 14.9 oz)   BMI 50.08 kg/m²     Physical Exam  Constitutional:       General: She is not in acute distress.     Appearance: She is well-developed.   HENT:      Head: Normocephalic and atraumatic.      Right Ear: Tympanic membrane, ear canal and external ear normal.      Left Ear: Ear canal and external ear normal. A middle ear effusion is present.      Nose: Nose normal. No nasal deformity, septal deviation, mucosal edema or rhinorrhea.      Right Sinus: No maxillary sinus tenderness or frontal sinus tenderness.      Left Sinus: No  maxillary sinus tenderness or frontal sinus tenderness.      Mouth/Throat:      Mouth: Mucous membranes are not pale and not dry.      Dentition: No dental caries.      Pharynx: Uvula midline. No oropharyngeal exudate or posterior oropharyngeal erythema.   Eyes:      General: Lids are normal. No scleral icterus.     Extraocular Movements:      Right eye: Normal extraocular motion and no nystagmus.      Left eye: Normal extraocular motion and no nystagmus.      Conjunctiva/sclera: Conjunctivae normal.      Right eye: Right conjunctiva is not injected. No chemosis.     Left eye: Left conjunctiva is not injected. No chemosis.     Pupils: Pupils are equal, round, and reactive to light.   Neck:      Thyroid: No thyroid mass or thyromegaly.      Trachea: Trachea and phonation normal. No tracheal tenderness or tracheal deviation.   Pulmonary:      Effort: Pulmonary effort is normal. No respiratory distress.      Breath sounds: No stridor.   Abdominal:      General: There is no distension.   Lymphadenopathy:      Head:      Right side of head: No submental, submandibular, preauricular, posterior auricular or occipital adenopathy.      Left side of head: No submental, submandibular, preauricular, posterior auricular or occipital adenopathy.      Cervical: No cervical adenopathy.   Skin:     General: Skin is warm and dry.      Findings: No erythema or rash.   Neurological:      Mental Status: She is alert and oriented to person, place, and time.      Cranial Nerves: No cranial nerve deficit.   Psychiatric:         Behavior: Behavior normal.          Tympanometry revealed Type A, normal in the right ear, and Type B, flat in the left ear.      Right Ear:  0.6ml@-55 daPa, with ear canal volume of: 1.6     Left Ear:  No peak, with ear canal volume of: 1.3        Patient was counseled with results.    Assessment:     1. SAM (middle ear effusion), left        Plan:     SAM (middle ear effusion), left      SAM persists. We have  scheduled her for Dr. Negron for PET placement in the left ear.

## 2020-09-01 NOTE — TELEPHONE ENCOUNTER
Sent message through [employer] [place]PT code [place] [address] she may call [phone] or [phone] to find out cost of procedure.

## 2020-09-01 NOTE — TELEPHONE ENCOUNTER
----- Message from Lidya Alvarez sent at 9/1/2020  4:02 PM CDT -----  Regarding: Procedure on 09/09  Good Afternoon,    Ms. Hayden called to get an estimate for her procedure on 09/09/20. Can you please provide the CPT code(s) for this visit?    Thank you,  Lidya Alvarez  Central Pricing Office

## 2020-09-02 ENCOUNTER — TELEPHONE (OUTPATIENT)
Dept: OTOLARYNGOLOGY | Facility: CLINIC | Age: 55
End: 2020-09-02

## 2020-09-03 ENCOUNTER — HOSPITAL ENCOUNTER (OUTPATIENT)
Dept: CARDIOLOGY | Facility: HOSPITAL | Age: 55
Discharge: HOME | End: 2020-09-03
Attending: INTERNAL MEDICINE
Payer: COMMERCIAL

## 2020-09-03 VITALS — HEIGHT: 65 IN | BODY MASS INDEX: 48.82 KG/M2 | WEIGHT: 293 LBS

## 2020-09-03 DIAGNOSIS — I10 ESSENTIAL HYPERTENSION: ICD-10-CM

## 2020-09-03 LAB
AORTIC ROOT ANNULUS: 2.97 CM
AV INDEX (PROSTH): 0.69
AV MEAN GRADIENT: 5 MMHG
AV PEAK GRADIENT: 9 MMHG
AV VALVE AREA: 2.24 CM2
AV VELOCITY RATIO: 0.73
BSA FOR ECHO PROCEDURE: 2.5 M2
CV ECHO LV RWT: 0.55 CM
DOP CALC AO PEAK VEL: 1.5 M/S
DOP CALC AO VTI: 36.74 CM
DOP CALC LVOT AREA: 3.3 CM2
DOP CALC LVOT DIAMETER: 2.04 CM
DOP CALC LVOT PEAK VEL: 1.1 M/S
DOP CALC LVOT STROKE VOLUME: 82.32 CM3
DOP CALC RVOT PEAK VEL: 0.79 M/S
DOP CALC RVOT VTI: 18.04 CM
DOP CALCLVOT PEAK VEL VTI: 25.2 CM
E WAVE DECELERATION TIME: 225.88 MSEC
E/A RATIO: 1.43
E/E' RATIO: 11.89 M/S
ECHO LV POSTERIOR WALL: 1.22 CM (ref 0.6–1.1)
FRACTIONAL SHORTENING: 37 % (ref 28–44)
INTERVENTRICULAR SEPTUM: 1.24 CM (ref 0.6–1.1)
IVRT: 79.92 MSEC
LA MAJOR: 5.05 CM
LA MINOR: 4.34 CM
LA WIDTH: 3.14 CM
LEFT ATRIUM SIZE: 3.14 CM
LEFT ATRIUM VOLUME INDEX: 16.6 ML/M2
LEFT ATRIUM VOLUME: 39.12 CM3
LEFT INTERNAL DIMENSION IN SYSTOLE: 2.82 CM (ref 2.1–4)
LEFT VENTRICLE DIASTOLIC VOLUME INDEX: 38.36 ML/M2
LEFT VENTRICLE DIASTOLIC VOLUME: 90.16 ML
LEFT VENTRICLE MASS INDEX: 86 G/M2
LEFT VENTRICLE SYSTOLIC VOLUME INDEX: 12.8 ML/M2
LEFT VENTRICLE SYSTOLIC VOLUME: 30.05 ML
LEFT VENTRICULAR INTERNAL DIMENSION IN DIASTOLE: 4.45 CM (ref 3.5–6)
LEFT VENTRICULAR MASS: 201.79 G
LV LATERAL E/E' RATIO: 11.89 M/S
LV SEPTAL E/E' RATIO: 11.89 M/S
MV PEAK A VEL: 0.75 M/S
MV PEAK E VEL: 1.07 M/S
PISA TR MAX VEL: 2.62 M/S
PULM VEIN S/D RATIO: 1.53
PV MEAN GRADIENT: 1.54 MMHG
PV PEAK D VEL: 0.4 M/S
PV PEAK S VEL: 0.61 M/S
PV PEAK VELOCITY: 0.99 CM/S
RA MAJOR: 4.08 CM
RA PRESSURE: 3 MMHG
RA WIDTH: 2.94 CM
RIGHT VENTRICULAR END-DIASTOLIC DIMENSION: 3.1 CM
SINUS: 2.51 CM
STJ: 2.23 CM
TDI LATERAL: 0.09 M/S
TDI SEPTAL: 0.09 M/S
TDI: 0.09 M/S
TR MAX PG: 27 MMHG
TRICUSPID ANNULAR PLANE SYSTOLIC EXCURSION: 2.05 CM
TV REST PULMONARY ARTERY PRESSURE: 30 MMHG

## 2020-09-03 PROCEDURE — 93306 ECHO (CUPID ONLY): ICD-10-PCS | Mod: 26,,, | Performed by: INTERNAL MEDICINE

## 2020-09-03 PROCEDURE — 93306 TTE W/DOPPLER COMPLETE: CPT | Mod: 26,,, | Performed by: INTERNAL MEDICINE

## 2020-09-03 PROCEDURE — 93306 TTE W/DOPPLER COMPLETE: CPT

## 2020-09-08 ENCOUNTER — TELEPHONE (OUTPATIENT)
Dept: CARDIOLOGY | Facility: CLINIC | Age: 55
End: 2020-09-08

## 2020-09-08 NOTE — TELEPHONE ENCOUNTER
Patient contacted and was advised that   Echo showed normal function  rtc in 3 months   the patient stated understanding with no question or concerns.

## 2020-09-09 ENCOUNTER — PROCEDURE VISIT (OUTPATIENT)
Dept: OTOLARYNGOLOGY | Facility: CLINIC | Age: 55
End: 2020-09-09
Payer: COMMERCIAL

## 2020-09-09 VITALS
DIASTOLIC BLOOD PRESSURE: 97 MMHG | HEART RATE: 98 BPM | WEIGHT: 293 LBS | SYSTOLIC BLOOD PRESSURE: 174 MMHG | BODY MASS INDEX: 50 KG/M2 | TEMPERATURE: 98 F

## 2020-09-09 DIAGNOSIS — H65.92 MEE (MIDDLE EAR EFFUSION), LEFT: Primary | ICD-10-CM

## 2020-09-09 DIAGNOSIS — H90.A32 MIXED CONDUCTIVE AND SENSORINEURAL HEARING LOSS OF LEFT EAR WITH RESTRICTED HEARING OF RIGHT EAR: ICD-10-CM

## 2020-09-09 PROCEDURE — 69433 CREATE EARDRUM OPENING: CPT | Mod: S$GLB,,, | Performed by: ORTHOPAEDIC SURGERY

## 2020-09-09 PROCEDURE — 69433 PR CREATE EARDRUM OPENING,LOCAL ANESTH: ICD-10-PCS | Mod: S$GLB,,, | Performed by: ORTHOPAEDIC SURGERY

## 2020-09-09 NOTE — PROGRESS NOTES
Subjective:       Patient ID: Ac Hayden is a 54 y.o. female.    Chief Complaint: Left PE tube insertion    Patient is here today for left PET insertion due to persistent left SAM.  No change in symptoms since last visit. Continued left hearing loss.    Review of Systems   HENT: Positive for hearing loss (left ear).          Objective:      Physical Exam  HENT:      Left Ear: A middle ear effusion is present.         Preprocedure Diagnosis:  Chronic left otitis media with effusion  Postoprocedure Diagnosis:  Same  Procedure:  Left ear tube placement    Findings:  Left ear tympanic membrane serous effusion     Procedure in detail:  After appropriate consents were obtained, the patient was placed on the exam chair in a supine position.  The binocular operating microscope was then brought into the field.    Her left EAC was found to have a small amount of cerumen that was carefully cleaned with a curette.  The tympanic membrane was then visualized, and was found to be serous effusion.  A small drop of phenol was then applied to the tympanic membrane in the area of anticipated myringotomy.   A radial myringotomy was then made in the anterior-inferior quadrant of the tympanic membrane, and a #5 Mensah tip suction was used to clear the middle ear.  With an alligator forceps, an Archer beveled grommet tube was then placed into the myringotomy site without difficulty.     The patient tolerated the procedure without difficulty, and there were no complications.          Assessment:       1. SAM (middle ear effusion), left    2. Mixed conductive and sensorineural hearing loss of left ear with restricted hearing of right ear        Plan:       Patient tolerated PET placement without difficulty.  Discussed that this type of PET generally stays in place 6-12 months.  RTC six months for recheck, sooner if issues arise.     
How Severe Is Your Acne?: mild
Is This A New Presentation, Or A Follow-Up?: Acne

## 2020-09-24 ENCOUNTER — PATIENT OUTREACH (OUTPATIENT)
Dept: ADMINISTRATIVE | Facility: OTHER | Age: 55
End: 2020-09-24

## 2020-09-24 NOTE — PROGRESS NOTES
Health Maintenance Due   Topic Date Due    Low Dose Statin  12/27/1986    Shingles Vaccine (1 of 2) 12/27/2015    Hemoglobin A1c  06/02/2020    Influenza Vaccine (1) 08/01/2020     Updates were requested from care everywhere.  Chart was reviewed for overdue Proactive [facility] Encounters ([facility]) topics (CRS, Breast Cancer Screening, Eye exam)  Health Maintenance has been updated.  LINKS immunization registry triggered.  Immunizations were reconciled.

## 2020-09-25 ENCOUNTER — OFFICE VISIT (OUTPATIENT)
Dept: PULMONOLOGY | Facility: CLINIC | Age: 55
End: 2020-09-25
Payer: COMMERCIAL

## 2020-09-25 VITALS
OXYGEN SATURATION: 98 % | WEIGHT: 293 LBS | RESPIRATION RATE: 18 BRPM | SYSTOLIC BLOOD PRESSURE: 136 MMHG | BODY MASS INDEX: 48.82 KG/M2 | HEART RATE: 93 BPM | DIASTOLIC BLOOD PRESSURE: 84 MMHG | HEIGHT: 65 IN

## 2020-09-25 DIAGNOSIS — I10 ESSENTIAL HYPERTENSION: ICD-10-CM

## 2020-09-25 DIAGNOSIS — E66.01 MORBID OBESITY WITH BODY MASS INDEX (BMI) OF 45.0 TO 49.9 IN ADULT: ICD-10-CM

## 2020-09-25 DIAGNOSIS — G47.33 OSA (OBSTRUCTIVE SLEEP APNEA): Primary | ICD-10-CM

## 2020-09-25 DIAGNOSIS — E78.5 HYPERLIPIDEMIA ASSOCIATED WITH TYPE 2 DIABETES MELLITUS: Chronic | ICD-10-CM

## 2020-09-25 DIAGNOSIS — R91.1 SOLITARY PULMONARY NODULE: ICD-10-CM

## 2020-09-25 DIAGNOSIS — E11.69 HYPERLIPIDEMIA ASSOCIATED WITH TYPE 2 DIABETES MELLITUS: Chronic | ICD-10-CM

## 2020-09-25 PROCEDURE — 3008F BODY MASS INDEX DOCD: CPT | Mod: CPTII,S$GLB,, | Performed by: INTERNAL MEDICINE

## 2020-09-25 PROCEDURE — 3044F HG A1C LEVEL LT 7.0%: CPT | Mod: CPTII,S$GLB,, | Performed by: INTERNAL MEDICINE

## 2020-09-25 PROCEDURE — 99999 PR PBB SHADOW E&M-EST. PATIENT-LVL IV: ICD-10-PCS | Mod: PBBFAC,,, | Performed by: INTERNAL MEDICINE

## 2020-09-25 PROCEDURE — 3044F PR MOST RECENT HEMOGLOBIN A1C LEVEL <7.0%: ICD-10-PCS | Mod: CPTII,S$GLB,, | Performed by: INTERNAL MEDICINE

## 2020-09-25 PROCEDURE — 99214 OFFICE O/P EST MOD 30 MIN: CPT | Mod: S$GLB,,, | Performed by: INTERNAL MEDICINE

## 2020-09-25 PROCEDURE — 3008F PR BODY MASS INDEX (BMI) DOCUMENTED: ICD-10-PCS | Mod: CPTII,S$GLB,, | Performed by: INTERNAL MEDICINE

## 2020-09-25 PROCEDURE — 99999 PR PBB SHADOW E&M-EST. PATIENT-LVL IV: CPT | Mod: PBBFAC,,, | Performed by: INTERNAL MEDICINE

## 2020-09-25 PROCEDURE — 99214 PR OFFICE/OUTPT VISIT, EST, LEVL IV, 30-39 MIN: ICD-10-PCS | Mod: S$GLB,,, | Performed by: INTERNAL MEDICINE

## 2020-09-25 NOTE — PROGRESS NOTES
Subjective:       Patient ID: Ac Hayden is a 54 y.o. female.  Patient Active Problem List   Diagnosis    Degeneration of lumbar or lumbosacral intervertebral disc    Mixed anxiety and depressive disorder    Rotator cuff tendinitis    Impingement syndrome, shoulder    Osteoarthritis of acromioclavicular joint    Urgency incontinence    Type 2 diabetes mellitus without complication, without long-term current use of insulin    Morbid obesity with body mass index (BMI) of 45.0 to 49.9 in adult    Pulmonary nodules    Bilateral edema of lower extremity    Hyperlipidemia associated with type 2 diabetes mellitus    History of colon polyps    CHRIS (obstructive sleep apnea)    S/P gastric surgery    Weakness of right lower extremity    Decreased ROM of right knee    Essential hypertension    Solitary pulmonary nodule      Social History     Tobacco Use   Smoking Status Former Smoker    Packs/day: 0.25    Years: 13.00    Pack years: 3.25    Quit date: 10/19/2000    Years since quittin.9   Smokeless Tobacco Never Used      Immunization History   Administered Date(s) Administered    Hepatitis A, Adult 2019    Influenza 2010, 10/31/2011, 2012, 10/14/2013    Influenza - Quadrivalent 11/10/2014, 10/12/2015, 2016    Influenza - Quadrivalent - PF *Preferred* (6 months and older) 10/04/2017, 2019    Influenza Split 10/14/2013    Pneumococcal Conjugate - 13 Valent 2016    Pneumococcal Polysaccharide - 23 Valent 10/04/2017    Td (ADULT) 2008    Td - PF (ADULT) 2020    Tdap 2010       EPWORTH SLEEPINESS SCALE 2020   Sitting and reading 2   Watching TV 0   Sitting, inactive in a public place (e.g. a theatre or a meeting) 0   As a passenger in a car for an hour without a break 3   Lying down to rest in the afternoon when circumstances permit 3   Sitting and talking to someone 1   Sitting quietly after a lunch without alcohol 0   In a  "car, while stopped for a few minutes in traffic 0   Total score 9        Chief Complaint: Sleep Apnea    Ms. Ac Hayden is 54 y.o.   Last visit 09/25/2018  Lost for f/u > 2 years.  Missed chest CT  2019 for 5 mm nodule  Also has elevated BP, Stress, manager [employer]  Lost weight from 334LB to 298Lb  Had Mild CHRIS on study 2014  Will  Repeat test  Follow up to review chest CT    Patient is asymptomatic no cough no wheezing or shortness of breath  Patient had CPAP 6-20  Immunizations are up-to-date  I reviewed all her records in detail  I have reviewed the patient's medical history in detail and updated the computerized patient record.               Review of Systems   Constitutional: Positive for weight loss. Negative for weight gain.   HENT: Negative.    Eyes: Negative.    Respiratory: Negative for apnea, snoring, sputum production, shortness of breath and wheezing.    Cardiovascular: Negative.    Genitourinary: Negative.    Endocrine: endocrine negative   Musculoskeletal: Negative.    Skin: Negative.    Gastrointestinal: Negative.    Neurological: Negative.    Psychiatric/Behavioral: Negative for sleep disturbance.       Objective:       Vitals:    09/25/20 1110   BP: 136/84   Pulse: 93   Resp: 18   SpO2: 98%   Weight: 135.4 kg (298 lb 8.1 oz)   Height: 5' 5" (1.651 m)       Physical Exam   Constitutional: She is oriented to person, place, and time. She appears well-developed and well-nourished. She is obese.   HENT:   Head: Normocephalic.   Mouth/Throat: Mallampati Score: IV.   Neck: Normal range of motion. Neck supple.   Neurological: She is alert and oriented to person, place, and time.   Skin: Skin is warm and dry.   Psychiatric: She has a normal mood and affect.   Nursing note and vitals reviewed.    Personal Diagnostic Review          Assessment:       Problem List Items Addressed This Visit     Hyperlipidemia associated with type 2 diabetes mellitus (Chronic)     Stable follow with PCP         Morbid " obesity with body mass index (BMI) of 45.0 to 49.9 in adult     General weight loss/lifestyle modification strategies discussed (elicit support from others; identify saboteurs; non-food rewards).  Diet interventions: low calorie (1000 kCal/d) deficit diet          CHRIS (obstructive sleep apnea) - Primary     Seeks to restart care  Weight loss since last study  AHI was Mild CHRIS  Will repeat Study         Relevant Orders    Home Sleep Studies    Essential hypertension     Stable on LOSARTAN         Solitary pulmonary nodule     5 mm nodule noted 2018, needs f/u         Relevant Orders    CT Chest Without Contrast        Plan:        Follow up in about 6 weeks (around 11/6/2020), or chest CT, Home sleep study, declined flu shot now, weight loss.    My recommendation at this point would be to set up a HOME SLEEP TEST or INLAB POLYSOMNOGRAPHY  through [facility] ( [phone].    We have discussed weight loss , non supine position, good sleep hygiene, and  other interventions to foster good natural healthy sleep.  We have discussed behavioral modifications, as well.  After her study, she  could certainly try PAP therapy or out suitable alternatives         TIME SPENT WITH PATIENT:     Time spent: 20 minutes in face to face  discussion concerning diagnosis, prognosis, review of lab and test results, benefits of treatment as well as management of disease, counseling of patient and coordination of care between various health  care providers . Greater than half the time spent was used for coordination of care and counseling of patient.     Keo Tavera    Pulmonary/Critical care/Sleepmedicine

## 2020-10-05 ENCOUNTER — PATIENT MESSAGE (OUTPATIENT)
Dept: SLEEP MEDICINE | Facility: HOSPITAL | Age: 55
End: 2020-10-05

## 2020-10-12 ENCOUNTER — PROCEDURE VISIT (OUTPATIENT)
Dept: SLEEP MEDICINE | Facility: CLINIC | Age: 55
End: 2020-10-12
Payer: COMMERCIAL

## 2020-10-12 DIAGNOSIS — G47.33 OSA (OBSTRUCTIVE SLEEP APNEA): Primary | ICD-10-CM

## 2020-10-12 PROCEDURE — 95800 PR SLEEP STUDY, UNATTENDED, RECORD HEART RATE/O2 SAT/RESP ANAL/SLEEP TIME: ICD-10-PCS | Mod: 26,,, | Performed by: INTERNAL MEDICINE

## 2020-10-12 PROCEDURE — 95800 SLP STDY UNATTENDED: CPT

## 2020-10-12 PROCEDURE — 95800 SLP STDY UNATTENDED: CPT | Mod: 26,,, | Performed by: INTERNAL MEDICINE

## 2020-10-12 NOTE — PROCEDURES
Home Sleep Studies    Date/Time: 10/12/2020 9:00 AM  Performed by: Keo Tavera MD  Authorized by: Keo Tavera MD       PHYSICIAN INTERPRETATION AND COMMENTS: Findings are consistent with PRIMARY SNORING. Night 1. Was the  best night. AHI 5.0 events per hour with 5.5 hr of sleep data. Snoring recorded above 50 decibels 16.8% of the night. Lowest  oxygen saturation was 84.9%. Interventions for snoring may be considered including dental devices, ENT evaluation. Repeat testing  may be considered if suspicion for obstructive sleep apnea is high.  CLINICAL HISTORY: 54 year old female presented with: 15 inch neck, BMI of 48, an Tivoli sleepiness score of 9, history of  hypertension, depression, a previous diagnosis of CHRIS and symptoms of nocturnal snoring and witnessed apneas. Based on the  clinical history, the patient has a high pre-test probability of having moderate CHRIS. History of obstructive sleep apnea. Mild sleep  apnea best some study to 2014. Patient lost weight from 334 lb to 298 lb.

## 2020-10-12 NOTE — Clinical Note
PHYSICIAN INTERPRETATION AND COMMENTS: Findings are consistent with PRIMARY SNORING. Night 1. Was the  best night. AHI 5.0 events per hour with 5.5 hr of sleep data. Snoring recorded above 50 decibels 16.8% of the night. Lowest  oxygen saturation was 84.9%. Interventions for snoring may be considered including dental devices, ENT evaluation. Repeat testing  may be considered if suspicion for obstructive sleep apnea is high.  CLINICAL HISTORY: 54 year old female presented with: 15 inch neck, BMI of 48, an Pierron sleepiness score of 9, history of  hypertension, depression, a previous diagnosis of CHRIS and symptoms of nocturnal snoring and witnessed apneas. Based on the  clinical history, the patient has a high pre-test probability of having moderate CHRIS. History of obstructive sleep apnea. Mild sleep  apnea best some study to 2014. Patient lost weight from 334 lb to 298 lb.

## 2020-12-07 ENCOUNTER — LAB VISIT (OUTPATIENT)
Dept: LAB | Facility: HOSPITAL | Age: 55
End: 2020-12-07
Payer: COMMERCIAL

## 2020-12-07 ENCOUNTER — LAB VISIT (OUTPATIENT)
Dept: LAB | Facility: HOSPITAL | Age: 55
End: 2020-12-07
Attending: INTERNAL MEDICINE
Payer: COMMERCIAL

## 2020-12-07 DIAGNOSIS — Z29.9 PREVENTIVE MEASURE: ICD-10-CM

## 2020-12-07 DIAGNOSIS — E11.9 TYPE 2 DIABETES MELLITUS WITHOUT COMPLICATION, WITHOUT LONG-TERM CURRENT USE OF INSULIN: Chronic | ICD-10-CM

## 2020-12-07 LAB
25(OH)D3+25(OH)D2 SERPL-MCNC: 20 NG/ML (ref 30–96)
ALBUMIN SERPL BCP-MCNC: 3.5 G/DL (ref 3.5–5.2)
ALBUMIN/CREAT UR: 16 UG/MG (ref 0–30)
ALP SERPL-CCNC: 103 U/L (ref 55–135)
ALT SERPL W/O P-5'-P-CCNC: 19 U/L (ref 10–44)
ANION GAP SERPL CALC-SCNC: 10 MMOL/L (ref 8–16)
AST SERPL-CCNC: 20 U/L (ref 10–40)
BASOPHILS # BLD AUTO: 0.03 K/UL (ref 0–0.2)
BASOPHILS NFR BLD: 0.4 % (ref 0–1.9)
BILIRUB SERPL-MCNC: 0.3 MG/DL (ref 0.1–1)
BUN SERPL-MCNC: 9 MG/DL (ref 6–20)
CALCIUM SERPL-MCNC: 9 MG/DL (ref 8.7–10.5)
CHLORIDE SERPL-SCNC: 105 MMOL/L (ref 95–110)
CHOLEST SERPL-MCNC: 206 MG/DL (ref 120–199)
CHOLEST/HDLC SERPL: 4.4 {RATIO} (ref 2–5)
CO2 SERPL-SCNC: 28 MMOL/L (ref 23–29)
CREAT SERPL-MCNC: 0.8 MG/DL (ref 0.5–1.4)
CREAT UR-MCNC: 163 MG/DL (ref 15–325)
DIFFERENTIAL METHOD: ABNORMAL
EOSINOPHIL # BLD AUTO: 0 K/UL (ref 0–0.5)
EOSINOPHIL NFR BLD: 0.5 % (ref 0–8)
ERYTHROCYTE [DISTWIDTH] IN BLOOD BY AUTOMATED COUNT: 12.4 % (ref 11.5–14.5)
EST. GFR  (AFRICAN AMERICAN): >60 ML/MIN/1.73 M^2
EST. GFR  (NON AFRICAN AMERICAN): >60 ML/MIN/1.73 M^2
GLUCOSE SERPL-MCNC: 106 MG/DL (ref 70–110)
HCT VFR BLD AUTO: 43.5 % (ref 37–48.5)
HDLC SERPL-MCNC: 47 MG/DL (ref 40–75)
HDLC SERPL: 22.8 % (ref 20–50)
HGB BLD-MCNC: 13.6 G/DL (ref 12–16)
IMM GRANULOCYTES # BLD AUTO: 0.02 K/UL (ref 0–0.04)
IMM GRANULOCYTES NFR BLD AUTO: 0.3 % (ref 0–0.5)
LDLC SERPL CALC-MCNC: 137.8 MG/DL (ref 63–159)
LYMPHOCYTES # BLD AUTO: 2.2 K/UL (ref 1–4.8)
LYMPHOCYTES NFR BLD: 29.4 % (ref 18–48)
MCH RBC QN AUTO: 29.8 PG (ref 27–31)
MCHC RBC AUTO-ENTMCNC: 31.3 G/DL (ref 32–36)
MCV RBC AUTO: 95 FL (ref 82–98)
MICROALBUMIN UR DL<=1MG/L-MCNC: 26 UG/ML
MONOCYTES # BLD AUTO: 0.5 K/UL (ref 0.3–1)
MONOCYTES NFR BLD: 6.9 % (ref 4–15)
NEUTROPHILS # BLD AUTO: 4.8 K/UL (ref 1.8–7.7)
NEUTROPHILS NFR BLD: 62.5 % (ref 38–73)
NONHDLC SERPL-MCNC: 159 MG/DL
NRBC BLD-RTO: 0 /100 WBC
PLATELET # BLD AUTO: 333 K/UL (ref 150–350)
PMV BLD AUTO: 10 FL (ref 9.2–12.9)
POTASSIUM SERPL-SCNC: 3.8 MMOL/L (ref 3.5–5.1)
PROT SERPL-MCNC: 7.2 G/DL (ref 6–8.4)
RBC # BLD AUTO: 4.56 M/UL (ref 4–5.4)
SODIUM SERPL-SCNC: 143 MMOL/L (ref 136–145)
TRIGL SERPL-MCNC: 106 MG/DL (ref 30–150)
TSH SERPL DL<=0.005 MIU/L-ACNC: 1.14 UIU/ML (ref 0.4–4)
WBC # BLD AUTO: 7.59 K/UL (ref 3.9–12.7)

## 2020-12-07 PROCEDURE — 84443 ASSAY THYROID STIM HORMONE: CPT

## 2020-12-07 PROCEDURE — 80061 LIPID PANEL: CPT

## 2020-12-07 PROCEDURE — 80053 COMPREHEN METABOLIC PANEL: CPT

## 2020-12-07 PROCEDURE — 82306 VITAMIN D 25 HYDROXY: CPT

## 2020-12-07 PROCEDURE — 83036 HEMOGLOBIN GLYCOSYLATED A1C: CPT

## 2020-12-07 PROCEDURE — 82043 UR ALBUMIN QUANTITATIVE: CPT

## 2020-12-07 PROCEDURE — 36415 COLL VENOUS BLD VENIPUNCTURE: CPT

## 2020-12-07 PROCEDURE — 85025 COMPLETE CBC W/AUTO DIFF WBC: CPT

## 2020-12-08 PROBLEM — I15.2 HYPERTENSION ASSOCIATED WITH DIABETES: Status: ACTIVE | Noted: 2020-08-07

## 2020-12-08 PROBLEM — E11.59 HYPERTENSION ASSOCIATED WITH DIABETES: Status: ACTIVE | Noted: 2020-08-07

## 2020-12-08 LAB
ESTIMATED AVG GLUCOSE: 117 MG/DL (ref 68–131)
HBA1C MFR BLD HPLC: 5.7 % (ref 4–5.6)

## 2020-12-08 NOTE — PROGRESS NOTES
"Subjective:      Patient ID: Ac Hayden is a 54 y.o. female.    Chief Complaint: Annual Exam      HPI  Here for f/u medical problems and preventive exam.  AC breakfast sugars 90s, random is 120s.  BPs 140-160/80s-90.  No f/c/sw/cough.  No cp/sob/palp.  BMs normal.  Urine normal.  Taking vit D.    HM: 12/20 today fluvax, 12/19 HAV, 11/16 requuo69, 7/20 Td, 12/10 TDaP, 12/20 mmg, 10/17 Cscope rep 5y, 6/16 HCV neg, 10/20 Eye Dr. Winslow.     Review of Systems   Constitutional: Positive for activity change and unexpected weight change. Negative for appetite change, chills, diaphoresis and fever.   HENT: Positive for hearing loss. Negative for congestion, ear pain, rhinorrhea, sinus pressure, sore throat and trouble swallowing.    Eyes: Negative for discharge and visual disturbance.   Respiratory: Negative for cough, chest tightness, shortness of breath and wheezing.    Cardiovascular: Negative for chest pain and palpitations.   Gastrointestinal: Negative for blood in stool, constipation, diarrhea, nausea and vomiting.   Endocrine: Positive for polydipsia and polyuria.   Genitourinary: Negative for difficulty urinating, dysuria, frequency, hematuria, menstrual problem, urgency and vaginal discharge.   Musculoskeletal: Positive for arthralgias and neck pain. Negative for joint swelling.   Skin: Negative for rash.   Neurological: Negative for dizziness, weakness and headaches.   Psychiatric/Behavioral: Negative for confusion, dysphoric mood and sleep disturbance. The patient is not nervous/anxious.          Objective:   BP (!) 164/92   Pulse 91   Temp 97.1 °F (36.2 °C) (Temporal)   Ht 5' 5" (1.651 m)   Wt (!) 138.3 kg (304 lb 14.3 oz)   SpO2 98%   BMI 50.74 kg/m²     Physical Exam  Constitutional:       Appearance: She is well-developed.   HENT:      Right Ear: External ear normal. Tympanic membrane is not injected.      Left Ear: External ear normal. Tympanic membrane is not injected.   Eyes:      " Conjunctiva/sclera: Conjunctivae normal.   Neck:      Musculoskeletal: Normal range of motion and neck supple.      Thyroid: No thyromegaly.   Cardiovascular:      Rate and Rhythm: Normal rate and regular rhythm.      Pulses:           Dorsalis pedis pulses are 2+ on the right side and 2+ on the left side.        Posterior tibial pulses are 2+ on the right side and 2+ on the left side.      Heart sounds: No murmur. No friction rub. No gallop.    Pulmonary:      Effort: Pulmonary effort is normal.      Breath sounds: Normal breath sounds. No wheezing or rales.   Chest:      Breasts:         Right: No mass, nipple discharge, skin change or tenderness.         Left: No mass, nipple discharge, skin change or tenderness.   Abdominal:      General: Bowel sounds are normal.      Palpations: Abdomen is soft. There is no mass.      Tenderness: There is no abdominal tenderness.   Feet:      Right foot:      Protective Sensation: 10 sites tested. 10 sites sensed.      Skin integrity: No ulcer, blister, skin breakdown, erythema, warmth, callus or dry skin.      Left foot:      Protective Sensation: 10 sites tested. 10 sites sensed.      Skin integrity: No ulcer, blister, skin breakdown, erythema, warmth, callus or dry skin.   Lymphadenopathy:      Cervical: No cervical adenopathy.   Skin:     General: Skin is warm.      Findings: No rash.   Neurological:      Mental Status: She is alert and oriented to person, place, and time.       Results for orders placed or performed in visit on 12/07/20   CBC auto differential   Result Value Ref Range    WBC 7.59 3.90 - 12.70 K/uL    RBC 4.56 4.00 - 5.40 M/uL    Hemoglobin 13.6 12.0 - 16.0 g/dL    Hematocrit 43.5 37.0 - 48.5 %    MCV 95 82 - 98 fL    MCH 29.8 27.0 - 31.0 pg    MCHC 31.3 (L) 32.0 - 36.0 g/dL    RDW 12.4 11.5 - 14.5 %    Platelets 333 150 - 350 K/uL    MPV 10.0 9.2 - 12.9 fL    Immature Granulocytes 0.3 0.0 - 0.5 %    Gran # (ANC) 4.8 1.8 - 7.7 K/uL    Immature Grans (Abs)  0.02 0.00 - 0.04 K/uL    Lymph # 2.2 1.0 - 4.8 K/uL    Mono # 0.5 0.3 - 1.0 K/uL    Eos # 0.0 0.0 - 0.5 K/uL    Baso # 0.03 0.00 - 0.20 K/uL    nRBC 0 0 /100 WBC    Gran % 62.5 38.0 - 73.0 %    Lymph % 29.4 18.0 - 48.0 %    Mono % 6.9 4.0 - 15.0 %    Eosinophil % 0.5 0.0 - 8.0 %    Basophil % 0.4 0.0 - 1.9 %    Differential Method Automated    Comprehensive metabolic panel   Result Value Ref Range    Sodium 143 136 - 145 mmol/L    Potassium 3.8 3.5 - 5.1 mmol/L    Chloride 105 95 - 110 mmol/L    CO2 28 23 - 29 mmol/L    Glucose 106 70 - 110 mg/dL    BUN 9 6 - 20 mg/dL    Creatinine 0.8 0.5 - 1.4 mg/dL    Calcium 9.0 8.7 - 10.5 mg/dL    Total Protein 7.2 6.0 - 8.4 g/dL    Albumin 3.5 3.5 - 5.2 g/dL    Total Bilirubin 0.3 0.1 - 1.0 mg/dL    Alkaline Phosphatase 103 55 - 135 U/L    AST 20 10 - 40 U/L    ALT 19 10 - 44 U/L    Anion Gap 10 8 - 16 mmol/L    eGFR if African American >60.0 >60 mL/min/1.73 m^2    eGFR if non African American >60.0 >60 mL/min/1.73 m^2   Lipid Panel   Result Value Ref Range    Cholesterol 206 (H) 120 - 199 mg/dL    Triglycerides 106 30 - 150 mg/dL    HDL 47 40 - 75 mg/dL    LDL Cholesterol 137.8 63.0 - 159.0 mg/dL    HDL/Cholesterol Ratio 22.8 20.0 - 50.0 %    Total Cholesterol/HDL Ratio 4.4 2.0 - 5.0    Non-HDL Cholesterol 159 mg/dL   TSH   Result Value Ref Range    TSH 1.140 0.400 - 4.000 uIU/mL   Vitamin D   Result Value Ref Range    Vit D, 25-Hydroxy 20 (L) 30 - 96 ng/mL   Hemoglobin A1C   Result Value Ref Range    Hemoglobin A1C 5.7 (H) 4.0 - 5.6 %    Estimated Avg Glucose 117 68 - 131 mg/dL             Assessment:       1. Encounter for preventive health examination    2. Type 2 diabetes mellitus without complication, without long-term current use of insulin    3. Morbid obesity with body mass index (BMI) of 45.0 to 49.9 in adult    4. Mixed anxiety and depressive disorder    5. Hyperlipidemia associated with type 2 diabetes mellitus    6. Hypertension associated with diabetes    7. Pure  hypercholesterolemia          Plan:     Encounter for preventive health examination- fluvax.    Type 2 diabetes mellitus without complication, without long-term current use of insulin- doing well with diet only.    Morbid obesity with body mass index (BMI) of 45.0 to 49.9 in adult    Mixed anxiety and depressive disorder    Hyperlipidemia associated with type 2 diabetes mellitus, unclear why off prava, restart, recheck 3mo.  -     pravastatin (PRAVACHOL) 20 MG tablet; Take 1 tablet (20 mg total) by mouth once daily.  Dispense: 90 tablet; Refill: 3  -     Lipid Panel; Future; Expected date: 12/14/2020  -     ALT (SGPT); Future; Expected date: 12/14/2020    Hypertension associated with diabetes- increase to 100mg, nurse visit 1wk.  -     losartan (COZAAR) 100 MG tablet; Take 1 tablet (100 mg total) by mouth once daily.  Dispense: 90 tablet; Refill: 3

## 2020-12-09 ENCOUNTER — HOSPITAL ENCOUNTER (OUTPATIENT)
Dept: RADIOLOGY | Facility: HOSPITAL | Age: 55
Discharge: HOME | End: 2020-12-09
Attending: INTERNAL MEDICINE
Payer: COMMERCIAL

## 2020-12-09 ENCOUNTER — OFFICE VISIT (OUTPATIENT)
Dept: PULMONOLOGY | Facility: CLINIC | Age: 55
End: 2020-12-09
Payer: COMMERCIAL

## 2020-12-09 VITALS
BODY MASS INDEX: 48.82 KG/M2 | RESPIRATION RATE: 16 BRPM | SYSTOLIC BLOOD PRESSURE: 140 MMHG | HEIGHT: 65 IN | OXYGEN SATURATION: 99 % | DIASTOLIC BLOOD PRESSURE: 80 MMHG | HEART RATE: 97 BPM | WEIGHT: 293 LBS

## 2020-12-09 DIAGNOSIS — E11.59 HYPERTENSION ASSOCIATED WITH DIABETES: ICD-10-CM

## 2020-12-09 DIAGNOSIS — R91.1 SOLITARY PULMONARY NODULE: ICD-10-CM

## 2020-12-09 DIAGNOSIS — E78.5 HYPERLIPIDEMIA ASSOCIATED WITH TYPE 2 DIABETES MELLITUS: Chronic | ICD-10-CM

## 2020-12-09 DIAGNOSIS — I15.2 HYPERTENSION ASSOCIATED WITH DIABETES: ICD-10-CM

## 2020-12-09 DIAGNOSIS — R91.8 PULMONARY NODULES: Primary | ICD-10-CM

## 2020-12-09 DIAGNOSIS — E11.9 TYPE 2 DIABETES MELLITUS WITHOUT COMPLICATION, WITHOUT LONG-TERM CURRENT USE OF INSULIN: Chronic | ICD-10-CM

## 2020-12-09 DIAGNOSIS — E11.69 HYPERLIPIDEMIA ASSOCIATED WITH TYPE 2 DIABETES MELLITUS: Chronic | ICD-10-CM

## 2020-12-09 PROBLEM — R06.83 PRIMARY SNORING: Status: ACTIVE | Noted: 2018-04-19

## 2020-12-09 PROCEDURE — 3044F HG A1C LEVEL LT 7.0%: CPT | Mod: CPTII,S$GLB,, | Performed by: INTERNAL MEDICINE

## 2020-12-09 PROCEDURE — 71250 CT THORAX DX C-: CPT | Mod: TC

## 2020-12-09 PROCEDURE — 99999 PR PBB SHADOW E&M-EST. PATIENT-LVL IV: CPT | Mod: PBBFAC,,, | Performed by: INTERNAL MEDICINE

## 2020-12-09 PROCEDURE — 3008F BODY MASS INDEX DOCD: CPT | Mod: CPTII,S$GLB,, | Performed by: INTERNAL MEDICINE

## 2020-12-09 PROCEDURE — 99999 PR PBB SHADOW E&M-EST. PATIENT-LVL IV: ICD-10-PCS | Mod: PBBFAC,,, | Performed by: INTERNAL MEDICINE

## 2020-12-09 PROCEDURE — 3072F LOW RISK FOR RETINOPATHY: CPT | Mod: S$GLB,,, | Performed by: INTERNAL MEDICINE

## 2020-12-09 PROCEDURE — 3072F PR LOW RISK FOR RETINOPATHY: ICD-10-PCS | Mod: S$GLB,,, | Performed by: INTERNAL MEDICINE

## 2020-12-09 PROCEDURE — 99214 PR OFFICE/OUTPT VISIT, EST, LEVL IV, 30-39 MIN: ICD-10-PCS | Mod: S$GLB,,, | Performed by: INTERNAL MEDICINE

## 2020-12-09 PROCEDURE — 3044F PR MOST RECENT HEMOGLOBIN A1C LEVEL <7.0%: ICD-10-PCS | Mod: CPTII,S$GLB,, | Performed by: INTERNAL MEDICINE

## 2020-12-09 PROCEDURE — 3008F PR BODY MASS INDEX (BMI) DOCUMENTED: ICD-10-PCS | Mod: CPTII,S$GLB,, | Performed by: INTERNAL MEDICINE

## 2020-12-09 PROCEDURE — 99214 OFFICE O/P EST MOD 30 MIN: CPT | Mod: S$GLB,,, | Performed by: INTERNAL MEDICINE

## 2020-12-09 NOTE — PATIENT INSTRUCTIONS
Imaging surveillance: Repeat CT chest in 12 months.   Fleischner Society Guidelines:    High risk patient:   Smoking history, history of malignancy or risk factors for malignancy.( non-solid ground glass opacities and partially solid nodules may require longer follow up to exclude indolent adenocarcinoma)      Nodule size Low risk patient High risk patient   4 mm  No follow up Follow up CT in 12 months. If unchanged, no further follow up.   4 - 6 mm Follow up CT in 12 months; if unchanged, no further follow up.  Initial follow up CT in 6 - 12 months, then at 18 - 24 months if no change.      6 - 8 mm  Initial follow up CT at 6 - 12 months and at 18 months if no change.  Initial follow up CT at 3 - 6 months. Then at 9-12 months and 24 months if no change.      > 8 mm Initial follow up CT at 3, 6, 9 and 24 months, dynamic contrast enhanced CT, PET-CT and/or biopsy. Initial follow up CT at 3, 6, 9 and 24 months, dynamic contrast enhanced CT, PET-CT and/or biopsy.

## 2020-12-09 NOTE — PROGRESS NOTES
Subjective:       Patient ID: Ac Hayden is a 54 y.o. female.  Patient Active Problem List   Diagnosis    Degeneration of lumbar or lumbosacral intervertebral disc    Mixed anxiety and depressive disorder    Rotator cuff tendinitis    Impingement syndrome, shoulder    Osteoarthritis of acromioclavicular joint    Urgency incontinence    Type 2 diabetes mellitus without complication, without long-term current use of insulin    BMI 50.0-59.9, adult    Pulmonary nodules    Bilateral edema of lower extremity    Hyperlipidemia associated with type 2 diabetes mellitus    History of colon polyps    Primary snoring    S/P gastric surgery    Weakness of right lower extremity    Decreased ROM of right knee    Hypertension associated with diabetes    Solitary pulmonary nodule      Social History     Tobacco Use   Smoking Status Former Smoker    Packs/day: 0.25    Years: 13.00    Pack years: 3.25    Quit date: 10/19/2000    Years since quittin.1   Smokeless Tobacco Never Used      Immunization History   Administered Date(s) Administered    Hepatitis A, Adult 2019    Influenza 2010, 10/31/2011, 2012, 10/14/2013    Influenza - Quadrivalent 11/10/2014, 10/12/2015, 2016    Influenza - Quadrivalent - PF *Preferred* (6 months and older) 10/04/2017, 2019    Influenza Split 10/14/2013    Pneumococcal Conjugate - 13 Valent 2016    Pneumococcal Polysaccharide - 23 Valent 10/04/2017    Td (ADULT) 2008    Td - PF (ADULT) 2020    Tdap 2010        EPWORTH SLEEPINESS SCALE 2020   Sitting and reading 3   Watching TV 2   Sitting, inactive in a public place (e.g. a theatre or a meeting) 0   As a passenger in a car for an hour without a break 3   Lying down to rest in the afternoon when circumstances permit 0   Sitting and talking to someone 0   Sitting quietly after a lunch without alcohol 0   In a car, while stopped for a few minutes in  "traffic 0   Total score 8             Chief Complaint: Pulmonary Nodules and Sleep Apnea    Ms. Ac Hayden is 54 y.o.   Last visit 09/25/2020  This appointment to review home sleep study.  Next the home sleep study was negative for obstructive sleep apnea  She also had chest CT scan to review pulmonary nodules   Pulmonary nodules do not show any detrimental change   chest CT reviewed with patient  Blood pressure elevated due to stress   Works as manager [employer]  Had Mild CHRIS on study 2014  Patient is asymptomatic no cough no wheezing or shortness of breath  Immunizations are up-to-date  I reviewed all her records in detail  I have reviewed the patient's medical history in detail and updated the computerized patient record.               Review of Systems   Constitutional: Positive for weight loss. Negative for weight gain.   HENT: Negative.    Eyes: Negative.    Respiratory: Negative for apnea, snoring, sputum production, shortness of breath and wheezing.    Cardiovascular: Negative.    Genitourinary: Negative.    Endocrine: endocrine negative   Musculoskeletal: Negative.    Skin: Negative.    Gastrointestinal: Negative.    Neurological: Negative.    Psychiatric/Behavioral: Negative for sleep disturbance.   All other systems reviewed and are negative.      Objective:       Vitals:    12/09/20 1456   BP: (!) 140/80   Pulse: 97   Resp: 16   SpO2: 99%   Weight: (!) 137.8 kg (303 lb 12.7 oz)   Height: 5' 5" (1.651 m)       Physical Exam   Constitutional: She is oriented to person, place, and time. She appears well-developed and well-nourished. She is obese.   HENT:   Head: Normocephalic.   Mouth/Throat: Mallampati Score: IV.   Neck: Normal range of motion. Neck supple.   Cardiovascular: Normal rate and normal heart sounds.   Pulmonary/Chest: Normal expansion, hyperinflation and symmetric chest wall expansion.   Musculoskeletal: Normal range of motion.   Neurological: She is alert and oriented to person, place, " and time.   Skin: Skin is warm and dry.   Psychiatric: She has a normal mood and affect.   Nursing note and vitals reviewed.    Personal Diagnostic Review    Home sleep study reviewed   PHYSICIAN INTERPRETATION AND COMMENTS: Findings are consistent with PRIMARY SNORING. Night 1. Was the  best night. AHI 5.0 events per hour with 5.5 hr of sleep data. Snoring recorded above 50 decibels 16.8% of the night. Lowest  oxygen saturation was 84.9%. Interventions for snoring may be considered including dental devices, ENT evaluation. Repeat testing  may be considered if suspicion for obstructive sleep apnea is high.  CLINICAL HISTORY: 54 year old female presented with: 15 inch neck, BMI of 48, an Lafferty sleepiness score of 9, history of  hypertension, depression, a previous diagnosis of CHRIS and symptoms of nocturnal snoring and witnessed apneas. Based on the  clinical history, the patient has a high pre-test probability of having moderate CHRIS. History of obstructive sleep apnea. Mild sleep  apnea best some study to 2014. Patient lost weight from 334 lb to 298 lb.    Chest CT reviewed   EXAMINATION:  CT CHEST WITHOUT CONTRAST     CLINICAL HISTORY:  Solitary pulmonary noduleLung nodule, < 6mm, low cancer risk, follow up exam;     TECHNIQUE:  Chest CT was performed without contrast. All CT scans at this facility use dose modulation, iterative reconstruction, and/or weight based dosing when appropriate to reduce radiation dose to as low as reasonably achievable.     COMPARISON:  Prior CT from September 25, 2018.     FINDINGS:  There are few tiny, nonspecific nodules within the lungs which are unchanged compared to the CT from September 25, 2018.  7 mm nodule within the right upper lobe on axial series 3, image 191.  6 mm nodule within the right lower lobe adjacent to the major fissure on image 256 is unchanged.  7 mm nodule adjacent to the pleural surface within the lingula on image 287.  No endobronchial or endotracheal  lesions.  No pathologically enlarged mediastinal, hilar, or axillary lymph nodes. The heart is normal in size. Degenerative osteophytosis of the thoracic spine.  Small hiatal hernia.  Limited images through the upper abdomen demonstrate prior surgery upon the stomach.     Impression:     1. Unchanged appearance of the 3 tiny, nonspecific nodules within the lungs compared to the CT from September 25, 2018.  No new findings.  2. Prior surgery upon the stomach.  3. Small hiatal hernia.      Assessment:       Problem List Items Addressed This Visit     Solitary pulmonary nodule    Relevant Orders    CT Chest Without Contrast    Type 2 diabetes mellitus without complication, without long-term current use of insulin (Chronic)     Stable continue current dietary management.         Hyperlipidemia associated with type 2 diabetes mellitus (Chronic)      Stable diet followed up by PCP.         BMI 50.0-59.9, adult     General weight loss/lifestyle modification strategies discussed (elicit support from others; identify saboteurs; non-food rewards).  Diet interventions: low calorie (1000 kCal/d) deficit diet          Pulmonary nodules - Primary     5 mm nodule noted 2018,   Follow-up CT show no detrimental change   Follow-up in 12 months         Relevant Orders    CT Chest Without Contrast    Hypertension associated with diabetes       Stable on losartan             Plan:        Follow up in about 1 year (around 12/9/2021), or weight loss, exercise, chest CT.    Imaging surveillance: Repeat CT chest in 12 months.   Fleischner Society Guidelines:    High risk patient:   Smoking history, history of malignancy or risk factors for malignancy.( non-solid ground glass opacities and partially solid nodules may require longer follow up to exclude indolent adenocarcinoma)      Nodule size Low risk patient High risk patient   4 mm  No follow up Follow up CT in 12 months. If unchanged, no further follow up.   4 - 6 mm Follow up CT in 12  months; if unchanged, no further follow up.  Initial follow up CT in 6 - 12 months, then at 18 - 24 months if no change.      6 - 8 mm  Initial follow up CT at 6 - 12 months and at 18 months if no change.  Initial follow up CT at 3 - 6 months. Then at 9-12 months and 24 months if no change.      > 8 mm Initial follow up CT at 3, 6, 9 and 24 months, dynamic contrast enhanced CT, PET-CT and/or biopsy. Initial follow up CT at 3, 6, 9 and 24 months, dynamic contrast enhanced CT, PET-CT and/or biopsy.             TIME SPENT WITH PATIENT:     Time spent: 20 minutes in face to face  discussion concerning diagnosis, prognosis, review of lab and test results, benefits of treatment as well as management of disease, counseling of patient and coordination of care between various health  care providers . Greater than half the time spent was used for coordination of care and counseling of patient.     Keo Tavera    Pulmonary/Critical care/Sleepmedicine

## 2020-12-11 ENCOUNTER — HOSPITAL ENCOUNTER (OUTPATIENT)
Dept: RADIOLOGY | Facility: HOSPITAL | Age: 55
Discharge: HOME | End: 2020-12-11
Attending: INTERNAL MEDICINE
Payer: COMMERCIAL

## 2020-12-11 DIAGNOSIS — Z29.9 PREVENTIVE MEASURE: ICD-10-CM

## 2020-12-11 PROCEDURE — 77067 SCR MAMMO BI INCL CAD: CPT | Mod: 26,,, | Performed by: RADIOLOGY

## 2020-12-11 PROCEDURE — 77067 MAMMO DIGITAL SCREENING BILAT WITH CAD: ICD-10-PCS | Mod: 26,,, | Performed by: RADIOLOGY

## 2020-12-11 PROCEDURE — 77067 SCR MAMMO BI INCL CAD: CPT | Mod: TC

## 2020-12-14 ENCOUNTER — OFFICE VISIT (OUTPATIENT)
Dept: FAMILY MEDICINE | Facility: CLINIC | Age: 55
End: 2020-12-14
Payer: COMMERCIAL

## 2020-12-14 ENCOUNTER — PATIENT OUTREACH (OUTPATIENT)
Dept: ADMINISTRATIVE | Facility: OTHER | Age: 55
End: 2020-12-14

## 2020-12-14 VITALS
DIASTOLIC BLOOD PRESSURE: 92 MMHG | HEART RATE: 91 BPM | TEMPERATURE: 97 F | OXYGEN SATURATION: 98 % | WEIGHT: 293 LBS | SYSTOLIC BLOOD PRESSURE: 164 MMHG | BODY MASS INDEX: 48.82 KG/M2 | HEIGHT: 65 IN

## 2020-12-14 DIAGNOSIS — E66.01 MORBID OBESITY WITH BODY MASS INDEX (BMI) OF 45.0 TO 49.9 IN ADULT: ICD-10-CM

## 2020-12-14 DIAGNOSIS — E11.69 HYPERLIPIDEMIA ASSOCIATED WITH TYPE 2 DIABETES MELLITUS: Chronic | ICD-10-CM

## 2020-12-14 DIAGNOSIS — E11.59 HYPERTENSION ASSOCIATED WITH DIABETES: ICD-10-CM

## 2020-12-14 DIAGNOSIS — E11.9 TYPE 2 DIABETES MELLITUS WITHOUT COMPLICATION, WITHOUT LONG-TERM CURRENT USE OF INSULIN: Chronic | ICD-10-CM

## 2020-12-14 DIAGNOSIS — E78.5 HYPERLIPIDEMIA ASSOCIATED WITH TYPE 2 DIABETES MELLITUS: Chronic | ICD-10-CM

## 2020-12-14 DIAGNOSIS — E78.00 PURE HYPERCHOLESTEROLEMIA: ICD-10-CM

## 2020-12-14 DIAGNOSIS — F41.8 MIXED ANXIETY AND DEPRESSIVE DISORDER: Chronic | ICD-10-CM

## 2020-12-14 DIAGNOSIS — I15.2 HYPERTENSION ASSOCIATED WITH DIABETES: ICD-10-CM

## 2020-12-14 DIAGNOSIS — Z00.00 ENCOUNTER FOR PREVENTIVE HEALTH EXAMINATION: Primary | ICD-10-CM

## 2020-12-14 PROCEDURE — 3008F PR BODY MASS INDEX (BMI) DOCUMENTED: ICD-10-PCS | Mod: CPTII,S$GLB,, | Performed by: INTERNAL MEDICINE

## 2020-12-14 PROCEDURE — 90471 IMMUNIZATION ADMIN: CPT | Mod: S$GLB,,, | Performed by: INTERNAL MEDICINE

## 2020-12-14 PROCEDURE — 90686 IIV4 VACC NO PRSV 0.5 ML IM: CPT | Mod: S$GLB,,, | Performed by: INTERNAL MEDICINE

## 2020-12-14 PROCEDURE — 3072F PR LOW RISK FOR RETINOPATHY: ICD-10-PCS | Mod: S$GLB,,, | Performed by: INTERNAL MEDICINE

## 2020-12-14 PROCEDURE — 1126F AMNT PAIN NOTED NONE PRSNT: CPT | Mod: S$GLB,,, | Performed by: INTERNAL MEDICINE

## 2020-12-14 PROCEDURE — 3044F PR MOST RECENT HEMOGLOBIN A1C LEVEL <7.0%: ICD-10-PCS | Mod: CPTII,S$GLB,, | Performed by: INTERNAL MEDICINE

## 2020-12-14 PROCEDURE — 90471 FLU VACCINE (QUAD) GREATER THAN OR EQUAL TO 3YO PRESERVATIVE FREE IM: ICD-10-PCS | Mod: S$GLB,,, | Performed by: INTERNAL MEDICINE

## 2020-12-14 PROCEDURE — 99999 PR PBB SHADOW E&M-EST. PATIENT-LVL IV: CPT | Mod: PBBFAC,,, | Performed by: INTERNAL MEDICINE

## 2020-12-14 PROCEDURE — 3044F HG A1C LEVEL LT 7.0%: CPT | Mod: CPTII,S$GLB,, | Performed by: INTERNAL MEDICINE

## 2020-12-14 PROCEDURE — 1126F PR PAIN SEVERITY QUANTIFIED, NO PAIN PRESENT: ICD-10-PCS | Mod: S$GLB,,, | Performed by: INTERNAL MEDICINE

## 2020-12-14 PROCEDURE — 99396 PR PREVENTIVE VISIT,EST,40-64: ICD-10-PCS | Mod: 25,S$GLB,, | Performed by: INTERNAL MEDICINE

## 2020-12-14 PROCEDURE — 3072F LOW RISK FOR RETINOPATHY: CPT | Mod: S$GLB,,, | Performed by: INTERNAL MEDICINE

## 2020-12-14 PROCEDURE — 99999 PR PBB SHADOW E&M-EST. PATIENT-LVL IV: ICD-10-PCS | Mod: PBBFAC,,, | Performed by: INTERNAL MEDICINE

## 2020-12-14 PROCEDURE — 90686 FLU VACCINE (QUAD) GREATER THAN OR EQUAL TO 3YO PRESERVATIVE FREE IM: ICD-10-PCS | Mod: S$GLB,,, | Performed by: INTERNAL MEDICINE

## 2020-12-14 PROCEDURE — 99396 PREV VISIT EST AGE 40-64: CPT | Mod: 25,S$GLB,, | Performed by: INTERNAL MEDICINE

## 2020-12-14 PROCEDURE — 3008F BODY MASS INDEX DOCD: CPT | Mod: CPTII,S$GLB,, | Performed by: INTERNAL MEDICINE

## 2020-12-14 RX ORDER — LOSARTAN POTASSIUM 100 MG/1
100 TABLET ORAL DAILY
Qty: 90 TABLET | Refills: 3 | Status: SHIPPED | OUTPATIENT
Start: 2020-12-14 | End: 2022-04-12

## 2020-12-14 RX ORDER — PRAVASTATIN SODIUM 20 MG/1
20 TABLET ORAL DAILY
Qty: 90 TABLET | Refills: 3 | Status: SHIPPED | OUTPATIENT
Start: 2020-12-14 | End: 2021-07-28 | Stop reason: SDUPTHER

## 2020-12-16 NOTE — PROGRESS NOTES
THIS IS THE SECOND ATTEMPT TO CONTACT PATIENT.  PATIENT DID NOT ANSWER CALL TO SCHEDULE DUE MAMMOGRAM, LEFT MESSAGE.

## 2020-12-21 ENCOUNTER — CLINICAL SUPPORT (OUTPATIENT)
Dept: FAMILY MEDICINE | Facility: CLINIC | Age: 55
End: 2020-12-21
Payer: COMMERCIAL

## 2020-12-21 ENCOUNTER — TELEPHONE (OUTPATIENT)
Dept: FAMILY MEDICINE | Facility: CLINIC | Age: 55
End: 2020-12-21

## 2020-12-21 VITALS — SYSTOLIC BLOOD PRESSURE: 140 MMHG | DIASTOLIC BLOOD PRESSURE: 116 MMHG | TEMPERATURE: 98 F

## 2020-12-21 DIAGNOSIS — I15.2 HYPERTENSION ASSOCIATED WITH DIABETES: Primary | ICD-10-CM

## 2020-12-21 DIAGNOSIS — E11.59 HYPERTENSION ASSOCIATED WITH DIABETES: Primary | ICD-10-CM

## 2020-12-21 PROCEDURE — 99999 PR PBB SHADOW E&M-EST. PATIENT-LVL I: ICD-10-PCS | Mod: PBBFAC,,,

## 2020-12-21 PROCEDURE — 99999 PR PBB SHADOW E&M-EST. PATIENT-LVL I: CPT | Mod: PBBFAC,,,

## 2020-12-21 RX ORDER — AMLODIPINE BESYLATE 5 MG/1
5 TABLET ORAL DAILY
Qty: 30 TABLET | Refills: 11 | Status: SHIPPED | OUTPATIENT
Start: 2020-12-21 | End: 2020-12-28

## 2020-12-21 NOTE — TELEPHONE ENCOUNTER
Please tell pt I sent in additional rx for amlodipine, to take with losartan daily.  Come in for BP check in 7-8 days.  SM

## 2020-12-21 NOTE — TELEPHONE ENCOUNTER
Pt came in for bp check.  Pt stated she did take her bp med this morning.  Pt wanted to know what was the goal to not just coming in every week.  Wants to know the plan of action.  Upset that she has to come back in a week to recheck.  Feels she should do something different.      Please advise.

## 2020-12-21 NOTE — TELEPHONE ENCOUNTER
Called pt and no answer. LVM on meds Dr Hansen called in for her bp.  Pt has nurse appt next week.

## 2020-12-28 ENCOUNTER — TELEPHONE (OUTPATIENT)
Dept: FAMILY MEDICINE | Facility: CLINIC | Age: 55
End: 2020-12-28

## 2020-12-28 ENCOUNTER — CLINICAL SUPPORT (OUTPATIENT)
Dept: FAMILY MEDICINE | Facility: CLINIC | Age: 55
End: 2020-12-28
Payer: COMMERCIAL

## 2020-12-28 VITALS — SYSTOLIC BLOOD PRESSURE: 150 MMHG | DIASTOLIC BLOOD PRESSURE: 100 MMHG

## 2020-12-28 PROCEDURE — 99999 PR PBB SHADOW E&M-EST. PATIENT-LVL I: CPT | Mod: PBBFAC,,,

## 2020-12-28 PROCEDURE — 99999 PR PBB SHADOW E&M-EST. PATIENT-LVL I: ICD-10-PCS | Mod: PBBFAC,,,

## 2020-12-28 RX ORDER — AMLODIPINE BESYLATE 10 MG/1
10 TABLET ORAL DAILY
Qty: 30 TABLET | Refills: 2 | Status: SHIPPED | OUTPATIENT
Start: 2020-12-28 | End: 2021-06-30 | Stop reason: SDUPTHER

## 2020-12-28 NOTE — PROGRESS NOTES
Patient for nurse visit for Blood pressure check, blood elevated , verified that she is taking her blood pressure medication, Dr Olea notified since Dr Luis is out and she will she see patient.

## 2020-12-28 NOTE — TELEPHONE ENCOUNTER
Will increase noravsc to 10 mg daily.   Patient to take extra 5 mg tonight and then 10 mg daily starting tomorrow. BP logs daily. Return to clinic in 2 weeks for recheck.    Avoid drinks/supplements that increase pressure.   Denies headaches, chest pain, sob, or acute changes in vision or urination.

## 2021-01-12 ENCOUNTER — OFFICE VISIT (OUTPATIENT)
Dept: FAMILY MEDICINE | Facility: CLINIC | Age: 56
End: 2021-01-12
Payer: COMMERCIAL

## 2021-01-12 VITALS
SYSTOLIC BLOOD PRESSURE: 144 MMHG | TEMPERATURE: 98 F | WEIGHT: 293 LBS | HEIGHT: 65 IN | HEART RATE: 88 BPM | DIASTOLIC BLOOD PRESSURE: 96 MMHG | OXYGEN SATURATION: 96 % | BODY MASS INDEX: 48.82 KG/M2

## 2021-01-12 DIAGNOSIS — E78.5 HYPERLIPIDEMIA ASSOCIATED WITH TYPE 2 DIABETES MELLITUS: Chronic | ICD-10-CM

## 2021-01-12 DIAGNOSIS — E11.59 HYPERTENSION ASSOCIATED WITH DIABETES: Primary | ICD-10-CM

## 2021-01-12 DIAGNOSIS — E11.69 HYPERLIPIDEMIA ASSOCIATED WITH TYPE 2 DIABETES MELLITUS: Chronic | ICD-10-CM

## 2021-01-12 DIAGNOSIS — I15.2 HYPERTENSION ASSOCIATED WITH DIABETES: Primary | ICD-10-CM

## 2021-01-12 PROCEDURE — 99213 PR OFFICE/OUTPT VISIT, EST, LEVL III, 20-29 MIN: ICD-10-PCS | Mod: S$GLB,,, | Performed by: INTERNAL MEDICINE

## 2021-01-12 PROCEDURE — 99999 PR PBB SHADOW E&M-EST. PATIENT-LVL IV: CPT | Mod: PBBFAC,,, | Performed by: INTERNAL MEDICINE

## 2021-01-12 PROCEDURE — 3008F PR BODY MASS INDEX (BMI) DOCUMENTED: ICD-10-PCS | Mod: CPTII,S$GLB,, | Performed by: INTERNAL MEDICINE

## 2021-01-12 PROCEDURE — 3077F SYST BP >= 140 MM HG: CPT | Mod: CPTII,S$GLB,, | Performed by: INTERNAL MEDICINE

## 2021-01-12 PROCEDURE — 99999 PR PBB SHADOW E&M-EST. PATIENT-LVL IV: ICD-10-PCS | Mod: PBBFAC,,, | Performed by: INTERNAL MEDICINE

## 2021-01-12 PROCEDURE — 3080F PR MOST RECENT DIASTOLIC BLOOD PRESSURE >= 90 MM HG: ICD-10-PCS | Mod: CPTII,S$GLB,, | Performed by: INTERNAL MEDICINE

## 2021-01-12 PROCEDURE — 3080F DIAST BP >= 90 MM HG: CPT | Mod: CPTII,S$GLB,, | Performed by: INTERNAL MEDICINE

## 2021-01-12 PROCEDURE — 3044F HG A1C LEVEL LT 7.0%: CPT | Mod: CPTII,S$GLB,, | Performed by: INTERNAL MEDICINE

## 2021-01-12 PROCEDURE — 3008F BODY MASS INDEX DOCD: CPT | Mod: CPTII,S$GLB,, | Performed by: INTERNAL MEDICINE

## 2021-01-12 PROCEDURE — 3077F PR MOST RECENT SYSTOLIC BLOOD PRESSURE >= 140 MM HG: ICD-10-PCS | Mod: CPTII,S$GLB,, | Performed by: INTERNAL MEDICINE

## 2021-01-12 PROCEDURE — 1126F PR PAIN SEVERITY QUANTIFIED, NO PAIN PRESENT: ICD-10-PCS | Mod: S$GLB,,, | Performed by: INTERNAL MEDICINE

## 2021-01-12 PROCEDURE — 3044F PR MOST RECENT HEMOGLOBIN A1C LEVEL <7.0%: ICD-10-PCS | Mod: CPTII,S$GLB,, | Performed by: INTERNAL MEDICINE

## 2021-01-12 PROCEDURE — 99213 OFFICE O/P EST LOW 20 MIN: CPT | Mod: S$GLB,,, | Performed by: INTERNAL MEDICINE

## 2021-01-12 PROCEDURE — 1126F AMNT PAIN NOTED NONE PRSNT: CPT | Mod: S$GLB,,, | Performed by: INTERNAL MEDICINE

## 2021-01-12 RX ORDER — HYDROCHLOROTHIAZIDE 25 MG/1
25 TABLET ORAL DAILY
Qty: 30 TABLET | Refills: 11 | Status: SHIPPED | OUTPATIENT
Start: 2021-01-12 | End: 2022-02-22

## 2021-01-13 ENCOUNTER — TELEPHONE (OUTPATIENT)
Dept: FAMILY MEDICINE | Facility: CLINIC | Age: 56
End: 2021-01-13

## 2021-01-25 ENCOUNTER — CLINICAL SUPPORT (OUTPATIENT)
Dept: FAMILY MEDICINE | Facility: CLINIC | Age: 56
End: 2021-01-25
Payer: COMMERCIAL

## 2021-01-25 VITALS — SYSTOLIC BLOOD PRESSURE: 126 MMHG | DIASTOLIC BLOOD PRESSURE: 86 MMHG | TEMPERATURE: 98 F

## 2021-01-25 PROCEDURE — 99999 PR PBB SHADOW E&M-EST. PATIENT-LVL I: CPT | Mod: PBBFAC,,,

## 2021-01-25 PROCEDURE — 99999 PR PBB SHADOW E&M-EST. PATIENT-LVL I: ICD-10-PCS | Mod: PBBFAC,,,

## 2021-03-09 ENCOUNTER — OFFICE VISIT (OUTPATIENT)
Dept: OTOLARYNGOLOGY | Facility: CLINIC | Age: 56
End: 2021-03-09
Payer: COMMERCIAL

## 2021-03-09 VITALS
SYSTOLIC BLOOD PRESSURE: 107 MMHG | HEART RATE: 113 BPM | DIASTOLIC BLOOD PRESSURE: 67 MMHG | HEIGHT: 65 IN | TEMPERATURE: 98 F | WEIGHT: 293 LBS | BODY MASS INDEX: 48.82 KG/M2

## 2021-03-09 DIAGNOSIS — Z96.22 PATENT PRESSURE EQUALIZATION TUBE: Primary | ICD-10-CM

## 2021-03-09 PROCEDURE — 3078F DIAST BP <80 MM HG: CPT | Mod: CPTII,S$GLB,, | Performed by: PHYSICIAN ASSISTANT

## 2021-03-09 PROCEDURE — 1126F PR PAIN SEVERITY QUANTIFIED, NO PAIN PRESENT: ICD-10-PCS | Mod: S$GLB,,, | Performed by: PHYSICIAN ASSISTANT

## 2021-03-09 PROCEDURE — 3078F PR MOST RECENT DIASTOLIC BLOOD PRESSURE < 80 MM HG: ICD-10-PCS | Mod: CPTII,S$GLB,, | Performed by: PHYSICIAN ASSISTANT

## 2021-03-09 PROCEDURE — 1126F AMNT PAIN NOTED NONE PRSNT: CPT | Mod: S$GLB,,, | Performed by: PHYSICIAN ASSISTANT

## 2021-03-09 PROCEDURE — 3008F BODY MASS INDEX DOCD: CPT | Mod: CPTII,S$GLB,, | Performed by: PHYSICIAN ASSISTANT

## 2021-03-09 PROCEDURE — 3074F SYST BP LT 130 MM HG: CPT | Mod: CPTII,S$GLB,, | Performed by: PHYSICIAN ASSISTANT

## 2021-03-09 PROCEDURE — 3074F PR MOST RECENT SYSTOLIC BLOOD PRESSURE < 130 MM HG: ICD-10-PCS | Mod: CPTII,S$GLB,, | Performed by: PHYSICIAN ASSISTANT

## 2021-03-09 PROCEDURE — 99213 PR OFFICE/OUTPT VISIT, EST, LEVL III, 20-29 MIN: ICD-10-PCS | Mod: S$GLB,,, | Performed by: PHYSICIAN ASSISTANT

## 2021-03-09 PROCEDURE — 99213 OFFICE O/P EST LOW 20 MIN: CPT | Mod: S$GLB,,, | Performed by: PHYSICIAN ASSISTANT

## 2021-03-09 PROCEDURE — 99999 PR PBB SHADOW E&M-EST. PATIENT-LVL IV: ICD-10-PCS | Mod: PBBFAC,,, | Performed by: PHYSICIAN ASSISTANT

## 2021-03-09 PROCEDURE — 3008F PR BODY MASS INDEX (BMI) DOCUMENTED: ICD-10-PCS | Mod: CPTII,S$GLB,, | Performed by: PHYSICIAN ASSISTANT

## 2021-03-09 PROCEDURE — 99999 PR PBB SHADOW E&M-EST. PATIENT-LVL IV: CPT | Mod: PBBFAC,,, | Performed by: PHYSICIAN ASSISTANT

## 2021-03-11 ENCOUNTER — TELEPHONE (OUTPATIENT)
Dept: FAMILY MEDICINE | Facility: CLINIC | Age: 56
End: 2021-03-11

## 2021-03-11 RX ORDER — LORAZEPAM 0.5 MG/1
0.5 TABLET ORAL EVERY 6 HOURS PRN
Qty: 30 TABLET | Refills: 0 | Status: SHIPPED | OUTPATIENT
Start: 2021-03-11 | End: 2021-03-12 | Stop reason: SDUPTHER

## 2021-03-12 ENCOUNTER — TELEPHONE (OUTPATIENT)
Dept: INTERNAL MEDICINE | Facility: CLINIC | Age: 56
End: 2021-03-12

## 2021-03-12 ENCOUNTER — TELEPHONE (OUTPATIENT)
Dept: FAMILY MEDICINE | Facility: CLINIC | Age: 56
End: 2021-03-12

## 2021-03-12 RX ORDER — LORAZEPAM 0.5 MG/1
0.5 TABLET ORAL EVERY 6 HOURS PRN
Qty: 30 TABLET | Refills: 0 | Status: SHIPPED | OUTPATIENT
Start: 2021-03-12 | End: 2023-02-13

## 2021-04-13 ENCOUNTER — LAB VISIT (OUTPATIENT)
Dept: LAB | Facility: HOSPITAL | Age: 56
End: 2021-04-13
Attending: INTERNAL MEDICINE
Payer: COMMERCIAL

## 2021-04-13 ENCOUNTER — OFFICE VISIT (OUTPATIENT)
Dept: FAMILY MEDICINE | Facility: CLINIC | Age: 56
End: 2021-04-13
Payer: COMMERCIAL

## 2021-04-13 VITALS
OXYGEN SATURATION: 99 % | HEART RATE: 95 BPM | SYSTOLIC BLOOD PRESSURE: 122 MMHG | TEMPERATURE: 98 F | RESPIRATION RATE: 16 BRPM | WEIGHT: 293 LBS | BODY MASS INDEX: 50.96 KG/M2 | DIASTOLIC BLOOD PRESSURE: 78 MMHG

## 2021-04-13 DIAGNOSIS — E11.59 HYPERTENSION ASSOCIATED WITH DIABETES: Primary | ICD-10-CM

## 2021-04-13 DIAGNOSIS — E11.9 TYPE 2 DIABETES MELLITUS WITHOUT COMPLICATION, WITHOUT LONG-TERM CURRENT USE OF INSULIN: ICD-10-CM

## 2021-04-13 DIAGNOSIS — E11.69 HYPERLIPIDEMIA ASSOCIATED WITH TYPE 2 DIABETES MELLITUS: ICD-10-CM

## 2021-04-13 DIAGNOSIS — F51.01 PRIMARY INSOMNIA: ICD-10-CM

## 2021-04-13 DIAGNOSIS — E78.5 HYPERLIPIDEMIA ASSOCIATED WITH TYPE 2 DIABETES MELLITUS: ICD-10-CM

## 2021-04-13 DIAGNOSIS — I15.2 HYPERTENSION ASSOCIATED WITH DIABETES: Primary | ICD-10-CM

## 2021-04-13 DIAGNOSIS — E66.01 MORBID OBESITY WITH BODY MASS INDEX (BMI) OF 45.0 TO 49.9 IN ADULT: ICD-10-CM

## 2021-04-13 DIAGNOSIS — E11.59 HYPERTENSION ASSOCIATED WITH DIABETES: ICD-10-CM

## 2021-04-13 DIAGNOSIS — E55.9 VITAMIN D DEFICIENCY: ICD-10-CM

## 2021-04-13 DIAGNOSIS — I15.2 HYPERTENSION ASSOCIATED WITH DIABETES: ICD-10-CM

## 2021-04-13 LAB
25(OH)D3+25(OH)D2 SERPL-MCNC: 21 NG/ML (ref 30–96)
ALBUMIN SERPL BCP-MCNC: 3.4 G/DL (ref 3.5–5.2)
ALP SERPL-CCNC: 85 U/L (ref 55–135)
ALT SERPL W/O P-5'-P-CCNC: 19 U/L (ref 10–44)
ANION GAP SERPL CALC-SCNC: 9 MMOL/L (ref 8–16)
AST SERPL-CCNC: 19 U/L (ref 10–40)
BILIRUB SERPL-MCNC: 0.5 MG/DL (ref 0.1–1)
BUN SERPL-MCNC: 12 MG/DL (ref 6–20)
CALCIUM SERPL-MCNC: 8.9 MG/DL (ref 8.7–10.5)
CHLORIDE SERPL-SCNC: 101 MMOL/L (ref 95–110)
CHOLEST SERPL-MCNC: 166 MG/DL (ref 120–199)
CHOLEST/HDLC SERPL: 4.5 {RATIO} (ref 2–5)
CO2 SERPL-SCNC: 29 MMOL/L (ref 23–29)
CREAT SERPL-MCNC: 0.8 MG/DL (ref 0.5–1.4)
EST. GFR  (AFRICAN AMERICAN): >60 ML/MIN/1.73 M^2
EST. GFR  (NON AFRICAN AMERICAN): >60 ML/MIN/1.73 M^2
GLUCOSE SERPL-MCNC: 124 MG/DL (ref 70–110)
HDLC SERPL-MCNC: 37 MG/DL (ref 40–75)
HDLC SERPL: 22.3 % (ref 20–50)
LDLC SERPL CALC-MCNC: 107.4 MG/DL (ref 63–159)
NONHDLC SERPL-MCNC: 129 MG/DL
POTASSIUM SERPL-SCNC: 3.7 MMOL/L (ref 3.5–5.1)
PROT SERPL-MCNC: 7.5 G/DL (ref 6–8.4)
SODIUM SERPL-SCNC: 139 MMOL/L (ref 136–145)
TRIGL SERPL-MCNC: 108 MG/DL (ref 30–150)

## 2021-04-13 PROCEDURE — 1126F PR PAIN SEVERITY QUANTIFIED, NO PAIN PRESENT: ICD-10-PCS | Mod: S$GLB,,, | Performed by: INTERNAL MEDICINE

## 2021-04-13 PROCEDURE — 99999 PR PBB SHADOW E&M-EST. PATIENT-LVL IV: ICD-10-PCS | Mod: PBBFAC,,, | Performed by: INTERNAL MEDICINE

## 2021-04-13 PROCEDURE — 82306 VITAMIN D 25 HYDROXY: CPT | Performed by: INTERNAL MEDICINE

## 2021-04-13 PROCEDURE — 3008F PR BODY MASS INDEX (BMI) DOCUMENTED: ICD-10-PCS | Mod: CPTII,S$GLB,, | Performed by: INTERNAL MEDICINE

## 2021-04-13 PROCEDURE — 3074F PR MOST RECENT SYSTOLIC BLOOD PRESSURE < 130 MM HG: ICD-10-PCS | Mod: CPTII,S$GLB,, | Performed by: INTERNAL MEDICINE

## 2021-04-13 PROCEDURE — 36415 COLL VENOUS BLD VENIPUNCTURE: CPT | Mod: PO | Performed by: INTERNAL MEDICINE

## 2021-04-13 PROCEDURE — 3074F SYST BP LT 130 MM HG: CPT | Mod: CPTII,S$GLB,, | Performed by: INTERNAL MEDICINE

## 2021-04-13 PROCEDURE — 99214 OFFICE O/P EST MOD 30 MIN: CPT | Mod: S$GLB,,, | Performed by: INTERNAL MEDICINE

## 2021-04-13 PROCEDURE — 3078F DIAST BP <80 MM HG: CPT | Mod: CPTII,S$GLB,, | Performed by: INTERNAL MEDICINE

## 2021-04-13 PROCEDURE — 3078F PR MOST RECENT DIASTOLIC BLOOD PRESSURE < 80 MM HG: ICD-10-PCS | Mod: CPTII,S$GLB,, | Performed by: INTERNAL MEDICINE

## 2021-04-13 PROCEDURE — 1126F AMNT PAIN NOTED NONE PRSNT: CPT | Mod: S$GLB,,, | Performed by: INTERNAL MEDICINE

## 2021-04-13 PROCEDURE — 99214 PR OFFICE/OUTPT VISIT, EST, LEVL IV, 30-39 MIN: ICD-10-PCS | Mod: S$GLB,,, | Performed by: INTERNAL MEDICINE

## 2021-04-13 PROCEDURE — 99999 PR PBB SHADOW E&M-EST. PATIENT-LVL IV: CPT | Mod: PBBFAC,,, | Performed by: INTERNAL MEDICINE

## 2021-04-13 PROCEDURE — 80061 LIPID PANEL: CPT | Performed by: INTERNAL MEDICINE

## 2021-04-13 PROCEDURE — 3008F BODY MASS INDEX DOCD: CPT | Mod: CPTII,S$GLB,, | Performed by: INTERNAL MEDICINE

## 2021-04-13 PROCEDURE — 80053 COMPREHEN METABOLIC PANEL: CPT | Performed by: INTERNAL MEDICINE

## 2021-04-13 RX ORDER — LORAZEPAM 0.5 MG/1
0.5 TABLET ORAL NIGHTLY PRN
Qty: 30 TABLET | Refills: 1 | Status: SHIPPED | OUTPATIENT
Start: 2021-04-13 | End: 2021-07-28 | Stop reason: SDUPTHER

## 2021-04-14 ENCOUNTER — PATIENT MESSAGE (OUTPATIENT)
Dept: FAMILY MEDICINE | Facility: CLINIC | Age: 56
End: 2021-04-14

## 2021-04-29 ENCOUNTER — TELEPHONE (OUTPATIENT)
Dept: FAMILY MEDICINE | Facility: CLINIC | Age: 56
End: 2021-04-29

## 2021-06-11 ENCOUNTER — TELEPHONE (OUTPATIENT)
Dept: FAMILY MEDICINE | Facility: CLINIC | Age: 56
End: 2021-06-11

## 2021-06-30 RX ORDER — AMLODIPINE BESYLATE 10 MG/1
10 TABLET ORAL DAILY
Qty: 90 TABLET | Refills: 3 | Status: SHIPPED | OUTPATIENT
Start: 2021-06-30 | End: 2022-04-14 | Stop reason: SDUPTHER

## 2021-07-06 ENCOUNTER — PATIENT MESSAGE (OUTPATIENT)
Dept: FAMILY MEDICINE | Facility: CLINIC | Age: 56
End: 2021-07-06

## 2021-07-28 ENCOUNTER — OFFICE VISIT (OUTPATIENT)
Dept: FAMILY MEDICINE | Facility: CLINIC | Age: 56
End: 2021-07-28
Payer: COMMERCIAL

## 2021-07-28 VITALS
TEMPERATURE: 98 F | WEIGHT: 293 LBS | HEART RATE: 87 BPM | HEIGHT: 65 IN | OXYGEN SATURATION: 98 % | BODY MASS INDEX: 48.82 KG/M2 | DIASTOLIC BLOOD PRESSURE: 75 MMHG | SYSTOLIC BLOOD PRESSURE: 125 MMHG

## 2021-07-28 DIAGNOSIS — I15.2 HYPERTENSION ASSOCIATED WITH DIABETES: Primary | ICD-10-CM

## 2021-07-28 DIAGNOSIS — E11.59 HYPERTENSION ASSOCIATED WITH DIABETES: Primary | ICD-10-CM

## 2021-07-28 DIAGNOSIS — E78.5 HYPERLIPIDEMIA ASSOCIATED WITH TYPE 2 DIABETES MELLITUS: Chronic | ICD-10-CM

## 2021-07-28 DIAGNOSIS — E11.69 HYPERLIPIDEMIA ASSOCIATED WITH TYPE 2 DIABETES MELLITUS: Chronic | ICD-10-CM

## 2021-07-28 DIAGNOSIS — E11.9 TYPE 2 DIABETES MELLITUS WITHOUT COMPLICATION, WITHOUT LONG-TERM CURRENT USE OF INSULIN: Chronic | ICD-10-CM

## 2021-07-28 DIAGNOSIS — Z29.9 PREVENTIVE MEASURE: ICD-10-CM

## 2021-07-28 DIAGNOSIS — F41.8 MIXED ANXIETY AND DEPRESSIVE DISORDER: Chronic | ICD-10-CM

## 2021-07-28 PROCEDURE — 3008F PR BODY MASS INDEX (BMI) DOCUMENTED: ICD-10-PCS | Mod: CPTII,S$GLB,, | Performed by: INTERNAL MEDICINE

## 2021-07-28 PROCEDURE — 1126F AMNT PAIN NOTED NONE PRSNT: CPT | Mod: CPTII,S$GLB,, | Performed by: INTERNAL MEDICINE

## 2021-07-28 PROCEDURE — 99999 PR PBB SHADOW E&M-EST. PATIENT-LVL IV: ICD-10-PCS | Mod: PBBFAC,,, | Performed by: INTERNAL MEDICINE

## 2021-07-28 PROCEDURE — 3074F SYST BP LT 130 MM HG: CPT | Mod: CPTII,S$GLB,, | Performed by: INTERNAL MEDICINE

## 2021-07-28 PROCEDURE — 99999 PR PBB SHADOW E&M-EST. PATIENT-LVL IV: CPT | Mod: PBBFAC,,, | Performed by: INTERNAL MEDICINE

## 2021-07-28 PROCEDURE — 99214 PR OFFICE/OUTPT VISIT, EST, LEVL IV, 30-39 MIN: ICD-10-PCS | Mod: S$GLB,,, | Performed by: INTERNAL MEDICINE

## 2021-07-28 PROCEDURE — 99214 OFFICE O/P EST MOD 30 MIN: CPT | Mod: S$GLB,,, | Performed by: INTERNAL MEDICINE

## 2021-07-28 PROCEDURE — 1159F MED LIST DOCD IN RCRD: CPT | Mod: CPTII,S$GLB,, | Performed by: INTERNAL MEDICINE

## 2021-07-28 PROCEDURE — 3074F PR MOST RECENT SYSTOLIC BLOOD PRESSURE < 130 MM HG: ICD-10-PCS | Mod: CPTII,S$GLB,, | Performed by: INTERNAL MEDICINE

## 2021-07-28 PROCEDURE — 3078F DIAST BP <80 MM HG: CPT | Mod: CPTII,S$GLB,, | Performed by: INTERNAL MEDICINE

## 2021-07-28 PROCEDURE — 1126F PR PAIN SEVERITY QUANTIFIED, NO PAIN PRESENT: ICD-10-PCS | Mod: CPTII,S$GLB,, | Performed by: INTERNAL MEDICINE

## 2021-07-28 PROCEDURE — 3008F BODY MASS INDEX DOCD: CPT | Mod: CPTII,S$GLB,, | Performed by: INTERNAL MEDICINE

## 2021-07-28 PROCEDURE — 3078F PR MOST RECENT DIASTOLIC BLOOD PRESSURE < 80 MM HG: ICD-10-PCS | Mod: CPTII,S$GLB,, | Performed by: INTERNAL MEDICINE

## 2021-07-28 PROCEDURE — 1159F PR MEDICATION LIST DOCUMENTED IN MEDICAL RECORD: ICD-10-PCS | Mod: CPTII,S$GLB,, | Performed by: INTERNAL MEDICINE

## 2021-07-28 RX ORDER — PRAVASTATIN SODIUM 40 MG/1
40 TABLET ORAL DAILY
Qty: 90 TABLET | Refills: 3 | Status: SHIPPED | OUTPATIENT
Start: 2021-07-28 | End: 2022-02-11 | Stop reason: SDUPTHER

## 2021-08-04 ENCOUNTER — PATIENT MESSAGE (OUTPATIENT)
Dept: ADMINISTRATIVE | Facility: HOSPITAL | Age: 56
End: 2021-08-04

## 2021-09-14 ENCOUNTER — OFFICE VISIT (OUTPATIENT)
Dept: OTOLARYNGOLOGY | Facility: CLINIC | Age: 56
End: 2021-09-14
Payer: COMMERCIAL

## 2021-09-14 VITALS — HEIGHT: 65 IN | WEIGHT: 293 LBS | BODY MASS INDEX: 48.82 KG/M2

## 2021-09-14 DIAGNOSIS — T85.618A NON-FUNCTIONING TYMPANOSTOMY TUBE, INITIAL ENCOUNTER: Primary | ICD-10-CM

## 2021-09-14 PROCEDURE — 3008F PR BODY MASS INDEX (BMI) DOCUMENTED: ICD-10-PCS | Mod: CPTII,S$GLB,, | Performed by: PHYSICIAN ASSISTANT

## 2021-09-14 PROCEDURE — 4010F PR ACE/ARB THEARPY RXD/TAKEN: ICD-10-PCS | Mod: CPTII,S$GLB,, | Performed by: PHYSICIAN ASSISTANT

## 2021-09-14 PROCEDURE — 4010F ACE/ARB THERAPY RXD/TAKEN: CPT | Mod: CPTII,S$GLB,, | Performed by: PHYSICIAN ASSISTANT

## 2021-09-14 PROCEDURE — 1159F MED LIST DOCD IN RCRD: CPT | Mod: CPTII,S$GLB,, | Performed by: PHYSICIAN ASSISTANT

## 2021-09-14 PROCEDURE — 99213 OFFICE O/P EST LOW 20 MIN: CPT | Mod: S$GLB,,, | Performed by: PHYSICIAN ASSISTANT

## 2021-09-14 PROCEDURE — 1159F PR MEDICATION LIST DOCUMENTED IN MEDICAL RECORD: ICD-10-PCS | Mod: CPTII,S$GLB,, | Performed by: PHYSICIAN ASSISTANT

## 2021-09-14 PROCEDURE — 99999 PR PBB SHADOW E&M-EST. PATIENT-LVL III: ICD-10-PCS | Mod: PBBFAC,,, | Performed by: PHYSICIAN ASSISTANT

## 2021-09-14 PROCEDURE — 99213 PR OFFICE/OUTPT VISIT, EST, LEVL III, 20-29 MIN: ICD-10-PCS | Mod: S$GLB,,, | Performed by: PHYSICIAN ASSISTANT

## 2021-09-14 PROCEDURE — 3008F BODY MASS INDEX DOCD: CPT | Mod: CPTII,S$GLB,, | Performed by: PHYSICIAN ASSISTANT

## 2021-09-14 PROCEDURE — 99999 PR PBB SHADOW E&M-EST. PATIENT-LVL III: CPT | Mod: PBBFAC,,, | Performed by: PHYSICIAN ASSISTANT

## 2021-10-01 ENCOUNTER — PATIENT OUTREACH (OUTPATIENT)
Dept: ADMINISTRATIVE | Facility: HOSPITAL | Age: 56
End: 2021-10-01

## 2021-10-11 ENCOUNTER — HOSPITAL ENCOUNTER (EMERGENCY)
Facility: HOSPITAL | Age: 56
Discharge: HOME | End: 2021-10-11
Attending: EMERGENCY MEDICINE
Payer: COMMERCIAL

## 2021-10-11 ENCOUNTER — NURSE TRIAGE (OUTPATIENT)
Dept: ADMINISTRATIVE | Facility: CLINIC | Age: 56
End: 2021-10-11

## 2021-10-11 VITALS
DIASTOLIC BLOOD PRESSURE: 80 MMHG | HEART RATE: 80 BPM | OXYGEN SATURATION: 100 % | WEIGHT: 293 LBS | BODY MASS INDEX: 48.82 KG/M2 | TEMPERATURE: 98 F | HEIGHT: 65 IN | RESPIRATION RATE: 18 BRPM | SYSTOLIC BLOOD PRESSURE: 130 MMHG

## 2021-10-11 DIAGNOSIS — R42 DIZZINESS: Primary | ICD-10-CM

## 2021-10-11 DIAGNOSIS — N39.0 URINARY TRACT INFECTION WITHOUT HEMATURIA, SITE UNSPECIFIED: ICD-10-CM

## 2021-10-11 DIAGNOSIS — E87.6 HYPOKALEMIA: ICD-10-CM

## 2021-10-11 LAB
ALBUMIN SERPL BCP-MCNC: 3.3 G/DL (ref 3.5–5.2)
ALP SERPL-CCNC: 74 U/L (ref 55–135)
ALT SERPL W/O P-5'-P-CCNC: 17 U/L (ref 10–44)
ANION GAP SERPL CALC-SCNC: 11 MMOL/L (ref 8–16)
AST SERPL-CCNC: 18 U/L (ref 10–40)
BACTERIA #/AREA URNS HPF: ABNORMAL /HPF
BASOPHILS # BLD AUTO: 0.04 K/UL (ref 0–0.2)
BASOPHILS NFR BLD: 0.4 % (ref 0–1.9)
BILIRUB SERPL-MCNC: 0.4 MG/DL (ref 0.1–1)
BILIRUB UR QL STRIP: NEGATIVE
BNP SERPL-MCNC: 13 PG/ML (ref 0–99)
BUN SERPL-MCNC: 9 MG/DL (ref 6–20)
CALCIUM SERPL-MCNC: 9.8 MG/DL (ref 8.7–10.5)
CHLORIDE SERPL-SCNC: 101 MMOL/L (ref 95–110)
CLARITY UR: CLEAR
CO2 SERPL-SCNC: 27 MMOL/L (ref 23–29)
COLOR UR: YELLOW
CREAT SERPL-MCNC: 0.7 MG/DL (ref 0.5–1.4)
CTP QC/QA: YES
DIFFERENTIAL METHOD: ABNORMAL
EOSINOPHIL # BLD AUTO: 0 K/UL (ref 0–0.5)
EOSINOPHIL NFR BLD: 0.3 % (ref 0–8)
ERYTHROCYTE [DISTWIDTH] IN BLOOD BY AUTOMATED COUNT: 12.1 % (ref 11.5–14.5)
EST. GFR  (AFRICAN AMERICAN): >60 ML/MIN/1.73 M^2
EST. GFR  (NON AFRICAN AMERICAN): >60 ML/MIN/1.73 M^2
GLUCOSE SERPL-MCNC: 90 MG/DL (ref 70–110)
GLUCOSE UR QL STRIP: NEGATIVE
HCT VFR BLD AUTO: 42 % (ref 37–48.5)
HCV AB SERPL QL IA: NEGATIVE
HEP C VIRUS HOLD SPECIMEN: NORMAL
HGB BLD-MCNC: 13.4 G/DL (ref 12–16)
HGB UR QL STRIP: NEGATIVE
HIV 1+2 AB+HIV1 P24 AG SERPL QL IA: NEGATIVE
IMM GRANULOCYTES # BLD AUTO: 0.04 K/UL (ref 0–0.04)
IMM GRANULOCYTES NFR BLD AUTO: 0.4 % (ref 0–0.5)
KETONES UR QL STRIP: NEGATIVE
LEUKOCYTE ESTERASE UR QL STRIP: NEGATIVE
LYMPHOCYTES # BLD AUTO: 2.5 K/UL (ref 1–4.8)
LYMPHOCYTES NFR BLD: 23.3 % (ref 18–48)
MCH RBC QN AUTO: 29.8 PG (ref 27–31)
MCHC RBC AUTO-ENTMCNC: 31.9 G/DL (ref 32–36)
MCV RBC AUTO: 93 FL (ref 82–98)
MICROSCOPIC COMMENT: ABNORMAL
MONOCYTES # BLD AUTO: 0.5 K/UL (ref 0.3–1)
MONOCYTES NFR BLD: 5 % (ref 4–15)
NEUTROPHILS # BLD AUTO: 7.6 K/UL (ref 1.8–7.7)
NEUTROPHILS NFR BLD: 70.6 % (ref 38–73)
NITRITE UR QL STRIP: POSITIVE
NRBC BLD-RTO: 0 /100 WBC
PH UR STRIP: 7 [PH] (ref 5–8)
PLATELET # BLD AUTO: 323 K/UL (ref 150–450)
PMV BLD AUTO: 9.1 FL (ref 9.2–12.9)
POTASSIUM SERPL-SCNC: 3.4 MMOL/L (ref 3.5–5.1)
PROT SERPL-MCNC: 7 G/DL (ref 6–8.4)
PROT UR QL STRIP: NEGATIVE
RBC # BLD AUTO: 4.5 M/UL (ref 4–5.4)
SARS-COV-2 RDRP RESP QL NAA+PROBE: NEGATIVE
SODIUM SERPL-SCNC: 139 MMOL/L (ref 136–145)
SP GR UR STRIP: 1.02 (ref 1–1.03)
SQUAMOUS #/AREA URNS HPF: 2 /HPF
TROPONIN I SERPL DL<=0.01 NG/ML-MCNC: <0.006 NG/ML (ref 0–0.03)
URN SPEC COLLECT METH UR: ABNORMAL
UROBILINOGEN UR STRIP-ACNC: 1 EU/DL
WBC # BLD AUTO: 10.7 K/UL (ref 3.9–12.7)
WBC #/AREA URNS HPF: 3 /HPF (ref 0–5)

## 2021-10-11 PROCEDURE — 85025 COMPLETE CBC W/AUTO DIFF WBC: CPT | Performed by: EMERGENCY MEDICINE

## 2021-10-11 PROCEDURE — 87389 HIV-1 AG W/HIV-1&-2 AB AG IA: CPT | Performed by: EMERGENCY MEDICINE

## 2021-10-11 PROCEDURE — 25000003 PHARM REV CODE 250: Performed by: EMERGENCY MEDICINE

## 2021-10-11 PROCEDURE — 83880 ASSAY OF NATRIURETIC PEPTIDE: CPT | Performed by: EMERGENCY MEDICINE

## 2021-10-11 PROCEDURE — 86803 HEPATITIS C AB TEST: CPT | Performed by: EMERGENCY MEDICINE

## 2021-10-11 PROCEDURE — 99284 EMERGENCY DEPT VISIT MOD MDM: CPT | Mod: 25

## 2021-10-11 PROCEDURE — 81000 URINALYSIS NONAUTO W/SCOPE: CPT | Performed by: EMERGENCY MEDICINE

## 2021-10-11 PROCEDURE — 93010 ELECTROCARDIOGRAM REPORT: CPT | Mod: ,,, | Performed by: INTERNAL MEDICINE

## 2021-10-11 PROCEDURE — U0002 COVID-19 LAB TEST NON-CDC: HCPCS | Performed by: EMERGENCY MEDICINE

## 2021-10-11 PROCEDURE — 80053 COMPREHEN METABOLIC PANEL: CPT | Performed by: EMERGENCY MEDICINE

## 2021-10-11 PROCEDURE — 93005 ELECTROCARDIOGRAM TRACING: CPT

## 2021-10-11 PROCEDURE — 93010 EKG 12-LEAD: ICD-10-PCS | Mod: ,,, | Performed by: INTERNAL MEDICINE

## 2021-10-11 PROCEDURE — 84484 ASSAY OF TROPONIN QUANT: CPT | Performed by: EMERGENCY MEDICINE

## 2021-10-11 PROCEDURE — 96360 HYDRATION IV INFUSION INIT: CPT

## 2021-10-11 RX ORDER — POTASSIUM CHLORIDE 20 MEQ/1
20 TABLET, EXTENDED RELEASE ORAL
Status: COMPLETED | OUTPATIENT
Start: 2021-10-11 | End: 2021-10-11

## 2021-10-11 RX ORDER — LEVOFLOXACIN 500 MG/1
500 TABLET, FILM COATED ORAL DAILY
Qty: 10 TABLET | Refills: 0 | Status: SHIPPED | OUTPATIENT
Start: 2021-10-11 | End: 2021-10-21

## 2021-10-11 RX ADMIN — SODIUM CHLORIDE 1000 ML: 0.9 INJECTION, SOLUTION INTRAVENOUS at 07:10

## 2021-10-11 RX ADMIN — POTASSIUM CHLORIDE 20 MEQ: 1500 TABLET, EXTENDED RELEASE ORAL at 09:10

## 2021-10-18 ENCOUNTER — PATIENT MESSAGE (OUTPATIENT)
Dept: ADMINISTRATIVE | Facility: HOSPITAL | Age: 56
End: 2021-10-18
Payer: COMMERCIAL

## 2021-10-20 ENCOUNTER — OFFICE VISIT (OUTPATIENT)
Dept: FAMILY MEDICINE | Facility: CLINIC | Age: 56
End: 2021-10-20
Payer: COMMERCIAL

## 2021-10-20 VITALS
BODY MASS INDEX: 48.82 KG/M2 | DIASTOLIC BLOOD PRESSURE: 80 MMHG | RESPIRATION RATE: 18 BRPM | HEART RATE: 97 BPM | SYSTOLIC BLOOD PRESSURE: 114 MMHG | TEMPERATURE: 98 F | HEIGHT: 65 IN | OXYGEN SATURATION: 99 % | WEIGHT: 293 LBS

## 2021-10-20 DIAGNOSIS — E11.59 HYPERTENSION ASSOCIATED WITH DIABETES: ICD-10-CM

## 2021-10-20 DIAGNOSIS — E11.69 HYPERLIPIDEMIA ASSOCIATED WITH TYPE 2 DIABETES MELLITUS: ICD-10-CM

## 2021-10-20 DIAGNOSIS — E66.01 MORBID OBESITY WITH BODY MASS INDEX (BMI) OF 45.0 TO 49.9 IN ADULT: ICD-10-CM

## 2021-10-20 DIAGNOSIS — I15.2 HYPERTENSION ASSOCIATED WITH DIABETES: ICD-10-CM

## 2021-10-20 DIAGNOSIS — E78.5 HYPERLIPIDEMIA ASSOCIATED WITH TYPE 2 DIABETES MELLITUS: ICD-10-CM

## 2021-10-20 DIAGNOSIS — F41.8 MIXED ANXIETY AND DEPRESSIVE DISORDER: ICD-10-CM

## 2021-10-20 DIAGNOSIS — E55.9 VITAMIN D DEFICIENCY: ICD-10-CM

## 2021-10-20 DIAGNOSIS — Z00.00 ENCOUNTER FOR PREVENTIVE HEALTH EXAMINATION: Primary | ICD-10-CM

## 2021-10-20 DIAGNOSIS — E11.9 TYPE 2 DIABETES MELLITUS WITHOUT COMPLICATION, WITHOUT LONG-TERM CURRENT USE OF INSULIN: ICD-10-CM

## 2021-10-20 DIAGNOSIS — N30.90 CYSTITIS: ICD-10-CM

## 2021-10-20 PROCEDURE — 3074F PR MOST RECENT SYSTOLIC BLOOD PRESSURE < 130 MM HG: ICD-10-PCS | Mod: CPTII,S$GLB,, | Performed by: INTERNAL MEDICINE

## 2021-10-20 PROCEDURE — 1159F MED LIST DOCD IN RCRD: CPT | Mod: CPTII,S$GLB,, | Performed by: INTERNAL MEDICINE

## 2021-10-20 PROCEDURE — 99396 PREV VISIT EST AGE 40-64: CPT | Mod: 25,S$GLB,, | Performed by: INTERNAL MEDICINE

## 2021-10-20 PROCEDURE — 3008F PR BODY MASS INDEX (BMI) DOCUMENTED: ICD-10-PCS | Mod: CPTII,S$GLB,, | Performed by: INTERNAL MEDICINE

## 2021-10-20 PROCEDURE — 4010F ACE/ARB THERAPY RXD/TAKEN: CPT | Mod: CPTII,S$GLB,, | Performed by: INTERNAL MEDICINE

## 2021-10-20 PROCEDURE — 99396 PR PREVENTIVE VISIT,EST,40-64: ICD-10-PCS | Mod: 25,S$GLB,, | Performed by: INTERNAL MEDICINE

## 2021-10-20 PROCEDURE — 1159F PR MEDICATION LIST DOCUMENTED IN MEDICAL RECORD: ICD-10-PCS | Mod: CPTII,S$GLB,, | Performed by: INTERNAL MEDICINE

## 2021-10-20 PROCEDURE — 3079F DIAST BP 80-89 MM HG: CPT | Mod: CPTII,S$GLB,, | Performed by: INTERNAL MEDICINE

## 2021-10-20 PROCEDURE — 99999 PR PBB SHADOW E&M-EST. PATIENT-LVL V: CPT | Mod: PBBFAC,,, | Performed by: INTERNAL MEDICINE

## 2021-10-20 PROCEDURE — 3074F SYST BP LT 130 MM HG: CPT | Mod: CPTII,S$GLB,, | Performed by: INTERNAL MEDICINE

## 2021-10-20 PROCEDURE — 3008F BODY MASS INDEX DOCD: CPT | Mod: CPTII,S$GLB,, | Performed by: INTERNAL MEDICINE

## 2021-10-20 PROCEDURE — 90471 IMMUNIZATION ADMIN: CPT | Mod: S$GLB,,, | Performed by: INTERNAL MEDICINE

## 2021-10-20 PROCEDURE — 90471 FLU VACCINE (QUAD) GREATER THAN OR EQUAL TO 3YO PRESERVATIVE FREE IM: ICD-10-PCS | Mod: S$GLB,,, | Performed by: INTERNAL MEDICINE

## 2021-10-20 PROCEDURE — 4010F PR ACE/ARB THEARPY RXD/TAKEN: ICD-10-PCS | Mod: CPTII,S$GLB,, | Performed by: INTERNAL MEDICINE

## 2021-10-20 PROCEDURE — 90686 IIV4 VACC NO PRSV 0.5 ML IM: CPT | Mod: S$GLB,,, | Performed by: INTERNAL MEDICINE

## 2021-10-20 PROCEDURE — 90686 FLU VACCINE (QUAD) GREATER THAN OR EQUAL TO 3YO PRESERVATIVE FREE IM: ICD-10-PCS | Mod: S$GLB,,, | Performed by: INTERNAL MEDICINE

## 2021-10-20 PROCEDURE — 99999 PR PBB SHADOW E&M-EST. PATIENT-LVL V: ICD-10-PCS | Mod: PBBFAC,,, | Performed by: INTERNAL MEDICINE

## 2021-10-20 PROCEDURE — 3079F PR MOST RECENT DIASTOLIC BLOOD PRESSURE 80-89 MM HG: ICD-10-PCS | Mod: CPTII,S$GLB,, | Performed by: INTERNAL MEDICINE

## 2021-10-21 ENCOUNTER — LAB VISIT (OUTPATIENT)
Dept: LAB | Facility: HOSPITAL | Age: 56
End: 2021-10-21
Attending: INTERNAL MEDICINE
Payer: COMMERCIAL

## 2021-10-21 DIAGNOSIS — Z29.9 PREVENTIVE MEASURE: ICD-10-CM

## 2021-10-21 DIAGNOSIS — E11.9 TYPE 2 DIABETES MELLITUS WITHOUT COMPLICATION, WITHOUT LONG-TERM CURRENT USE OF INSULIN: Chronic | ICD-10-CM

## 2021-10-21 LAB
25(OH)D3+25(OH)D2 SERPL-MCNC: 20 NG/ML (ref 30–96)
ALBUMIN SERPL BCP-MCNC: 3.4 G/DL (ref 3.5–5.2)
ALP SERPL-CCNC: 78 U/L (ref 55–135)
ALT SERPL W/O P-5'-P-CCNC: 19 U/L (ref 10–44)
ANION GAP SERPL CALC-SCNC: 15 MMOL/L (ref 8–16)
AST SERPL-CCNC: 19 U/L (ref 10–40)
BASOPHILS # BLD AUTO: 0.03 K/UL (ref 0–0.2)
BASOPHILS NFR BLD: 0.4 % (ref 0–1.9)
BILIRUB SERPL-MCNC: 0.6 MG/DL (ref 0.1–1)
BUN SERPL-MCNC: 9 MG/DL (ref 6–20)
CALCIUM SERPL-MCNC: 9.6 MG/DL (ref 8.7–10.5)
CHLORIDE SERPL-SCNC: 102 MMOL/L (ref 95–110)
CHOLEST SERPL-MCNC: 146 MG/DL (ref 120–199)
CHOLEST/HDLC SERPL: 3.3 {RATIO} (ref 2–5)
CO2 SERPL-SCNC: 25 MMOL/L (ref 23–29)
CREAT SERPL-MCNC: 0.8 MG/DL (ref 0.5–1.4)
DIFFERENTIAL METHOD: ABNORMAL
EOSINOPHIL # BLD AUTO: 0 K/UL (ref 0–0.5)
EOSINOPHIL NFR BLD: 0.4 % (ref 0–8)
ERYTHROCYTE [DISTWIDTH] IN BLOOD BY AUTOMATED COUNT: 12.8 % (ref 11.5–14.5)
EST. GFR  (AFRICAN AMERICAN): >60 ML/MIN/1.73 M^2
EST. GFR  (NON AFRICAN AMERICAN): >60 ML/MIN/1.73 M^2
ESTIMATED AVG GLUCOSE: 140 MG/DL (ref 68–131)
GLUCOSE SERPL-MCNC: 81 MG/DL (ref 70–110)
HBA1C MFR BLD: 6.5 % (ref 4–5.6)
HCT VFR BLD AUTO: 41.6 % (ref 37–48.5)
HDLC SERPL-MCNC: 44 MG/DL (ref 40–75)
HDLC SERPL: 30.1 % (ref 20–50)
HGB BLD-MCNC: 13.6 G/DL (ref 12–16)
IMM GRANULOCYTES # BLD AUTO: 0.02 K/UL (ref 0–0.04)
IMM GRANULOCYTES NFR BLD AUTO: 0.2 % (ref 0–0.5)
LDLC SERPL CALC-MCNC: 87 MG/DL (ref 63–159)
LYMPHOCYTES # BLD AUTO: 1.6 K/UL (ref 1–4.8)
LYMPHOCYTES NFR BLD: 18.9 % (ref 18–48)
MCH RBC QN AUTO: 30.8 PG (ref 27–31)
MCHC RBC AUTO-ENTMCNC: 32.7 G/DL (ref 32–36)
MCV RBC AUTO: 94 FL (ref 82–98)
MONOCYTES # BLD AUTO: 0.6 K/UL (ref 0.3–1)
MONOCYTES NFR BLD: 6.6 % (ref 4–15)
NEUTROPHILS # BLD AUTO: 6.2 K/UL (ref 1.8–7.7)
NEUTROPHILS NFR BLD: 73.5 % (ref 38–73)
NONHDLC SERPL-MCNC: 102 MG/DL
NRBC BLD-RTO: 0 /100 WBC
PLATELET # BLD AUTO: 322 K/UL (ref 150–450)
PMV BLD AUTO: 9.9 FL (ref 9.2–12.9)
POTASSIUM SERPL-SCNC: 3.3 MMOL/L (ref 3.5–5.1)
PROT SERPL-MCNC: 7.1 G/DL (ref 6–8.4)
RBC # BLD AUTO: 4.42 M/UL (ref 4–5.4)
SODIUM SERPL-SCNC: 142 MMOL/L (ref 136–145)
TRIGL SERPL-MCNC: 75 MG/DL (ref 30–150)
TSH SERPL DL<=0.005 MIU/L-ACNC: 1.07 UIU/ML (ref 0.4–4)
WBC # BLD AUTO: 8.38 K/UL (ref 3.9–12.7)

## 2021-10-21 PROCEDURE — 83036 HEMOGLOBIN GLYCOSYLATED A1C: CPT | Performed by: INTERNAL MEDICINE

## 2021-10-21 PROCEDURE — 80061 LIPID PANEL: CPT | Performed by: INTERNAL MEDICINE

## 2021-10-21 PROCEDURE — 82306 VITAMIN D 25 HYDROXY: CPT | Performed by: INTERNAL MEDICINE

## 2021-10-21 PROCEDURE — 36415 COLL VENOUS BLD VENIPUNCTURE: CPT | Mod: PO | Performed by: INTERNAL MEDICINE

## 2021-10-21 PROCEDURE — 80053 COMPREHEN METABOLIC PANEL: CPT | Performed by: INTERNAL MEDICINE

## 2021-10-21 PROCEDURE — 84443 ASSAY THYROID STIM HORMONE: CPT | Performed by: INTERNAL MEDICINE

## 2021-10-21 PROCEDURE — 85025 COMPLETE CBC W/AUTO DIFF WBC: CPT | Performed by: INTERNAL MEDICINE

## 2021-10-22 ENCOUNTER — LAB VISIT (OUTPATIENT)
Dept: LAB | Facility: HOSPITAL | Age: 56
End: 2021-10-22
Payer: COMMERCIAL

## 2021-10-22 DIAGNOSIS — Z29.9 PREVENTIVE MEASURE: ICD-10-CM

## 2021-10-22 DIAGNOSIS — E11.9 TYPE 2 DIABETES MELLITUS WITHOUT COMPLICATION, WITHOUT LONG-TERM CURRENT USE OF INSULIN: Chronic | ICD-10-CM

## 2021-10-22 LAB
ALBUMIN/CREAT UR: 6.5 UG/MG (ref 0–30)
CREAT UR-MCNC: 339 MG/DL (ref 15–325)
MICROALBUMIN UR DL<=1MG/L-MCNC: 22 UG/ML

## 2021-10-22 PROCEDURE — 82570 ASSAY OF URINE CREATININE: CPT | Performed by: INTERNAL MEDICINE

## 2021-10-25 ENCOUNTER — PATIENT MESSAGE (OUTPATIENT)
Dept: FAMILY MEDICINE | Facility: CLINIC | Age: 56
End: 2021-10-25
Payer: COMMERCIAL

## 2021-10-28 ENCOUNTER — OFFICE VISIT (OUTPATIENT)
Dept: FAMILY MEDICINE | Facility: CLINIC | Age: 56
End: 2021-10-28
Payer: COMMERCIAL

## 2021-10-28 ENCOUNTER — TELEPHONE (OUTPATIENT)
Dept: FAMILY MEDICINE | Facility: CLINIC | Age: 56
End: 2021-10-28

## 2021-10-28 ENCOUNTER — LAB VISIT (OUTPATIENT)
Dept: LAB | Facility: HOSPITAL | Age: 56
End: 2021-10-28
Payer: COMMERCIAL

## 2021-10-28 VITALS
WEIGHT: 293 LBS | TEMPERATURE: 97 F | RESPIRATION RATE: 18 BRPM | DIASTOLIC BLOOD PRESSURE: 78 MMHG | SYSTOLIC BLOOD PRESSURE: 120 MMHG | OXYGEN SATURATION: 95 % | HEIGHT: 65 IN | BODY MASS INDEX: 48.82 KG/M2 | HEART RATE: 104 BPM

## 2021-10-28 DIAGNOSIS — I15.2 HYPERTENSION ASSOCIATED WITH DIABETES: ICD-10-CM

## 2021-10-28 DIAGNOSIS — E78.5 HYPERLIPIDEMIA ASSOCIATED WITH TYPE 2 DIABETES MELLITUS: Chronic | ICD-10-CM

## 2021-10-28 DIAGNOSIS — E55.9 VITAMIN D DEFICIENCY: ICD-10-CM

## 2021-10-28 DIAGNOSIS — F41.8 MIXED ANXIETY AND DEPRESSIVE DISORDER: Chronic | ICD-10-CM

## 2021-10-28 DIAGNOSIS — E11.9 TYPE 2 DIABETES MELLITUS WITHOUT COMPLICATION, WITHOUT LONG-TERM CURRENT USE OF INSULIN: Chronic | ICD-10-CM

## 2021-10-28 DIAGNOSIS — R30.0 DYSURIA: Primary | ICD-10-CM

## 2021-10-28 DIAGNOSIS — E11.59 HYPERTENSION ASSOCIATED WITH DIABETES: ICD-10-CM

## 2021-10-28 DIAGNOSIS — E87.6 HYPOKALEMIA: ICD-10-CM

## 2021-10-28 DIAGNOSIS — Z98.890 S/P GASTRIC SURGERY: ICD-10-CM

## 2021-10-28 DIAGNOSIS — E11.69 HYPERLIPIDEMIA ASSOCIATED WITH TYPE 2 DIABETES MELLITUS: Chronic | ICD-10-CM

## 2021-10-28 DIAGNOSIS — E66.01 MORBID OBESITY WITH BODY MASS INDEX (BMI) GREATER THAN OR EQUAL TO 50: ICD-10-CM

## 2021-10-28 DIAGNOSIS — R30.0 DYSURIA: ICD-10-CM

## 2021-10-28 LAB
BACTERIA #/AREA URNS AUTO: NORMAL /HPF
BILIRUB UR QL STRIP: NEGATIVE
CLARITY UR REFRACT.AUTO: ABNORMAL
COLOR UR AUTO: YELLOW
GLUCOSE UR QL STRIP: NEGATIVE
HGB UR QL STRIP: NEGATIVE
HYALINE CASTS UR QL AUTO: 1 /LPF
KETONES UR QL STRIP: NEGATIVE
LEUKOCYTE ESTERASE UR QL STRIP: ABNORMAL
MICROSCOPIC COMMENT: NORMAL
NITRITE UR QL STRIP: NEGATIVE
PH UR STRIP: 6 [PH] (ref 5–8)
PROT UR QL STRIP: NEGATIVE
RBC #/AREA URNS AUTO: 2 /HPF (ref 0–4)
SP GR UR STRIP: 1.02 (ref 1–1.03)
SQUAMOUS #/AREA URNS AUTO: 10 /HPF
URN SPEC COLLECT METH UR: ABNORMAL
WBC #/AREA URNS AUTO: 2 /HPF (ref 0–5)

## 2021-10-28 PROCEDURE — 99214 PR OFFICE/OUTPT VISIT, EST, LEVL IV, 30-39 MIN: ICD-10-PCS | Mod: S$GLB,,, | Performed by: INTERNAL MEDICINE

## 2021-10-28 PROCEDURE — 3066F PR DOCUMENTATION OF TREATMENT FOR NEPHROPATHY: ICD-10-PCS | Mod: CPTII,S$GLB,, | Performed by: INTERNAL MEDICINE

## 2021-10-28 PROCEDURE — 3074F PR MOST RECENT SYSTOLIC BLOOD PRESSURE < 130 MM HG: ICD-10-PCS | Mod: CPTII,S$GLB,, | Performed by: INTERNAL MEDICINE

## 2021-10-28 PROCEDURE — 3008F PR BODY MASS INDEX (BMI) DOCUMENTED: ICD-10-PCS | Mod: CPTII,S$GLB,, | Performed by: INTERNAL MEDICINE

## 2021-10-28 PROCEDURE — 99999 PR PBB SHADOW E&M-EST. PATIENT-LVL V: ICD-10-PCS | Mod: PBBFAC,,, | Performed by: INTERNAL MEDICINE

## 2021-10-28 PROCEDURE — 3044F HG A1C LEVEL LT 7.0%: CPT | Mod: CPTII,S$GLB,, | Performed by: INTERNAL MEDICINE

## 2021-10-28 PROCEDURE — 3066F NEPHROPATHY DOC TX: CPT | Mod: CPTII,S$GLB,, | Performed by: INTERNAL MEDICINE

## 2021-10-28 PROCEDURE — 3074F SYST BP LT 130 MM HG: CPT | Mod: CPTII,S$GLB,, | Performed by: INTERNAL MEDICINE

## 2021-10-28 PROCEDURE — 1160F PR REVIEW ALL MEDS BY PRESCRIBER/CLIN PHARMACIST DOCUMENTED: ICD-10-PCS | Mod: CPTII,S$GLB,, | Performed by: INTERNAL MEDICINE

## 2021-10-28 PROCEDURE — 3078F DIAST BP <80 MM HG: CPT | Mod: CPTII,S$GLB,, | Performed by: INTERNAL MEDICINE

## 2021-10-28 PROCEDURE — 1160F RVW MEDS BY RX/DR IN RCRD: CPT | Mod: CPTII,S$GLB,, | Performed by: INTERNAL MEDICINE

## 2021-10-28 PROCEDURE — 1159F PR MEDICATION LIST DOCUMENTED IN MEDICAL RECORD: ICD-10-PCS | Mod: CPTII,S$GLB,, | Performed by: INTERNAL MEDICINE

## 2021-10-28 PROCEDURE — 3008F BODY MASS INDEX DOCD: CPT | Mod: CPTII,S$GLB,, | Performed by: INTERNAL MEDICINE

## 2021-10-28 PROCEDURE — 1159F MED LIST DOCD IN RCRD: CPT | Mod: CPTII,S$GLB,, | Performed by: INTERNAL MEDICINE

## 2021-10-28 PROCEDURE — 4010F PR ACE/ARB THEARPY RXD/TAKEN: ICD-10-PCS | Mod: CPTII,S$GLB,, | Performed by: INTERNAL MEDICINE

## 2021-10-28 PROCEDURE — 87086 URINE CULTURE/COLONY COUNT: CPT | Performed by: INTERNAL MEDICINE

## 2021-10-28 PROCEDURE — 99214 OFFICE O/P EST MOD 30 MIN: CPT | Mod: S$GLB,,, | Performed by: INTERNAL MEDICINE

## 2021-10-28 PROCEDURE — 3061F PR NEG MICROALBUMINURIA RESULT DOCUMENTED/REVIEW: ICD-10-PCS | Mod: CPTII,S$GLB,, | Performed by: INTERNAL MEDICINE

## 2021-10-28 PROCEDURE — 3061F NEG MICROALBUMINURIA REV: CPT | Mod: CPTII,S$GLB,, | Performed by: INTERNAL MEDICINE

## 2021-10-28 PROCEDURE — 3044F PR MOST RECENT HEMOGLOBIN A1C LEVEL <7.0%: ICD-10-PCS | Mod: CPTII,S$GLB,, | Performed by: INTERNAL MEDICINE

## 2021-10-28 PROCEDURE — 81001 URINALYSIS AUTO W/SCOPE: CPT | Performed by: INTERNAL MEDICINE

## 2021-10-28 PROCEDURE — 4010F ACE/ARB THERAPY RXD/TAKEN: CPT | Mod: CPTII,S$GLB,, | Performed by: INTERNAL MEDICINE

## 2021-10-28 PROCEDURE — 3078F PR MOST RECENT DIASTOLIC BLOOD PRESSURE < 80 MM HG: ICD-10-PCS | Mod: CPTII,S$GLB,, | Performed by: INTERNAL MEDICINE

## 2021-10-28 PROCEDURE — 99999 PR PBB SHADOW E&M-EST. PATIENT-LVL V: CPT | Mod: PBBFAC,,, | Performed by: INTERNAL MEDICINE

## 2021-10-28 RX ORDER — ERGOCALCIFEROL 1.25 MG/1
50000 CAPSULE ORAL WEEKLY
Qty: 4 CAPSULE | Refills: 4 | Status: SHIPPED | OUTPATIENT
Start: 2021-10-28 | End: 2021-11-27

## 2021-10-29 LAB — BACTERIA UR CULT: NORMAL

## 2021-11-01 ENCOUNTER — PATIENT MESSAGE (OUTPATIENT)
Dept: FAMILY MEDICINE | Facility: CLINIC | Age: 56
End: 2021-11-01
Payer: COMMERCIAL

## 2021-11-13 ENCOUNTER — PATIENT MESSAGE (OUTPATIENT)
Dept: FAMILY MEDICINE | Facility: CLINIC | Age: 56
End: 2021-11-13
Payer: COMMERCIAL

## 2021-12-09 ENCOUNTER — OFFICE VISIT (OUTPATIENT)
Dept: SLEEP MEDICINE | Facility: CLINIC | Age: 56
End: 2021-12-09
Payer: COMMERCIAL

## 2021-12-09 ENCOUNTER — HOSPITAL ENCOUNTER (OUTPATIENT)
Dept: RADIOLOGY | Facility: HOSPITAL | Age: 56
Discharge: HOME | End: 2021-12-09
Attending: INTERNAL MEDICINE
Payer: COMMERCIAL

## 2021-12-09 VITALS
OXYGEN SATURATION: 97 % | WEIGHT: 293 LBS | BODY MASS INDEX: 57.52 KG/M2 | HEART RATE: 94 BPM | SYSTOLIC BLOOD PRESSURE: 124 MMHG | DIASTOLIC BLOOD PRESSURE: 88 MMHG | HEIGHT: 60 IN | RESPIRATION RATE: 16 BRPM

## 2021-12-09 DIAGNOSIS — R91.8 PULMONARY NODULES: Primary | ICD-10-CM

## 2021-12-09 DIAGNOSIS — E66.01 MORBID OBESITY WITH BODY MASS INDEX (BMI) GREATER THAN OR EQUAL TO 50: ICD-10-CM

## 2021-12-09 DIAGNOSIS — R91.1 SOLITARY PULMONARY NODULE: ICD-10-CM

## 2021-12-09 DIAGNOSIS — R91.8 PULMONARY NODULES: ICD-10-CM

## 2021-12-09 DIAGNOSIS — E11.9 TYPE 2 DIABETES MELLITUS WITHOUT COMPLICATION, WITHOUT LONG-TERM CURRENT USE OF INSULIN: Chronic | ICD-10-CM

## 2021-12-09 DIAGNOSIS — I15.2 HYPERTENSION ASSOCIATED WITH DIABETES: ICD-10-CM

## 2021-12-09 DIAGNOSIS — E11.59 HYPERTENSION ASSOCIATED WITH DIABETES: ICD-10-CM

## 2021-12-09 DIAGNOSIS — E11.69 HYPERLIPIDEMIA ASSOCIATED WITH TYPE 2 DIABETES MELLITUS: Chronic | ICD-10-CM

## 2021-12-09 DIAGNOSIS — E78.5 HYPERLIPIDEMIA ASSOCIATED WITH TYPE 2 DIABETES MELLITUS: Chronic | ICD-10-CM

## 2021-12-09 PROCEDURE — 3066F NEPHROPATHY DOC TX: CPT | Mod: CPTII,S$GLB,, | Performed by: INTERNAL MEDICINE

## 2021-12-09 PROCEDURE — 99214 OFFICE O/P EST MOD 30 MIN: CPT | Mod: S$GLB,,, | Performed by: INTERNAL MEDICINE

## 2021-12-09 PROCEDURE — 99999 PR PBB SHADOW E&M-EST. PATIENT-LVL V: CPT | Mod: PBBFAC,,, | Performed by: INTERNAL MEDICINE

## 2021-12-09 PROCEDURE — 99214 PR OFFICE/OUTPT VISIT, EST, LEVL IV, 30-39 MIN: ICD-10-PCS | Mod: S$GLB,,, | Performed by: INTERNAL MEDICINE

## 2021-12-09 PROCEDURE — 3061F PR NEG MICROALBUMINURIA RESULT DOCUMENTED/REVIEW: ICD-10-PCS | Mod: CPTII,S$GLB,, | Performed by: INTERNAL MEDICINE

## 2021-12-09 PROCEDURE — 99999 PR PBB SHADOW E&M-EST. PATIENT-LVL V: ICD-10-PCS | Mod: PBBFAC,,, | Performed by: INTERNAL MEDICINE

## 2021-12-09 PROCEDURE — 3061F NEG MICROALBUMINURIA REV: CPT | Mod: CPTII,S$GLB,, | Performed by: INTERNAL MEDICINE

## 2021-12-09 PROCEDURE — 4010F ACE/ARB THERAPY RXD/TAKEN: CPT | Mod: CPTII,S$GLB,, | Performed by: INTERNAL MEDICINE

## 2021-12-09 PROCEDURE — 71250 CT THORAX DX C-: CPT | Mod: 26,,, | Performed by: RADIOLOGY

## 2021-12-09 PROCEDURE — 71250 CT CHEST WITHOUT CONTRAST: ICD-10-PCS | Mod: 26,,, | Performed by: RADIOLOGY

## 2021-12-09 PROCEDURE — 3066F PR DOCUMENTATION OF TREATMENT FOR NEPHROPATHY: ICD-10-PCS | Mod: CPTII,S$GLB,, | Performed by: INTERNAL MEDICINE

## 2021-12-09 PROCEDURE — 71250 CT THORAX DX C-: CPT | Mod: TC

## 2021-12-09 PROCEDURE — 4010F PR ACE/ARB THEARPY RXD/TAKEN: ICD-10-PCS | Mod: CPTII,S$GLB,, | Performed by: INTERNAL MEDICINE

## 2021-12-13 ENCOUNTER — HOSPITAL ENCOUNTER (OUTPATIENT)
Dept: RADIOLOGY | Facility: HOSPITAL | Age: 56
Discharge: HOME | End: 2021-12-13
Attending: INTERNAL MEDICINE
Payer: COMMERCIAL

## 2021-12-13 DIAGNOSIS — Z00.00 ENCOUNTER FOR PREVENTIVE HEALTH EXAMINATION: ICD-10-CM

## 2021-12-13 PROCEDURE — 77067 SCR MAMMO BI INCL CAD: CPT | Mod: 26,,, | Performed by: RADIOLOGY

## 2021-12-13 PROCEDURE — 77067 SCR MAMMO BI INCL CAD: CPT | Mod: TC

## 2021-12-13 PROCEDURE — 77063 BREAST TOMOSYNTHESIS BI: CPT | Mod: 26,,, | Performed by: RADIOLOGY

## 2021-12-13 PROCEDURE — 77067 MAMMO DIGITAL SCREENING BILAT WITH TOMO: ICD-10-PCS | Mod: 26,,, | Performed by: RADIOLOGY

## 2021-12-13 PROCEDURE — 77063 MAMMO DIGITAL SCREENING BILAT WITH TOMO: ICD-10-PCS | Mod: 26,,, | Performed by: RADIOLOGY

## 2022-01-02 ENCOUNTER — PATIENT MESSAGE (OUTPATIENT)
Dept: ADMINISTRATIVE | Facility: OTHER | Age: 57
End: 2022-01-02
Payer: COMMERCIAL

## 2022-01-02 ENCOUNTER — PATIENT MESSAGE (OUTPATIENT)
Dept: FAMILY MEDICINE | Facility: CLINIC | Age: 57
End: 2022-01-02
Payer: COMMERCIAL

## 2022-01-03 ENCOUNTER — NURSE TRIAGE (OUTPATIENT)
Dept: ADMINISTRATIVE | Facility: CLINIC | Age: 57
End: 2022-01-03
Payer: COMMERCIAL

## 2022-01-03 ENCOUNTER — LAB VISIT (OUTPATIENT)
Dept: PRIMARY CARE CLINIC | Facility: OTHER | Age: 57
End: 2022-01-03
Attending: INTERNAL MEDICINE
Payer: COMMERCIAL

## 2022-01-03 DIAGNOSIS — R05.9 COUGH: ICD-10-CM

## 2022-01-03 DIAGNOSIS — Z20.822 ENCOUNTER FOR LABORATORY TESTING FOR COVID-19 VIRUS: Primary | ICD-10-CM

## 2022-01-03 PROCEDURE — U0003 INFECTIOUS AGENT DETECTION BY NUCLEIC ACID (DNA OR RNA); SEVERE ACUTE RESPIRATORY SYNDROME CORONAVIRUS 2 (SARS-COV-2) (CORONAVIRUS DISEASE [COVID-19]), AMPLIFIED PROBE TECHNIQUE, MAKING USE OF HIGH THROUGHPUT TECHNOLOGIES AS DESCRIBED BY CMS-2020-01-R: HCPCS | Mod: ST72 | Performed by: INTERNAL MEDICINE

## 2022-01-03 NOTE — TELEPHONE ENCOUNTER
Called re [facility] program, started feeling ill on Friday, Saturday test at home was positive. Asking next steps, and would like PCR test. [facility] address and times given. Triage done- home care advised. Instructed to call [facility] for any further questions or concerns or worsening S/S. Verb understanding.     Reason for Disposition   [1] COVID-19 diagnosed by positive lab test AND [2] mild symptoms (e.g., cough, fever, others) AND [3] no complications or SOB    Additional Information   Negative: SEVERE difficulty breathing (e.g., struggling for each breath, speaks in single words)   Negative: Difficult to awaken or acting confused (e.g., disoriented, slurred speech)   Negative: Bluish (or gray) lips or face now   Negative: Shock suspected (e.g., cold/pale/clammy skin, too weak to stand, low BP, rapid pulse)   Negative: Sounds like a life-threatening emergency to the triager   Negative: [1] COVID-19 exposure AND [2] NO symptoms   Negative: COVID-19 vaccine reaction suspected (e.g., fever, headache, muscle aches) occurring 1 to 3 days after getting vaccine   Negative: COVID-19 vaccine, questions about   Negative: [1] Lives with someone known to have influenza (flu test positive) AND [2] flu-like symptoms (e.g., cough, runny nose, sore throat, SOB; with or without fever)   Negative: [1] Adult with possible COVID-19 symptoms AND [2] triager concerned about severity of symptoms or other causes   Negative: COVID-19 and breastfeeding, questions about   Negative: SEVERE or constant chest pain or pressure (Exception: mild central chest pain, present only when coughing)   Negative: MODERATE difficulty breathing (e.g., speaks in phrases, SOB even at rest, pulse 100-120)   Negative: Headache and stiff neck (can't touch chin to chest)   Negative: MILD difficulty breathing (e.g., minimal/no SOB at rest, SOB with walking, pulse <100)   Negative: Chest pain or pressure   Negative: Patient sounds very  sick or weak to the triager   Negative: Fever > 103 F (39.4 C)   Negative: [1] Fever > 101 F (38.3 C) AND [2] over 60 years of age   Negative: [1] Fever > 100.0 F (37.8 C) AND [2] bedridden (e.g., nursing home patient, CVA, chronic illness, recovering from surgery)   Negative: HIGH RISK for severe COVID complications (e.g., age > 64 years, obesity with BMI > 25, pregnant, chronic lung disease or other chronic medical condition) (Exception: Already seen by PCP and no new or worsening symptoms.)   Negative: [1] HIGH RISK patient AND [2] influenza is widespread in the community AND [3] ONE OR MORE respiratory symptoms: cough, sore throat, runny or stuffy nose   Negative: [1] HIGH RISK patient AND [2] influenza exposure within the last 7 days AND [3] ONE OR MORE respiratory symptoms: cough, sore throat, runny or stuffy nose   Negative: [1] COVID-19 infection suspected by caller or triager AND [2] mild symptoms (cough, fever, or others) AND [3] negative COVID-19 rapid test   Negative: Fever present > 3 days (72 hours)   Negative: [1] Fever returns after gone for over 24 hours AND [2] symptoms worse or not improved   Negative: [1] Continuous (nonstop) coughing interferes with work or school AND [2] no improvement using cough treatment per protocol   Negative: Cough present > 3 weeks   Negative: [1] COVID-19 diagnosed by positive lab test AND [2] NO symptoms (e.g., cough, fever, others)    Protocols used: CORONAVIRUS (COVID-19) DIAGNOSED OR [id number]

## 2022-01-04 ENCOUNTER — PATIENT MESSAGE (OUTPATIENT)
Dept: ADMINISTRATIVE | Facility: OTHER | Age: 57
End: 2022-01-04
Payer: COMMERCIAL

## 2022-01-06 ENCOUNTER — PATIENT MESSAGE (OUTPATIENT)
Dept: ADMINISTRATIVE | Facility: OTHER | Age: 57
End: 2022-01-06
Payer: COMMERCIAL

## 2022-01-08 DIAGNOSIS — U07.1 COVID-19 VIRUS DETECTED: ICD-10-CM

## 2022-01-08 LAB
SARS-COV-2 RNA RESP QL NAA+PROBE: DETECTED
TEST PERFORMANCE INFO SPEC: ABNORMAL

## 2022-01-18 ENCOUNTER — PATIENT OUTREACH (OUTPATIENT)
Dept: ADMINISTRATIVE | Facility: OTHER | Age: 57
End: 2022-01-18
Payer: COMMERCIAL

## 2022-01-18 NOTE — PROGRESS NOTES
Health Maintenance Due   Topic Date Due    Shingles Vaccine (1 of 2) Never done    Eye Exam  06/04/2021    COVID-19 Vaccine (3 - Booster for Moderna series) 10/08/2021     Updates were requested from care everywhere.  Chart was reviewed for overdue Proactive [facility] Encounters ([facility]) topics (CRS, Breast Cancer Screening, Eye exam)  Health Maintenance has been updated.  LINKS immunization registry triggered.  Immunizations were reconciled.

## 2022-01-19 ENCOUNTER — OFFICE VISIT (OUTPATIENT)
Dept: OPHTHALMOLOGY | Facility: CLINIC | Age: 57
End: 2022-01-19
Payer: COMMERCIAL

## 2022-01-19 DIAGNOSIS — E11.9 DIABETES MELLITUS TYPE 2 WITHOUT RETINOPATHY: Primary | ICD-10-CM

## 2022-01-19 DIAGNOSIS — H52.7 REFRACTIVE ERRORS: ICD-10-CM

## 2022-01-19 DIAGNOSIS — Z46.0 ENCOUNTER FOR FITTING OR ADJUSTMENT OF SPECTACLES OR CONTACT LENSES: ICD-10-CM

## 2022-01-19 PROCEDURE — 99999 PR PBB SHADOW E&M-EST. PATIENT-LVL III: ICD-10-PCS | Mod: PBBFAC,,, | Performed by: OPTOMETRIST

## 2022-01-19 PROCEDURE — 1159F MED LIST DOCD IN RCRD: CPT | Mod: CPTII,S$GLB,, | Performed by: OPTOMETRIST

## 2022-01-19 PROCEDURE — 92014 PR EYE EXAM, EST PATIENT,COMPREHESV: ICD-10-PCS | Mod: S$GLB,,, | Performed by: OPTOMETRIST

## 2022-01-19 PROCEDURE — 2023F DILAT RTA XM W/O RTNOPTHY: CPT | Mod: CPTII,S$GLB,, | Performed by: OPTOMETRIST

## 2022-01-19 PROCEDURE — 92310 CONTACT LENS FITTING OU: CPT | Mod: CSM,S$GLB,, | Performed by: OPTOMETRIST

## 2022-01-19 PROCEDURE — 2023F PR DILATED RETINAL EXAM W/O EVID OF RETINOPATHY: ICD-10-PCS | Mod: CPTII,S$GLB,, | Performed by: OPTOMETRIST

## 2022-01-19 PROCEDURE — 1159F PR MEDICATION LIST DOCUMENTED IN MEDICAL RECORD: ICD-10-PCS | Mod: CPTII,S$GLB,, | Performed by: OPTOMETRIST

## 2022-01-19 PROCEDURE — 92014 COMPRE OPH EXAM EST PT 1/>: CPT | Mod: S$GLB,,, | Performed by: OPTOMETRIST

## 2022-01-19 PROCEDURE — 92015 DETERMINE REFRACTIVE STATE: CPT | Mod: S$GLB,,, | Performed by: OPTOMETRIST

## 2022-01-19 PROCEDURE — 92310 PR CONTACT LENS FITTING (NO CHANGE): ICD-10-PCS | Mod: CSM,S$GLB,, | Performed by: OPTOMETRIST

## 2022-01-19 PROCEDURE — 99999 PR PBB SHADOW E&M-EST. PATIENT-LVL III: CPT | Mod: PBBFAC,,, | Performed by: OPTOMETRIST

## 2022-01-19 PROCEDURE — 92015 PR REFRACTION: ICD-10-PCS | Mod: S$GLB,,, | Performed by: OPTOMETRIST

## 2022-01-19 NOTE — PROGRESS NOTES
HPI     NIDDM exam.  No visual complaints.  Last eye visit 06/04/2020 corneal ulcer left eye.  Last eye exam 12/02/2019 [facility].  Update glasses and contact lenses RX.  Patient states she sis wearing -2.00 contact lenses in [facility].  Discuss fee to update contact lenses.  Lab Results       Component                Value               Date                       HGBA1C                   6.5 (H)             10/21/2021                Last edited by Tahira Floyd MA on 1/19/2022  9:38 AM. (History)            Assessment /Plan     For exam results, see Encounter Report.    Diabetes mellitus type 2 without retinopathy    Encounter for fitting or adjustment of spectacles or contact lenses    Refractive errors        No Background Diabetic Retinopathy    Discussed CL charges.  Dispense Final Rx for glasses  No changes CL Rx  RTC 1 year  Discussed above and answered questions.

## 2022-01-31 NOTE — PROGRESS NOTES
Subjective:      Patient ID: Ac Hayden is a 56 y.o. female.    Chief Complaint: Follow-up      HPI  Here for follow up of medical problems.  Lost 6# more, total 10# on own.  4d [employer].  No soda, working on stopping white foods.  No cp/sob/palp.  BMs normal.       Updated/ annual due 10/22:  HM: 10/21 fluvax, 4/21 covid vaccines, 12/19 HAV, 11/16 [id number], 7/20 Td, 12/10 TDaP, 12/20 mmg/me, 10/17 Cscope rep 5y, 6/16 HCV neg, 10/20 Eye Dr. Winslow.  On ozempic, sugars running good range.     Review of Systems   Constitutional: Negative for chills, diaphoresis and fever.   Respiratory: Negative for cough and shortness of breath.    Cardiovascular: Negative for chest pain, palpitations and leg swelling.   Gastrointestinal: Negative for blood in stool, constipation, diarrhea, nausea and vomiting.   Genitourinary: Negative for dysuria, frequency and hematuria.   Psychiatric/Behavioral: The patient is not nervous/anxious.          Objective:   /77 (BP Location: Left arm, Patient Position: Sitting, BP Method: Large (Automatic))   Pulse 84   Temp 97.3 °F (36.3 °C)   Ht 5' (1.524 m)   Wt 135.2 kg (298 lb 1 oz)   SpO2 100%   BMI 58.21 kg/m²     Physical Exam  Constitutional:       Appearance: She is well-developed.   HENT:      Mouth/Throat:      Mouth: Oropharynx is clear and moist.   Neck:      Thyroid: No thyroid mass.      Vascular: No carotid bruit.   Cardiovascular:      Rate and Rhythm: Normal rate and regular rhythm.      Pulses: Intact distal pulses.      Heart sounds: No murmur heard.  No friction rub. No gallop.    Pulmonary:      Effort: Pulmonary effort is normal.      Breath sounds: Normal breath sounds. No wheezing or rales.   Abdominal:      General: Bowel sounds are normal.      Palpations: Abdomen is soft. There is no hepatosplenomegaly or mass.      Tenderness: There is no abdominal tenderness.   Musculoskeletal:         General: No edema.      Cervical back: Neck supple.    Lymphadenopathy:      Cervical: No cervical adenopathy.   Neurological:      Mental Status: She is alert and oriented to person, place, and time.   Psychiatric:         Mood and Affect: Mood and affect normal.             Assessment:       1. Type 2 diabetes mellitus without complication, without long-term current use of insulin    2. Morbid obesity with body mass index (BMI) greater than or equal to 50    3. Hypertension associated with diabetes    4. Mixed anxiety and depressive disorder    5. Hyperlipidemia associated with type 2 diabetes mellitus          Plan:     Type 2 diabetes mellitus without complication, without long-term current use of insulin  -     phentermine (LOMAIRA) 8 mg Tab; Take 1 tablet by mouth every morning.  Dispense: 31 each; Refill: 0    Morbid obesity with body mass index (BMI) greater than or equal to 50    Hypertension associated with diabetes- doing well, cont rx.    Mixed anxiety and depressive disorder- doing well, cont rx.    Hyperlipidemia associated with type 2 diabetes mellitus  -     pravastatin (PRAVACHOL) 40 MG tablet; Take 1 tablet (40 mg total) by mouth once daily.  Dispense: 90 tablet; Refill: 3    Try low dose phent for 3 months, then go up.

## 2022-02-14 ENCOUNTER — OFFICE VISIT (OUTPATIENT)
Dept: FAMILY MEDICINE | Facility: CLINIC | Age: 57
End: 2022-02-14
Payer: COMMERCIAL

## 2022-02-14 VITALS
WEIGHT: 293 LBS | OXYGEN SATURATION: 100 % | SYSTOLIC BLOOD PRESSURE: 120 MMHG | BODY MASS INDEX: 57.52 KG/M2 | HEIGHT: 60 IN | TEMPERATURE: 97 F | HEART RATE: 84 BPM | DIASTOLIC BLOOD PRESSURE: 77 MMHG

## 2022-02-14 DIAGNOSIS — E11.9 TYPE 2 DIABETES MELLITUS WITHOUT COMPLICATION, WITHOUT LONG-TERM CURRENT USE OF INSULIN: Primary | ICD-10-CM

## 2022-02-14 DIAGNOSIS — E11.59 HYPERTENSION ASSOCIATED WITH DIABETES: ICD-10-CM

## 2022-02-14 DIAGNOSIS — E66.01 MORBID OBESITY WITH BODY MASS INDEX (BMI) GREATER THAN OR EQUAL TO 50: ICD-10-CM

## 2022-02-14 DIAGNOSIS — E11.69 HYPERLIPIDEMIA ASSOCIATED WITH TYPE 2 DIABETES MELLITUS: Chronic | ICD-10-CM

## 2022-02-14 DIAGNOSIS — I15.2 HYPERTENSION ASSOCIATED WITH DIABETES: ICD-10-CM

## 2022-02-14 DIAGNOSIS — F41.8 MIXED ANXIETY AND DEPRESSIVE DISORDER: ICD-10-CM

## 2022-02-14 DIAGNOSIS — E78.5 HYPERLIPIDEMIA ASSOCIATED WITH TYPE 2 DIABETES MELLITUS: Chronic | ICD-10-CM

## 2022-02-14 PROCEDURE — 3044F HG A1C LEVEL LT 7.0%: CPT | Mod: CPTII,S$GLB,, | Performed by: INTERNAL MEDICINE

## 2022-02-14 PROCEDURE — 99999 PR PBB SHADOW E&M-EST. PATIENT-LVL IV: ICD-10-PCS | Mod: PBBFAC,,, | Performed by: INTERNAL MEDICINE

## 2022-02-14 PROCEDURE — 3078F DIAST BP <80 MM HG: CPT | Mod: CPTII,S$GLB,, | Performed by: INTERNAL MEDICINE

## 2022-02-14 PROCEDURE — 1159F PR MEDICATION LIST DOCUMENTED IN MEDICAL RECORD: ICD-10-PCS | Mod: CPTII,S$GLB,, | Performed by: INTERNAL MEDICINE

## 2022-02-14 PROCEDURE — 3044F PR MOST RECENT HEMOGLOBIN A1C LEVEL <7.0%: ICD-10-PCS | Mod: CPTII,S$GLB,, | Performed by: INTERNAL MEDICINE

## 2022-02-14 PROCEDURE — 3008F PR BODY MASS INDEX (BMI) DOCUMENTED: ICD-10-PCS | Mod: CPTII,S$GLB,, | Performed by: INTERNAL MEDICINE

## 2022-02-14 PROCEDURE — 3074F PR MOST RECENT SYSTOLIC BLOOD PRESSURE < 130 MM HG: ICD-10-PCS | Mod: CPTII,S$GLB,, | Performed by: INTERNAL MEDICINE

## 2022-02-14 PROCEDURE — 99999 PR PBB SHADOW E&M-EST. PATIENT-LVL IV: CPT | Mod: PBBFAC,,, | Performed by: INTERNAL MEDICINE

## 2022-02-14 PROCEDURE — 99214 OFFICE O/P EST MOD 30 MIN: CPT | Mod: S$GLB,,, | Performed by: INTERNAL MEDICINE

## 2022-02-14 PROCEDURE — 1159F MED LIST DOCD IN RCRD: CPT | Mod: CPTII,S$GLB,, | Performed by: INTERNAL MEDICINE

## 2022-02-14 PROCEDURE — 3074F SYST BP LT 130 MM HG: CPT | Mod: CPTII,S$GLB,, | Performed by: INTERNAL MEDICINE

## 2022-02-14 PROCEDURE — 3078F PR MOST RECENT DIASTOLIC BLOOD PRESSURE < 80 MM HG: ICD-10-PCS | Mod: CPTII,S$GLB,, | Performed by: INTERNAL MEDICINE

## 2022-02-14 PROCEDURE — 99214 PR OFFICE/OUTPT VISIT, EST, LEVL IV, 30-39 MIN: ICD-10-PCS | Mod: S$GLB,,, | Performed by: INTERNAL MEDICINE

## 2022-02-14 PROCEDURE — 3008F BODY MASS INDEX DOCD: CPT | Mod: CPTII,S$GLB,, | Performed by: INTERNAL MEDICINE

## 2022-02-14 RX ORDER — PRAVASTATIN SODIUM 40 MG/1
40 TABLET ORAL DAILY
Qty: 90 TABLET | Refills: 3 | Status: SHIPPED | OUTPATIENT
Start: 2022-02-14 | End: 2023-05-07

## 2022-02-14 RX ORDER — ERGOCALCIFEROL 1.25 MG/1
50000 CAPSULE ORAL
COMMUNITY
Start: 2022-02-11 | End: 2022-03-21

## 2022-02-21 DIAGNOSIS — E11.59 HYPERTENSION ASSOCIATED WITH DIABETES: ICD-10-CM

## 2022-02-21 DIAGNOSIS — I15.2 HYPERTENSION ASSOCIATED WITH DIABETES: ICD-10-CM

## 2022-02-21 NOTE — TELEPHONE ENCOUNTER
No new care gaps identified.  Powered by [employer]. Reference number: [id number].   2/21/2022 4:22:08 PM CST

## 2022-02-22 RX ORDER — HYDROCHLOROTHIAZIDE 25 MG/1
TABLET ORAL
Qty: 90 TABLET | Refills: 3 | Status: SHIPPED | OUTPATIENT
Start: 2022-02-22 | End: 2023-04-03 | Stop reason: SDUPTHER

## 2022-03-14 DIAGNOSIS — J30.1 SEASONAL ALLERGIC RHINITIS DUE TO POLLEN: ICD-10-CM

## 2022-03-14 NOTE — TELEPHONE ENCOUNTER
No new care gaps identified.  Powered by [employer] by [employer]. Reference number: [id number].   3/14/2022 3:36:37 PM CDT

## 2022-03-16 NOTE — PROGRESS NOTES
DIABETES REPORT. Attempting to contact pt to schedule labs &  DM/PCP appt. Unable to reach pt. LVM      LAST HA1c:DATE: 8-9-18     LAST Ha1c:   8.0                            DUE DATE: Now    LAST LIPID PANEL: DATE:   8-9-18                        DUE DATE:Now    LAST MICRO ALBUMIN:DATE:      11-22-17               DUE DATE: Now      No show pcp appt -12-21-18  
No

## 2022-03-19 ENCOUNTER — PATIENT MESSAGE (OUTPATIENT)
Dept: FAMILY MEDICINE | Facility: CLINIC | Age: 57
End: 2022-03-19
Payer: COMMERCIAL

## 2022-03-21 DIAGNOSIS — J30.1 SEASONAL ALLERGIC RHINITIS DUE TO POLLEN: ICD-10-CM

## 2022-03-21 RX ORDER — FLUTICASONE PROPIONATE 50 MCG
SPRAY, SUSPENSION (ML) NASAL
Qty: 48 G | Refills: 3 | Status: SHIPPED | OUTPATIENT
Start: 2022-03-21 | End: 2022-05-18

## 2022-03-21 RX ORDER — ERGOCALCIFEROL 1.25 MG/1
50000 CAPSULE ORAL
Qty: 4 CAPSULE | Refills: 11 | Status: SHIPPED | OUTPATIENT
Start: 2022-03-21 | End: 2023-04-03 | Stop reason: SDUPTHER

## 2022-03-21 NOTE — TELEPHONE ENCOUNTER
Pt states she needs a refill on Levofloxacin but I do not see it in her current medications.     Please advise.

## 2022-03-21 NOTE — TELEPHONE ENCOUNTER
No new care gaps identified.  Powered by [employer]. Reference number: [id number].   3/21/2022 10:29:25 AM CDT

## 2022-03-22 RX ORDER — FLUTICASONE PROPIONATE 50 MCG
SPRAY, SUSPENSION (ML) NASAL
Qty: 48 G | Refills: 0 | OUTPATIENT
Start: 2022-03-22

## 2022-03-22 NOTE — TELEPHONE ENCOUNTER
Eula DC. Request already responded to by other means (e.g. phone or fax)   Refill Authorization Note   Ac Hayden  is requesting a refill authorization.  Brief Assessment and Rationale for Refill:  Quick Discontinue  Medication Therapy Plan:       Medication Reconciliation Completed:  No      Comments:   Pended Medication(s)       Requested Prescriptions     Pending Prescriptions Disp Refills    fluticasone propionate (FLONASE) 50 mcg/actuation nasal spray [Pharmacy Med Name: Fluticasone Propionate 50 MCG/ACT Nasal Suspension] 48 g 0     Sig: Use 2 spray(s) in each nostril once daily           Providers    Authorizing Provider:    Tati Hansen MD   [address] [facility] [address]   Phone:  [phone]   Fax:  [phone]   PABLO #:  [id number]   NPI:  [id number]        Ordering User:  Tati Hansen MD          Pharmacy    [address] [address]   Phone:  [phone]  Fax:  [phone]   PABLO #:  --   LORNA Reason: --       Outpatient Medication Detail     Disp Refills Start End LORNA   fluticasone propionate (FLONASE) 50 mcg/actuation nasal spray 48 g 3 3/21/2022  No   SiUSE TWO SPRAY(S) IN EACH NOSTRIL ONCE DAILY   Sent to pharmacy as: fluticasone propionate (FLONASE) 50 mcg/actuation nasal spray   Class: Normal   Order: [id number]   Date/Time Signed: 3/21/2022 11:16       E-Prescribing Status: Receipt confirmed by pharmacy (3/21/2022 11:16 AM CDT)          Note composed:11:58 AM 2022

## 2022-03-22 NOTE — TELEPHONE ENCOUNTER
Eula DC. Request already responded to by other means (e.g. phone or fax)   Refill Authorization Note   Ac Hayden  is requesting a refill authorization.  Brief Assessment and Rationale for Refill:  Quick Discontinue  Medication Therapy Plan:       Medication Reconciliation Completed:  No      Comments:   Pended Medication(s)       Requested Prescriptions     Refused Prescriptions Disp Refills    fluticasone propionate (FLONASE) 50 mcg/actuation nasal spray [Pharmacy Med Name: Fluticasone Propionate 50 MCG/ACT Nasal Suspension] 48 g 0     Sig: Use 2 spray(s) in each nostril once daily     Refused By: DENISE COLE     Reason for Refusal: Request already responded to by other means (e.g. phone or fax)        Duplicate Pended Encounter(s)/ Last Prescribed Details: (includes pharmacy & prescriber details)   Outpatient Medication Detail     Disp Refills Start End LORNA   fluticasone propionate (FLONASE) 50 mcg/actuation nasal spray 48 g 3 3/21/2022  No   SiUSE TWO SPRAY(S) IN EACH NOSTRIL ONCE DAILY   Sent to pharmacy as: fluticasone propionate (FLONASE) 50 mcg/actuation nasal spray   Class: Normal   Order: [id number]   Date/Time Signed: 3/21/2022 11:16       E-Prescribing Status: Receipt confirmed by pharmacy (3/21/2022 11:16 AM CDT)     Associated Reports   View Encounter        Note composed:3:35 PM 2022

## 2022-03-23 DIAGNOSIS — E11.9 TYPE 2 DIABETES MELLITUS WITHOUT COMPLICATION, WITHOUT LONG-TERM CURRENT USE OF INSULIN: ICD-10-CM

## 2022-04-11 DIAGNOSIS — E11.59 HYPERTENSION ASSOCIATED WITH DIABETES: ICD-10-CM

## 2022-04-11 DIAGNOSIS — I15.2 HYPERTENSION ASSOCIATED WITH DIABETES: ICD-10-CM

## 2022-04-11 NOTE — TELEPHONE ENCOUNTER
No new care gaps identified.  Powered by [employer] by [employer] Reference number: [id number].   4/11/2022 3:52:26 PM CDT

## 2022-04-12 RX ORDER — LOSARTAN POTASSIUM 100 MG/1
TABLET ORAL
Qty: 90 TABLET | Refills: 3 | Status: SHIPPED | OUTPATIENT
Start: 2022-04-12 | End: 2023-04-03 | Stop reason: SDUPTHER

## 2022-04-12 NOTE — TELEPHONE ENCOUNTER
Refill Authorization Note   Ac Hayden  is requesting a refill authorization.  Brief Assessment and Rationale for Refill:  Approve     Medication Therapy Plan:       Medication Reconciliation Completed: No   Comments:     No Care Gaps recommended.     Note composed:4:17 PM 04/12/2022

## 2022-04-14 ENCOUNTER — OFFICE VISIT (OUTPATIENT)
Dept: FAMILY MEDICINE | Facility: CLINIC | Age: 57
End: 2022-04-14
Payer: COMMERCIAL

## 2022-04-14 VITALS
TEMPERATURE: 98 F | DIASTOLIC BLOOD PRESSURE: 76 MMHG | OXYGEN SATURATION: 98 % | SYSTOLIC BLOOD PRESSURE: 116 MMHG | WEIGHT: 290.38 LBS | HEIGHT: 65 IN | BODY MASS INDEX: 48.38 KG/M2 | HEART RATE: 96 BPM

## 2022-04-14 DIAGNOSIS — E66.01 MORBID OBESITY WITH BODY MASS INDEX (BMI) OF 45.0 TO 49.9 IN ADULT: ICD-10-CM

## 2022-04-14 DIAGNOSIS — L50.9 URTICARIAL RASH: Primary | ICD-10-CM

## 2022-04-14 DIAGNOSIS — E11.9 TYPE 2 DIABETES MELLITUS WITHOUT COMPLICATION, WITHOUT LONG-TERM CURRENT USE OF INSULIN: ICD-10-CM

## 2022-04-14 PROCEDURE — 3044F HG A1C LEVEL LT 7.0%: CPT | Mod: CPTII,S$GLB,, | Performed by: FAMILY MEDICINE

## 2022-04-14 PROCEDURE — 96372 PR INJECTION,THERAP/PROPH/DIAG2ST, IM OR SUBCUT: ICD-10-PCS | Mod: S$GLB,,, | Performed by: FAMILY MEDICINE

## 2022-04-14 PROCEDURE — 3008F BODY MASS INDEX DOCD: CPT | Mod: CPTII,S$GLB,, | Performed by: FAMILY MEDICINE

## 2022-04-14 PROCEDURE — 4010F ACE/ARB THERAPY RXD/TAKEN: CPT | Mod: CPTII,S$GLB,, | Performed by: FAMILY MEDICINE

## 2022-04-14 PROCEDURE — 3044F PR MOST RECENT HEMOGLOBIN A1C LEVEL <7.0%: ICD-10-PCS | Mod: CPTII,S$GLB,, | Performed by: FAMILY MEDICINE

## 2022-04-14 PROCEDURE — 3074F SYST BP LT 130 MM HG: CPT | Mod: CPTII,S$GLB,, | Performed by: FAMILY MEDICINE

## 2022-04-14 PROCEDURE — 99213 PR OFFICE/OUTPT VISIT, EST, LEVL III, 20-29 MIN: ICD-10-PCS | Mod: 25,S$GLB,, | Performed by: FAMILY MEDICINE

## 2022-04-14 PROCEDURE — 96372 THER/PROPH/DIAG INJ SC/IM: CPT | Mod: S$GLB,,, | Performed by: FAMILY MEDICINE

## 2022-04-14 PROCEDURE — 3078F DIAST BP <80 MM HG: CPT | Mod: CPTII,S$GLB,, | Performed by: FAMILY MEDICINE

## 2022-04-14 PROCEDURE — 3008F PR BODY MASS INDEX (BMI) DOCUMENTED: ICD-10-PCS | Mod: CPTII,S$GLB,, | Performed by: FAMILY MEDICINE

## 2022-04-14 PROCEDURE — 99999 PR PBB SHADOW E&M-EST. PATIENT-LVL V: CPT | Mod: PBBFAC,,, | Performed by: FAMILY MEDICINE

## 2022-04-14 PROCEDURE — 99213 OFFICE O/P EST LOW 20 MIN: CPT | Mod: 25,S$GLB,, | Performed by: FAMILY MEDICINE

## 2022-04-14 PROCEDURE — 3078F PR MOST RECENT DIASTOLIC BLOOD PRESSURE < 80 MM HG: ICD-10-PCS | Mod: CPTII,S$GLB,, | Performed by: FAMILY MEDICINE

## 2022-04-14 PROCEDURE — 3074F PR MOST RECENT SYSTOLIC BLOOD PRESSURE < 130 MM HG: ICD-10-PCS | Mod: CPTII,S$GLB,, | Performed by: FAMILY MEDICINE

## 2022-04-14 PROCEDURE — 4010F PR ACE/ARB THEARPY RXD/TAKEN: ICD-10-PCS | Mod: CPTII,S$GLB,, | Performed by: FAMILY MEDICINE

## 2022-04-14 PROCEDURE — 99999 PR PBB SHADOW E&M-EST. PATIENT-LVL V: ICD-10-PCS | Mod: PBBFAC,,, | Performed by: FAMILY MEDICINE

## 2022-04-14 RX ORDER — METHYLPREDNISOLONE ACETATE 80 MG/ML
80 INJECTION, SUSPENSION INTRA-ARTICULAR; INTRALESIONAL; INTRAMUSCULAR; SOFT TISSUE ONCE
Status: COMPLETED | OUTPATIENT
Start: 2022-04-14 | End: 2022-04-14

## 2022-04-14 RX ORDER — METHYLPREDNISOLONE ACETATE 40 MG/ML
80 INJECTION, SUSPENSION INTRA-ARTICULAR; INTRALESIONAL; INTRAMUSCULAR; SOFT TISSUE
Status: DISCONTINUED | OUTPATIENT
Start: 2022-04-14 | End: 2022-04-14

## 2022-04-14 RX ORDER — AMLODIPINE BESYLATE 10 MG/1
10 TABLET ORAL DAILY
Qty: 90 TABLET | Refills: 3 | Status: SHIPPED | OUTPATIENT
Start: 2022-04-14 | End: 2022-05-18

## 2022-04-14 RX ADMIN — METHYLPREDNISOLONE ACETATE 80 MG: 80 INJECTION, SUSPENSION INTRA-ARTICULAR; INTRALESIONAL; INTRAMUSCULAR; SOFT TISSUE at 09:04

## 2022-04-14 NOTE — TELEPHONE ENCOUNTER
No new care gaps identified.  Powered by [employer]. Reference number: [id number].   4/14/2022 11:38:34 AM CDT

## 2022-04-14 NOTE — PROGRESS NOTES
Ac Hayden    Chief Complaint   Patient presents with    Rash     Pt c/o rash in face, abdomen and thigh area since Sunday        History of Present Illness:   Ms. Hayden comes in today with complaint if having itchy skin breakout (hives) at her eyelids, face, neck, abdominal wall, upper legs which started Sunday morning. She states she took Benadryl on Sunday and Monday without help. She states she went fishing at [employer] on Saturday.  She states she ate crawfish (which is not new for her) but states she usually only eats 1 to 2 crawfish when she eats it. She states she has been taking Losartan for blood pressure control since January 2022.    Otherwise, she denies having associated sinus symptoms; fever, chills, fatigue, appetite changes; shortness of breath, cough, wheezing; chest pain, palpitations, leg swelling; abdominal pain, nausea, vomiting, diarrhea, constipation; unusual urinary symptoms; polydipsia, polyphagia, polyuria, hot or cold intolerance; back pain; headache; anxiety, depression, homicidal or suicidal thoughts or exposure to new medications.  She states she does not smoke tobacco.    She states her glucose levels range 106 (fasting) and 120's (after meals).     She is followed by PCP Dr. Hansen.    Labs:                  WBC                      8.38                10/21/2021                 HGB                      13.6                10/21/2021                 HCT                      41.6                10/21/2021                 PLT                      322                 10/21/2021                 CHOL                     146                 10/21/2021                 TRIG                     75                  10/21/2021                 HDL                      44                  10/21/2021                 ALT                      15                  02/11/2022                 AST                      19                  10/21/2021                 NA                        142                 02/11/2022                 K                        3.1 (L)             02/11/2022                 CL                       98                  02/11/2022                 CREATININE               0.8                 02/11/2022                 BUN                      15                  02/11/2022                 CO2                      28                  02/11/2022                 TSH                      1.066               10/21/2021                 INR                      1.0                 05/23/2018                 HGBA1C                   6.3 (H)             02/11/2022              LDLCALC                  87.0                10/21/2021                Current Outpatient Medications   Medication Sig    albuterol 90 mcg/actuation inhaler Inhale 1-2 puffs into the lungs every 6 (six) hours as needed for Wheezing.    amLODIPine (NORVASC) 10 MG tablet Take 1 tablet (10 mg total) by mouth once daily.    blood sugar diagnostic Strp To check BG one time daily, to use with insurance preferred meter    DULoxetine (CYMBALTA) 30 MG capsule TAKE 3 CAPSULES BY MOUTH ONCE DAILY    ergocalciferol (ERGOCALCIFEROL) 50,000 unit Cap Take 1 capsule (50,000 Units total) by mouth every 7 days.    fluticasone propionate (FLONASE) 50 mcg/actuation nasal spray 1USE TWO SPRAY(S) IN EACH NOSTRIL ONCE DAILY    hydroCHLOROthiazide (HYDRODIURIL) 25 MG tablet Take 1 tablet by mouth once daily    lancets Misc To check BG one time daily, to use with insurance preferred meter    LORazepam (ATIVAN) 0.5 MG tablet Take 1 tablet (0.5 mg total) by mouth every 6 (six) hours as needed for Anxiety.    losartan (COZAAR) 100 MG tablet Take 1 tablet by mouth once daily    meloxicam (MOBIC) 15 MG tablet Take 1 tablet (15 mg total) by mouth once daily. For pain and inflammation (Patient taking differently: Take 15 mg by mouth daily as needed. For pain and inflammation)    moxifloxacin (VIGAMOX) 0.5 % ophthalmic solution  Place 1 drop into the left eye every 2 (two) hours.    phentermine (LOMAIRA) 8 mg Tab Take 1 tablet by mouth every morning.    pravastatin (PRAVACHOL) 40 MG tablet Take 1 tablet (40 mg total) by mouth once daily.    RELION PRIME METER Misc     semaglutide (OZEMPIC) 0.25 mg or 0.5 mg(2 mg/1.5 mL) pen injector Inject 0.5 mg into the skin every 7 days.       Review of Systems   Constitutional: Negative for appetite change, chills, fatigue and fever.   HENT: Negative for trouble swallowing.    Respiratory: Negative for cough, shortness of breath and wheezing.    Cardiovascular: Negative for chest pain, palpitations and leg swelling.   Gastrointestinal: Negative for abdominal pain, constipation, diarrhea, nausea and vomiting.   Endocrine: Negative for cold intolerance, heat intolerance, polydipsia, polyphagia and polyuria.        See history of present illness.   Genitourinary: Negative for difficulty urinating.   Musculoskeletal: Negative for arthralgias, back pain and myalgias.   Skin:        See history of present illness.   Neurological: Negative for headaches.   Psychiatric/Behavioral: Negative for dysphoric mood and suicidal ideas. The patient is not nervous/anxious.         Negative for homicidal ideas.       Objective:  Physical Exam  Vitals reviewed.   Constitutional:       General: She is not in acute distress.     Appearance: Normal appearance. She is obese. She is not ill-appearing, toxic-appearing or diaphoretic.      Comments: Pleasant.   Cardiovascular:      Rate and Rhythm: Normal rate and regular rhythm.      Pulses: Normal pulses.      Heart sounds: No murmur heard.  Pulmonary:      Effort: Pulmonary effort is normal. No respiratory distress.      Breath sounds: Normal breath sounds. No wheezing.   Abdominal:      General: Bowel sounds are normal. There is no distension.      Palpations: Abdomen is soft. There is no mass.      Tenderness: There is no abdominal tenderness. There is no guarding or  rebound.   Musculoskeletal:         General: No swelling or tenderness. Normal range of motion.      Cervical back: Normal range of motion and neck supple. No tenderness.      Comments: She is ambulatory without problems.   Lymphadenopathy:      Cervical: No cervical adenopathy.   Skin:     General: Skin is warm.      Findings: Rash present.      Comments: Erythematous urticarial rash at right cheek, chin, mid anterior neck and left lower abdominal wall and vaguely at upper eyelids bilaterally.   Neurological:      General: No focal deficit present.      Mental Status: She is alert and oriented to person, place, and time.   Psychiatric:         Mood and Affect: Mood normal.         Behavior: Behavior normal.         Thought Content: Thought content normal.         Judgment: Judgment normal.         ASSESSMENT:  1. Urticarial rash    2. Type 2 diabetes mellitus without complication, without long-term current use of insulin    3. Morbid obesity with body mass index (BMI) of 45.0 to 49.9 in adult        PLAN:  Ac was seen today for rash.    Diagnoses and all orders for this visit:    Urticarial rash  -     methylPREDNISolone acetate injection 80 mg    Type 2 diabetes mellitus without complication, without long-term current use of insulin    Morbid obesity with body mass index (BMI) of 45.0 to 49.9 in adult       I discussed with patient possible causes for rash (food, medication, versus unknown) and advised she continue to take Benadryl daily as directed.  Add steroid for treatment but advised patient steroid may cause temporary rise in glucose levels.  Continue current medications, follow low sodium, low cholesterol, low carb diet, daily walks.  Follow up if symptoms worsen or fail to improve. However, advised patient to follow up with PCP if symptom persists or reoccurs as medication (Losartan) may need to be changed. Patient verbalized understanding.

## 2022-04-29 ENCOUNTER — OFFICE VISIT (OUTPATIENT)
Dept: INTERNAL MEDICINE | Facility: CLINIC | Age: 57
End: 2022-04-29
Payer: COMMERCIAL

## 2022-04-29 VITALS
HEART RATE: 80 BPM | SYSTOLIC BLOOD PRESSURE: 121 MMHG | HEIGHT: 65 IN | DIASTOLIC BLOOD PRESSURE: 74 MMHG | TEMPERATURE: 98 F | OXYGEN SATURATION: 97 % | BODY MASS INDEX: 48.48 KG/M2 | WEIGHT: 291 LBS

## 2022-04-29 DIAGNOSIS — G44.209 ACUTE NON INTRACTABLE TENSION-TYPE HEADACHE: Primary | ICD-10-CM

## 2022-04-29 DIAGNOSIS — H65.03 BILATERAL ACUTE SEROUS OTITIS MEDIA, RECURRENCE NOT SPECIFIED: ICD-10-CM

## 2022-04-29 PROCEDURE — 3044F HG A1C LEVEL LT 7.0%: CPT | Mod: CPTII,S$GLB,, | Performed by: PHYSICIAN ASSISTANT

## 2022-04-29 PROCEDURE — 3078F PR MOST RECENT DIASTOLIC BLOOD PRESSURE < 80 MM HG: ICD-10-PCS | Mod: CPTII,S$GLB,, | Performed by: PHYSICIAN ASSISTANT

## 2022-04-29 PROCEDURE — 3074F PR MOST RECENT SYSTOLIC BLOOD PRESSURE < 130 MM HG: ICD-10-PCS | Mod: CPTII,S$GLB,, | Performed by: PHYSICIAN ASSISTANT

## 2022-04-29 PROCEDURE — 99999 PR PBB SHADOW E&M-EST. PATIENT-LVL V: CPT | Mod: PBBFAC,,, | Performed by: PHYSICIAN ASSISTANT

## 2022-04-29 PROCEDURE — 3008F PR BODY MASS INDEX (BMI) DOCUMENTED: ICD-10-PCS | Mod: CPTII,S$GLB,, | Performed by: PHYSICIAN ASSISTANT

## 2022-04-29 PROCEDURE — 99213 OFFICE O/P EST LOW 20 MIN: CPT | Mod: S$GLB,,, | Performed by: PHYSICIAN ASSISTANT

## 2022-04-29 PROCEDURE — 4010F PR ACE/ARB THEARPY RXD/TAKEN: ICD-10-PCS | Mod: CPTII,S$GLB,, | Performed by: PHYSICIAN ASSISTANT

## 2022-04-29 PROCEDURE — 1159F MED LIST DOCD IN RCRD: CPT | Mod: CPTII,S$GLB,, | Performed by: PHYSICIAN ASSISTANT

## 2022-04-29 PROCEDURE — 99999 PR PBB SHADOW E&M-EST. PATIENT-LVL V: ICD-10-PCS | Mod: PBBFAC,,, | Performed by: PHYSICIAN ASSISTANT

## 2022-04-29 PROCEDURE — 1159F PR MEDICATION LIST DOCUMENTED IN MEDICAL RECORD: ICD-10-PCS | Mod: CPTII,S$GLB,, | Performed by: PHYSICIAN ASSISTANT

## 2022-04-29 PROCEDURE — 99213 PR OFFICE/OUTPT VISIT, EST, LEVL III, 20-29 MIN: ICD-10-PCS | Mod: S$GLB,,, | Performed by: PHYSICIAN ASSISTANT

## 2022-04-29 PROCEDURE — 3074F SYST BP LT 130 MM HG: CPT | Mod: CPTII,S$GLB,, | Performed by: PHYSICIAN ASSISTANT

## 2022-04-29 PROCEDURE — 3044F PR MOST RECENT HEMOGLOBIN A1C LEVEL <7.0%: ICD-10-PCS | Mod: CPTII,S$GLB,, | Performed by: PHYSICIAN ASSISTANT

## 2022-04-29 PROCEDURE — 1160F PR REVIEW ALL MEDS BY PRESCRIBER/CLIN PHARMACIST DOCUMENTED: ICD-10-PCS | Mod: CPTII,S$GLB,, | Performed by: PHYSICIAN ASSISTANT

## 2022-04-29 PROCEDURE — 1160F RVW MEDS BY RX/DR IN RCRD: CPT | Mod: CPTII,S$GLB,, | Performed by: PHYSICIAN ASSISTANT

## 2022-04-29 PROCEDURE — 3008F BODY MASS INDEX DOCD: CPT | Mod: CPTII,S$GLB,, | Performed by: PHYSICIAN ASSISTANT

## 2022-04-29 PROCEDURE — 4010F ACE/ARB THERAPY RXD/TAKEN: CPT | Mod: CPTII,S$GLB,, | Performed by: PHYSICIAN ASSISTANT

## 2022-04-29 PROCEDURE — 3078F DIAST BP <80 MM HG: CPT | Mod: CPTII,S$GLB,, | Performed by: PHYSICIAN ASSISTANT

## 2022-04-29 RX ORDER — AZELASTINE 1 MG/ML
1 SPRAY, METERED NASAL 2 TIMES DAILY
Qty: 30 ML | Refills: 0 | Status: SHIPPED | OUTPATIENT
Start: 2022-04-29 | End: 2023-04-03 | Stop reason: SDUPTHER

## 2022-04-29 RX ORDER — NAPROXEN 500 MG/1
500 TABLET ORAL 2 TIMES DAILY WITH MEALS
Qty: 30 TABLET | Refills: 0 | Status: SHIPPED | OUTPATIENT
Start: 2022-04-29

## 2022-04-29 RX ORDER — LEVOCETIRIZINE DIHYDROCHLORIDE 5 MG/1
5 TABLET, FILM COATED ORAL NIGHTLY
Qty: 30 TABLET | Refills: 0 | Status: SHIPPED | OUTPATIENT
Start: 2022-04-29 | End: 2023-04-03 | Stop reason: SDUPTHER

## 2022-04-29 NOTE — PROGRESS NOTES
"Subjective:       Patient ID: Ac Hayden is a 56 y.o. female.    Chief Complaint: Headache      Patient Care Team:  Tati Hansen MD as PCP - General (Internal Medicine)  JOAQUIN Jain MD (Inactive) as Consulting Physician (Urology)  Ximena Parekh LPN (Inactive) as Care Coordinator    HPI  Patient is new to me.    Ac Hayden is a 56 y.o. female who presents today with complaints of Headache  Started 2 days ago, starts at back of neck and wraps to front, reports "hot" sensation. Reports muffled sounds in her ears.  No paresthesias, vision changes, photophobia, phonophobia, N/V  No history of migraines  Wednesday took 2 Tylenol with mild relief and yesterday took sinus medication. Did report sore throat that has improved with that medication. She reports her AC is out in her office      Review of Systems   Constitutional: Negative for chills and fever.   HENT: Positive for sore throat. Negative for congestion, ear pain and trouble swallowing.    Eyes: Negative for photophobia and visual disturbance.   Respiratory: Negative for cough and shortness of breath.    Cardiovascular: Negative for chest pain.   Gastrointestinal: Negative for nausea and vomiting.   Neurological: Positive for headaches. Negative for dizziness, weakness, light-headedness and numbness.       Objective:      Physical Exam  Vitals and nursing note reviewed.   Constitutional:       General: She is not in acute distress.     Appearance: She is well-developed.   HENT:      Head: Normocephalic and atraumatic.      Right Ear: Ear canal and external ear normal. A middle ear effusion is present.      Left Ear: Ear canal and external ear normal. A middle ear effusion is present.      Nose: No mucosal edema.      Comments: Boggy nasal mucosa noted     Mouth/Throat:      Lips: Pink.      Mouth: Mucous membranes are moist.      Pharynx: No posterior oropharyngeal erythema.      Comments: Clear PND noted to posterior " pharynx  Eyes:      General: Lids are normal. No scleral icterus.     Extraocular Movements: Extraocular movements intact.      Conjunctiva/sclera: Conjunctivae normal.   Neck:     Cardiovascular:      Rate and Rhythm: Normal rate and regular rhythm.   Pulmonary:      Effort: Pulmonary effort is normal.      Breath sounds: Normal breath sounds. No decreased breath sounds, wheezing, rhonchi or rales.   Neurological:      Mental Status: She is alert.      Cranial Nerves: No cranial nerve deficit.      Motor: Motor function is intact.      Gait: Gait is intact.   Psychiatric:         Mood and Affect: Mood and affect normal.         Assessment:       1. Acute non intractable tension-type headache    2. Bilateral acute serous otitis media, recurrence not specified        Plan:     Problem List Items Addressed This Visit    None     Visit Diagnoses     Acute non intractable tension-type headache    -  Primary    Relevant Medications    naproxen (NAPROSYN) 500 MG tablet    Bilateral acute serous otitis media, recurrence not specified        Relevant Medications    azelastine (ASTELIN) 137 mcg (0.1 %) nasal spray    levocetirizine (XYZAL) 5 MG tablet

## 2022-05-04 NOTE — PROGRESS NOTES
"Subjective:      Patient ID: Ac Hayden is a 56 y.o. female.    Chief Complaint: Follow-up (3 month )      HPI  Here for follow up of medical problems.  Sugar was 56 last week, when she hadn't eaten.  On ozempic only.  More low feeling, crying much more without a particular stressor.    Gym twice a week, energy good.  No f/c/sw/cough.  No cp/sob/palp.  BMs normal.  Had hives one month ago, s/p depomedrol injection, no return.    Updated/ annual due 10/22:  HM: 10/21 fluvax, 4/21 covid vaccines, 12/19 HAV, 11/16 [id number], 10/17 [id number], 7/20 Td, 12/10 TDaP, 12/20 mmg/me, 10/17 Cscope rep 5y, 6/16 HCV neg, 10/20 Eye Dr. Winslow.  On ozempic, sugars running good range.     Review of Systems   Constitutional: Negative for chills, diaphoresis and fever.   Respiratory: Negative for cough and shortness of breath.    Cardiovascular: Negative for chest pain, palpitations and leg swelling.   Gastrointestinal: Negative for blood in stool, constipation, diarrhea, nausea and vomiting.   Genitourinary: Negative for dysuria, frequency and hematuria.   Psychiatric/Behavioral: The patient is not nervous/anxious.          Objective:   /70   Pulse 104   Temp 96.9 °F (36.1 °C)   Ht 5' 5" (1.651 m)   Wt 133.6 kg (294 lb 6.8 oz)   SpO2 96%   BMI 48.99 kg/m²     Physical Exam  Constitutional:       Appearance: She is well-developed.   Neck:      Thyroid: No thyroid mass.      Vascular: No carotid bruit.   Cardiovascular:      Rate and Rhythm: Normal rate and regular rhythm.      Heart sounds: No murmur heard.    No friction rub. No gallop.   Pulmonary:      Effort: Pulmonary effort is normal.      Breath sounds: Normal breath sounds. No wheezing or rales.   Abdominal:      General: Bowel sounds are normal.      Palpations: Abdomen is soft. There is no mass.      Tenderness: There is no abdominal tenderness.   Musculoskeletal:      Cervical back: Neck supple.   Lymphadenopathy:      Cervical: No cervical adenopathy. "   Neurological:      Mental Status: She is alert and oriented to person, place, and time.          Latest Reference Range & Units 02/11/22 10:22   Sodium 136 - 145 mmol/L 142   Potassium 3.5 - 5.1 mmol/L 3.1 (L)   Chloride 95 - 110 mmol/L 98   CO2 23 - 29 mmol/L 28   Anion Gap 8 - 16 mmol/L 16   BUN 6 - 20 mg/dL 15   Creatinine 0.5 - 1.4 mg/dL 0.8   EGFR if non African American >60 mL/min/1.73 m^2 >60.0 [1]   EGFR if African American >60 mL/min/1.73 m^2 >60.0   Glucose 70 - 110 mg/dL 92   Calcium 8.7 - 10.5 mg/dL 9.8   ALT 10 - 44 U/L 15   Hemoglobin A1C External 4.0 - 5.6 % 6.3 (H) [2]   Estimated Avg Glucose 68 - 131 mg/dL 134 (H)       Assessment:       1. Type 2 diabetes mellitus without complication, without long-term current use of insulin    2. Hypertension associated with diabetes    3. Hyperlipidemia associated with type 2 diabetes mellitus    4. Mixed anxiety and depressive disorder    5. Morbid obesity with body mass index (BMI) greater than or equal to 50          Plan:     Type 2 diabetes mellitus without complication, without long-term current use of insulin- increase ozempic.  -     semaglutide (OZEMPIC) 1 mg/dose (4 mg/3 mL); Inject 1 mg into the skin every 7 days.  Dispense: 1 pen; Refill: 11    Hypertension associated with diabetes- BP seems low, half amlodipine, cont others, RTC 6wk.  -     amLODIPine (NORVASC) 10 MG tablet; Take 0.5 tablets (5 mg total) by mouth once daily.  Dispense: 90 tablet; Refill: 3    Hyperlipidemia associated with type 2 diabetes mellitus    Mixed anxiety and depressive disorder- cont cymbalta 90mg, add wellbutrin low dose, RTC 6wk.  -     Ambulatory referral/consult to Psychiatry; Future; Expected date: 05/25/2022  -     buPROPion (WELLBUTRIN) 100 MG tablet; Take 1 tablet (100 mg total) by mouth every morning.  Dispense: 30 tablet; Refill: 11    Morbid obesity with body mass index (BMI) greater than or equal to 50- cont to work on wt loss.

## 2022-05-18 ENCOUNTER — OFFICE VISIT (OUTPATIENT)
Dept: FAMILY MEDICINE | Facility: CLINIC | Age: 57
End: 2022-05-18
Payer: COMMERCIAL

## 2022-05-18 VITALS
BODY MASS INDEX: 48.82 KG/M2 | WEIGHT: 293 LBS | TEMPERATURE: 97 F | HEART RATE: 104 BPM | SYSTOLIC BLOOD PRESSURE: 114 MMHG | DIASTOLIC BLOOD PRESSURE: 70 MMHG | HEIGHT: 65 IN | OXYGEN SATURATION: 96 %

## 2022-05-18 DIAGNOSIS — E11.9 TYPE 2 DIABETES MELLITUS WITHOUT COMPLICATION, WITHOUT LONG-TERM CURRENT USE OF INSULIN: Primary | Chronic | ICD-10-CM

## 2022-05-18 DIAGNOSIS — E11.59 HYPERTENSION ASSOCIATED WITH DIABETES: ICD-10-CM

## 2022-05-18 DIAGNOSIS — F41.8 MIXED ANXIETY AND DEPRESSIVE DISORDER: Chronic | ICD-10-CM

## 2022-05-18 DIAGNOSIS — E66.01 MORBID OBESITY WITH BODY MASS INDEX (BMI) GREATER THAN OR EQUAL TO 50: ICD-10-CM

## 2022-05-18 DIAGNOSIS — I15.2 HYPERTENSION ASSOCIATED WITH DIABETES: ICD-10-CM

## 2022-05-18 DIAGNOSIS — E11.69 HYPERLIPIDEMIA ASSOCIATED WITH TYPE 2 DIABETES MELLITUS: Chronic | ICD-10-CM

## 2022-05-18 DIAGNOSIS — E78.5 HYPERLIPIDEMIA ASSOCIATED WITH TYPE 2 DIABETES MELLITUS: Chronic | ICD-10-CM

## 2022-05-18 PROCEDURE — 1159F MED LIST DOCD IN RCRD: CPT | Mod: CPTII,S$GLB,, | Performed by: INTERNAL MEDICINE

## 2022-05-18 PROCEDURE — 3078F DIAST BP <80 MM HG: CPT | Mod: CPTII,S$GLB,, | Performed by: INTERNAL MEDICINE

## 2022-05-18 PROCEDURE — 3008F PR BODY MASS INDEX (BMI) DOCUMENTED: ICD-10-PCS | Mod: CPTII,S$GLB,, | Performed by: INTERNAL MEDICINE

## 2022-05-18 PROCEDURE — 3074F PR MOST RECENT SYSTOLIC BLOOD PRESSURE < 130 MM HG: ICD-10-PCS | Mod: CPTII,S$GLB,, | Performed by: INTERNAL MEDICINE

## 2022-05-18 PROCEDURE — 99999 PR PBB SHADOW E&M-EST. PATIENT-LVL V: CPT | Mod: PBBFAC,,, | Performed by: INTERNAL MEDICINE

## 2022-05-18 PROCEDURE — 99999 PR PBB SHADOW E&M-EST. PATIENT-LVL V: ICD-10-PCS | Mod: PBBFAC,,, | Performed by: INTERNAL MEDICINE

## 2022-05-18 PROCEDURE — 3074F SYST BP LT 130 MM HG: CPT | Mod: CPTII,S$GLB,, | Performed by: INTERNAL MEDICINE

## 2022-05-18 PROCEDURE — 1159F PR MEDICATION LIST DOCUMENTED IN MEDICAL RECORD: ICD-10-PCS | Mod: CPTII,S$GLB,, | Performed by: INTERNAL MEDICINE

## 2022-05-18 PROCEDURE — 3044F HG A1C LEVEL LT 7.0%: CPT | Mod: CPTII,S$GLB,, | Performed by: INTERNAL MEDICINE

## 2022-05-18 PROCEDURE — 99214 OFFICE O/P EST MOD 30 MIN: CPT | Mod: S$GLB,,, | Performed by: INTERNAL MEDICINE

## 2022-05-18 PROCEDURE — 3008F BODY MASS INDEX DOCD: CPT | Mod: CPTII,S$GLB,, | Performed by: INTERNAL MEDICINE

## 2022-05-18 PROCEDURE — 4010F ACE/ARB THERAPY RXD/TAKEN: CPT | Mod: CPTII,S$GLB,, | Performed by: INTERNAL MEDICINE

## 2022-05-18 PROCEDURE — 3044F PR MOST RECENT HEMOGLOBIN A1C LEVEL <7.0%: ICD-10-PCS | Mod: CPTII,S$GLB,, | Performed by: INTERNAL MEDICINE

## 2022-05-18 PROCEDURE — 4010F PR ACE/ARB THEARPY RXD/TAKEN: ICD-10-PCS | Mod: CPTII,S$GLB,, | Performed by: INTERNAL MEDICINE

## 2022-05-18 PROCEDURE — 3078F PR MOST RECENT DIASTOLIC BLOOD PRESSURE < 80 MM HG: ICD-10-PCS | Mod: CPTII,S$GLB,, | Performed by: INTERNAL MEDICINE

## 2022-05-18 PROCEDURE — 99214 PR OFFICE/OUTPT VISIT, EST, LEVL IV, 30-39 MIN: ICD-10-PCS | Mod: S$GLB,,, | Performed by: INTERNAL MEDICINE

## 2022-05-18 RX ORDER — AMLODIPINE BESYLATE 10 MG/1
5 TABLET ORAL DAILY
Qty: 90 TABLET | Refills: 3
Start: 2022-05-18 | End: 2022-07-06

## 2022-05-18 RX ORDER — BUPROPION HYDROCHLORIDE 100 MG/1
100 TABLET ORAL EVERY MORNING
Qty: 30 TABLET | Refills: 11 | Status: SHIPPED | OUTPATIENT
Start: 2022-05-18 | End: 2022-07-06

## 2022-05-20 ENCOUNTER — PATIENT MESSAGE (OUTPATIENT)
Dept: RESEARCH | Facility: HOSPITAL | Age: 57
End: 2022-05-20
Payer: COMMERCIAL

## 2022-05-29 ENCOUNTER — PATIENT MESSAGE (OUTPATIENT)
Dept: FAMILY MEDICINE | Facility: CLINIC | Age: 57
End: 2022-05-29
Payer: COMMERCIAL

## 2022-06-23 NOTE — PROGRESS NOTES
"Subjective:      Patient ID: Ac Hayden is a 56 y.o. female.    Chief Complaint: Follow-up      HPI  Here for follow up of medical problems.  Wellbutrin has helped cymbalta, feels better.  BP at home on half amlodipine, 117/78.  No lightheaded.  BMs normal.  No cp/sob/palp.    Updated/ annual due 10/22:  HM: 10/21 fluvax, 4/21 covid vaccines, 12/19 HAV, 11/16 [id number], 10/17 [id number], 7/20 Td, 12/10 TDaP, 12/21 mmg/me, 10/17 Cscope rep 5y, 6/16 HCV neg, 10/20 Eye Dr. Winslow.  On ozempic, sugars running good range.     Review of Systems   Constitutional: Negative for chills, diaphoresis and fever.   Respiratory: Negative for cough and shortness of breath.    Cardiovascular: Negative for chest pain, palpitations and leg swelling.   Gastrointestinal: Negative for blood in stool, constipation, diarrhea, nausea and vomiting.   Genitourinary: Negative for dysuria, frequency and hematuria.   Psychiatric/Behavioral: The patient is not nervous/anxious.          Objective:   /72 (BP Location: Right arm, Patient Position: Sitting, BP Method: Large (Manual))   Pulse (!) 123   Temp 97.4 °F (36.3 °C)   Ht 5' 5" (1.651 m)   Wt 131.8 kg (290 lb 10.8 oz)   SpO2 98%   BMI 48.37 kg/m²     Physical Exam  Constitutional:       Appearance: She is well-developed.   Neck:      Thyroid: No thyroid mass.      Vascular: No carotid bruit.   Cardiovascular:      Rate and Rhythm: Normal rate and regular rhythm.      Heart sounds: No murmur heard.    No friction rub. No gallop.   Pulmonary:      Effort: Pulmonary effort is normal.      Breath sounds: Normal breath sounds. No wheezing or rales.   Abdominal:      General: Bowel sounds are normal.      Palpations: Abdomen is soft. There is no mass.      Tenderness: There is no abdominal tenderness.   Musculoskeletal:      Cervical back: Neck supple.   Lymphadenopathy:      Cervical: No cervical adenopathy.   Neurological:      Mental Status: She is alert and oriented to person, " place, and time.            Latest Reference Range & Units 02/11/22 10:22   Sodium 136 - 145 mmol/L 142   Potassium 3.5 - 5.1 mmol/L 3.1 (L)   Chloride 95 - 110 mmol/L 98   CO2 23 - 29 mmol/L 28   Anion Gap 8 - 16 mmol/L 16   BUN 6 - 20 mg/dL 15   Creatinine 0.5 - 1.4 mg/dL 0.8   eGFR if non African American >60 mL/min/1.73 m^2 >60.0   eGFR if African American >60 mL/min/1.73 m^2 >60.0   Glucose 70 - 110 mg/dL 92   Calcium 8.7 - 10.5 mg/dL 9.8   ALT 10 - 44 U/L 15   Hemoglobin A1C External 4.0 - 5.6 % 6.3 (H)   Estimated Avg Glucose 68 - 131 mg/dL 134 (H)     Assessment:       1. Hypertension associated with diabetes    2. Type 2 diabetes mellitus without complication, without long-term current use of insulin    3. Mixed anxiety and depressive disorder    4. Morbid obesity with body mass index (BMI) greater than or equal to 50          Plan:     Hypertension associated with diabetes- continue this dose amlod/los/hctz.  -     amLODIPine (NORVASC) 5 MG tablet; Take 1 tablet (5 mg total) by mouth once daily.  Dispense: 90 tablet; Refill: 3    Type 2 diabetes mellitus without complication, without long-term current use of insulin- cont meds and wt loss.    Mixed anxiety and depressive disorder- cont cymbalta 30, increase wellbutrin 150mg.  Keep Psych appt in August.  -     buPROPion (WELLBUTRIN XL) 150 MG TB24 tablet; Take 1 tablet (150 mg total) by mouth once daily.  Dispense: 90 tablet; Refill: 3    Morbid obesity with body mass index (BMI) greater than or equal to 50- cont wt loss.    RTC  2mo.

## 2022-07-06 ENCOUNTER — OFFICE VISIT (OUTPATIENT)
Dept: FAMILY MEDICINE | Facility: CLINIC | Age: 57
End: 2022-07-06
Payer: COMMERCIAL

## 2022-07-06 VITALS
TEMPERATURE: 97 F | WEIGHT: 290.69 LBS | SYSTOLIC BLOOD PRESSURE: 116 MMHG | HEIGHT: 65 IN | OXYGEN SATURATION: 98 % | HEART RATE: 123 BPM | BODY MASS INDEX: 48.43 KG/M2 | DIASTOLIC BLOOD PRESSURE: 72 MMHG

## 2022-07-06 DIAGNOSIS — E11.9 TYPE 2 DIABETES MELLITUS WITHOUT COMPLICATION, WITHOUT LONG-TERM CURRENT USE OF INSULIN: Chronic | ICD-10-CM

## 2022-07-06 DIAGNOSIS — I15.2 HYPERTENSION ASSOCIATED WITH DIABETES: Primary | ICD-10-CM

## 2022-07-06 DIAGNOSIS — E66.01 MORBID OBESITY WITH BODY MASS INDEX (BMI) GREATER THAN OR EQUAL TO 50: ICD-10-CM

## 2022-07-06 DIAGNOSIS — F41.8 MIXED ANXIETY AND DEPRESSIVE DISORDER: Chronic | ICD-10-CM

## 2022-07-06 DIAGNOSIS — E11.59 HYPERTENSION ASSOCIATED WITH DIABETES: Primary | ICD-10-CM

## 2022-07-06 PROCEDURE — 4010F PR ACE/ARB THEARPY RXD/TAKEN: ICD-10-PCS | Mod: CPTII,S$GLB,, | Performed by: INTERNAL MEDICINE

## 2022-07-06 PROCEDURE — 99999 PR PBB SHADOW E&M-EST. PATIENT-LVL IV: ICD-10-PCS | Mod: PBBFAC,,, | Performed by: INTERNAL MEDICINE

## 2022-07-06 PROCEDURE — 3044F PR MOST RECENT HEMOGLOBIN A1C LEVEL <7.0%: ICD-10-PCS | Mod: CPTII,S$GLB,, | Performed by: INTERNAL MEDICINE

## 2022-07-06 PROCEDURE — 3078F PR MOST RECENT DIASTOLIC BLOOD PRESSURE < 80 MM HG: ICD-10-PCS | Mod: CPTII,S$GLB,, | Performed by: INTERNAL MEDICINE

## 2022-07-06 PROCEDURE — 3074F SYST BP LT 130 MM HG: CPT | Mod: CPTII,S$GLB,, | Performed by: INTERNAL MEDICINE

## 2022-07-06 PROCEDURE — 99999 PR PBB SHADOW E&M-EST. PATIENT-LVL IV: CPT | Mod: PBBFAC,,, | Performed by: INTERNAL MEDICINE

## 2022-07-06 PROCEDURE — 99214 OFFICE O/P EST MOD 30 MIN: CPT | Mod: S$GLB,,, | Performed by: INTERNAL MEDICINE

## 2022-07-06 PROCEDURE — 3074F PR MOST RECENT SYSTOLIC BLOOD PRESSURE < 130 MM HG: ICD-10-PCS | Mod: CPTII,S$GLB,, | Performed by: INTERNAL MEDICINE

## 2022-07-06 PROCEDURE — 3044F HG A1C LEVEL LT 7.0%: CPT | Mod: CPTII,S$GLB,, | Performed by: INTERNAL MEDICINE

## 2022-07-06 PROCEDURE — 3008F PR BODY MASS INDEX (BMI) DOCUMENTED: ICD-10-PCS | Mod: CPTII,S$GLB,, | Performed by: INTERNAL MEDICINE

## 2022-07-06 PROCEDURE — 1159F MED LIST DOCD IN RCRD: CPT | Mod: CPTII,S$GLB,, | Performed by: INTERNAL MEDICINE

## 2022-07-06 PROCEDURE — 4010F ACE/ARB THERAPY RXD/TAKEN: CPT | Mod: CPTII,S$GLB,, | Performed by: INTERNAL MEDICINE

## 2022-07-06 PROCEDURE — 99214 PR OFFICE/OUTPT VISIT, EST, LEVL IV, 30-39 MIN: ICD-10-PCS | Mod: S$GLB,,, | Performed by: INTERNAL MEDICINE

## 2022-07-06 PROCEDURE — 3078F DIAST BP <80 MM HG: CPT | Mod: CPTII,S$GLB,, | Performed by: INTERNAL MEDICINE

## 2022-07-06 PROCEDURE — 1159F PR MEDICATION LIST DOCUMENTED IN MEDICAL RECORD: ICD-10-PCS | Mod: CPTII,S$GLB,, | Performed by: INTERNAL MEDICINE

## 2022-07-06 PROCEDURE — 3008F BODY MASS INDEX DOCD: CPT | Mod: CPTII,S$GLB,, | Performed by: INTERNAL MEDICINE

## 2022-07-06 RX ORDER — BUPROPION HYDROCHLORIDE 150 MG/1
150 TABLET ORAL DAILY
Qty: 90 TABLET | Refills: 3 | Status: SHIPPED | OUTPATIENT
Start: 2022-07-06 | End: 2023-01-09 | Stop reason: ALTCHOICE

## 2022-07-06 RX ORDER — AMLODIPINE BESYLATE 5 MG/1
5 TABLET ORAL DAILY
Qty: 90 TABLET | Refills: 3
Start: 2022-07-06 | End: 2022-11-28 | Stop reason: SDUPTHER

## 2022-08-23 ENCOUNTER — TELEPHONE (OUTPATIENT)
Dept: FAMILY MEDICINE | Facility: CLINIC | Age: 57
End: 2022-08-23
Payer: COMMERCIAL

## 2022-08-23 NOTE — TELEPHONE ENCOUNTER
----- Message from Jose Mendez sent at 8/22/2022  3:16 PM CDT -----  Contact: Ac  Ac would like a call back at [phone] in regards to missing the appointment she had scheduled with  and would like to see if she can be seen today by anyone in the office.  Thanks

## 2022-08-23 NOTE — TELEPHONE ENCOUNTER
----- Message from Jose Mendez sent at 8/22/2022  3:16 PM CDT -----  Contact: Ac  Ac would like a call back at [phone] in regards to missing the appointment she had scheduled with  and would like to see if she can be seen today by anyone in the office.  Thanks

## 2022-08-24 ENCOUNTER — OFFICE VISIT (OUTPATIENT)
Dept: FAMILY MEDICINE | Facility: CLINIC | Age: 57
End: 2022-08-24
Payer: COMMERCIAL

## 2022-08-24 VITALS
WEIGHT: 288.56 LBS | DIASTOLIC BLOOD PRESSURE: 74 MMHG | SYSTOLIC BLOOD PRESSURE: 118 MMHG | OXYGEN SATURATION: 95 % | TEMPERATURE: 98 F | HEIGHT: 65 IN | HEART RATE: 118 BPM | BODY MASS INDEX: 48.08 KG/M2

## 2022-08-24 DIAGNOSIS — E11.9 TYPE 2 DIABETES MELLITUS WITHOUT COMPLICATION, WITHOUT LONG-TERM CURRENT USE OF INSULIN: ICD-10-CM

## 2022-08-24 DIAGNOSIS — F41.8 MIXED ANXIETY AND DEPRESSIVE DISORDER: ICD-10-CM

## 2022-08-24 DIAGNOSIS — J01.00 ACUTE NON-RECURRENT MAXILLARY SINUSITIS: Primary | ICD-10-CM

## 2022-08-24 DIAGNOSIS — E11.59 HYPERTENSION ASSOCIATED WITH DIABETES: ICD-10-CM

## 2022-08-24 DIAGNOSIS — I15.2 HYPERTENSION ASSOCIATED WITH DIABETES: ICD-10-CM

## 2022-08-24 PROCEDURE — 96372 PR INJECTION,THERAP/PROPH/DIAG2ST, IM OR SUBCUT: ICD-10-PCS | Mod: S$GLB,,, | Performed by: INTERNAL MEDICINE

## 2022-08-24 PROCEDURE — 3078F DIAST BP <80 MM HG: CPT | Mod: CPTII,S$GLB,, | Performed by: INTERNAL MEDICINE

## 2022-08-24 PROCEDURE — 3074F PR MOST RECENT SYSTOLIC BLOOD PRESSURE < 130 MM HG: ICD-10-PCS | Mod: CPTII,S$GLB,, | Performed by: INTERNAL MEDICINE

## 2022-08-24 PROCEDURE — 3044F PR MOST RECENT HEMOGLOBIN A1C LEVEL <7.0%: ICD-10-PCS | Mod: CPTII,S$GLB,, | Performed by: INTERNAL MEDICINE

## 2022-08-24 PROCEDURE — 1159F MED LIST DOCD IN RCRD: CPT | Mod: CPTII,S$GLB,, | Performed by: INTERNAL MEDICINE

## 2022-08-24 PROCEDURE — 99999 PR PBB SHADOW E&M-EST. PATIENT-LVL V: CPT | Mod: PBBFAC,,, | Performed by: INTERNAL MEDICINE

## 2022-08-24 PROCEDURE — 4010F ACE/ARB THERAPY RXD/TAKEN: CPT | Mod: CPTII,S$GLB,, | Performed by: INTERNAL MEDICINE

## 2022-08-24 PROCEDURE — 99214 PR OFFICE/OUTPT VISIT, EST, LEVL IV, 30-39 MIN: ICD-10-PCS | Mod: 25,S$GLB,, | Performed by: INTERNAL MEDICINE

## 2022-08-24 PROCEDURE — 1159F PR MEDICATION LIST DOCUMENTED IN MEDICAL RECORD: ICD-10-PCS | Mod: CPTII,S$GLB,, | Performed by: INTERNAL MEDICINE

## 2022-08-24 PROCEDURE — 3008F PR BODY MASS INDEX (BMI) DOCUMENTED: ICD-10-PCS | Mod: CPTII,S$GLB,, | Performed by: INTERNAL MEDICINE

## 2022-08-24 PROCEDURE — 99999 PR PBB SHADOW E&M-EST. PATIENT-LVL V: ICD-10-PCS | Mod: PBBFAC,,, | Performed by: INTERNAL MEDICINE

## 2022-08-24 PROCEDURE — 99214 OFFICE O/P EST MOD 30 MIN: CPT | Mod: 25,S$GLB,, | Performed by: INTERNAL MEDICINE

## 2022-08-24 PROCEDURE — 3008F BODY MASS INDEX DOCD: CPT | Mod: CPTII,S$GLB,, | Performed by: INTERNAL MEDICINE

## 2022-08-24 PROCEDURE — 4010F PR ACE/ARB THEARPY RXD/TAKEN: ICD-10-PCS | Mod: CPTII,S$GLB,, | Performed by: INTERNAL MEDICINE

## 2022-08-24 PROCEDURE — 3044F HG A1C LEVEL LT 7.0%: CPT | Mod: CPTII,S$GLB,, | Performed by: INTERNAL MEDICINE

## 2022-08-24 PROCEDURE — 96372 THER/PROPH/DIAG INJ SC/IM: CPT | Mod: S$GLB,,, | Performed by: INTERNAL MEDICINE

## 2022-08-24 PROCEDURE — 3078F PR MOST RECENT DIASTOLIC BLOOD PRESSURE < 80 MM HG: ICD-10-PCS | Mod: CPTII,S$GLB,, | Performed by: INTERNAL MEDICINE

## 2022-08-24 PROCEDURE — 3074F SYST BP LT 130 MM HG: CPT | Mod: CPTII,S$GLB,, | Performed by: INTERNAL MEDICINE

## 2022-08-24 RX ORDER — METHYLPREDNISOLONE ACETATE 40 MG/ML
40 INJECTION, SUSPENSION INTRA-ARTICULAR; INTRALESIONAL; INTRAMUSCULAR; SOFT TISSUE
Status: COMPLETED | OUTPATIENT
Start: 2022-08-24 | End: 2022-08-24

## 2022-08-24 RX ORDER — AZITHROMYCIN 500 MG/1
500 TABLET, FILM COATED ORAL DAILY
Qty: 5 TABLET | Refills: 0 | Status: SHIPPED | OUTPATIENT
Start: 2022-08-24 | End: 2022-08-29

## 2022-08-24 RX ADMIN — METHYLPREDNISOLONE ACETATE 40 MG: 40 INJECTION, SUSPENSION INTRA-ARTICULAR; INTRALESIONAL; INTRAMUSCULAR; SOFT TISSUE at 03:08

## 2022-08-24 NOTE — PROGRESS NOTES
"Subjective:      Patient ID: Ac Hayden is a 56 y.o. female.    Chief Complaint: Sinus Problem and Allergies      HPI  Pt here for follow up of medical problems and head pressure/drainage x 8 days.  No f/c/n/v.  Coughing is dry.  AC breakfast sugars 90s.    Updated/ annual due 10/22:  HM: 10/21 fluvax, 4/21 covid vaccines, 12/19 HAV, 11/16 [id number], 10/17 [id number], 7/20 Td, 12/10 TDaP, 12/21 mmg/me, 10/17 Cscope rep 5y, 6/16 HCV neg, 10/20 Eye Dr. Winslow.  On ozempic, sugars running good range.     Review of Systems   Constitutional: Negative for chills, diaphoresis and fever.   Respiratory: Negative for cough and shortness of breath.    Cardiovascular: Negative for chest pain, palpitations and leg swelling.   Gastrointestinal: Negative for blood in stool, constipation, diarrhea, nausea and vomiting.   Genitourinary: Negative for dysuria, frequency and hematuria.   Psychiatric/Behavioral: The patient is not nervous/anxious.          Objective:   /74 (BP Location: Right arm, Patient Position: Sitting, BP Method: Large (Manual))   Pulse (!) 118   Temp 97.6 °F (36.4 °C)   Ht 5' 5" (1.651 m)   Wt 130.9 kg (288 lb 9.3 oz)   SpO2 95%   BMI 48.02 kg/m²     Physical Exam  Constitutional:       Appearance: She is well-developed.   Neck:      Thyroid: No thyroid mass.      Vascular: No carotid bruit.   Cardiovascular:      Rate and Rhythm: Normal rate and regular rhythm.      Heart sounds: No murmur heard.    No friction rub. No gallop.   Pulmonary:      Effort: Pulmonary effort is normal.      Breath sounds: Normal breath sounds. No wheezing or rales.   Abdominal:      General: Bowel sounds are normal.      Palpations: Abdomen is soft. There is no mass.      Tenderness: There is no abdominal tenderness.   Musculoskeletal:      Cervical back: Neck supple.   Lymphadenopathy:      Cervical: No cervical adenopathy.   Neurological:      Mental Status: She is alert and oriented to person, place, and time. "             Assessment:       1. Acute non-recurrent maxillary sinusitis    2. Type 2 diabetes mellitus without complication, without long-term current use of insulin    3. Hypertension associated with diabetes    4. Mixed anxiety and depressive disorder          Plan:     Acute non-recurrent maxillary sinusitis-  -     azithromycin (ZITHROMAX) 500 MG tablet; Take 1 tablet (500 mg total) by mouth once daily. for 5 days  Dispense: 5 tablet; Refill: 0  -     methylPREDNISolone acetate injection 40 mg    Type 2 diabetes mellitus without complication, without long-term current use of insulin- doing well, lab in Oct.    Hypertension associated with diabetes- stable, cont rx.    Mixed anxiety and depressive disorder- doing well, cont rx.

## 2022-08-24 NOTE — LETTER
August 24, 2022      [facility]  [address] [place] [address]  Phone: [phone]  Fax: [phone]       Patient: Ac Hayden   YOB: 1965  Date of Visit: 08/24/2022    To Whom It May Concern:    Ac Hayden  was at [facility] on 08/24/2022. Please excuse Ac [place] for 8/22/2022-8/26/2022.The patient may return to work/school on 8/29/2022. If you have any questions or concerns, or if I can be of further assistance, please do not hesitate to contact me.    Sincerely,    EDGAR Gonzalez Md

## 2022-09-07 ENCOUNTER — PATIENT MESSAGE (OUTPATIENT)
Dept: PSYCHIATRY | Facility: CLINIC | Age: 57
End: 2022-09-07
Payer: COMMERCIAL

## 2022-09-14 NOTE — PROGRESS NOTES
Outpatient Therapy Discharge Summary     Name: Ac Hayden  Clinic Number: [id number]    Therapy Diagnosis:        Encounter Diagnoses   Name Primary?    Weakness of right lower extremity      Decreased ROM of right knee       Physician: Quincy Miller MD    Physician Orders: PT Eval and Treat   Medical Diagnosis from Referral: M25.561 (ICD-10-CM) - Right knee pain, unspecified chronicity  Evaluation Date: 3/2/2020    Date of Last visit: 3/11/2020  Total Visits Received: 3  Cancelled Visits: 2+  No Show Visits: 1    Assessment    Goals: Short Term Goals: In 3 weeks:  1.Pt to be educated on HEP.  2.Patient to demo increased AROM to WNL to improve functional mobility.  3.Patient to increase strength R LE by 1/2 grade to improve functional tasks.  4.Patient to have decreased pain to 4/10 to improve quality of movement.  5.Patient to increase LE balance to 5 sec.  6.Patient to improve score on the FOTO by 10%.     Long Term Goals: In 6 weeks  1. Patient to perform daily activities including prolonged walking without limitation.  2. Patient to demonstrate increased LE strength to 5/5 to improve functional tasks..  3. Patient to have decreased pain to 1/20 to improve quality of movement.  4. Patient to improve score on the FOTO to 20.    Unable to assess due to patient not returning to PT after COVID-19.  Discharge reason: Patient has not attended therapy since 3/11/2020    Plan   This patient is discharged from Physical Therapy         (2) more than 100 beats/min

## 2022-11-17 ENCOUNTER — LAB VISIT (OUTPATIENT)
Dept: LAB | Facility: HOSPITAL | Age: 57
End: 2022-11-17
Attending: INTERNAL MEDICINE
Payer: COMMERCIAL

## 2022-11-17 DIAGNOSIS — E11.9 TYPE 2 DIABETES MELLITUS WITHOUT COMPLICATION, WITHOUT LONG-TERM CURRENT USE OF INSULIN: ICD-10-CM

## 2022-11-17 LAB
ALBUMIN SERPL BCP-MCNC: 3.6 G/DL (ref 3.5–5.2)
ALP SERPL-CCNC: 82 U/L (ref 55–135)
ALT SERPL W/O P-5'-P-CCNC: 19 U/L (ref 10–44)
ANION GAP SERPL CALC-SCNC: 10 MMOL/L (ref 8–16)
AST SERPL-CCNC: 24 U/L (ref 10–40)
BASOPHILS # BLD AUTO: 0.03 K/UL (ref 0–0.2)
BASOPHILS NFR BLD: 0.3 % (ref 0–1.9)
BILIRUB SERPL-MCNC: 0.4 MG/DL (ref 0.1–1)
BUN SERPL-MCNC: 13 MG/DL (ref 6–20)
CALCIUM SERPL-MCNC: 9.6 MG/DL (ref 8.7–10.5)
CHLORIDE SERPL-SCNC: 102 MMOL/L (ref 95–110)
CHOLEST SERPL-MCNC: 173 MG/DL (ref 120–199)
CHOLEST/HDLC SERPL: 3.6 {RATIO} (ref 2–5)
CO2 SERPL-SCNC: 28 MMOL/L (ref 23–29)
CREAT SERPL-MCNC: 1 MG/DL (ref 0.5–1.4)
DIFFERENTIAL METHOD: ABNORMAL
EOSINOPHIL # BLD AUTO: 0 K/UL (ref 0–0.5)
EOSINOPHIL NFR BLD: 0.3 % (ref 0–8)
ERYTHROCYTE [DISTWIDTH] IN BLOOD BY AUTOMATED COUNT: 12.6 % (ref 11.5–14.5)
EST. GFR  (NO RACE VARIABLE): >60 ML/MIN/1.73 M^2
ESTIMATED AVG GLUCOSE: 117 MG/DL (ref 68–131)
GLUCOSE SERPL-MCNC: 87 MG/DL (ref 70–110)
HBA1C MFR BLD: 5.7 % (ref 4–5.6)
HCT VFR BLD AUTO: 44.9 % (ref 37–48.5)
HDLC SERPL-MCNC: 48 MG/DL (ref 40–75)
HDLC SERPL: 27.7 % (ref 20–50)
HGB BLD-MCNC: 14.2 G/DL (ref 12–16)
IMM GRANULOCYTES # BLD AUTO: 0.01 K/UL (ref 0–0.04)
IMM GRANULOCYTES NFR BLD AUTO: 0.1 % (ref 0–0.5)
LDLC SERPL CALC-MCNC: 108.4 MG/DL (ref 63–159)
LYMPHOCYTES # BLD AUTO: 1.9 K/UL (ref 1–4.8)
LYMPHOCYTES NFR BLD: 21.8 % (ref 18–48)
MCH RBC QN AUTO: 31.4 PG (ref 27–31)
MCHC RBC AUTO-ENTMCNC: 31.6 G/DL (ref 32–36)
MCV RBC AUTO: 99 FL (ref 82–98)
MONOCYTES # BLD AUTO: 0.5 K/UL (ref 0.3–1)
MONOCYTES NFR BLD: 5.7 % (ref 4–15)
NEUTROPHILS # BLD AUTO: 6.4 K/UL (ref 1.8–7.7)
NEUTROPHILS NFR BLD: 71.8 % (ref 38–73)
NONHDLC SERPL-MCNC: 125 MG/DL
NRBC BLD-RTO: 0 /100 WBC
PLATELET # BLD AUTO: 320 K/UL (ref 150–450)
PMV BLD AUTO: 9.4 FL (ref 9.2–12.9)
POTASSIUM SERPL-SCNC: 3.7 MMOL/L (ref 3.5–5.1)
PROT SERPL-MCNC: 7.6 G/DL (ref 6–8.4)
RBC # BLD AUTO: 4.52 M/UL (ref 4–5.4)
SODIUM SERPL-SCNC: 140 MMOL/L (ref 136–145)
TRIGL SERPL-MCNC: 83 MG/DL (ref 30–150)
TSH SERPL DL<=0.005 MIU/L-ACNC: 0.99 UIU/ML (ref 0.4–4)
WBC # BLD AUTO: 8.91 K/UL (ref 3.9–12.7)

## 2022-11-17 PROCEDURE — 80061 LIPID PANEL: CPT | Performed by: INTERNAL MEDICINE

## 2022-11-17 PROCEDURE — 36415 COLL VENOUS BLD VENIPUNCTURE: CPT | Performed by: INTERNAL MEDICINE

## 2022-11-17 PROCEDURE — 80053 COMPREHEN METABOLIC PANEL: CPT | Performed by: INTERNAL MEDICINE

## 2022-11-17 PROCEDURE — 83036 HEMOGLOBIN GLYCOSYLATED A1C: CPT | Performed by: INTERNAL MEDICINE

## 2022-11-17 PROCEDURE — 85025 COMPLETE CBC W/AUTO DIFF WBC: CPT | Performed by: INTERNAL MEDICINE

## 2022-11-17 PROCEDURE — 84443 ASSAY THYROID STIM HORMONE: CPT | Performed by: INTERNAL MEDICINE

## 2022-11-28 ENCOUNTER — OFFICE VISIT (OUTPATIENT)
Dept: INTERNAL MEDICINE | Facility: CLINIC | Age: 57
End: 2022-11-28
Payer: COMMERCIAL

## 2022-11-28 VITALS
HEART RATE: 91 BPM | WEIGHT: 284.94 LBS | TEMPERATURE: 98 F | OXYGEN SATURATION: 98 % | RESPIRATION RATE: 19 BRPM | BODY MASS INDEX: 47.47 KG/M2 | DIASTOLIC BLOOD PRESSURE: 80 MMHG | HEIGHT: 65 IN | SYSTOLIC BLOOD PRESSURE: 120 MMHG

## 2022-11-28 DIAGNOSIS — Z23 NEED FOR INFLUENZA VACCINATION: ICD-10-CM

## 2022-11-28 DIAGNOSIS — Z12.31 ENCOUNTER FOR SCREENING MAMMOGRAM FOR BREAST CANCER: ICD-10-CM

## 2022-11-28 DIAGNOSIS — E11.59 TYPE 2 DIABETES MELLITUS WITH OTHER CIRCULATORY COMPLICATION, WITHOUT LONG-TERM CURRENT USE OF INSULIN: ICD-10-CM

## 2022-11-28 DIAGNOSIS — I15.2 HYPERTENSION ASSOCIATED WITH DIABETES: ICD-10-CM

## 2022-11-28 DIAGNOSIS — Z98.890 S/P GASTRIC SURGERY: ICD-10-CM

## 2022-11-28 DIAGNOSIS — E55.9 VITAMIN D DEFICIENCY: ICD-10-CM

## 2022-11-28 DIAGNOSIS — E11.69 HYPERLIPIDEMIA ASSOCIATED WITH TYPE 2 DIABETES MELLITUS: Chronic | ICD-10-CM

## 2022-11-28 DIAGNOSIS — F41.8 MIXED ANXIETY AND DEPRESSIVE DISORDER: Chronic | ICD-10-CM

## 2022-11-28 DIAGNOSIS — Z86.010 HISTORY OF COLON POLYPS: ICD-10-CM

## 2022-11-28 DIAGNOSIS — E78.5 HYPERLIPIDEMIA ASSOCIATED WITH TYPE 2 DIABETES MELLITUS: Chronic | ICD-10-CM

## 2022-11-28 DIAGNOSIS — E11.59 HYPERTENSION ASSOCIATED WITH DIABETES: ICD-10-CM

## 2022-11-28 DIAGNOSIS — E66.01 CLASS 3 SEVERE OBESITY DUE TO EXCESS CALORIES WITH SERIOUS COMORBIDITY AND BODY MASS INDEX (BMI) OF 45.0 TO 49.9 IN ADULT: ICD-10-CM

## 2022-11-28 DIAGNOSIS — Z76.89 ENCOUNTER TO ESTABLISH CARE: Primary | ICD-10-CM

## 2022-11-28 PROBLEM — R29.898 WEAKNESS OF RIGHT LOWER EXTREMITY: Status: RESOLVED | Noted: 2020-03-02 | Resolved: 2022-11-28

## 2022-11-28 PROBLEM — E66.813 CLASS 3 SEVERE OBESITY DUE TO EXCESS CALORIES WITH SERIOUS COMORBIDITY AND BODY MASS INDEX (BMI) OF 45.0 TO 49.9 IN ADULT: Status: ACTIVE | Noted: 2021-10-28

## 2022-11-28 PROBLEM — R60.0 BILATERAL EDEMA OF LOWER EXTREMITY: Status: RESOLVED | Noted: 2017-01-13 | Resolved: 2022-11-28

## 2022-11-28 PROCEDURE — 3074F PR MOST RECENT SYSTOLIC BLOOD PRESSURE < 130 MM HG: ICD-10-PCS | Mod: CPTII,S$GLB,, | Performed by: PHYSICIAN ASSISTANT

## 2022-11-28 PROCEDURE — 4010F ACE/ARB THERAPY RXD/TAKEN: CPT | Mod: CPTII,S$GLB,, | Performed by: PHYSICIAN ASSISTANT

## 2022-11-28 PROCEDURE — 3079F PR MOST RECENT DIASTOLIC BLOOD PRESSURE 80-89 MM HG: ICD-10-PCS | Mod: CPTII,S$GLB,, | Performed by: PHYSICIAN ASSISTANT

## 2022-11-28 PROCEDURE — 3061F PR NEG MICROALBUMINURIA RESULT DOCUMENTED/REVIEW: ICD-10-PCS | Mod: CPTII,S$GLB,, | Performed by: PHYSICIAN ASSISTANT

## 2022-11-28 PROCEDURE — 99999 PR PBB SHADOW E&M-EST. PATIENT-LVL V: CPT | Mod: PBBFAC,,, | Performed by: PHYSICIAN ASSISTANT

## 2022-11-28 PROCEDURE — 1160F PR REVIEW ALL MEDS BY PRESCRIBER/CLIN PHARMACIST DOCUMENTED: ICD-10-PCS | Mod: CPTII,S$GLB,, | Performed by: PHYSICIAN ASSISTANT

## 2022-11-28 PROCEDURE — 3008F PR BODY MASS INDEX (BMI) DOCUMENTED: ICD-10-PCS | Mod: CPTII,S$GLB,, | Performed by: PHYSICIAN ASSISTANT

## 2022-11-28 PROCEDURE — 3044F HG A1C LEVEL LT 7.0%: CPT | Mod: CPTII,S$GLB,, | Performed by: PHYSICIAN ASSISTANT

## 2022-11-28 PROCEDURE — 3044F PR MOST RECENT HEMOGLOBIN A1C LEVEL <7.0%: ICD-10-PCS | Mod: CPTII,S$GLB,, | Performed by: PHYSICIAN ASSISTANT

## 2022-11-28 PROCEDURE — 99214 PR OFFICE/OUTPT VISIT, EST, LEVL IV, 30-39 MIN: ICD-10-PCS | Mod: 25,S$GLB,, | Performed by: PHYSICIAN ASSISTANT

## 2022-11-28 PROCEDURE — 3074F SYST BP LT 130 MM HG: CPT | Mod: CPTII,S$GLB,, | Performed by: PHYSICIAN ASSISTANT

## 2022-11-28 PROCEDURE — 99214 OFFICE O/P EST MOD 30 MIN: CPT | Mod: 25,S$GLB,, | Performed by: PHYSICIAN ASSISTANT

## 2022-11-28 PROCEDURE — 1159F PR MEDICATION LIST DOCUMENTED IN MEDICAL RECORD: ICD-10-PCS | Mod: CPTII,S$GLB,, | Performed by: PHYSICIAN ASSISTANT

## 2022-11-28 PROCEDURE — 1159F MED LIST DOCD IN RCRD: CPT | Mod: CPTII,S$GLB,, | Performed by: PHYSICIAN ASSISTANT

## 2022-11-28 PROCEDURE — 3008F BODY MASS INDEX DOCD: CPT | Mod: CPTII,S$GLB,, | Performed by: PHYSICIAN ASSISTANT

## 2022-11-28 PROCEDURE — 3061F NEG MICROALBUMINURIA REV: CPT | Mod: CPTII,S$GLB,, | Performed by: PHYSICIAN ASSISTANT

## 2022-11-28 PROCEDURE — 90686 IIV4 VACC NO PRSV 0.5 ML IM: CPT | Mod: S$GLB,,, | Performed by: PHYSICIAN ASSISTANT

## 2022-11-28 PROCEDURE — 3079F DIAST BP 80-89 MM HG: CPT | Mod: CPTII,S$GLB,, | Performed by: PHYSICIAN ASSISTANT

## 2022-11-28 PROCEDURE — 99999 PR PBB SHADOW E&M-EST. PATIENT-LVL V: ICD-10-PCS | Mod: PBBFAC,,, | Performed by: PHYSICIAN ASSISTANT

## 2022-11-28 PROCEDURE — 4010F PR ACE/ARB THEARPY RXD/TAKEN: ICD-10-PCS | Mod: CPTII,S$GLB,, | Performed by: PHYSICIAN ASSISTANT

## 2022-11-28 PROCEDURE — 90686 FLU VACCINE (QUAD) GREATER THAN OR EQUAL TO 3YO PRESERVATIVE FREE IM: ICD-10-PCS | Mod: S$GLB,,, | Performed by: PHYSICIAN ASSISTANT

## 2022-11-28 PROCEDURE — 1160F RVW MEDS BY RX/DR IN RCRD: CPT | Mod: CPTII,S$GLB,, | Performed by: PHYSICIAN ASSISTANT

## 2022-11-28 PROCEDURE — 3066F PR DOCUMENTATION OF TREATMENT FOR NEPHROPATHY: ICD-10-PCS | Mod: CPTII,S$GLB,, | Performed by: PHYSICIAN ASSISTANT

## 2022-11-28 PROCEDURE — 90471 IMMUNIZATION ADMIN: CPT | Mod: S$GLB,,, | Performed by: PHYSICIAN ASSISTANT

## 2022-11-28 PROCEDURE — 90471 FLU VACCINE (QUAD) GREATER THAN OR EQUAL TO 3YO PRESERVATIVE FREE IM: ICD-10-PCS | Mod: S$GLB,,, | Performed by: PHYSICIAN ASSISTANT

## 2022-11-28 PROCEDURE — 3066F NEPHROPATHY DOC TX: CPT | Mod: CPTII,S$GLB,, | Performed by: PHYSICIAN ASSISTANT

## 2022-11-28 RX ORDER — AMLODIPINE BESYLATE 5 MG/1
5 TABLET ORAL DAILY
Qty: 90 TABLET | Refills: 3 | Status: SHIPPED | OUTPATIENT
Start: 2022-11-28 | End: 2023-04-03 | Stop reason: SDUPTHER

## 2022-11-28 NOTE — ASSESSMENT & PLAN NOTE
Wt Readings from Last 10 Encounters:   11/28/22 129.2 kg (284 lb 15.1 oz)   08/24/22 130.9 kg (288 lb 9.3 oz)   07/06/22 131.8 kg (290 lb 10.8 oz)   05/18/22 133.6 kg (294 lb 6.8 oz)   04/29/22 132 kg (291 lb 0.1 oz)   04/14/22 131.7 kg (290 lb 5.5 oz)   02/14/22 135.2 kg (298 lb 1 oz)   12/09/21 (!) 137.4 kg (302 lb 14.6 oz)   10/28/21 (!) 138 kg (304 lb 3.8 oz)   10/20/21 (!) 140 kg (308 lb 10.3 oz)     Body mass index is 47.42 kg/m².  Counseled on diet and exercise.

## 2022-11-28 NOTE — PROGRESS NOTES
Subjective:       Patient ID: Ac Hayden is a 56 y.o. female.    Chief Complaint: Establish Care      Patient Care Team:  Quynh Bradford MD as PCP - General (Family Medicine)  JOAQUIN Jain MD (Inactive) as Consulting Physician (Urology)  Ximena Parekh LPN (Inactive) as Care Coordinator  Monique Heller PA-C as Physician Assistant (Internal Medicine)    Patient presents to clinic today for establish [facility] annual physical.      Review of Systems   Constitutional:  Positive for unexpected weight change (Expected, losing weight, on Ozempic). Negative for chills, fatigue and fever.   HENT:  Positive for hearing loss (Chronic, needs to  her new hearing aid). Negative for congestion, dental problem, ear pain, rhinorrhea and trouble swallowing.    Eyes:  Negative for pain and visual disturbance.   Respiratory:  Negative for cough and shortness of breath.    Cardiovascular:  Positive for leg swelling (New, started 3 days ago, right lower leg, has had varicose veins in that leg chronically, had recent extended periods of standing helping prepare for a wedding, no calf pain or shortness of breath). Negative for chest pain and palpitations.   Gastrointestinal:  Negative for abdominal distention, abdominal pain, blood in stool, constipation, diarrhea, nausea and vomiting.   Genitourinary:  Negative for difficulty urinating and vaginal discharge.   Musculoskeletal:  Negative for arthralgias and myalgias.   Skin:  Negative for rash.   Neurological:  Negative for dizziness, weakness, numbness and headaches.   Hematological:  Negative for adenopathy. Does not bruise/bleed easily.   Psychiatric/Behavioral:  Positive for dysphoric mood (Chronic) and sleep disturbance (Chronic). The patient is nervous/anxious (Chronic).      Objective:      Physical Exam  Vitals and nursing note reviewed.   Constitutional:       General: She is not in acute distress.     Appearance: Normal appearance. She is  well-developed. She is obese.   HENT:      Head: Normocephalic and atraumatic.      Right Ear: Tympanic membrane, ear canal and external ear normal.      Left Ear: Tympanic membrane, ear canal and external ear normal.      Nose: Nose normal. No mucosal edema or rhinorrhea.      Mouth/Throat:      Lips: Pink.      Mouth: Mucous membranes are moist.      Pharynx: Oropharynx is clear. Uvula midline.   Eyes:      General: Lids are normal. No scleral icterus.     Extraocular Movements: Extraocular movements intact.      Conjunctiva/sclera: Conjunctivae normal.      Pupils: Pupils are equal, round, and reactive to light.   Neck:      Thyroid: No thyromegaly.   Cardiovascular:      Rate and Rhythm: Normal rate and regular rhythm.      Pulses: Normal pulses.      Comments: Negative Homans sign on the right  Swelling noted to right ankle and right lower leg, nonpitting, large varicose vein noted to medial lower leg on the right  Pulmonary:      Effort: Pulmonary effort is normal.      Breath sounds: Normal breath sounds. No wheezing, rhonchi or rales.   Abdominal:      General: Bowel sounds are normal. There is no distension.      Palpations: Abdomen is soft. There is no mass.      Tenderness: There is no abdominal tenderness.   Musculoskeletal:         General: Normal range of motion.      Cervical back: Normal range of motion and neck supple.      Left lower leg: No edema.   Lymphadenopathy:      Cervical: No cervical adenopathy.   Skin:     General: Skin is warm and dry.      Findings: No lesion or rash.      Nails: There is no clubbing.   Neurological:      Mental Status: She is alert.      Cranial Nerves: No cranial nerve deficit.      Sensory: No sensory deficit.   Psychiatric:         Mood and Affect: Mood and affect normal.       Protective Sensation (w/ 10 gram monofilament):  Right: Intact  Left: Intact    Visual Inspection:  Normal -  Bilateral and Nails Intact - without Evidence of Foot Deformity-  Bilateral    Pedal Pulses:   Right: Present  Left: Present    Posterior tibialis:   Right:Present  Left: Present    Assessment:       1. Encounter to establish care    2. Hypertension associated with diabetes    3. Hyperlipidemia associated with type 2 diabetes mellitus    4. Type 2 diabetes mellitus with other circulatory complication, without long-term current use of insulin    5. History of colon polyps    6. S/P gastric surgery    7. Mixed anxiety and depressive disorder    8. Vitamin D deficiency    9. Need for influenza vaccination    10. Encounter for screening mammogram for breast cancer    11. Class 3 severe obesity due to excess calories with serious comorbidity and body mass index (BMI) of 45.0 to 49.9 in adult          Plan:   1. Encounter to establish care    2. Hypertension associated with diabetes  Assessment & Plan:  /80, controlled, continue amlodipine, losartan, hydrochlorothiazide  Lab Results   Component Value Date     11/17/2022    K 3.7 11/17/2022    BUN 13 11/17/2022    CREATININE 1.0 11/17/2022    ESTGFRAFRICA >60.0 02/11/2022    EGFRNONAA >60.0 02/11/2022    EGFRNORACEVR >60.0 11/17/2022        Orders:  -     amLODIPine (NORVASC) 5 MG tablet; Take 1 tablet (5 mg total) by mouth once daily.  Dispense: 90 tablet; Refill: 3    3. Hyperlipidemia associated with type 2 diabetes mellitus  Assessment & Plan:  Controlled, continue pravastatin   Lab Results   Component Value Date    CHOL 173 11/17/2022    CHOL 146 10/21/2021    LDLCALC 108.4 11/17/2022    LDLCALC 87.0 10/21/2021    TRIG 83 11/17/2022    HDL 48 11/17/2022    ALT 19 11/17/2022    AST 24 11/17/2022    ALKPHOS 82 11/17/2022        Orders:  -     Comprehensive Metabolic Panel; Future; Expected date: 05/28/2023  -     Lipid Panel; Future; Expected date: 05/28/2023    4. Type 2 diabetes mellitus with other circulatory complication, without long-term current use of insulin  Assessment & Plan:  Controlled, continue Ozempic  Lab  Results   Component Value Date    HGBA1C 5.7 (H) 11/17/2022    HGBA1C 6.3 (H) 02/11/2022    LDLCALC 108.4 11/17/2022    LABMICR 16.0 11/17/2022    CREATRANDUR 351.0 (H) 11/17/2022    MICALBCREAT 4.6 11/17/2022        Orders:  -     Hemoglobin A1C; Future; Expected date: 05/28/2023  -     Ambulatory referral/consult to Optometry; Future; Expected date: 01/19/2023    5. History of colon polyps  -     Ambulatory referral/consult to Endo Procedure ; Future; Expected date: 11/29/2022    6. S/P gastric surgery    7. Mixed anxiety and depressive disorder  Assessment & Plan:  Referral sent to psychiatry department. Continue current regimen    Orders:  -     Ambulatory referral/consult to Psychiatry; Future; Expected date: 12/05/2022    8. Vitamin D deficiency  Assessment & Plan:  Status pending lab, continue supplement    Orders:  -     Vitamin D; Future; Expected date: 05/28/2023    9. Need for influenza vaccination    10. Encounter for screening mammogram for breast cancer  -     Mammo Digital Screening Bilat w/ Shun; Future; Expected date: 12/13/2022    11. Class 3 severe obesity due to excess calories with serious comorbidity and body mass index (BMI) of 45.0 to 49.9 in adult  Assessment & Plan:  Wt Readings from Last 10 Encounters:   11/28/22 129.2 kg (284 lb 15.1 oz)   08/24/22 130.9 kg (288 lb 9.3 oz)   07/06/22 131.8 kg (290 lb 10.8 oz)   05/18/22 133.6 kg (294 lb 6.8 oz)   04/29/22 132 kg (291 lb 0.1 oz)   04/14/22 131.7 kg (290 lb 5.5 oz)   02/14/22 135.2 kg (298 lb 1 oz)   12/09/21 (!) 137.4 kg (302 lb 14.6 oz)   10/28/21 (!) 138 kg (304 lb 3.8 oz)   10/20/21 (!) 140 kg (308 lb 10.3 oz)     Body mass index is 47.42 kg/m².  Counseled on diet and exercise.      Other orders  -     Influenza - Quadrivalent (PF)      Recent labs reviewed with patient.    MMG ordered next month  Eye scheduled in January  C-scope ordered  Flu today  6 month f/u with Dr. Bradford scheduled with fasting labs [facility]  Maintenance reviewed/updated.

## 2022-11-28 NOTE — ASSESSMENT & PLAN NOTE
Controlled, continue pravastatin   Lab Results   Component Value Date    CHOL 173 11/17/2022    CHOL 146 10/21/2021    LDLCALC 108.4 11/17/2022    LDLCALC 87.0 10/21/2021    TRIG 83 11/17/2022    HDL 48 11/17/2022    ALT 19 11/17/2022    AST 24 11/17/2022    ALKPHOS 82 11/17/2022

## 2022-11-28 NOTE — ASSESSMENT & PLAN NOTE
Controlled, continue Ozempic  Lab Results   Component Value Date    HGBA1C 5.7 (H) 11/17/2022    HGBA1C 6.3 (H) 02/11/2022    LDLCALC 108.4 11/17/2022    LABMICR 16.0 11/17/2022    CREATRANDUR 351.0 (H) 11/17/2022    MICALBCREAT 4.6 11/17/2022

## 2022-11-28 NOTE — PATIENT INSTRUCTIONS
A referral has been submitted for psychiatry department. They will contact you for your appointment information. If you do not hear from them in the next 2 weeks, please call [phone] regarding your appointment information.

## 2022-11-28 NOTE — ASSESSMENT & PLAN NOTE
/80, controlled, continue amlodipine, losartan, hydrochlorothiazide  Lab Results   Component Value Date     11/17/2022    K 3.7 11/17/2022    BUN 13 11/17/2022    CREATININE 1.0 11/17/2022    ESTGFRAFRICA >60.0 02/11/2022    EGFRNONAA >60.0 02/11/2022    EGFRNORACEVR >60.0 11/17/2022

## 2022-11-30 ENCOUNTER — HOSPITAL ENCOUNTER (OUTPATIENT)
Dept: PREADMISSION TESTING | Facility: HOSPITAL | Age: 57
Discharge: HOME | End: 2022-11-30
Attending: PHYSICIAN ASSISTANT
Payer: COMMERCIAL

## 2022-11-30 ENCOUNTER — PATIENT MESSAGE (OUTPATIENT)
Dept: PREADMISSION TESTING | Facility: HOSPITAL | Age: 57
End: 2022-11-30

## 2022-11-30 DIAGNOSIS — Z86.010 HISTORY OF COLON POLYPS: Primary | ICD-10-CM

## 2022-11-30 RX ORDER — POLYETHYLENE GLYCOL 3350, SODIUM SULFATE ANHYDROUS, SODIUM BICARBONATE, SODIUM CHLORIDE, POTASSIUM CHLORIDE 236; 22.74; 6.74; 5.86; 2.97 G/4L; G/4L; G/4L; G/4L; G/4L
4 POWDER, FOR SOLUTION ORAL ONCE
Qty: 4000 ML | Refills: 0 | Status: SHIPPED | OUTPATIENT
Start: 2022-11-30 | End: 2022-11-30

## 2022-12-08 ENCOUNTER — OFFICE VISIT (OUTPATIENT)
Dept: SLEEP MEDICINE | Facility: CLINIC | Age: 57
End: 2022-12-08
Payer: COMMERCIAL

## 2022-12-08 ENCOUNTER — HOSPITAL ENCOUNTER (OUTPATIENT)
Dept: RADIOLOGY | Facility: HOSPITAL | Age: 57
Discharge: HOME | End: 2022-12-08
Attending: INTERNAL MEDICINE
Payer: COMMERCIAL

## 2022-12-08 VITALS
OXYGEN SATURATION: 100 % | SYSTOLIC BLOOD PRESSURE: 128 MMHG | DIASTOLIC BLOOD PRESSURE: 80 MMHG | RESPIRATION RATE: 18 BRPM | BODY MASS INDEX: 47.49 KG/M2 | HEART RATE: 103 BPM | HEIGHT: 65 IN | WEIGHT: 285.06 LBS

## 2022-12-08 DIAGNOSIS — E11.69 HYPERLIPIDEMIA ASSOCIATED WITH TYPE 2 DIABETES MELLITUS: Chronic | ICD-10-CM

## 2022-12-08 DIAGNOSIS — E11.59 HYPERTENSION ASSOCIATED WITH DIABETES: ICD-10-CM

## 2022-12-08 DIAGNOSIS — E66.01 CLASS 3 SEVERE OBESITY DUE TO EXCESS CALORIES WITH SERIOUS COMORBIDITY AND BODY MASS INDEX (BMI) OF 45.0 TO 49.9 IN ADULT: ICD-10-CM

## 2022-12-08 DIAGNOSIS — I15.2 HYPERTENSION ASSOCIATED WITH DIABETES: ICD-10-CM

## 2022-12-08 DIAGNOSIS — E11.59 TYPE 2 DIABETES MELLITUS WITH OTHER CIRCULATORY COMPLICATION, WITHOUT LONG-TERM CURRENT USE OF INSULIN: ICD-10-CM

## 2022-12-08 DIAGNOSIS — E78.5 HYPERLIPIDEMIA ASSOCIATED WITH TYPE 2 DIABETES MELLITUS: Chronic | ICD-10-CM

## 2022-12-08 DIAGNOSIS — R91.1 SOLITARY PULMONARY NODULE: ICD-10-CM

## 2022-12-08 DIAGNOSIS — R91.8 PULMONARY NODULES: ICD-10-CM

## 2022-12-08 DIAGNOSIS — R06.83 PRIMARY SNORING: ICD-10-CM

## 2022-12-08 DIAGNOSIS — R91.8 PULMONARY NODULES: Primary | ICD-10-CM

## 2022-12-08 PROCEDURE — 71046 XR CHEST PA AND LATERAL: ICD-10-PCS | Mod: 26,,, | Performed by: RADIOLOGY

## 2022-12-08 PROCEDURE — 3044F HG A1C LEVEL LT 7.0%: CPT | Mod: CPTII,S$GLB,, | Performed by: INTERNAL MEDICINE

## 2022-12-08 PROCEDURE — 3079F PR MOST RECENT DIASTOLIC BLOOD PRESSURE 80-89 MM HG: ICD-10-PCS | Mod: CPTII,S$GLB,, | Performed by: INTERNAL MEDICINE

## 2022-12-08 PROCEDURE — 4010F ACE/ARB THERAPY RXD/TAKEN: CPT | Mod: CPTII,S$GLB,, | Performed by: INTERNAL MEDICINE

## 2022-12-08 PROCEDURE — 1160F PR REVIEW ALL MEDS BY PRESCRIBER/CLIN PHARMACIST DOCUMENTED: ICD-10-PCS | Mod: CPTII,S$GLB,, | Performed by: INTERNAL MEDICINE

## 2022-12-08 PROCEDURE — 3061F PR NEG MICROALBUMINURIA RESULT DOCUMENTED/REVIEW: ICD-10-PCS | Mod: CPTII,S$GLB,, | Performed by: INTERNAL MEDICINE

## 2022-12-08 PROCEDURE — 3074F PR MOST RECENT SYSTOLIC BLOOD PRESSURE < 130 MM HG: ICD-10-PCS | Mod: CPTII,S$GLB,, | Performed by: INTERNAL MEDICINE

## 2022-12-08 PROCEDURE — 3079F DIAST BP 80-89 MM HG: CPT | Mod: CPTII,S$GLB,, | Performed by: INTERNAL MEDICINE

## 2022-12-08 PROCEDURE — 1159F MED LIST DOCD IN RCRD: CPT | Mod: CPTII,S$GLB,, | Performed by: INTERNAL MEDICINE

## 2022-12-08 PROCEDURE — 3074F SYST BP LT 130 MM HG: CPT | Mod: CPTII,S$GLB,, | Performed by: INTERNAL MEDICINE

## 2022-12-08 PROCEDURE — 3008F PR BODY MASS INDEX (BMI) DOCUMENTED: ICD-10-PCS | Mod: CPTII,S$GLB,, | Performed by: INTERNAL MEDICINE

## 2022-12-08 PROCEDURE — 3061F NEG MICROALBUMINURIA REV: CPT | Mod: CPTII,S$GLB,, | Performed by: INTERNAL MEDICINE

## 2022-12-08 PROCEDURE — 99999 PR PBB SHADOW E&M-EST. PATIENT-LVL IV: CPT | Mod: PBBFAC,,, | Performed by: INTERNAL MEDICINE

## 2022-12-08 PROCEDURE — 99999 PR PBB SHADOW E&M-EST. PATIENT-LVL IV: ICD-10-PCS | Mod: PBBFAC,,, | Performed by: INTERNAL MEDICINE

## 2022-12-08 PROCEDURE — 71046 X-RAY EXAM CHEST 2 VIEWS: CPT | Mod: TC

## 2022-12-08 PROCEDURE — 3008F BODY MASS INDEX DOCD: CPT | Mod: CPTII,S$GLB,, | Performed by: INTERNAL MEDICINE

## 2022-12-08 PROCEDURE — 4010F PR ACE/ARB THEARPY RXD/TAKEN: ICD-10-PCS | Mod: CPTII,S$GLB,, | Performed by: INTERNAL MEDICINE

## 2022-12-08 PROCEDURE — 3044F PR MOST RECENT HEMOGLOBIN A1C LEVEL <7.0%: ICD-10-PCS | Mod: CPTII,S$GLB,, | Performed by: INTERNAL MEDICINE

## 2022-12-08 PROCEDURE — 99213 OFFICE O/P EST LOW 20 MIN: CPT | Mod: S$GLB,,, | Performed by: INTERNAL MEDICINE

## 2022-12-08 PROCEDURE — 1159F PR MEDICATION LIST DOCUMENTED IN MEDICAL RECORD: ICD-10-PCS | Mod: CPTII,S$GLB,, | Performed by: INTERNAL MEDICINE

## 2022-12-08 PROCEDURE — 99213 PR OFFICE/OUTPT VISIT, EST, LEVL III, 20-29 MIN: ICD-10-PCS | Mod: S$GLB,,, | Performed by: INTERNAL MEDICINE

## 2022-12-08 PROCEDURE — 1160F RVW MEDS BY RX/DR IN RCRD: CPT | Mod: CPTII,S$GLB,, | Performed by: INTERNAL MEDICINE

## 2022-12-08 PROCEDURE — 3066F NEPHROPATHY DOC TX: CPT | Mod: CPTII,S$GLB,, | Performed by: INTERNAL MEDICINE

## 2022-12-08 PROCEDURE — 3066F PR DOCUMENTATION OF TREATMENT FOR NEPHROPATHY: ICD-10-PCS | Mod: CPTII,S$GLB,, | Performed by: INTERNAL MEDICINE

## 2022-12-08 PROCEDURE — 71046 X-RAY EXAM CHEST 2 VIEWS: CPT | Mod: 26,,, | Performed by: RADIOLOGY

## 2022-12-08 NOTE — PROGRESS NOTES
Subjective:       Patient ID: Ac Hayden is a 56 y.o. female.  Patient Active Problem List   Diagnosis    Degeneration of lumbar or lumbosacral intervertebral disc    Mixed anxiety and depressive disorder    Impingement syndrome, shoulder    Osteoarthritis of acromioclavicular joint    Urgency incontinence    Type 2 diabetes mellitus with circulatory disorder, without long-term current use of insulin    Pulmonary nodules    Hyperlipidemia associated with type 2 diabetes mellitus    History of colon polyps    Primary snoring    S/P gastric surgery    Decreased ROM of right knee    Hypertension associated with diabetes    Class 3 severe obesity due to excess calories with serious comorbidity and body mass index (BMI) of 45.0 to 49.9 in adult    Vitamin D deficiency      Social History     Tobacco Use   Smoking Status Former    Packs/day: 0.25    Years: 13.00    Pack years: 3.25    Types: Cigarettes    Quit date: 10/19/2000    Years since quittin.1   Smokeless Tobacco Never      Immunization History   Administered Date(s) Administered    COVID-19 Vaccine 2022    COVID-19, MRNA, LN-S, PF (MODERNA FULL 0.5 ML DOSE) 2021, 2021    COVID-19, mRNA, LNP-S, bivalent booster, PF (PFIZER OMICRON) 2022    Hepatitis A, Adult 2019    Hepatitis B (recombinant) Adjuvanted, 2 dose 2022    Influenza 2010, 10/31/2011, 2012    Influenza - Quadrivalent 11/10/2014, 10/12/2015, 2016    Influenza - Quadrivalent - PF *Preferred* (6 months and older) 10/04/2017, 2019, 2020, 10/20/2021, 2022    Influenza Split 10/14/2013    Pneumococcal Conjugate - 13 Valent 2016    Pneumococcal Polysaccharide - 23 Valent 10/04/2017    Td (ADULT) 2008    Td - PF (ADULT) 2020    Tdap 2010    Zoster Recombinant 2022, 2022              EPWORTH SLEEPINESS SCALE 2022   Sitting and reading 0   Watching TV 2   Sitting, inactive in a public  place (e.g. a theatre or a meeting) 0   As a passenger in a car for an hour without a break 3   Lying down to rest in the afternoon when circumstances permit 3   Sitting and talking to someone 0   Sitting quietly after a lunch without alcohol 0   In a car, while stopped for a few minutes in traffic 0   Total score 8           Chief Complaint: Pulmonary Nodules    Ms. Ac Hayden is 54 y.o.   Last visit 09/25/2020  This appointment to review home sleep study.  Next the home sleep study was negative for obstructive sleep apnea  She also had chest CT scan to review pulmonary nodules   Pulmonary nodules do not show any detrimental change   chest CT reviewed with patient  Blood pressure elevated due to stress   Works as manager [employer]  Had Mild CHRIS on study 2014  Patient is asymptomatic no cough no wheezing or shortness of breath  Immunizations are up-to-date  I reviewed all her records in detail  I have reviewed the patient's medical history in detail and updated the computerized patient record.           12/09/2021  Follow up visit to review chest CT  Has 2 subcentimeter nodules right lung  CT reveiwed  Unchanged in size  Imaging since April 2016  Not current smoker  Low risk  Annual surveillance with CXR  Patient concurs      12/08/2022  Last visit 12/09/2021  No new respiratory symptoms  CXR revewied  Lost weight from 302lb to 285Lb      Review of Systems   Constitutional:  Positive for weight loss. Negative for weight gain.   HENT: Negative.     Eyes: Negative.    Respiratory:  Negative for apnea, snoring, sputum production, shortness of breath and wheezing.    Cardiovascular: Negative.    Genitourinary: Negative.    Endocrine: endocrine negative    Musculoskeletal: Negative.    Skin: Negative.    Gastrointestinal: Negative.    Neurological: Negative.    Psychiatric/Behavioral:  Negative for sleep disturbance.    All other systems reviewed and are negative.      The following portions of the patient's  history were reviewed and updated as appropriate: She  has a past medical history of Allergic rhinitis, Arthritis, Colon polyps, Diabetes mellitus, Diabetes mellitus, type 2, DM (diabetes mellitus) (18  years 2002), Hyperlipidemia, Hypertension, Metabolic syndrome, Mixed anxiety and depressive disorder, Pneumonia, Prediabetes, and Urine incontinence.  She does not have any pertinent problems on file.  She  has a past surgical history that includes Plantar fascia surgery; Partial hysterectomy; Colonoscopy (N/A, 10/19/2017); Bariatric Surgery (08/22/2018); Hysterectomy; and Total Reduction Mammoplasty (2014).  Her family history includes Cancer in her father; Diabetes in her brother, mother, and sister; Eczema in an other family member; Lupus in an other family member; Ovarian cancer in her sister; Strabismus in her sister; Stroke in her mother.  She  reports that she quit smoking about 22 years ago. She has a 3.25 pack-year smoking history. She has never used smokeless tobacco. She reports that she does not currently use alcohol. She reports that she does not use drugs.  She has a current medication list which includes the following prescription(s): amlodipine, azelastine, blood sugar diagnostic, bupropion, duloxetine, ergocalciferol, hydrochlorothiazide, lancets, levocetirizine, lorazepam, losartan, moxifloxacin, naproxen, pravastatin, relion prime meter, semaglutide, and albuterol.  Current Outpatient Medications on File Prior to Visit   Medication Sig Dispense Refill    amLODIPine (NORVASC) 5 MG tablet Take 1 tablet (5 mg total) by mouth once daily. 90 tablet 3    azelastine (ASTELIN) 137 mcg (0.1 %) nasal spray 1 spray (137 mcg total) by Nasal route 2 (two) times daily. 30 mL 0    blood sugar diagnostic Strp To check BG one time daily, to use with insurance preferred meter 100 each 3    buPROPion (WELLBUTRIN XL) 150 MG TB24 tablet Take 1 tablet (150 mg total) by mouth once daily. 90 tablet 3    DULoxetine  "(CYMBALTA) 30 MG capsule TAKE 3 CAPSULES BY MOUTH ONCE DAILY 270 capsule 3    ergocalciferol (ERGOCALCIFEROL) 50,000 unit Cap Take 1 capsule (50,000 Units total) by mouth every 7 days. 4 capsule 11    hydroCHLOROthiazide (HYDRODIURIL) 25 MG tablet Take 1 tablet by mouth once daily 90 tablet 3    lancets Misc To check BG one time daily, to use with insurance preferred meter 100 each 3    levocetirizine (XYZAL) 5 MG tablet Take 1 tablet (5 mg total) by mouth every evening. 30 tablet 0    LORazepam (ATIVAN) 0.5 MG tablet Take 1 tablet (0.5 mg total) by mouth every 6 (six) hours as needed for Anxiety. 30 tablet 0    losartan (COZAAR) 100 MG tablet Take 1 tablet by mouth once daily 90 tablet 3    moxifloxacin (VIGAMOX) 0.5 % ophthalmic solution Place 1 drop into the left eye every 2 (two) hours. 3 mL 3    naproxen (NAPROSYN) 500 MG tablet Take 1 tablet (500 mg total) by mouth 2 (two) times daily with meals. Take with food 30 tablet 0    pravastatin (PRAVACHOL) 40 MG tablet Take 1 tablet (40 mg total) by mouth once daily. 90 tablet 3    RELION PRIME METER Misc       semaglutide (OZEMPIC) 1 mg/dose (4 mg/3 mL) Inject 1 mg into the skin every 7 days. 1 pen 11    albuterol 90 mcg/actuation inhaler Inhale 1-2 puffs into the lungs every 6 (six) hours as needed for Wheezing. 1 Inhaler 0     No current facility-administered medications on file prior to visit.     She is allergic to codeine, pcn [penicillins], and sulfa (sulfonamide antibiotics)..    Objective:       Vitals:    12/08/22 1422   BP: 128/80   Pulse: 103   Resp: 18   SpO2: 100%   Weight: 129.3 kg (285 lb 0.9 oz)   Height: 5' 5" (1.651 m)         Physical Exam   Constitutional: She is oriented to person, place, and time. She appears well-developed and well-nourished. She is obese.   HENT:   Head: Normocephalic.   Mouth/Throat: Mallampati Score: IV.   Cardiovascular: Normal rate and normal heart sounds.   Pulmonary/Chest: Normal expansion, hyperinflation and symmetric " chest wall expansion.   Musculoskeletal:         General: Normal range of motion.      Cervical back: Normal range of motion and neck supple.   Neurological: She is alert and oriented to person, place, and time.   Skin: Skin is warm and dry.   Psychiatric: She has a normal mood and affect.   Nursing note and vitals reviewed.  Personal Diagnostic Review     X-Ray Chest PA And Lateral  Narrative: EXAMINATION:  XR CHEST PA AND LATERAL    CLINICAL HISTORY:  Other nonspecific abnormal finding of lung field    TECHNIQUE:  PA and lateral views of the chest were performed.    COMPARISON:  10/11/2021    FINDINGS:  The lungs are clear, with normal appearance of pulmonary vasculature and no pleural effusion or pneumothorax.  Known pulmonary nodules are not well evaluated.    The cardiac silhouette is normal in size. The hilar and mediastinal contours are unremarkable.    Bones are intact.  Impression: No acute abnormality.  Please see prior CT chest for pulmonary nodule description.    Electronically signed by: Magan Bernardo  Date:    2022  Time:    14:25     Assessment:       Problem List Items Addressed This Visit       Hyperlipidemia associated with type 2 diabetes mellitus (Chronic)     Stable on PRAVACHOL         Primary snoring    Type 2 diabetes mellitus with circulatory disorder, without long-term current use of insulin     Stable on OZEMPIC         Pulmonary nodules - Primary     2 stable nodules  Imagin2016, 2016, 2018, 2018, 2018, 2020, 2021      STABLE CXR           Relevant Orders    X-Ray Chest PA And Lateral    Hypertension associated with diabetes     STABLE NORVASC HCTZ, LOSARTAN         Class 3 severe obesity due to excess calories with serious comorbidity and body mass index (BMI) of 45.0 to 49.9 in adult     General weight loss/lifestyle modification strategies discussed (elicit support from others; identify saboteurs; non-food rewards).  Diet interventions: low calorie (1000  kCal/d) deficit diet           Plan:        Follow up in about 1 year (around 12/8/2023), or CXR.    Imaging surveillance: Repeat CT chest in 12 months.   Fleischner Society Guidelines:    High risk patient:   Smoking history, history of malignancy or risk factors for malignancy.( non-solid ground glass opacities and partially solid nodules may require longer follow up to exclude indolent adenocarcinoma)      Nodule size Low risk patient High risk patient   4 mm  No follow up Follow up CT in 12 months. If unchanged, no further follow up.   4 - 6 mm Follow up CT in 12 months; if unchanged, no further follow up.  Initial follow up CT in 6 - 12 months, then at 18 - 24 months if no change.      6 - 8 mm  Initial follow up CT at 6 - 12 months and at 18 months if no change.  Initial follow up CT at 3 - 6 months. Then at 9-12 months and 24 months if no change.      > 8 mm Initial follow up CT at 3, 6, 9 and 24 months, dynamic contrast enhanced CT, PET-CT and/or biopsy. Initial follow up CT at 3, 6, 9 and 24 months, dynamic contrast enhanced CT, PET-CT and/or biopsy.             TIME SPENT WITH PATIENT:     Time spent: 20 minutes in face to face  discussion concerning diagnosis, prognosis, review of lab and test results, benefits of treatment as well as management of disease, counseling of patient and coordination of care between various health  care providers . Greater than half the time spent was used for coordination of care and counseling of patient.     Keo Tavera    Pulmonary/Critical care/Sleepmedicine

## 2022-12-13 ENCOUNTER — PATIENT MESSAGE (OUTPATIENT)
Dept: INTERNAL MEDICINE | Facility: CLINIC | Age: 57
End: 2022-12-13
Payer: COMMERCIAL

## 2023-01-05 ENCOUNTER — HOSPITAL ENCOUNTER (OUTPATIENT)
Facility: HOSPITAL | Age: 58
Discharge: HOME | End: 2023-01-05
Attending: INTERNAL MEDICINE | Admitting: INTERNAL MEDICINE
Payer: COMMERCIAL

## 2023-01-05 ENCOUNTER — ANESTHESIA (OUTPATIENT)
Dept: ENDOSCOPY | Facility: HOSPITAL | Age: 58
End: 2023-01-05
Payer: COMMERCIAL

## 2023-01-05 ENCOUNTER — ANESTHESIA EVENT (OUTPATIENT)
Dept: ENDOSCOPY | Facility: HOSPITAL | Age: 58
End: 2023-01-05
Payer: COMMERCIAL

## 2023-01-05 VITALS
BODY MASS INDEX: 46.48 KG/M2 | HEIGHT: 65 IN | WEIGHT: 279 LBS | RESPIRATION RATE: 16 BRPM | SYSTOLIC BLOOD PRESSURE: 141 MMHG | OXYGEN SATURATION: 100 % | DIASTOLIC BLOOD PRESSURE: 85 MMHG | TEMPERATURE: 98 F | HEART RATE: 91 BPM

## 2023-01-05 DIAGNOSIS — Z86.010 HISTORY OF COLON POLYPS: Primary | ICD-10-CM

## 2023-01-05 LAB — POCT GLUCOSE: 80 MG/DL (ref 70–110)

## 2023-01-05 PROCEDURE — 25000003 PHARM REV CODE 250: Performed by: NURSE ANESTHETIST, CERTIFIED REGISTERED

## 2023-01-05 PROCEDURE — 63600175 PHARM REV CODE 636 W HCPCS: Performed by: NURSE ANESTHETIST, CERTIFIED REGISTERED

## 2023-01-05 PROCEDURE — 45385 COLONOSCOPY W/LESION REMOVAL: CPT | Mod: PT | Performed by: INTERNAL MEDICINE

## 2023-01-05 PROCEDURE — 88305 TISSUE EXAM BY PATHOLOGIST: CPT | Mod: 26,,, | Performed by: PATHOLOGY

## 2023-01-05 PROCEDURE — 27201089 HC SNARE, DISP (ANY): Performed by: INTERNAL MEDICINE

## 2023-01-05 PROCEDURE — 25000003 PHARM REV CODE 250: Performed by: INTERNAL MEDICINE

## 2023-01-05 PROCEDURE — 37000009 HC ANESTHESIA EA ADD 15 MINS: Performed by: INTERNAL MEDICINE

## 2023-01-05 PROCEDURE — 88305 TISSUE EXAM BY PATHOLOGIST: ICD-10-PCS | Mod: 26,,, | Performed by: PATHOLOGY

## 2023-01-05 PROCEDURE — 45385 COLONOSCOPY W/LESION REMOVAL: CPT | Mod: 33,,, | Performed by: INTERNAL MEDICINE

## 2023-01-05 PROCEDURE — 37000008 HC ANESTHESIA 1ST 15 MINUTES: Performed by: INTERNAL MEDICINE

## 2023-01-05 PROCEDURE — 88305 TISSUE EXAM BY PATHOLOGIST: CPT | Performed by: PATHOLOGY

## 2023-01-05 PROCEDURE — 45385 PR COLONOSCOPY,REMV LESN,SNARE: ICD-10-PCS | Mod: 33,,, | Performed by: INTERNAL MEDICINE

## 2023-01-05 RX ORDER — PROPOFOL 10 MG/ML
VIAL (ML) INTRAVENOUS
Status: DISCONTINUED | OUTPATIENT
Start: 2023-01-05 | End: 2023-01-05

## 2023-01-05 RX ORDER — LIDOCAINE HYDROCHLORIDE 10 MG/ML
INJECTION, SOLUTION EPIDURAL; INFILTRATION; INTRACAUDAL; PERINEURAL
Status: DISCONTINUED | OUTPATIENT
Start: 2023-01-05 | End: 2023-01-05

## 2023-01-05 RX ORDER — DEXTROMETHORPHAN/PSEUDOEPHED 2.5-7.5/.8
DROPS ORAL
Status: DISCONTINUED | OUTPATIENT
Start: 2023-01-05 | End: 2023-01-05 | Stop reason: HOSPADM

## 2023-01-05 RX ADMIN — SODIUM CHLORIDE, SODIUM LACTATE, POTASSIUM CHLORIDE, AND CALCIUM CHLORIDE: .6; .31; .03; .02 INJECTION, SOLUTION INTRAVENOUS at 09:01

## 2023-01-05 RX ADMIN — PROPOFOL 40 MG: 10 INJECTION, EMULSION INTRAVENOUS at 09:01

## 2023-01-05 RX ADMIN — LIDOCAINE HYDROCHLORIDE 50 MG: 10 INJECTION, SOLUTION EPIDURAL; INFILTRATION; INTRACAUDAL; PERINEURAL at 09:01

## 2023-01-05 RX ADMIN — PROPOFOL 80 MG: 10 INJECTION, EMULSION INTRAVENOUS at 09:01

## 2023-01-05 NOTE — H&P
PRE PROCEDURE H&P    Patient Name: Ac Hayden  MRN: [id number]  : 1965  Date of Procedure:  2023  Referring Physician: Monique Heller PA*  Primary Physician: Quynh Bradford MD  Procedure Physician: Katelyn Hensley MD       Planned Procedure: Colonoscopy  Diagnosis: previous adenomatous polyp  Chief Complaint: Same as above    HPI: Patient is an 57 y.o. female is here for the above.     Last colonoscopy:   Family history: neg   Anticoagulation: none     Past Medical History:   Past Medical History:   Diagnosis Date    Allergic rhinitis     Arthritis     Colon polyps     Diabetes mellitus     Diabetes mellitus, type 2     DM (diabetes mellitus) 18  years      am 2022    Hyperlipidemia     Hypertension     Metabolic syndrome     Mixed anxiety and depressive disorder     Pneumonia     Prediabetes     Urine incontinence         Past Surgical History:  Past Surgical History:   Procedure Laterality Date    BARIATRIC SURGERY  2018    [facility])    COLONOSCOPY N/A 10/19/2017    Procedure: COLONOSCOPY;  Surgeon: Jamal Cole MD;  Location: [facility];  Service: Endoscopy;  Laterality: N/A;    HYSTERECTOMY      PARTIAL HYSTERECTOMY      PLANTAR FASCIA SURGERY      TOTAL REDUCTION MAMMOPLASTY          Home Medications:  Prior to Admission medications    Medication Sig Start Date End Date Taking? Authorizing Provider   amLODIPine (NORVASC) 5 MG tablet Take 1 tablet (5 mg total) by mouth once daily. 22 Yes Monique Heller PA-C   azelastine (ASTELIN) 137 mcg (0.1 %) nasal spray 1 spray (137 mcg total) by Nasal route 2 (two) times daily. 22  Yes Monique Heller PA-C   blood sugar diagnostic Strp To check BG one time daily, to use with insurance preferred meter 18  Yes Siva Esparza MD   buPROPion (WELLBUTRIN XL) 150 MG TB24 tablet Take 1 tablet (150 mg total) by mouth once daily. 22 Yes Tati Hansen,  MD   DULoxetine (CYMBALTA) 30 MG capsule TAKE 3 CAPSULES BY MOUTH ONCE DAILY 3/21/22  Yes Tati Hansen MD   ergocalciferol (ERGOCALCIFEROL) 50,000 unit Cap Take 1 capsule (50,000 Units total) by mouth every 7 days. 3/21/22  Yes Tati Hansen MD   hydroCHLOROthiazide (HYDRODIURIL) 25 MG tablet Take 1 tablet by mouth once daily 2/22/22  Yes Tati Hansen MD   lancets [facility] To check BG one time daily, to use with insurance preferred meter 9/21/18  Yes Siva Esparza MD   levocetirizine (XYZAL) 5 MG tablet Take 1 tablet (5 mg total) by mouth every evening. 4/29/22  Yes Monique Heller PA-C   LORazepam (ATIVAN) 0.5 MG tablet Take 1 tablet (0.5 mg total) by mouth every 6 (six) hours as needed for Anxiety. 3/12/21  Yes Tati Hansen MD   losartan (COZAAR) 100 MG tablet Take 1 tablet by mouth once daily 4/12/22  Yes Tati Hansen MD   moxifloxacin (VIGAMOX) 0.5 % ophthalmic solution Place 1 drop into the left eye every 2 (two) hours. 5/29/20  Yes Star Winslow, LINETTE   pravastatin (PRAVACHOL) 40 MG tablet Take 1 tablet (40 mg total) by mouth once daily. 2/14/22 2/14/23 Yes Tati Hansen MD   RELION PRIME METER [facility]  9/21/18  Yes Historical Provider   albuterol 90 mcg/actuation inhaler Inhale 1-2 puffs into the lungs every 6 (six) hours as needed for Wheezing. 3/19/16 11/28/22  Jeovanny Zepeda PA-C   naproxen (NAPROSYN) 500 MG tablet Take 1 tablet (500 mg total) by mouth 2 (two) times daily with meals. Take with food 4/29/22   Monique Heller PA-C   semaglutide (OZEMPIC) 1 mg/dose (4 mg/3 mL) Inject 1 mg into the skin every 7 days. 5/18/22 5/18/23  Tati Hansen MD        Allergies:  Review of patient's allergies indicates:   Allergen Reactions    Codeine Edema and Itching    Pcn [penicillins] Rash    Sulfa (sulfonamide antibiotics) Edema and Rash        Social History:   Social History     Socioeconomic History    Marital status:    Occupational History     Employer:  "[id number]   Tobacco Use    Smoking status: Former     Packs/day: 0.25     Years: 13.00     Pack years: 3.25     Types: Cigarettes     Quit date: 10/19/2000     Years since quittin.2    Smokeless tobacco: Never   Substance and Sexual Activity    Alcohol use: Not Currently     Comment: occasionally    Drug use: No    Sexual activity: Never   Social History Narrative    Works at [employer], inventory mgmt/receiving. .       Family History:  Family History   Problem Relation Age of Onset    Diabetes Mother     Stroke Mother     Cancer Father     Diabetes Sister     Ovarian cancer Sister     Strabismus Sister     Diabetes Brother     Lupus Other     Eczema Other     Melanoma Neg Hx     Psoriasis Neg Hx        ROS: No acute cardiac events, no acute respiratory complaints.     Physical Exam (all patients):    BP (!) 141/82 (BP Location: Left arm, Patient Position: Lying)   Pulse 79   Temp 97.9 °F (36.6 °C) (Temporal)   Resp 16   Ht 5' 5" (1.651 m)   Wt 126.6 kg (279 lb)   SpO2 99%   Breastfeeding No   BMI 46.43 kg/m²   Lungs: Clear to auscultation bilaterally, respirations unlabored  Heart: Regular rate and rhythm, S1 and S2 normal, no obvious murmurs  Abdomen:         Soft, non-tender, bowel sounds normal, no masses, no organomegaly    Lab Results   Component Value Date    WBC 8.91 2022    MCV 99 (H) 2022    RDW 12.6 2022     2022    INR 1.0 2018    GLU 87 2022    HGBA1C 5.7 (H) 2022    BUN 13 2022     2022    K 3.7 2022     2022        SEDATION PLAN: per anesthesia      History reviewed, vital signs satisfactory, cardiopulmonary status satisfactory, sedation options, risks and plans have been discussed with the patient  All their questions were answered and the patient agrees to the sedation procedures as planned and the patient is deemed an appropriate candidate for the sedation as planned.    Procedure " explained to patient, informed consent obtained and placed in chart.    Katelyn Hensley  1/5/2023  9:28 AM

## 2023-01-05 NOTE — ANESTHESIA POSTPROCEDURE EVALUATION
Anesthesia Post Evaluation    Patient: Ac Hayden    Procedure(s) Performed: Procedure(s) (LRB):  COLONOSCOPY (N/A)    Final Anesthesia Type: MAC      Patient location during evaluation: PACU  Patient participation: Yes- Able to Participate  Level of consciousness: awake and alert  Post-procedure vital signs: reviewed and stable  Pain management: adequate  Airway patency: patent    PONV status at discharge: No PONV  Anesthetic complications: no      Cardiovascular status: blood pressure returned to baseline  Respiratory status: unassisted and room air  Hydration status: euvolemic  Follow-up not needed.          Vitals Value Taken Time   /82 01/05/23 0806   Temp 36.6 °C (97.9 °F) 01/05/23 0806   Pulse 79 01/05/23 0806   Resp 16 01/05/23 0806   SpO2 99 % 01/05/23 0806         No case tracking events are documented in the log.      Pain/Arianne Score: No data recorded

## 2023-01-05 NOTE — PROVATION PATIENT INSTRUCTIONS
Discharge Summary/Instructions after an Endoscopic Procedure  Patient Name: Ac Hayden  Patient MRN: [id number]  Patient YOB: 1965 Thursday, January 5, 2023 Katelyn Hensley MD  Dear patient,  As a result of recent federal legislation (The Federal Cures Act), you may   receive lab or pathology results from your procedure in your [facility]   account before your physician is able to contact you. Your physician or   their representative will relay the results to you with their   recommendations at their soonest availability.  Thank you,  RESTRICTIONS:  During your procedure today, you received medications for sedation.  These   medications may affect your judgment, balance and coordination.  Therefore,   for 24 hours, you have the following restrictions:   - DO NOT drive a car, operate machinery, make legal/financial decisions,   sign important papers or drink alcohol.    ACTIVITY:  Today: no heavy lifting, straining or running due to procedural   sedation/anesthesia.  The following day: return to full activity including work.  DIET:  Eat and drink normally unless instructed otherwise.     TREATMENT FOR COMMON SIDE EFFECTS:  - Mild abdominal pain, nausea, belching, bloating or excessive gas:  rest,   eat lightly and use a heating pad.  - Sore Throat: treat with throat lozenges and/or gargle with warm salt   water.  - Because air was used during the procedure, expelling large amounts of air   from your rectum or belching is normal.  - If a bowel prep was taken, you may not have a bowel movement for 1-3 days.    This is normal.  SYMPTOMS TO WATCH FOR AND REPORT TO YOUR PHYSICIAN:  1. Abdominal pain or bloating, other than gas cramps.  2. Chest pain.  3. Back pain.  4. Signs of infection such as: chills or fever occurring within 24 hours   after the procedure.  5. Rectal bleeding, which would show as bright red, maroon, or black stools.   (A tablespoon of blood from the rectum is not serious, especially  if   hemorrhoids are present.)  6. Vomiting.  7. Weakness or dizziness.  GO DIRECTLY TO THE NEAREST EMERGENCY ROOM IF YOU HAVE ANY OF THE FOLLOWING:      Difficulty breathing              Chills and/or fever over 101 F   Persistent vomiting and/or vomiting blood   Severe abdominal pain   Severe chest pain   Black, tarry stools   Bleeding- more than one tablespoon   Any other symptom or condition that you feel may need urgent attention  Your doctor recommends these additional instructions:  If any biopsies were taken, your doctors clinic will contact you in 1 to 2   weeks with any results.  - Patient has a contact number available for emergencies.  The signs and   symptoms of potential delayed complications were discussed with the   patient.  Return to normal activities tomorrow.  Written discharge   instructions were provided to the patient.   - Discharge patient to home (via wheelchair).   - Resume previous diet today.   - Continue present medications.   - No aspirin, ibuprofen, naproxen, or other non-steroidal anti-inflammatory   drugs for 7 days after polyp removal.   - Await pathology results.   - Repeat colonoscopy in 5 years for surveillance.  For questions, problems or results please call your physician Katelyn Hensley MD at Work:  [phone]  If you have any questions about the above instructions, call the GI   department at [phone] or call the endoscopy unit at [phone]   from 7am until 3 pm.  [facility], EMERGENCY ROOM PHONE NUMBER:   [phone]  IF A COMPLICATION OR EMERGENCY SITUATION ARISES AND YOU ARE UNABLE TO REACH   YOUR PHYSICIAN - GO DIRECTLY TO THE EMERGENCY ROOM.  I have read or have had read to me these discharge instructions for my   procedure and have received a written copy.  I understand these   instructions and will follow-up with my physician if I have any questions.     __________________________________        _____________________________________  Nurse Signature                                          Patient/Designated   Responsible Party Signature  MD Katelyn Bajwa MD  1/5/2023 9:53:09 AM  This report has been verified and signed electronically.  Dear patient,  As a result of recent federal legislation (The Federal Cures Act), you may   receive lab or pathology results from your procedure in your [facility]   account before your physician is able to contact you. Your physician or   their representative will relay the results to you with their   recommendations at their soonest availability.  Thank you,  PROVATION

## 2023-01-05 NOTE — PLAN OF CARE
Dr. Hensley at  to discuss findings. VSS. No  Pain, no GI bleeding. Pt to be discharged from unit.

## 2023-01-05 NOTE — ANESTHESIA PREPROCEDURE EVALUATION
01/05/2023  Ac Hayden is a 57 y.o., female.      Pre-op Assessment    I have reviewed the Patient Summary Reports.     I have reviewed the Nursing Notes. I have reviewed the NPO Status.   I have reviewed the Medications.     Review of Systems  Anesthesia Hx:  No problems with previous Anesthesia  Denies Family Hx of Anesthesia complications.   Denies Personal Hx of Anesthesia complications.   Social:  Non-Smoker    Hematology/Oncology:  Hematology Normal   Oncology Normal     EENT/Dental:EENT/Dental Normal   Cardiovascular:   Hypertension ECG has been reviewed.    Pulmonary:   Pneumonia    Renal/:  Renal/ Normal     Hepatic/GI:  Hepatic/GI Normal    Musculoskeletal:   Arthritis     Neurological:  Neurology Normal    Endocrine:   Diabetes, type 2  Morbid Obesity / BMI > 40  Dermatological:  Skin Normal    Psych:   Psychiatric History          Physical Exam  General: Cooperative, Alert and Oriented    Airway:  Mallampati: II   Mouth Opening: Normal  TM Distance: Normal  Tongue: Normal, Large  Neck ROM: Normal ROM    Dental:  Intact    Chest/Lungs:  Clear to auscultation, Normal Respiratory Rate    Heart:  Rate: Normal  Rhythm: Regular Rhythm        Anesthesia Plan  Type of Anesthesia, risks & benefits discussed:    Anesthesia Type: MAC  Intra-op Monitoring Plan: Standard ASA Monitors  Induction:  IV  Informed Consent: Informed consent signed with the Patient and all parties understand the risks and agree with anesthesia plan.  All questions answered.   ASA Score: 3  Day of Surgery Review of History & Physical: H&P Update referred to the surgeon/provider.I have interviewed and examined the patient. I have reviewed the patient's H&P dated: There are no significant changes.     Ready For Surgery From Anesthesia Perspective.     .

## 2023-01-05 NOTE — TRANSFER OF CARE
"Anesthesia Transfer of Care Note    Patient: Ac Hayden    Procedure(s) Performed: Procedure(s) (LRB):  COLONOSCOPY (N/A)    Patient location: PACU    Anesthesia Type: MAC    Transport from OR: Transported from OR on room air with adequate spontaneous ventilation    Post pain: adequate analgesia    Post assessment: no apparent anesthetic complications    Post vital signs: stable    Level of consciousness: responds to stimulation    Nausea/Vomiting: no nausea/vomiting    Complications: none    Transfer of care protocol was followed      Last vitals:   Visit Vitals  BP (!) 141/82 (BP Location: Left arm, Patient Position: Lying)   Pulse 79   Temp 36.6 °C (97.9 °F) (Temporal)   Resp 16   Ht 5' 5" (1.651 m)   Wt 126.6 kg (279 lb)   SpO2 99%   Breastfeeding No   BMI 46.43 kg/m²     "

## 2023-01-06 ENCOUNTER — HOSPITAL ENCOUNTER (OUTPATIENT)
Dept: RADIOLOGY | Facility: HOSPITAL | Age: 58
Discharge: HOME | End: 2023-01-06
Attending: PHYSICIAN ASSISTANT
Payer: COMMERCIAL

## 2023-01-06 ENCOUNTER — OFFICE VISIT (OUTPATIENT)
Dept: OPHTHALMOLOGY | Facility: CLINIC | Age: 58
End: 2023-01-06
Payer: COMMERCIAL

## 2023-01-06 ENCOUNTER — PATIENT MESSAGE (OUTPATIENT)
Dept: OPHTHALMOLOGY | Facility: CLINIC | Age: 58
End: 2023-01-06

## 2023-01-06 VITALS — HEIGHT: 65 IN | BODY MASS INDEX: 46.43 KG/M2

## 2023-01-06 DIAGNOSIS — Z46.0 ENCOUNTER FOR FITTING OR ADJUSTMENT OF SPECTACLES OR CONTACT LENSES: ICD-10-CM

## 2023-01-06 DIAGNOSIS — Z12.31 ENCOUNTER FOR SCREENING MAMMOGRAM FOR BREAST CANCER: ICD-10-CM

## 2023-01-06 DIAGNOSIS — E11.59 TYPE 2 DIABETES MELLITUS WITH OTHER CIRCULATORY COMPLICATION, WITHOUT LONG-TERM CURRENT USE OF INSULIN: ICD-10-CM

## 2023-01-06 DIAGNOSIS — E11.9 DIABETES MELLITUS TYPE 2 WITHOUT RETINOPATHY: Primary | ICD-10-CM

## 2023-01-06 DIAGNOSIS — H52.7 REFRACTIVE ERRORS: ICD-10-CM

## 2023-01-06 PROCEDURE — 2023F DILAT RTA XM W/O RTNOPTHY: CPT | Mod: CPTII,S$GLB,, | Performed by: OPTOMETRIST

## 2023-01-06 PROCEDURE — 1159F MED LIST DOCD IN RCRD: CPT | Mod: CPTII,S$GLB,, | Performed by: OPTOMETRIST

## 2023-01-06 PROCEDURE — 77067 MAMMO DIGITAL SCREENING BILAT WITH TOMO: ICD-10-PCS | Mod: 26,,, | Performed by: RADIOLOGY

## 2023-01-06 PROCEDURE — 92015 PR REFRACTION: ICD-10-PCS | Mod: S$GLB,,, | Performed by: OPTOMETRIST

## 2023-01-06 PROCEDURE — 1159F PR MEDICATION LIST DOCUMENTED IN MEDICAL RECORD: ICD-10-PCS | Mod: CPTII,S$GLB,, | Performed by: OPTOMETRIST

## 2023-01-06 PROCEDURE — 77067 SCR MAMMO BI INCL CAD: CPT | Mod: TC

## 2023-01-06 PROCEDURE — 92014 PR EYE EXAM, EST PATIENT,COMPREHESV: ICD-10-PCS | Mod: S$GLB,,, | Performed by: OPTOMETRIST

## 2023-01-06 PROCEDURE — 99999 PR PBB SHADOW E&M-EST. PATIENT-LVL III: CPT | Mod: PBBFAC,,, | Performed by: OPTOMETRIST

## 2023-01-06 PROCEDURE — 92015 DETERMINE REFRACTIVE STATE: CPT | Mod: S$GLB,,, | Performed by: OPTOMETRIST

## 2023-01-06 PROCEDURE — 92310 PR CONTACT LENS FITTING (NO CHANGE): ICD-10-PCS | Mod: CSM,S$GLB,, | Performed by: OPTOMETRIST

## 2023-01-06 PROCEDURE — 92310 CONTACT LENS FITTING OU: CPT | Mod: CSM,S$GLB,, | Performed by: OPTOMETRIST

## 2023-01-06 PROCEDURE — 2023F PR DILATED RETINAL EXAM W/O EVID OF RETINOPATHY: ICD-10-PCS | Mod: CPTII,S$GLB,, | Performed by: OPTOMETRIST

## 2023-01-06 PROCEDURE — 99999 PR PBB SHADOW E&M-EST. PATIENT-LVL III: ICD-10-PCS | Mod: PBBFAC,,, | Performed by: OPTOMETRIST

## 2023-01-06 PROCEDURE — 77063 BREAST TOMOSYNTHESIS BI: CPT | Mod: 26,,, | Performed by: RADIOLOGY

## 2023-01-06 PROCEDURE — 77063 MAMMO DIGITAL SCREENING BILAT WITH TOMO: ICD-10-PCS | Mod: 26,,, | Performed by: RADIOLOGY

## 2023-01-06 PROCEDURE — 77067 SCR MAMMO BI INCL CAD: CPT | Mod: 26,,, | Performed by: RADIOLOGY

## 2023-01-06 PROCEDURE — 92014 COMPRE OPH EXAM EST PT 1/>: CPT | Mod: S$GLB,,, | Performed by: OPTOMETRIST

## 2023-01-06 NOTE — PROGRESS NOTES
HPI    NIDDM exam.  No visual complaints.  Last eye exam 01/19/2022 TRF.  Update glasses and contact lenses RX.  Lab Results       Component                Value               Date                       HGBA1C                   5.7 (H)             11/17/2022              Last edited by Tahira Floyd MA on 1/6/2023  3:45 PM.            Assessment /Plan     For exam results, see Encounter Report.    Diabetes mellitus type 2 without retinopathy    Type 2 diabetes mellitus with other circulatory complication, without long-term current use of insulin  -     Ambulatory referral/consult to Optometry    Encounter for fitting or adjustment of spectacles or contact lenses    Refractive errors      No Background Diabetic Retinopathy    Discussed CL charges.  Dispense Final Rx for glasses  No changes CL Rx  RTC 1 year  Discussed above and answered questions.

## 2023-01-08 NOTE — PROGRESS NOTES
PSYCHIATRIC EVALUATION     Name: Ac Hayden  Age: 57 y.o.  : 1965    65 minutes of total time spent on the encounter, which includes face to face time and non-face to face time.  Face-to-face time: 50 minutes.    Preparing to see the patient (reviewing portions of the available record), Performing a medically appropriate evaluation, Counseling and educating the patient/family/caregiver, Ordering medications, labs, or referrals, and Documenting clinical information in the health record      CHIEF COMPLAINT :  I take on everything from everybody in help everybody with everything, leading to depression with anxiety.      HISTORY OF PRESENT ILLNESS:         Ac Hayden a 57 y.o.  female presents today by way of referral from her PCP.  In 2022 PCP visit indicates a diagnosis of mixed anxiety and depressive disorder and psychotropics Wellbutrin  mg daily and Cymbalta 90 mg daily.  Other problems included degenerative lumbar discs, obesity, status post gastric surgery, type 2 diabetes, hypertension, hyperlipidemia, and other medications listed at that visit were albuterol, Norvasc, Lorena in, vitamin-D, HCTZ, Xyzal, Cozaar, pravastatin, Ozempic.    A 10/11/2021 EKG conducted at an ED visit for dizziness shows sinus rhythm with frequent PVCs and ; however, the patient was noted to be hypokalemic, and and 2020 EKG was normal with a .    In the current interview, the patient begins the session with the above chief complaint.  She also indicates a long history of grief and stress.  She reports that her mother  when she was 16 years old, her son was shot and killed at 24 years old in , 1 of her 2 sisters whom she describes as her best friend  quickly of cancer in 2021, and a very close relationship with her  of 35 years (together 37 years) changed about 5 years ago.  Regarding the latter, she indicates that in  she saw a number on their  "phone bill repeatedly and asked him about the number, and on 1 occasion he told her it was a co-worker and on another occasion told her it was a friend, leading her to suspect that he was in communication with another woman.  She reports that she ultimately got past this because she saw no other changes in his behavior.  However, she says that 5 years ago she overheard him on the phone with a woman, referring to her as baby and speaking in very friendly fashion.  She says that her  will not discuss it, saying that there is nothing to discuss, and he is refused couples counseling and counseling with her .    The patient describes recurring episodes depression with decreased mood, enjoyment, activity, sleep, energy, and concentration and with increased appetite.  She reports that her self-esteem remains intact and she does not suffer from excessive guilt, and she says that she has never has lots of harm to self or others and is confident of her safety and the safety of others.  Never with psychosis.  She reports anxiety that appears to be related to the depression.  No independent excessive worry, agoraphobia, social phobia, obsessions, compulsions, or PTSD.  She reports that she has had 3 panic attacks 1 in a stressful situation and 2 out of the blue.  Extensive and specific questioning reveals no history of terence or hypomania.    The patient reports that she basically took to the bed for several months after her son was killed but since then, I get up every day, get dressed, go to work, and take care of my business."    The patient has never seen a mental health professional.  She has received help from discussions with her .  In 2011, she was prescribed Wellbutrin and then this was changed to Cymbalta, and Wellbutrin was restarted 3 or 4 months ago.  The patient finds no difference with Wellbutrin but feels that Cymbalta has been partially beneficial.      PAST BEHAVIORAL HEALTH " HISTORY  Outpatient treatment-none  Inpatient Treatment - none  Depression-as above  Suicide Attempts - none  Violence - none  Psychosis - none  Rima/Hypomania - none  Eating disorders-none, but she overeats when depressed  Medications - as above  Counseling - none  Substance Abuse Treatment - none  Trauma:  Significant issues of grief and stress, but no trauma of the PTSD sort    SUBSTANCE USE:    Alcohol:  Very rare small amount     Other:  Tried marijuana once as a teenager and did not like it    Allergy Review:   Review of patient's allergies indicates:   Allergen Reactions    Codeine Edema and Itching    Pcn [penicillins] Rash    Sulfa (sulfonamide antibiotics) Edema and Rash        Medical Problem List:   Patient Active Problem List   Diagnosis    Degeneration of lumbar or lumbosacral intervertebral disc    Mixed anxiety and depressive disorder    Impingement syndrome, shoulder    Osteoarthritis of acromioclavicular joint    Urgency incontinence    Type 2 diabetes mellitus with circulatory disorder, without long-term current use of insulin    Pulmonary nodules    Hyperlipidemia associated with type 2 diabetes mellitus    History of colon polyps    Primary snoring    S/P gastric surgery    Decreased ROM of right knee    Hypertension associated with diabetes    Class 3 severe obesity due to excess calories with serious comorbidity and body mass index (BMI) of 45.0 to 49.9 in adult    Vitamin D deficiency        Past Surgical History:   Procedure Laterality Date    BARIATRIC SURGERY  08/22/2018    [facility])    COLONOSCOPY N/A 10/19/2017    Procedure: COLONOSCOPY;  Surgeon: Jamal Cole MD;  Location: [facility] ENDO;  Service: Endoscopy;  Laterality: N/A;    COLONOSCOPY N/A 1/5/2023    Procedure: COLONOSCOPY;  Surgeon: Katelyn Hensley MD;  Location: [facility] ENDO;  Service: Endoscopy;  Laterality: N/A;    HYSTERECTOMY      PARTIAL HYSTERECTOMY      PLANTAR FASCIA SURGERY      TOTAL REDUCTION  MAMMOPLASTY  2014        Other (medical) :  Seizure:  None    Family History:  Family History   Problem Relation Age of Onset    Diabetes Mother     Stroke Mother     Cancer Father     Diabetes Sister     Ovarian cancer Sister     Strabismus Sister     Diabetes Brother     Lupus Other     Eczema Other     Melanoma Neg Hx     Psoriasis Neg Hx       Family Psychiatric History:  None    PSYCHO-SOCIAL/DEVELOPMENT HISTORY:     Relationships:  The patient lives with her .  One son is , and she has 1 grandchild by that son.  Her other son lives in [place], and she has 4 grandchildren by him.  She is very close to her son and to all of her grandchildren.  She is close to a friend since 4th grade; she says that this friend's mother and her mother records friends and she moved in with them initially when her mother passed.  Her naga remains important to her and is described as a strength.    Education:  3 and half years of college, beautician certificate    Legal Issues:  None    Employment:   at a [employer]    Mental Status Exam:   Appearance:  Appropriately groomed  Orientation:  X4  Attitude:  Cooperative, engaged pleasant, polite   Eye Contact:  Appropriate  Behavior:  Calm, appropriate  Speech:     Rate - WNL    Volume - WNL    Quantity - WNL    Tone - relaxed but subdued  Pressure - no  Thought Processes:  Goal-directed  Mood:  Depressed and intermittently anxious  Affect:  Smiling at appropriate times, but otherwise subdued/sad  SI:  No, and no thoughts of self-harm; she is confident of her safety, volunteers protective factors, and is hopeful and positively future oriented  HI:  No, and no thoughts of harm towards others, thoughts of violence, feelings of aggression, or feeling that she has to act in self-protection  Paranoia:  No  Delusions:  No  Hallucinations:  No  Attention:  Intact over the course of the session  Cognition:  No deficits noted over the course of the session  Insight:   Intact   Judgment:  Intact  Impulse Control:  Intact     Assessment/Plan:     Encounter Diagnoses   Name Primary?    Moderate episode of recurrent major depressive disorder Yes    Mixed anxiety and depressive disorder     High risk medication use         Follow up in 5 weeks and schedule with a counselor.  At the end of the appointment, the patient was directed to the  for scheduling.    Psychiatry Medication:  Discontinue Wellbutrin.  Increase Cymbalta to 120 mg daily.  The patient reports that she just filled a large prescription of the 30 mg capsules.  Rationale, risks, and side effects were reviewed with the patient, including suicidal ideations, terence, insomnia, increased dreaming, anxiety, decreased alertness, dizziness, unsteadiness, effects on activities that require alertness and steadiness, hypertension, sweating, serotonin syndrome, headache, GI upset, dry mouth, increased bleeding, glaucoma, issues with low sodium, confusion, seizures, sexual side effects, yawning, withdrawal, and others.     EKG in preparation for the likelihood of ultimate need for other medication interventions.    Reviewed with patient:  Report side effects or any other problems to the psychiatrist during clinic business hours. Call 911 or go to an emergency department for any acute or urgent issues otherwise.  Follow up with primary care/MD specialist for continued monitoring of general health and wellness and any medical conditions.  Call  [facility] at [phone] or go to [facility]  My Chart if necessary for scheduling or rescheduling.  It is the responsibility of the patient to reschedule an appointment if an appointment has been canceled or missed.  Understanding was expressed; and no further concerns or questions were raised at this time.       There are no Patient Instructions on file for this visit.    Large portions of this note were completed by way of voice recognition dictation software, and  transcription errors are possible, such that specific information in the note should be considered in the context of the entire report.

## 2023-01-09 ENCOUNTER — OFFICE VISIT (OUTPATIENT)
Dept: PSYCHIATRY | Facility: CLINIC | Age: 58
End: 2023-01-09
Payer: COMMERCIAL

## 2023-01-09 VITALS
SYSTOLIC BLOOD PRESSURE: 120 MMHG | WEIGHT: 279 LBS | HEART RATE: 101 BPM | BODY MASS INDEX: 46.43 KG/M2 | DIASTOLIC BLOOD PRESSURE: 79 MMHG

## 2023-01-09 DIAGNOSIS — Z79.899 HIGH RISK MEDICATION USE: ICD-10-CM

## 2023-01-09 DIAGNOSIS — F33.1 MODERATE EPISODE OF RECURRENT MAJOR DEPRESSIVE DISORDER: Primary | ICD-10-CM

## 2023-01-09 DIAGNOSIS — F41.8 MIXED ANXIETY AND DEPRESSIVE DISORDER: Chronic | ICD-10-CM

## 2023-01-09 PROCEDURE — 1159F PR MEDICATION LIST DOCUMENTED IN MEDICAL RECORD: ICD-10-PCS | Mod: CPTII,S$GLB,, | Performed by: PSYCHIATRY & NEUROLOGY

## 2023-01-09 PROCEDURE — 1160F PR REVIEW ALL MEDS BY PRESCRIBER/CLIN PHARMACIST DOCUMENTED: ICD-10-PCS | Mod: CPTII,S$GLB,, | Performed by: PSYCHIATRY & NEUROLOGY

## 2023-01-09 PROCEDURE — 1160F RVW MEDS BY RX/DR IN RCRD: CPT | Mod: CPTII,S$GLB,, | Performed by: PSYCHIATRY & NEUROLOGY

## 2023-01-09 PROCEDURE — 3008F BODY MASS INDEX DOCD: CPT | Mod: CPTII,S$GLB,, | Performed by: PSYCHIATRY & NEUROLOGY

## 2023-01-09 PROCEDURE — 99205 OFFICE O/P NEW HI 60 MIN: CPT | Mod: S$GLB,,, | Performed by: PSYCHIATRY & NEUROLOGY

## 2023-01-09 PROCEDURE — 3078F PR MOST RECENT DIASTOLIC BLOOD PRESSURE < 80 MM HG: ICD-10-PCS | Mod: CPTII,S$GLB,, | Performed by: PSYCHIATRY & NEUROLOGY

## 2023-01-09 PROCEDURE — 99999 PR PBB SHADOW E&M-EST. PATIENT-LVL III: CPT | Mod: PBBFAC,,, | Performed by: PSYCHIATRY & NEUROLOGY

## 2023-01-09 PROCEDURE — 3008F PR BODY MASS INDEX (BMI) DOCUMENTED: ICD-10-PCS | Mod: CPTII,S$GLB,, | Performed by: PSYCHIATRY & NEUROLOGY

## 2023-01-09 PROCEDURE — 1159F MED LIST DOCD IN RCRD: CPT | Mod: CPTII,S$GLB,, | Performed by: PSYCHIATRY & NEUROLOGY

## 2023-01-09 PROCEDURE — 3072F PR LOW RISK FOR RETINOPATHY: ICD-10-PCS | Mod: CPTII,S$GLB,, | Performed by: PSYCHIATRY & NEUROLOGY

## 2023-01-09 PROCEDURE — 3074F SYST BP LT 130 MM HG: CPT | Mod: CPTII,S$GLB,, | Performed by: PSYCHIATRY & NEUROLOGY

## 2023-01-09 PROCEDURE — 3078F DIAST BP <80 MM HG: CPT | Mod: CPTII,S$GLB,, | Performed by: PSYCHIATRY & NEUROLOGY

## 2023-01-09 PROCEDURE — 99205 PR OFFICE/OUTPT VISIT, NEW, LEVL V, 60-74 MIN: ICD-10-PCS | Mod: S$GLB,,, | Performed by: PSYCHIATRY & NEUROLOGY

## 2023-01-09 PROCEDURE — 3074F PR MOST RECENT SYSTOLIC BLOOD PRESSURE < 130 MM HG: ICD-10-PCS | Mod: CPTII,S$GLB,, | Performed by: PSYCHIATRY & NEUROLOGY

## 2023-01-09 PROCEDURE — 3072F LOW RISK FOR RETINOPATHY: CPT | Mod: CPTII,S$GLB,, | Performed by: PSYCHIATRY & NEUROLOGY

## 2023-01-09 PROCEDURE — 99999 PR PBB SHADOW E&M-EST. PATIENT-LVL III: ICD-10-PCS | Mod: PBBFAC,,, | Performed by: PSYCHIATRY & NEUROLOGY

## 2023-01-09 RX ORDER — DULOXETIN HYDROCHLORIDE 30 MG/1
CAPSULE, DELAYED RELEASE ORAL
Qty: 270 CAPSULE | Refills: 3
Start: 2023-01-09 | End: 2023-02-13

## 2023-01-10 LAB
FINAL PATHOLOGIC DIAGNOSIS: NORMAL
Lab: NORMAL

## 2023-02-12 NOTE — PROGRESS NOTES
"    Ac Hayden   1965        CURRENT PRESENTATION:   The patient presents for follow-up of recurrent major depression.  At her initial visit 1 month ago, the patient described depression, anxiety, and a long history of grief and stress, including a change 5 years ago in her relationship with her  of 35 years when she discovered signs of possible affair, which she has denied and will not discuss.  She reported that she had had counseling with her  and had been taking Cymbalta 90 mg daily, with Wellbutrin later restarted with no further benefit.  Cymbalta was increased to 120 mg daily.  An EKG was ordered but has not yet been done.    The patient reports that mood and anxiety problems have come under very good control with no side effects with the increase of Cymbalta to 120 mg daily.  Her only complaint is continued problems with sleep.  Never with terence, hypomania, thoughts of harm to self or others, or psychosis.    The patient reports that she did not get the EKG but will do so soon, and when those results are available, decisions will be made with regards to sleep medications.    The patient reports that her  talk to me a little bit in a conversation after she told him she was ready to move out.  She reports that the conversation was positive, with him saying that he did not want to lose her, and he has been sleeping in the bed with me again."  Positives with her son in [place] (4 grandchildren) and with her grandchild by way of her  son.  The patient also has a friend since 4th grade.  Her naga remains important to her.  Her job as  at [employer] has been going okay.    Interim history:  Living situation/supports:  As above  Medical issues:  No change  Nonpsychotropic Medications:  No change   Allergies:  No change  Review of patient's allergies indicates:   Allergen Reactions    Codeine Edema and Itching    Pcn [penicillins] Rash    Sulfa " (sulfonamide antibiotics) Edema and Rash     Alcohol use:  No change in pattern of very rare small amount  Other substance use:  None      Mental Status Exam:   Appearance:  Appropriately groomed  Orientation:  X4  Attitude:  Cooperative, engaged   Eye Contact:  Appropriate  Behavior:  Calm, appropriate  Speech:     Rate - WNL    Volume - WNL    Quantity - WNL    Tone - relaxed, appropriate  Pressure - no  Thought Processes:  Goal-directed  Mood:  Euthymic   Affect:  Without distress, euthymic, including ability to brighten at appropriate times  SI:  No, and no thoughts of self-harm  HI:  No, and no thoughts of harm towards others  Paranoia:  No  Delusions:  No  Hallucinations:  No  Attention:  Intact over the course of the session  Cognition:  No deficits noted over the course of the session  Insight:  Intact   Judgment:  Intact  Impulse Control:  Intact         ASSESSMENT:   Encounter Diagnosis   Name Primary?    Moderate episode of recurrent major depressive disorder Yes         PLAN:     Follow up in 2 months.  The patient was directed to the  desk to schedule this appointment.  Otherwise, the patient is to call [facility] at [phone] (or go to [facility]  My Chart alex) and  arrange the next psychiatry appointment.     Psychiatry Medication:  Continue Cymbalta 120 mg daily; the patient is aware new prescription will take 2 tablets because of the 60 mg strength.  After her EKG, decisions made with regards to a medication to help with sleep.  It is noted that the patient was on trazodone in in 2012, 2013, and 2014.    Reviewed with patient:  Report side effects or any other problems to the psychiatrist during clinic business hours.  Call [phone] or go to an emergency department for any acute or urgent issues otherwise.  Follow up with primary care/MD specialist for continued monitoring of general health and wellness and any medical conditions.  Call  [facility] at  [phone] or go to [facility] My Chart if necessary for scheduling or rescheduling.  Understanding was expressed; and no further concerns or questions were raised at this time.     [id number]  Total time for the patient encounter:  30 minutes.  Face-to-face time: 20 minutes.    The time was spent in face-to-face interaction and non face-to-face time as follows:   Preparing to see the patient (reviewing portions of the available record), Performing a medically appropriate evaluation, Counseling and educating the patient/family/caregiver, Ordering medications, labs, or referrals, and Documenting clinical information in the health record      Large portions of this note were completed by way of voice recognition dictation software, and transcription errors are possible, such that specific information in the note should be considered in the context of the entire report.

## 2023-02-13 ENCOUNTER — PATIENT MESSAGE (OUTPATIENT)
Dept: PSYCHIATRY | Facility: CLINIC | Age: 58
End: 2023-02-13
Payer: COMMERCIAL

## 2023-02-13 ENCOUNTER — TELEPHONE (OUTPATIENT)
Dept: INTERNAL MEDICINE | Facility: CLINIC | Age: 58
End: 2023-02-13
Payer: COMMERCIAL

## 2023-02-13 ENCOUNTER — OFFICE VISIT (OUTPATIENT)
Dept: PSYCHIATRY | Facility: CLINIC | Age: 58
End: 2023-02-13
Payer: COMMERCIAL

## 2023-02-13 ENCOUNTER — HOSPITAL ENCOUNTER (OUTPATIENT)
Dept: CARDIOLOGY | Facility: HOSPITAL | Age: 58
Discharge: HOME | End: 2023-02-13
Attending: PSYCHIATRY & NEUROLOGY
Payer: COMMERCIAL

## 2023-02-13 VITALS — HEART RATE: 116 BPM | DIASTOLIC BLOOD PRESSURE: 79 MMHG | SYSTOLIC BLOOD PRESSURE: 133 MMHG

## 2023-02-13 DIAGNOSIS — F33.1 MODERATE EPISODE OF RECURRENT MAJOR DEPRESSIVE DISORDER: Primary | ICD-10-CM

## 2023-02-13 DIAGNOSIS — R94.31 ABNORMAL EKG: Primary | ICD-10-CM

## 2023-02-13 DIAGNOSIS — Z79.899 HIGH RISK MEDICATION USE: ICD-10-CM

## 2023-02-13 PROCEDURE — 3078F PR MOST RECENT DIASTOLIC BLOOD PRESSURE < 80 MM HG: ICD-10-PCS | Mod: CPTII,S$GLB,, | Performed by: PSYCHIATRY & NEUROLOGY

## 2023-02-13 PROCEDURE — 93010 ELECTROCARDIOGRAM REPORT: CPT | Mod: ,,, | Performed by: INTERNAL MEDICINE

## 2023-02-13 PROCEDURE — 99214 OFFICE O/P EST MOD 30 MIN: CPT | Mod: S$GLB,,, | Performed by: PSYCHIATRY & NEUROLOGY

## 2023-02-13 PROCEDURE — 99999 PR PBB SHADOW E&M-EST. PATIENT-LVL II: ICD-10-PCS | Mod: PBBFAC,,, | Performed by: PSYCHIATRY & NEUROLOGY

## 2023-02-13 PROCEDURE — 3075F SYST BP GE 130 - 139MM HG: CPT | Mod: CPTII,S$GLB,, | Performed by: PSYCHIATRY & NEUROLOGY

## 2023-02-13 PROCEDURE — 99999 PR PBB SHADOW E&M-EST. PATIENT-LVL II: CPT | Mod: PBBFAC,,, | Performed by: PSYCHIATRY & NEUROLOGY

## 2023-02-13 PROCEDURE — 3072F PR LOW RISK FOR RETINOPATHY: ICD-10-PCS | Mod: CPTII,S$GLB,, | Performed by: PSYCHIATRY & NEUROLOGY

## 2023-02-13 PROCEDURE — 93005 ELECTROCARDIOGRAM TRACING: CPT

## 2023-02-13 PROCEDURE — 93010 EKG 12-LEAD: ICD-10-PCS | Mod: ,,, | Performed by: INTERNAL MEDICINE

## 2023-02-13 PROCEDURE — 3075F PR MOST RECENT SYSTOLIC BLOOD PRESS GE 130-139MM HG: ICD-10-PCS | Mod: CPTII,S$GLB,, | Performed by: PSYCHIATRY & NEUROLOGY

## 2023-02-13 PROCEDURE — 99214 PR OFFICE/OUTPT VISIT, EST, LEVL IV, 30-39 MIN: ICD-10-PCS | Mod: S$GLB,,, | Performed by: PSYCHIATRY & NEUROLOGY

## 2023-02-13 PROCEDURE — 3072F LOW RISK FOR RETINOPATHY: CPT | Mod: CPTII,S$GLB,, | Performed by: PSYCHIATRY & NEUROLOGY

## 2023-02-13 PROCEDURE — 3078F DIAST BP <80 MM HG: CPT | Mod: CPTII,S$GLB,, | Performed by: PSYCHIATRY & NEUROLOGY

## 2023-02-13 RX ORDER — DULOXETIN HYDROCHLORIDE 60 MG/1
120 CAPSULE, DELAYED RELEASE ORAL DAILY
Qty: 180 CAPSULE | Refills: 0 | Status: SHIPPED | OUTPATIENT
Start: 2023-02-13 | End: 2023-06-09 | Stop reason: SDUPTHER

## 2023-02-13 RX ORDER — TRAZODONE HYDROCHLORIDE 50 MG/1
50 TABLET ORAL NIGHTLY
Qty: 30 TABLET | Refills: 1 | Status: SHIPPED | OUTPATIENT
Start: 2023-02-13 | End: 2023-02-22 | Stop reason: SDUPTHER

## 2023-02-13 NOTE — TELEPHONE ENCOUNTER
EKG done today shows normal MA and QRS and QTC of 437.  I called the patient, and she indicated that the trazodone she took about 10 years ago was very effective for sleep with no side effects, including no orthostasis.  I reviewed the rationale, risks, and side effects with her, including sedation, unsteadiness, interference with driving and other activities requiring alertness and steadiness, orthostasis and other cardiovascular issues, headache, GI upset, disturbing dreams, anxiety, terence, decreased focus or confusion, suicidal ideations, and others.  She requests to restart, 50 mg at bedtime.    I reviewed with her the other findings on the EKG.  She confirms that she has no current cardiovascular symptoms or shortness of breath and does not recall any episodes of chest pain apart from a visit to the emergency department about 1 year ago during which she reports medically cleared and discharged home.  I informed her that I have sent the EKG to her PCP Dr. Hansen in the event that any repeat, follow-up, or further evaluation needs to be done.  She expressed her appreciation and had no questions.

## 2023-02-14 NOTE — TELEPHONE ENCOUNTER
Please call pt and sched with Cards, as she has not established with her new PCP yet.  Explain that we want the Cardiologist to review her EKG and see if any other testing should be done.  CHARISSA

## 2023-02-15 ENCOUNTER — OFFICE VISIT (OUTPATIENT)
Dept: CARDIOLOGY | Facility: CLINIC | Age: 58
End: 2023-02-15
Payer: COMMERCIAL

## 2023-02-15 VITALS
SYSTOLIC BLOOD PRESSURE: 110 MMHG | DIASTOLIC BLOOD PRESSURE: 60 MMHG | HEART RATE: 105 BPM | BODY MASS INDEX: 46.94 KG/M2 | HEIGHT: 65 IN | OXYGEN SATURATION: 96 % | WEIGHT: 281.75 LBS

## 2023-02-15 DIAGNOSIS — E11.69 HYPERLIPIDEMIA ASSOCIATED WITH TYPE 2 DIABETES MELLITUS: Primary | Chronic | ICD-10-CM

## 2023-02-15 DIAGNOSIS — E78.5 HYPERLIPIDEMIA ASSOCIATED WITH TYPE 2 DIABETES MELLITUS: Primary | Chronic | ICD-10-CM

## 2023-02-15 DIAGNOSIS — E11.59 HYPERTENSION ASSOCIATED WITH DIABETES: ICD-10-CM

## 2023-02-15 DIAGNOSIS — R94.31 ABNORMAL EKG: ICD-10-CM

## 2023-02-15 DIAGNOSIS — E11.59 TYPE 2 DIABETES MELLITUS WITH OTHER CIRCULATORY COMPLICATION, WITHOUT LONG-TERM CURRENT USE OF INSULIN: ICD-10-CM

## 2023-02-15 DIAGNOSIS — I15.2 HYPERTENSION ASSOCIATED WITH DIABETES: ICD-10-CM

## 2023-02-15 DIAGNOSIS — Z79.899 HIGH RISK MEDICATION USE: ICD-10-CM

## 2023-02-15 DIAGNOSIS — E66.01 CLASS 3 SEVERE OBESITY DUE TO EXCESS CALORIES WITH SERIOUS COMORBIDITY AND BODY MASS INDEX (BMI) OF 45.0 TO 49.9 IN ADULT: ICD-10-CM

## 2023-02-15 PROCEDURE — 99999 PR PBB SHADOW E&M-EST. PATIENT-LVL V: CPT | Mod: PBBFAC,,, | Performed by: INTERNAL MEDICINE

## 2023-02-15 PROCEDURE — 1159F MED LIST DOCD IN RCRD: CPT | Mod: CPTII,S$GLB,, | Performed by: INTERNAL MEDICINE

## 2023-02-15 PROCEDURE — 3072F PR LOW RISK FOR RETINOPATHY: ICD-10-PCS | Mod: CPTII,S$GLB,, | Performed by: INTERNAL MEDICINE

## 2023-02-15 PROCEDURE — 99214 OFFICE O/P EST MOD 30 MIN: CPT | Mod: S$GLB,,, | Performed by: INTERNAL MEDICINE

## 2023-02-15 PROCEDURE — 99999 PR PBB SHADOW E&M-EST. PATIENT-LVL V: ICD-10-PCS | Mod: PBBFAC,,, | Performed by: INTERNAL MEDICINE

## 2023-02-15 PROCEDURE — 99214 PR OFFICE/OUTPT VISIT, EST, LEVL IV, 30-39 MIN: ICD-10-PCS | Mod: S$GLB,,, | Performed by: INTERNAL MEDICINE

## 2023-02-15 PROCEDURE — 3078F PR MOST RECENT DIASTOLIC BLOOD PRESSURE < 80 MM HG: ICD-10-PCS | Mod: CPTII,S$GLB,, | Performed by: INTERNAL MEDICINE

## 2023-02-15 PROCEDURE — 1159F PR MEDICATION LIST DOCUMENTED IN MEDICAL RECORD: ICD-10-PCS | Mod: CPTII,S$GLB,, | Performed by: INTERNAL MEDICINE

## 2023-02-15 PROCEDURE — 3074F SYST BP LT 130 MM HG: CPT | Mod: CPTII,S$GLB,, | Performed by: INTERNAL MEDICINE

## 2023-02-15 PROCEDURE — 3078F DIAST BP <80 MM HG: CPT | Mod: CPTII,S$GLB,, | Performed by: INTERNAL MEDICINE

## 2023-02-15 PROCEDURE — 3074F PR MOST RECENT SYSTOLIC BLOOD PRESSURE < 130 MM HG: ICD-10-PCS | Mod: CPTII,S$GLB,, | Performed by: INTERNAL MEDICINE

## 2023-02-15 PROCEDURE — 3072F LOW RISK FOR RETINOPATHY: CPT | Mod: CPTII,S$GLB,, | Performed by: INTERNAL MEDICINE

## 2023-02-15 PROCEDURE — 3008F PR BODY MASS INDEX (BMI) DOCUMENTED: ICD-10-PCS | Mod: CPTII,S$GLB,, | Performed by: INTERNAL MEDICINE

## 2023-02-15 PROCEDURE — 3008F BODY MASS INDEX DOCD: CPT | Mod: CPTII,S$GLB,, | Performed by: INTERNAL MEDICINE

## 2023-02-15 NOTE — PROGRESS NOTES
Subjective:   Patient ID:  Ac Hayden is a 57 y.o. female who presents for follow-up of No chief complaint on file.     53 yo female referred for uncontrolled HTN  PMH DM 7 yrs, HTN, HLD anxiety obesity BNI 50 s/p sleeve in 2018, CHRIS not tolerate CPAP (given an used one and refused to use it). Quit smoking 20yrs and no drink  No chest pain, DONALDSON, dizziness, palpitation, leg swelling  Back muscles pain and knee pain,   Med compliance  No regular exercise  Works at [employer]  EKG NSR  No f./h premature CAD  BP high  A1c controlled. Off DM med after weight procedure             02/15/2023:   Patient has review everything including hypertension and cholesterol profiles are all doing well.    EKG shows minor abnormalities no acute changes and the patient is satisfied that there is no acute issues going on at this time.  Patient continue all medications.      Review of Systems   Constitutional: Negative for chills, diaphoresis, night sweats, weight gain and weight loss.   HENT:  Negative for congestion, hoarse voice, sore throat and stridor.    Eyes:  Negative for double vision and pain.   Cardiovascular:  Negative for chest pain, claudication, cyanosis, dyspnea on exertion, irregular heartbeat, leg swelling, near-syncope, orthopnea, palpitations, paroxysmal nocturnal dyspnea and syncope.   Respiratory:  Negative for cough, hemoptysis, shortness of breath, sleep disturbances due to breathing, snoring, sputum production and wheezing.    Endocrine: Negative for cold intolerance, heat intolerance and polydipsia.   Hematologic/Lymphatic: Negative for bleeding problem. Does not bruise/bleed easily.   Skin:  Negative for color change, dry skin and rash.   Musculoskeletal:  Negative for joint swelling and muscle cramps.   Gastrointestinal:  Negative for bloating, abdominal pain, constipation, diarrhea, dysphagia, melena, nausea and vomiting.   Genitourinary:  Negative for flank pain and urgency.   Neurological:   Negative for dizziness, focal weakness, headaches, light-headedness, loss of balance, seizures and weakness.   Psychiatric/Behavioral:  Negative for altered mental status and memory loss. The patient is not nervous/anxious.    Family History   Problem Relation Age of Onset    Diabetes Mother     Stroke Mother     Cancer Father     Diabetes Sister     Ovarian cancer Sister     Strabismus Sister     Diabetes Brother     Lupus Other     Eczema Other     Melanoma Neg Hx     Psoriasis Neg Hx      Past Medical History:   Diagnosis Date    Allergic rhinitis     Arthritis     Colon polyps     Diabetes mellitus      am 2023    Diabetes mellitus, type 2     DM (diabetes mellitus) 18  years      am 2022    Hyperlipidemia     Hypertension     Metabolic syndrome     Mixed anxiety and depressive disorder     Pneumonia     Prediabetes     Urine incontinence      Social History     Socioeconomic History    Marital status:    Occupational History     Employer: [id number]   Tobacco Use    Smoking status: Former     Packs/day: 0.25     Years: 13.00     Pack years: 3.25     Types: Cigarettes     Quit date: 10/19/2000     Years since quittin.3    Smokeless tobacco: Never   Substance and Sexual Activity    Alcohol use: Not Currently     Comment: occasionally    Drug use: No    Sexual activity: Never   Social History Narrative    Works at [employer], inventory mgmt/receiving. .     Current Outpatient Medications on File Prior to Visit   Medication Sig Dispense Refill    albuterol 90 mcg/actuation inhaler Inhale 1-2 puffs into the lungs every 6 (six) hours as needed for Wheezing. 1 Inhaler 0    amLODIPine (NORVASC) 5 MG tablet Take 1 tablet (5 mg total) by mouth once daily. 90 tablet 3    azelastine (ASTELIN) 137 mcg (0.1 %) nasal spray 1 spray (137 mcg total) by Nasal route 2 (two) times daily. 30 mL 0    blood sugar diagnostic Strp To check BG one time daily, to use with insurance  preferred meter 100 each 3    DULoxetine (CYMBALTA) 60 MG capsule Take 2 capsules (120 mg total) by mouth once daily. 180 capsule 0    ergocalciferol (ERGOCALCIFEROL) 50,000 unit Cap Take 1 capsule (50,000 Units total) by mouth every 7 days. 4 capsule 11    hydroCHLOROthiazide (HYDRODIURIL) 25 MG tablet Take 1 tablet by mouth once daily 90 tablet 3    lancets Misc To check BG one time daily, to use with insurance preferred meter 100 each 3    levocetirizine (XYZAL) 5 MG tablet Take 1 tablet (5 mg total) by mouth every evening. (Patient not taking: Reported on 1/6/2023) 30 tablet 0    losartan (COZAAR) 100 MG tablet Take 1 tablet by mouth once daily 90 tablet 3    moxifloxacin (VIGAMOX) 0.5 % ophthalmic solution Place 1 drop into the left eye every 2 (two) hours. 3 mL 3    naproxen (NAPROSYN) 500 MG tablet Take 1 tablet (500 mg total) by mouth 2 (two) times daily with meals. Take with food 30 tablet 0    pravastatin (PRAVACHOL) 40 MG tablet Take 1 tablet (40 mg total) by mouth once daily. 90 tablet 3    RELION PRIME METER Misc       semaglutide (OZEMPIC) 1 mg/dose (4 mg/3 mL) Inject 1 mg into the skin every 7 days. 1 pen 11    traZODone (DESYREL) 50 MG tablet Take 1 tablet (50 mg total) by mouth every evening. 30 tablet 1     No current facility-administered medications on file prior to visit.     Review of patient's allergies indicates:   Allergen Reactions    Codeine Edema and Itching    Pcn [penicillins] Rash    Sulfa (sulfonamide antibiotics) Edema and Rash       Objective:     Physical Exam  Eyes:      Pupils: Pupils are equal, round, and reactive to light.   Neck:      Trachea: No tracheal deviation.   Cardiovascular:      Rate and Rhythm: Normal rate and regular rhythm.      Pulses: Intact distal pulses.           Carotid pulses are 2+ on the right side and 2+ on the left side.       Radial pulses are 2+ on the right side and 2+ on the left side.        Femoral pulses are 2+ on the right side and 2+ on the  left side.       Popliteal pulses are 2+ on the right side and 2+ on the left side.        Dorsalis pedis pulses are 2+ on the right side and 2+ on the left side.        Posterior tibial pulses are 2+ on the right side and 2+ on the left side.      Heart sounds: Normal heart sounds. No murmur heard.    No friction rub. No gallop.   Pulmonary:      Effort: Pulmonary effort is normal. No respiratory distress.      Breath sounds: Normal breath sounds. No stridor. No wheezing or rales.   Chest:      Chest wall: No tenderness.   Abdominal:      General: There is no distension.      Tenderness: There is no abdominal tenderness. There is no rebound.   Musculoskeletal:         General: No tenderness.      Cervical back: Normal range of motion.   Skin:     General: Skin is warm and dry.   Neurological:      Mental Status: She is alert and oriented to person, place, and time.     Cardiac echo 08/07/2020:  Concentric left ventricular remodeling.  Normal left ventricular systolic function. The estimated ejection fraction is 60%.  Normal LV diastolic function.  Normal central venous pressure (3 mmHg).  The estimated PA systolic pressure is 30 mmHg.  Normal right ventricular systolic function.     120 - 199 mg/dL 173  146 CM  166 CM  206 High  CM  188 CM  171 CM  161 CM     Comment: The National Cholesterol Education Program (NCEP) has set the   following guidelines (reference ranges) for Cholesterol:   Optimal.....................<200 mg/dL   Borderline High.............200-239 mg/dL   High........................> or = 240 mg/dL    Triglycerides 30 - 150 mg/dL 83  75 CM  108 CM  106 CM  96 CM  168 High  CM  93 CM    Comment: The National Cholesterol Education Program (NCEP) has set the   following guidelines (reference values) for triglycerides:   Normal......................<150 mg/dL   Borderline High.............150-199 mg/dL   High........................200-499 mg/dL    HDL 40 - 75 mg/dL 48  44 CM  37 Low  CM  47 CM  50 CM   36 Low  CM  40 CM    Comment: The National Cholesterol Education Program (NCEP) has set the   following guidelines (reference values) for HDL Cholesterol:   Low...............<40 mg/dL   Optimal...........>60 mg/dL    LDL Cholesterol 63.0 - 159.0 mg/dL 108.4  87.0 CM  107.4 CM  137.8 CM  118.8 CM  101.4 CM  102.4 CM    Comment: The National Cholesterol Education Program (NCEP) has set the      Assessment:     1. Hyperlipidemia associated with type 2 diabetes mellitus    2. Hypertension associated with diabetes    3. Type 2 diabetes mellitus with other circulatory complication, without long-term current use of insulin    4. Class 3 severe obesity due to excess calories with serious comorbidity and body mass index (BMI) of 45.0 to 49.9 in adult    5. High risk medication use        Plan:     Hyperlipidemia associated with type 2 diabetes mellitus    Hypertension associated with diabetes    Type 2 diabetes mellitus with other circulatory complication, without long-term current use of insulin    Class 3 severe obesity due to excess calories with serious comorbidity and body mass index (BMI) of 45.0 to 49.9 in adult    High risk medication use    Impression 1 hypertension stable on amlodipine losartan.    2. Diabetes stable   3 hyperlipidemia stable on statin medications   All questions answered today EKGs are unchanged no acute issues follow-up evaluation again in 6 months sooner if acute recurrence symptoms.  All questions answered today.

## 2023-02-20 ENCOUNTER — PATIENT MESSAGE (OUTPATIENT)
Dept: PSYCHIATRY | Facility: CLINIC | Age: 58
End: 2023-02-20
Payer: COMMERCIAL

## 2023-02-20 DIAGNOSIS — F33.1 MODERATE EPISODE OF RECURRENT MAJOR DEPRESSIVE DISORDER: ICD-10-CM

## 2023-02-22 RX ORDER — TRAZODONE HYDROCHLORIDE 50 MG/1
100 TABLET ORAL NIGHTLY
Qty: 30 TABLET | Refills: 1
Start: 2023-02-22 | End: 2023-06-09 | Stop reason: SDUPTHER

## 2023-02-22 NOTE — TELEPHONE ENCOUNTER
I spoke with the patient regarding her message about trazodone 50 mg not working for sleep.  The patient denies any side effects, including any orthostasis or other, with specific questioning.  She is agreeable for the next step to be increasing the dose to 2 tablets at bedtime.  She will be aware of orthostasis and other side effects with the increase.  She will contact me for questions, concerns, or need for a refill with the increase in dose.

## 2023-03-10 ENCOUNTER — OFFICE VISIT (OUTPATIENT)
Dept: PSYCHIATRY | Facility: CLINIC | Age: 58
End: 2023-03-10
Payer: COMMERCIAL

## 2023-03-10 ENCOUNTER — TELEPHONE (OUTPATIENT)
Dept: PSYCHIATRY | Facility: CLINIC | Age: 58
End: 2023-03-10
Payer: COMMERCIAL

## 2023-03-10 DIAGNOSIS — F33.1 MODERATE EPISODE OF RECURRENT MAJOR DEPRESSIVE DISORDER: ICD-10-CM

## 2023-03-10 DIAGNOSIS — F41.8 MIXED ANXIETY AND DEPRESSIVE DISORDER: Chronic | ICD-10-CM

## 2023-03-10 PROCEDURE — 4010F PR ACE/ARB THEARPY RXD/TAKEN: ICD-10-PCS | Mod: CPTII,95,, | Performed by: SOCIAL WORKER

## 2023-03-10 PROCEDURE — 3072F PR LOW RISK FOR RETINOPATHY: ICD-10-PCS | Mod: CPTII,95,, | Performed by: SOCIAL WORKER

## 2023-03-10 PROCEDURE — 3044F HG A1C LEVEL LT 7.0%: CPT | Mod: CPTII,95,, | Performed by: SOCIAL WORKER

## 2023-03-10 PROCEDURE — 99499 UNLISTED E&M SERVICE: CPT | Mod: 95,,, | Performed by: SOCIAL WORKER

## 2023-03-10 PROCEDURE — 3044F PR MOST RECENT HEMOGLOBIN A1C LEVEL <7.0%: ICD-10-PCS | Mod: CPTII,95,, | Performed by: SOCIAL WORKER

## 2023-03-10 PROCEDURE — 3072F LOW RISK FOR RETINOPATHY: CPT | Mod: CPTII,95,, | Performed by: SOCIAL WORKER

## 2023-03-10 PROCEDURE — 99499 NO LOS: ICD-10-PCS | Mod: 95,,, | Performed by: SOCIAL WORKER

## 2023-03-10 PROCEDURE — 4010F ACE/ARB THERAPY RXD/TAKEN: CPT | Mod: CPTII,95,, | Performed by: SOCIAL WORKER

## 2023-03-10 NOTE — TELEPHONE ENCOUNTER
----- Message from Robin Sosa sent at 3/10/2023  1:51 PM CST -----  Regarding: Virtual Visit  Patient called and said she wanted to speak with Dr. Harris concerning the virtual visit where the patient was present but the doctor was not.

## 2023-03-31 ENCOUNTER — TELEPHONE (OUTPATIENT)
Dept: INTERNAL MEDICINE | Facility: CLINIC | Age: 58
End: 2023-03-31
Payer: COMMERCIAL

## 2023-03-31 NOTE — TELEPHONE ENCOUNTER
----- Message from Michaela Zamudio sent at 3/31/2023  8:51 AM CDT -----  Contact: Ac Mahmood is calling in regards to her having to find another doctor due to Dr. Tati Melara going to older pt and her needing an sooner appt than the 05/29.Please call back at [phone]        Thanks  ALICIA

## 2023-04-03 ENCOUNTER — PATIENT MESSAGE (OUTPATIENT)
Dept: INTERNAL MEDICINE | Facility: CLINIC | Age: 58
End: 2023-04-03

## 2023-04-03 ENCOUNTER — LAB VISIT (OUTPATIENT)
Dept: LAB | Facility: HOSPITAL | Age: 58
End: 2023-04-03
Attending: FAMILY MEDICINE
Payer: COMMERCIAL

## 2023-04-03 ENCOUNTER — OFFICE VISIT (OUTPATIENT)
Dept: INTERNAL MEDICINE | Facility: CLINIC | Age: 58
End: 2023-04-03
Payer: COMMERCIAL

## 2023-04-03 VITALS
DIASTOLIC BLOOD PRESSURE: 78 MMHG | BODY MASS INDEX: 46.81 KG/M2 | SYSTOLIC BLOOD PRESSURE: 126 MMHG | TEMPERATURE: 97 F | WEIGHT: 281.31 LBS

## 2023-04-03 DIAGNOSIS — I15.2 HYPERTENSION ASSOCIATED WITH DIABETES: Chronic | ICD-10-CM

## 2023-04-03 DIAGNOSIS — E11.69 TYPE 2 DIABETES MELLITUS WITH OTHER SPECIFIED COMPLICATION, WITHOUT LONG-TERM CURRENT USE OF INSULIN: Chronic | ICD-10-CM

## 2023-04-03 DIAGNOSIS — Z01.818 PREOP EXAMINATION: Primary | ICD-10-CM

## 2023-04-03 DIAGNOSIS — E55.9 VITAMIN D DEFICIENCY: Chronic | ICD-10-CM

## 2023-04-03 DIAGNOSIS — E11.9 TYPE 2 DIABETES MELLITUS WITHOUT COMPLICATION, WITHOUT LONG-TERM CURRENT USE OF INSULIN: Chronic | ICD-10-CM

## 2023-04-03 DIAGNOSIS — Z01.818 PREOP EXAMINATION: ICD-10-CM

## 2023-04-03 DIAGNOSIS — E11.59 HYPERTENSION ASSOCIATED WITH DIABETES: Chronic | ICD-10-CM

## 2023-04-03 DIAGNOSIS — E78.5 HYPERLIPIDEMIA ASSOCIATED WITH TYPE 2 DIABETES MELLITUS: Chronic | ICD-10-CM

## 2023-04-03 DIAGNOSIS — H65.03 BILATERAL ACUTE SEROUS OTITIS MEDIA, RECURRENCE NOT SPECIFIED: ICD-10-CM

## 2023-04-03 DIAGNOSIS — E11.69 HYPERLIPIDEMIA ASSOCIATED WITH TYPE 2 DIABETES MELLITUS: Chronic | ICD-10-CM

## 2023-04-03 DIAGNOSIS — N32.81 OVERACTIVE BLADDER: ICD-10-CM

## 2023-04-03 DIAGNOSIS — N39.41 URGENCY INCONTINENCE: ICD-10-CM

## 2023-04-03 PROBLEM — E66.813 CLASS 3 SEVERE OBESITY DUE TO EXCESS CALORIES WITH SERIOUS COMORBIDITY AND BODY MASS INDEX (BMI) OF 45.0 TO 49.9 IN ADULT: Chronic | Status: ACTIVE | Noted: 2021-10-28

## 2023-04-03 PROBLEM — E66.01 CLASS 3 SEVERE OBESITY DUE TO EXCESS CALORIES WITH SERIOUS COMORBIDITY AND BODY MASS INDEX (BMI) OF 45.0 TO 49.9 IN ADULT: Chronic | Status: ACTIVE | Noted: 2021-10-28

## 2023-04-03 LAB
ALBUMIN SERPL BCP-MCNC: 3.6 G/DL (ref 3.5–5.2)
ALP SERPL-CCNC: 80 U/L (ref 55–135)
ALT SERPL W/O P-5'-P-CCNC: 22 U/L (ref 10–44)
ANION GAP SERPL CALC-SCNC: 10 MMOL/L (ref 8–16)
AST SERPL-CCNC: 23 U/L (ref 10–40)
BILIRUB SERPL-MCNC: 0.5 MG/DL (ref 0.1–1)
BUN SERPL-MCNC: 13 MG/DL (ref 6–20)
CALCIUM SERPL-MCNC: 9.8 MG/DL (ref 8.7–10.5)
CHLORIDE SERPL-SCNC: 103 MMOL/L (ref 95–110)
CO2 SERPL-SCNC: 31 MMOL/L (ref 23–29)
CREAT SERPL-MCNC: 0.9 MG/DL (ref 0.5–1.4)
ERYTHROCYTE [DISTWIDTH] IN BLOOD BY AUTOMATED COUNT: 12.5 % (ref 11.5–14.5)
EST. GFR  (NO RACE VARIABLE): >60 ML/MIN/1.73 M^2
ESTIMATED AVG GLUCOSE: 117 MG/DL (ref 68–131)
GLUCOSE SERPL-MCNC: 83 MG/DL (ref 70–110)
HBA1C MFR BLD: 5.7 % (ref 4–5.6)
HCT VFR BLD AUTO: 43.2 % (ref 37–48.5)
HGB BLD-MCNC: 13.7 G/DL (ref 12–16)
MCH RBC QN AUTO: 30.6 PG (ref 27–31)
MCHC RBC AUTO-ENTMCNC: 31.7 G/DL (ref 32–36)
MCV RBC AUTO: 96 FL (ref 82–98)
PLATELET # BLD AUTO: 333 K/UL (ref 150–450)
PMV BLD AUTO: 9.5 FL (ref 9.2–12.9)
POTASSIUM SERPL-SCNC: 3.9 MMOL/L (ref 3.5–5.1)
PROT SERPL-MCNC: 7.5 G/DL (ref 6–8.4)
RBC # BLD AUTO: 4.48 M/UL (ref 4–5.4)
SODIUM SERPL-SCNC: 144 MMOL/L (ref 136–145)
WBC # BLD AUTO: 8.37 K/UL (ref 3.9–12.7)

## 2023-04-03 PROCEDURE — 3044F PR MOST RECENT HEMOGLOBIN A1C LEVEL <7.0%: ICD-10-PCS | Mod: CPTII,S$GLB,, | Performed by: FAMILY MEDICINE

## 2023-04-03 PROCEDURE — 3078F DIAST BP <80 MM HG: CPT | Mod: CPTII,S$GLB,, | Performed by: FAMILY MEDICINE

## 2023-04-03 PROCEDURE — 1159F MED LIST DOCD IN RCRD: CPT | Mod: CPTII,S$GLB,, | Performed by: FAMILY MEDICINE

## 2023-04-03 PROCEDURE — 99999 PR PBB SHADOW E&M-EST. PATIENT-LVL V: CPT | Mod: PBBFAC,,, | Performed by: FAMILY MEDICINE

## 2023-04-03 PROCEDURE — 3078F PR MOST RECENT DIASTOLIC BLOOD PRESSURE < 80 MM HG: ICD-10-PCS | Mod: CPTII,S$GLB,, | Performed by: FAMILY MEDICINE

## 2023-04-03 PROCEDURE — 99999 PR PBB SHADOW E&M-EST. PATIENT-LVL V: ICD-10-PCS | Mod: PBBFAC,,, | Performed by: FAMILY MEDICINE

## 2023-04-03 PROCEDURE — 83036 HEMOGLOBIN GLYCOSYLATED A1C: CPT | Performed by: FAMILY MEDICINE

## 2023-04-03 PROCEDURE — 80053 COMPREHEN METABOLIC PANEL: CPT | Performed by: FAMILY MEDICINE

## 2023-04-03 PROCEDURE — 36415 COLL VENOUS BLD VENIPUNCTURE: CPT | Performed by: FAMILY MEDICINE

## 2023-04-03 PROCEDURE — 99214 OFFICE O/P EST MOD 30 MIN: CPT | Mod: S$GLB,,, | Performed by: FAMILY MEDICINE

## 2023-04-03 PROCEDURE — 3008F PR BODY MASS INDEX (BMI) DOCUMENTED: ICD-10-PCS | Mod: CPTII,S$GLB,, | Performed by: FAMILY MEDICINE

## 2023-04-03 PROCEDURE — 3072F PR LOW RISK FOR RETINOPATHY: ICD-10-PCS | Mod: CPTII,S$GLB,, | Performed by: FAMILY MEDICINE

## 2023-04-03 PROCEDURE — 3074F SYST BP LT 130 MM HG: CPT | Mod: CPTII,S$GLB,, | Performed by: FAMILY MEDICINE

## 2023-04-03 PROCEDURE — 3008F BODY MASS INDEX DOCD: CPT | Mod: CPTII,S$GLB,, | Performed by: FAMILY MEDICINE

## 2023-04-03 PROCEDURE — 4010F PR ACE/ARB THEARPY RXD/TAKEN: ICD-10-PCS | Mod: CPTII,S$GLB,, | Performed by: FAMILY MEDICINE

## 2023-04-03 PROCEDURE — 4010F ACE/ARB THERAPY RXD/TAKEN: CPT | Mod: CPTII,S$GLB,, | Performed by: FAMILY MEDICINE

## 2023-04-03 PROCEDURE — 99214 PR OFFICE/OUTPT VISIT, EST, LEVL IV, 30-39 MIN: ICD-10-PCS | Mod: S$GLB,,, | Performed by: FAMILY MEDICINE

## 2023-04-03 PROCEDURE — 1160F PR REVIEW ALL MEDS BY PRESCRIBER/CLIN PHARMACIST DOCUMENTED: ICD-10-PCS | Mod: CPTII,S$GLB,, | Performed by: FAMILY MEDICINE

## 2023-04-03 PROCEDURE — 85027 COMPLETE CBC AUTOMATED: CPT | Performed by: FAMILY MEDICINE

## 2023-04-03 PROCEDURE — 3072F LOW RISK FOR RETINOPATHY: CPT | Mod: CPTII,S$GLB,, | Performed by: FAMILY MEDICINE

## 2023-04-03 PROCEDURE — 3044F HG A1C LEVEL LT 7.0%: CPT | Mod: CPTII,S$GLB,, | Performed by: FAMILY MEDICINE

## 2023-04-03 PROCEDURE — 3074F PR MOST RECENT SYSTOLIC BLOOD PRESSURE < 130 MM HG: ICD-10-PCS | Mod: CPTII,S$GLB,, | Performed by: FAMILY MEDICINE

## 2023-04-03 PROCEDURE — 1159F PR MEDICATION LIST DOCUMENTED IN MEDICAL RECORD: ICD-10-PCS | Mod: CPTII,S$GLB,, | Performed by: FAMILY MEDICINE

## 2023-04-03 PROCEDURE — 1160F RVW MEDS BY RX/DR IN RCRD: CPT | Mod: CPTII,S$GLB,, | Performed by: FAMILY MEDICINE

## 2023-04-03 RX ORDER — ERGOCALCIFEROL 1.25 MG/1
100000 CAPSULE ORAL
Qty: 24 CAPSULE | Refills: 3 | Status: SHIPPED | OUTPATIENT
Start: 2023-04-03 | End: 2023-09-07 | Stop reason: SDUPTHER

## 2023-04-03 RX ORDER — BUPROPION HYDROCHLORIDE 150 MG/1
150 TABLET ORAL EVERY MORNING
COMMUNITY
Start: 2023-04-02 | End: 2023-06-09

## 2023-04-03 RX ORDER — FLUTICASONE PROPIONATE 50 MCG
2 SPRAY, SUSPENSION (ML) NASAL
COMMUNITY
Start: 2022-12-28 | End: 2024-03-07 | Stop reason: SDUPTHER

## 2023-04-03 RX ORDER — HYDROCHLOROTHIAZIDE 25 MG/1
25 TABLET ORAL DAILY
Qty: 90 TABLET | Refills: 3 | Status: SHIPPED | OUTPATIENT
Start: 2023-04-03 | End: 2023-09-07 | Stop reason: SDUPTHER

## 2023-04-03 RX ORDER — AZELASTINE 1 MG/ML
1 SPRAY, METERED NASAL 2 TIMES DAILY
Qty: 30 ML | Refills: 5 | Status: SHIPPED | OUTPATIENT
Start: 2023-04-03 | End: 2023-09-07 | Stop reason: SDUPTHER

## 2023-04-03 RX ORDER — AMLODIPINE BESYLATE 5 MG/1
5 TABLET ORAL DAILY
Qty: 90 TABLET | Refills: 3 | Status: SHIPPED | OUTPATIENT
Start: 2023-04-03 | End: 2023-09-07 | Stop reason: SDUPTHER

## 2023-04-03 RX ORDER — LOSARTAN POTASSIUM 100 MG/1
100 TABLET ORAL DAILY
Qty: 90 TABLET | Refills: 3 | Status: SHIPPED | OUTPATIENT
Start: 2023-04-03 | End: 2023-09-07 | Stop reason: SDUPTHER

## 2023-04-03 RX ORDER — LEVOCETIRIZINE DIHYDROCHLORIDE 5 MG/1
5 TABLET, FILM COATED ORAL NIGHTLY
Qty: 90 TABLET | Refills: 3 | Status: SHIPPED | OUTPATIENT
Start: 2023-04-03 | End: 2023-09-07 | Stop reason: SDUPTHER

## 2023-04-03 NOTE — ASSESSMENT & PLAN NOTE
BP Readings from Last 6 Encounters:   04/03/23 126/78   02/15/23 110/60   02/13/23 133/79   01/09/23 120/79   01/05/23 (!) 141/85   12/08/22 128/80     Lab Results   Component Value Date    EGFRNORACEVR >60.0 11/17/2022    CREATININE 1.0 11/17/2022    BUN 13 11/17/2022    K 3.7 11/17/2022     11/17/2022     11/17/2022     Results for orders placed or performed during the hospital encounter of 02/13/23   EKG 12-lead    Collection Time: 02/13/23 10:04 AM    Narrative    Test Reason : Z79.899,    Vent. Rate : 107 BPM     Atrial Rate : 107 BPM     P-R Int : 152 ms          QRS Dur : 078 ms      QT Int : 328 ms       P-R-T Axes : 081 044 055 degrees     QTc Int : 437 ms    Sinus tachycardia  Low voltage QRS  Cannot rule out Anterior infarct ,age undetermined  Abnormal ECG  When compared with ECG of 11-OCT-2021 19:16,  Premature ventricular complexes are no longer Present  Confirmed by REBECA BOGGS MD ([id number]) on 2/13/2023 1:36:56 PM    Referred By: ARMANDO SMITH           Confirmed By:REBECA BOGGS MD

## 2023-04-03 NOTE — ASSESSMENT & PLAN NOTE
Lab Results   Component Value Date    CHOL 173 11/17/2022    CHOL 146 10/21/2021    TRIG 83 11/17/2022    TRIG 75 10/21/2021    HDL 48 11/17/2022    HDL 44 10/21/2021    LDLCALC 108.4 11/17/2022    LDLCALC 87.0 10/21/2021    NONHDLCHOL 125 11/17/2022    NONHDLCHOL 102 10/21/2021    AST 24 11/17/2022    ALT 19 11/17/2022     The 10-year ASCVD risk score (Shawn PEREZ, et al., 2019) is: 12.1%    Values used to calculate the score:      Age: 57 years      Sex: Female      Is Non- : Yes      Diabetic: Yes      Tobacco smoker: No      Systolic Blood Pressure: 126 mmHg      Is BP treated: Yes      HDL Cholesterol: 48 mg/dL      Total Cholesterol: 173 mg/dL

## 2023-04-03 NOTE — ASSESSMENT & PLAN NOTE
Still deficient on D2 50K IU/day.  Lab Results   Component Value Date    LDZVYVFO14RJ 20 (L) 10/21/2021    [id number] 21 (L) 04/13/2021    [id number] 20 (L) 12/07/2020

## 2023-04-03 NOTE — PROGRESS NOTES
"OFFICE VISIT 4/3/23 11:00 AM CDT    Subjective   CHIEF COMPLAINT: Establish Care    REPORT OF PHYSICIAN CONSULTATION FOR PRE-OPERATIVE EVALUATION    CONSULTATION REQUESTED BY: Gregorio Jain MD, FACS  REASON FOR CONSULTATION (CHIEF COMPLAINT): Pre-operative evaluation, history and physical exam  ANTICIPATED OPERATION: Abdominoplasty, liposuction  ANTICIPATED DATE OF OPERATION: within 30 days  ANESTHESIA ANTICIPATED TO BE USED: general  PREOPERATIVE TESTS ORDERED: Complete Blood Count, Metabolic Panel, and ECG  DATA REVIEWED: Labs unremarkable. Previous EKG stable.    ANESTHESIA HISTORY: No history of adverse reaction to anesthesia.  FERTILITY: s/p hysterectomy  FUNCTIONAL AEROBIC CAPACITY REPORTED BY PATIENT: Equal to or greater than 5 METs  HEMATOLOGIC/HEMOSTATIC CONCERN: No history of hematologic disease or bleeding problems reported.  THROMBOEMBOLISM RISK: No history of thromboembolic events or hypercoagulable state.  ANTICOAGULATION: Ac is NOT on anticoagulation, including warfarin, heparin, apixaban, argatroban, bivalirudin, dabigatran, dalteparin, desirudin, edoxaban, enoxaparin, fondaparinux, fondaparinux, rivaroxaban, or any other known anticoagulant.  ASPIRIN: Ac is NOT on aspirin.  CLOPIDOGREL: Ac is NOT on clopidogrel.    SUMMARY ASSESSMENT: Based on the history and physical exam I performed and review of the data presently available to me, Ac is at low risk for perioperative morbidity and mortality associated with this procedure. She may proceed with the planned operation without further evaluation or delay, assuming clearance from her cardiologist.    Refer to the history and data embedded within the "Assessment & Plan" section below for the status of the conditions evaluated and managed today.    Review of Systems   Respiratory:  Negative for chest tightness and shortness of breath.    Cardiovascular:  Negative for chest pain.   Endocrine: Negative for polydipsia and polyuria.   "     Assessment and Plan   1. Preop examination  -     CBC Without Differential; Future; Expected date: 04/03/2023  -     Comprehensive Metabolic Panel; Future; Expected date: 04/03/2023    2. Hypertension associated with diabetes  Assessment & Plan:  BP Readings from Last 6 Encounters:   04/03/23 126/78   02/15/23 110/60   02/13/23 133/79   01/09/23 120/79   01/05/23 (!) 141/85   12/08/22 128/80     Lab Results   Component Value Date    EGFRNORACEVR >60.0 11/17/2022    CREATININE 1.0 11/17/2022    BUN 13 11/17/2022    K 3.7 11/17/2022     11/17/2022     11/17/2022     Results for orders placed or performed during the hospital encounter of 02/13/23   EKG 12-lead    Collection Time: 02/13/23 10:04 AM    Narrative    Test Reason : Z79.899,    Vent. Rate : 107 BPM     Atrial Rate : 107 BPM     P-R Int : 152 ms          QRS Dur : 078 ms      QT Int : 328 ms       P-R-T Axes : 081 044 055 degrees     QTc Int : 437 ms    Sinus tachycardia  Low voltage QRS  Cannot rule out Anterior infarct ,age undetermined  Abnormal ECG  When compared with ECG of 11-OCT-2021 19:16,  Premature ventricular complexes are no longer Present  Confirmed by REBECA BOGGS MD ([id number]) on 2/13/2023 1:36:56 PM    Referred By: ARMANDO SMITH           Confirmed By:REBECA BOGGS MD        Orders:  -     Comprehensive Metabolic Panel; Future; Expected date: 04/03/2023  -     amLODIPine (NORVASC) 5 MG tablet; Take 1 tablet (5 mg total) by mouth once daily.  Dispense: 90 tablet; Refill: 3  -     losartan (COZAAR) 100 MG tablet; Take 1 tablet (100 mg total) by mouth once daily.  Dispense: 90 tablet; Refill: 3  -     hydroCHLOROthiazide (HYDRODIURIL) 25 MG tablet; Take 1 tablet (25 mg total) by mouth once daily.  Dispense: 90 tablet; Refill: 3    3. Hyperlipidemia associated with type 2 diabetes mellitus  Assessment & Plan:  Lab Results   Component Value Date    CHOL 173 11/17/2022    CHOL 146 10/21/2021    TRIG 83 11/17/2022    TRIG 75  10/21/2021    HDL 48 11/17/2022    HDL 44 10/21/2021    LDLCALC 108.4 11/17/2022    LDLCALC 87.0 10/21/2021    NONHDLCHOL 125 11/17/2022    NONHDLCHOL 102 10/21/2021    AST 24 11/17/2022    ALT 19 11/17/2022     The 10-year ASCVD risk score (Shawn PEREZ, et al., 2019) is: 12.1%    Values used to calculate the score:      Age: 57 years      Sex: Female      Is Non- : Yes      Diabetic: Yes      Tobacco smoker: No      Systolic Blood Pressure: 126 mmHg      Is BP treated: Yes      HDL Cholesterol: 48 mg/dL      Total Cholesterol: 173 mg/dL     Orders:  -     Comprehensive Metabolic Panel; Future; Expected date: 04/03/2023    4. Type 2 diabetes mellitus without complication, without long-term current use of insulin    5. Bilateral acute serous otitis media, recurrence not specified  -     azelastine (ASTELIN) 137 mcg (0.1 %) nasal spray; 1 spray (137 mcg total) by Nasal route 2 (two) times daily.  Dispense: 30 mL; Refill: 5  -     levocetirizine (XYZAL) 5 MG tablet; Take 1 tablet (5 mg total) by mouth every evening.  Dispense: 90 tablet; Refill: 3    6. Vitamin D deficiency  Assessment & Plan:  Still deficient on D2 50K IU/day.  Lab Results   Component Value Date    USQSNIMO27VB 20 (L) 10/21/2021    [id number] 21 (L) 04/13/2021    [id number] 20 (L) 12/07/2020        Orders:  -     ergocalciferol (ERGOCALCIFEROL) 50,000 unit Cap; Take 2 capsules (100,000 Units total) by mouth every 7 days.  Dispense: 24 capsule; Refill: 3    7. Urgency incontinence  -     Ambulatory referral/consult to Urology; Future; Expected date: 04/10/2023    8. Overactive bladder  -     Ambulatory referral/consult to Urology; Future; Expected date: 04/10/2023    9. Type 2 diabetes mellitus with other specified complication, without long-term current use of insulin  Assessment & Plan:  Diabetes Management Status    Statin: Not taking  ACE/ARB: Taking    Screening or Prevention Patient's value Goal Complete/Controlled?    HgA1C Testing and Control   Lab Results   Component Value Date    HGBA1C 5.7 (H) 11/17/2022      Annually/Less than 8% Yes   Lipid profile : 11/17/2022 Annually Yes   LDL control Lab Results   Component Value Date    LDLCALC 108.4 11/17/2022    Annually/Less than 100 mg/dl  No   Nephropathy screening Lab Results   Component Value Date    LABMICR 16.0 11/17/2022     Lab Results   Component Value Date    PROTEINUA Negative 10/28/2021    Annually Yes   Blood pressure BP Readings from Last 1 Encounters:   04/03/23 126/78    Less than 140/90 Yes   Dilated retinal exam : 01/06/2023 Annually Yes   Foot exam   : 11/28/2022 Annually Yes     Lab Results   Component Value Date    HGBA1C 5.7 (H) 11/17/2022    HGBA1C 6.3 (H) 02/11/2022    HGBA1C 6.5 (H) 10/21/2021    EGFRNORACEVR >60.0 11/17/2022    MICALBCREAT 4.6 11/17/2022    LDLCALC 108.4 11/17/2022     No results found for: GLUTAMICACID, CPEPTIDE   Last 5 Patient Entered Readings                                          Most Recent A1c:     There is no flowsheet data to display.        HEALTH MAINTENANCE: Diabetic health maintenance interventions reviewed and are up to date except for:  There are no preventive care reminders to display for this patient.     Orders:  -     semaglutide (OZEMPIC) 1 mg/dose (4 mg/3 mL); Inject 1 mg into the skin every 7 days.  Dispense: 9 mL; Refill: 1  -     Hemoglobin A1C; Future; Expected date: 04/03/2023    Unless noted herein, any chronic conditions are represented as and appear stable, and no other significant complaints or concerns were reported.    Follow up in about 5 months (around 9/14/2023) for review test results, discuss treatment plan, re-evaluation.         Objective   Vitals:    04/03/23 1046   BP: 126/78   BP Location: Right arm   Patient Position: Sitting   BP Method: Large (Manual)   Temp: 96.8 °F (36 °C)   TempSrc: Tympanic   Weight: 127.6 kg (281 lb 4.9 oz)   Physical Exam  Vitals reviewed.   Constitutional:        "General: She is not in acute distress.     Appearance: Normal appearance. She is not ill-appearing, toxic-appearing or diaphoretic.   HENT:      Head: Normocephalic and atraumatic.   Eyes:      General: No scleral icterus.     Conjunctiva/sclera: Conjunctivae normal.   Neck:      Vascular: No carotid bruit.   Cardiovascular:      Rate and Rhythm: Normal rate and regular rhythm.   Pulmonary:      Effort: Pulmonary effort is normal.      Breath sounds: Normal breath sounds.   Abdominal:      Palpations: Abdomen is soft.      Tenderness: There is no abdominal tenderness.   Skin:     General: Skin is warm and dry.   Neurological:      Mental Status: She is alert and oriented to person, place, and time. Mental status is at baseline.   Psychiatric:         Mood and Affect: Mood normal.         Behavior: Behavior normal.         Judgment: Judgment normal.        Documentation entered by me for this encounter may have been done in part using speech-recognition technology. Although I have made an effort to ensure accuracy, "sound like" errors may exist and should be interpreted in context.   "

## 2023-04-03 NOTE — LETTER
FAX COVER SHEET    TO: Maria Teresa Gonzalez, MA  Phone: [phone]  Fax: [phone]  FROM: JASWINDER Powers MD  DATE: 04/10/2023  RE: Ac Hayden,  1965; MRN [id number]              CONFIDENTIALITY NOTICE: The accompanying facsimile is intended solely for the use of the recipient designated above. Document(s) transmitted herewith may contain information that is confidential and privileged. Delivery, distribution or dissemination of this communication other than to the intended recipient is strictly prohibited. If you have received this facsimile in error, please notify [facility]'s Compliance and Privacy Department immediately by telephone at [phone]. Thank you.          ROJELIO Powers MD  [facility] [address] [place] [address]  PH: [phone]    FX: [phone]          April 10, 2023        Gregorio Jain MD  [address]  [address]  [facility] [address]  Via Fax: [phone]       RE:  Ac Hayden,  1965   (our MRN [id number])      Dear Dr. Jain:    Please see my accompanying pre-operative evaluation and test results.    SUMMARY ASSESSMENT: Based on the history and physical exam I performed and review of the data presently available to me, Ac is at low risk for perioperative morbidity and mortality associated with this procedure. She may proceed with the planned operation without further evaluation or delay, assuming clearance from her cardiologist.    Please call me if you have any questions or if I can be of any further service.    Kind regards,     ROJELIO Powers MD    Enclosures: April 3 Office Visit Note, Lab Results, EKG

## 2023-04-03 NOTE — ASSESSMENT & PLAN NOTE
Diabetes Management Status    Statin: Not taking  ACE/ARB: Taking    Screening or Prevention Patient's value Goal Complete/Controlled?   HgA1C Testing and Control   Lab Results   Component Value Date    HGBA1C 5.7 (H) 11/17/2022      Annually/Less than 8% Yes   Lipid profile : 11/17/2022 Annually Yes   LDL control Lab Results   Component Value Date    LDLCALC 108.4 11/17/2022    Annually/Less than 100 mg/dl  No   Nephropathy screening Lab Results   Component Value Date    LABMICR 16.0 11/17/2022     Lab Results   Component Value Date    PROTEINUA Negative 10/28/2021    Annually Yes   Blood pressure BP Readings from Last 1 Encounters:   04/03/23 126/78    Less than 140/90 Yes   Dilated retinal exam : 01/06/2023 Annually Yes   Foot exam   : 11/28/2022 Annually Yes     Lab Results   Component Value Date    HGBA1C 5.7 (H) 11/17/2022    HGBA1C 6.3 (H) 02/11/2022    HGBA1C 6.5 (H) 10/21/2021    EGFRNORACEVR >60.0 11/17/2022    MICALBCREAT 4.6 11/17/2022    LDLCALC 108.4 11/17/2022     No results found for: GLUTAMICACID, CPEPTIDE   Last 5 Patient Entered Readings                                          Most Recent A1c:     There is no flowsheet data to display.        HEALTH MAINTENANCE: Diabetic health maintenance interventions reviewed and are up to date except for:  There are no preventive care reminders to display for this patient.

## 2023-04-10 ENCOUNTER — OFFICE VISIT (OUTPATIENT)
Dept: UROLOGY | Facility: CLINIC | Age: 58
End: 2023-04-10
Payer: COMMERCIAL

## 2023-04-10 VITALS
TEMPERATURE: 98 F | DIASTOLIC BLOOD PRESSURE: 78 MMHG | SYSTOLIC BLOOD PRESSURE: 116 MMHG | BODY MASS INDEX: 46.75 KG/M2 | HEIGHT: 65 IN | HEART RATE: 99 BPM | WEIGHT: 280.63 LBS

## 2023-04-10 DIAGNOSIS — N32.89 BLADDER SPASMS: ICD-10-CM

## 2023-04-10 DIAGNOSIS — N32.81 OVERACTIVE BLADDER: Primary | ICD-10-CM

## 2023-04-10 DIAGNOSIS — B35.4 TINEA CORPORIS: ICD-10-CM

## 2023-04-10 DIAGNOSIS — R31.29 MICROHEMATURIA: ICD-10-CM

## 2023-04-10 LAB
BILIRUB SERPL-MCNC: NORMAL MG/DL
BLOOD URINE, POC: NORMAL
CLARITY, POC UA: CLEAR
COLOR, POC UA: NORMAL
GLUCOSE UR QL STRIP: NORMAL
KETONES UR QL STRIP: NORMAL
LEUKOCYTE ESTERASE URINE, POC: NORMAL
NITRITE, POC UA: NORMAL
PH, POC UA: 6
PROTEIN, POC: NORMAL
SPECIFIC GRAVITY, POC UA: 1.02
UROBILINOGEN, POC UA: NORMAL

## 2023-04-10 PROCEDURE — 51798 PR MEAS,POST-VOID RES,US,NON-IMAGING: ICD-10-PCS | Mod: S$GLB,,, | Performed by: UROLOGY

## 2023-04-10 PROCEDURE — 99204 PR OFFICE/OUTPT VISIT, NEW, LEVL IV, 45-59 MIN: ICD-10-PCS | Mod: S$GLB,,, | Performed by: UROLOGY

## 2023-04-10 PROCEDURE — 3072F LOW RISK FOR RETINOPATHY: CPT | Mod: CPTII,S$GLB,, | Performed by: UROLOGY

## 2023-04-10 PROCEDURE — 3074F PR MOST RECENT SYSTOLIC BLOOD PRESSURE < 130 MM HG: ICD-10-PCS | Mod: CPTII,S$GLB,, | Performed by: UROLOGY

## 2023-04-10 PROCEDURE — 99999 PR PBB SHADOW E&M-EST. PATIENT-LVL IV: CPT | Mod: PBBFAC,,, | Performed by: UROLOGY

## 2023-04-10 PROCEDURE — 4010F PR ACE/ARB THEARPY RXD/TAKEN: ICD-10-PCS | Mod: CPTII,S$GLB,, | Performed by: UROLOGY

## 2023-04-10 PROCEDURE — 3074F SYST BP LT 130 MM HG: CPT | Mod: CPTII,S$GLB,, | Performed by: UROLOGY

## 2023-04-10 PROCEDURE — 99204 OFFICE O/P NEW MOD 45 MIN: CPT | Mod: S$GLB,,, | Performed by: UROLOGY

## 2023-04-10 PROCEDURE — 4010F ACE/ARB THERAPY RXD/TAKEN: CPT | Mod: CPTII,S$GLB,, | Performed by: UROLOGY

## 2023-04-10 PROCEDURE — 3008F PR BODY MASS INDEX (BMI) DOCUMENTED: ICD-10-PCS | Mod: CPTII,S$GLB,, | Performed by: UROLOGY

## 2023-04-10 PROCEDURE — 81002 URINALYSIS NONAUTO W/O SCOPE: CPT | Mod: S$GLB,,, | Performed by: UROLOGY

## 2023-04-10 PROCEDURE — 99999 PR PBB SHADOW E&M-EST. PATIENT-LVL IV: ICD-10-PCS | Mod: PBBFAC,,, | Performed by: UROLOGY

## 2023-04-10 PROCEDURE — 3044F PR MOST RECENT HEMOGLOBIN A1C LEVEL <7.0%: ICD-10-PCS | Mod: CPTII,S$GLB,, | Performed by: UROLOGY

## 2023-04-10 PROCEDURE — 81002 POCT URINE DIPSTICK WITHOUT MICROSCOPE: ICD-10-PCS | Mod: S$GLB,,, | Performed by: UROLOGY

## 2023-04-10 PROCEDURE — 1159F MED LIST DOCD IN RCRD: CPT | Mod: CPTII,S$GLB,, | Performed by: UROLOGY

## 2023-04-10 PROCEDURE — 3078F PR MOST RECENT DIASTOLIC BLOOD PRESSURE < 80 MM HG: ICD-10-PCS | Mod: CPTII,S$GLB,, | Performed by: UROLOGY

## 2023-04-10 PROCEDURE — 3044F HG A1C LEVEL LT 7.0%: CPT | Mod: CPTII,S$GLB,, | Performed by: UROLOGY

## 2023-04-10 PROCEDURE — 3078F DIAST BP <80 MM HG: CPT | Mod: CPTII,S$GLB,, | Performed by: UROLOGY

## 2023-04-10 PROCEDURE — 51798 US URINE CAPACITY MEASURE: CPT | Mod: S$GLB,,, | Performed by: UROLOGY

## 2023-04-10 PROCEDURE — 3008F BODY MASS INDEX DOCD: CPT | Mod: CPTII,S$GLB,, | Performed by: UROLOGY

## 2023-04-10 PROCEDURE — 1159F PR MEDICATION LIST DOCUMENTED IN MEDICAL RECORD: ICD-10-PCS | Mod: CPTII,S$GLB,, | Performed by: UROLOGY

## 2023-04-10 PROCEDURE — 3072F PR LOW RISK FOR RETINOPATHY: ICD-10-PCS | Mod: CPTII,S$GLB,, | Performed by: UROLOGY

## 2023-04-10 RX ORDER — SOLIFENACIN SUCCINATE 10 MG/1
10 TABLET, FILM COATED ORAL DAILY
Qty: 30 TABLET | Refills: 3 | Status: SHIPPED | OUTPATIENT
Start: 2023-04-10 | End: 2023-07-21

## 2023-04-10 RX ORDER — NYSTATIN 100000 U/G
CREAM TOPICAL 2 TIMES DAILY
Qty: 30 G | Refills: 3 | Status: SHIPPED | OUTPATIENT
Start: 2023-04-10

## 2023-04-10 NOTE — PROGRESS NOTES
Chief Complaint   Patient presents with    Other       Referring Provider: Dr. JASWINDER Powers      History of Present Illness:   Ac Hayden is a 57 y.o. female here for evaluation of Other  .   4/10/23-56yo F, here for evaluation of urinary urgency with cramping and pelvic pressure. She reports that it feels like menstrual cramps. When the cramping starts, she has to sit for a minute until she can move, and then goes to the restroom. Ongoing x 2 weeks. Seems to have started after intercourse with her . No gross hematuria. She generally has issues with urgency and wears a pad.       Review of Systems   Constitutional:  Negative for chills and fever.   Respiratory:  Negative for shortness of breath.    Cardiovascular:  Negative for chest pain.   Gastrointestinal:  Negative for constipation and diarrhea.   Musculoskeletal:  Positive for back pain.   All other systems reviewed and are negative.      Past Medical History:   Diagnosis Date    Allergic rhinitis     Arthritis     Colon polyps     Diabetes mellitus 2002     am 01/05/2023    Diabetes mellitus, type 2     DM (diabetes mellitus) 18  years 2002     am 01/19/2022    Hyperlipidemia     Hypertension     Metabolic syndrome     Mixed anxiety and depressive disorder     Pneumonia     Prediabetes     Urine incontinence        Past Surgical History:   Procedure Laterality Date    BARIATRIC SURGERY  08/22/2018    [facility])    COLONOSCOPY N/A 10/19/2017    Procedure: COLONOSCOPY;  Surgeon: Jamal Cole MD;  Location: [facility];  Service: Endoscopy;  Laterality: N/A;    COLONOSCOPY N/A 1/5/2023    Procedure: COLONOSCOPY;  Surgeon: Katelyn Hensley MD;  Location: [facility];  Service: Endoscopy;  Laterality: N/A;    HYSTERECTOMY      PARTIAL HYSTERECTOMY      PLANTAR FASCIA SURGERY      TOTAL REDUCTION MAMMOPLASTY  2014       Family History   Problem Relation Age of Onset    Diabetes Mother     Stroke Mother     Cancer  Father     Diabetes Sister     Ovarian cancer Sister     Strabismus Sister     Diabetes Brother     Lupus Other     Eczema Other     Melanoma Neg Hx     Psoriasis Neg Hx        Social History     Tobacco Use    Smoking status: Former     Packs/day: 0.25     Years: 13.00     Pack years: 3.25     Types: Cigarettes     Quit date: 10/19/2000     Years since quittin.4    Smokeless tobacco: Never   Substance Use Topics    Alcohol use: Not Currently     Comment: occasionally    Drug use: No       Current Outpatient Medications   Medication Sig Dispense Refill    amLODIPine (NORVASC) 5 MG tablet Take 1 tablet (5 mg total) by mouth once daily. 90 tablet 3    azelastine (ASTELIN) 137 mcg (0.1 %) nasal spray 1 spray (137 mcg total) by Nasal route 2 (two) times daily. 30 mL 5    blood sugar diagnostic Strp To check BG one time daily, to use with insurance preferred meter 100 each 3    buPROPion (WELLBUTRIN XL) 150 MG TB24 tablet Take 150 mg by mouth every morning.      DULoxetine (CYMBALTA) 60 MG capsule Take 2 capsules (120 mg total) by mouth once daily. 180 capsule 0    ergocalciferol (ERGOCALCIFEROL) 50,000 unit Cap Take 2 capsules (100,000 Units total) by mouth every 7 days. 24 capsule 3    fluticasone propionate (FLONASE) 50 mcg/actuation nasal spray 2 sprays by Each Nostril route.      hydroCHLOROthiazide (HYDRODIURIL) 25 MG tablet Take 1 tablet (25 mg total) by mouth once daily. 90 tablet 3    lancets Misc To check BG one time daily, to use with insurance preferred meter 100 each 3    levocetirizine (XYZAL) 5 MG tablet Take 1 tablet (5 mg total) by mouth every evening. 90 tablet 3    losartan (COZAAR) 100 MG tablet Take 1 tablet (100 mg total) by mouth once daily. 90 tablet 3    moxifloxacin (VIGAMOX) 0.5 % ophthalmic solution Place 1 drop into the left eye every 2 (two) hours. 3 mL 3    naproxen (NAPROSYN) 500 MG tablet Take 1 tablet (500 mg total) by mouth 2 (two) times daily with meals. Take with food 30 tablet 0     RELION PRIME METER Misc       semaglutide (OZEMPIC) 1 mg/dose (4 mg/3 mL) Inject 1 mg into the skin every 7 days. 9 mL 1    traZODone (DESYREL) 50 MG tablet Take 2 tablets (100 mg total) by mouth every evening. 30 tablet 1    albuterol 90 mcg/actuation inhaler Inhale 1-2 puffs into the lungs every 6 (six) hours as needed for Wheezing. 1 Inhaler 0    nystatin (MYCOSTATIN) cream Apply topically 2 (two) times daily. 30 g 3    pravastatin (PRAVACHOL) 40 MG tablet Take 1 tablet (40 mg total) by mouth once daily. 90 tablet 3    solifenacin (VESICARE) 10 MG tablet Take 1 tablet (10 mg total) by mouth once daily. 30 tablet 3     No current facility-administered medications for this visit.       Review of patient's allergies indicates:   Allergen Reactions    Codeine Edema and Itching    Pcn [penicillins] Rash    Sulfa (sulfonamide antibiotics) Edema and Rash       Physical Exam  Vitals:    04/10/23 0819   BP: 116/78   Pulse: 99   Temp: 97.8 °F (36.6 °C)     General: Well-developed, well-nourished, in no acute distress  HEENT: Normocephalic, atraumatic, extraocular movements intact  Neck: Supple, no supraclavicular or cervical lymphadenopathy, trachea midline  Respirations: even and unlabored  Back: midline spine, No CVA tenderness  Abdomen: soft, Non-tender, non-distended, no palpable masses, no rebound or guarding, moisture and slight irritation beneath pannus  Extremities: moves all equally, no clubbing, cyanosis or edema  Skin: Warm and dry. No lesions  Psych: normal affect  Neuro: Alert and oriented x 3. Cranial nerves II-XII intact      Urinalysis  Trace blood, otherwise negative      Assessment:  1. Overactive bladder  Ambulatory referral/consult to Urology    POCT URINE DIPSTICK WITHOUT MICROSCOPE      2. Bladder spasms  solifenacin (VESICARE) 10 MG tablet      3. Microhematuria  Urinalysis Microscopic    Urine culture      4. Tinea corporis  nystatin (MYCOSTATIN) cream            Plan:   Overactive bladder  -      Ambulatory referral/consult to Urology  -     POCT URINE DIPSTICK WITHOUT MICROSCOPE    Bladder spasms  -     solifenacin (VESICARE) 10 MG tablet; Take 1 tablet (10 mg total) by mouth once daily.  Dispense: 30 tablet; Refill: 3    Microhematuria  -     Urinalysis Microscopic  -     Urine culture    Tinea corporis  -     nystatin (MYCOSTATIN) cream; Apply topically 2 (two) times daily.  Dispense: 30 g; Refill: 3          Follow up in about 6 weeks (around 5/22/2023).

## 2023-04-13 ENCOUNTER — OFFICE VISIT (OUTPATIENT)
Dept: PSYCHIATRY | Facility: CLINIC | Age: 58
End: 2023-04-13
Payer: COMMERCIAL

## 2023-04-13 DIAGNOSIS — F33.1 MODERATE EPISODE OF RECURRENT MAJOR DEPRESSIVE DISORDER: Primary | ICD-10-CM

## 2023-04-13 DIAGNOSIS — Z63.0 MARITAL CONFLICT: ICD-10-CM

## 2023-04-13 PROCEDURE — 90791 PR PSYCHIATRIC DIAGNOSTIC EVALUATION: ICD-10-PCS | Mod: 95,,, | Performed by: SOCIAL WORKER

## 2023-04-13 PROCEDURE — 1159F PR MEDICATION LIST DOCUMENTED IN MEDICAL RECORD: ICD-10-PCS | Mod: CPTII,95,, | Performed by: SOCIAL WORKER

## 2023-04-13 PROCEDURE — 3072F PR LOW RISK FOR RETINOPATHY: ICD-10-PCS | Mod: CPTII,95,, | Performed by: SOCIAL WORKER

## 2023-04-13 PROCEDURE — 3044F PR MOST RECENT HEMOGLOBIN A1C LEVEL <7.0%: ICD-10-PCS | Mod: CPTII,95,, | Performed by: SOCIAL WORKER

## 2023-04-13 PROCEDURE — 90791 PSYCH DIAGNOSTIC EVALUATION: CPT | Mod: 95,,, | Performed by: SOCIAL WORKER

## 2023-04-13 PROCEDURE — 1159F MED LIST DOCD IN RCRD: CPT | Mod: CPTII,95,, | Performed by: SOCIAL WORKER

## 2023-04-13 PROCEDURE — 4010F ACE/ARB THERAPY RXD/TAKEN: CPT | Mod: CPTII,95,, | Performed by: SOCIAL WORKER

## 2023-04-13 PROCEDURE — 3044F HG A1C LEVEL LT 7.0%: CPT | Mod: CPTII,95,, | Performed by: SOCIAL WORKER

## 2023-04-13 PROCEDURE — 3072F LOW RISK FOR RETINOPATHY: CPT | Mod: CPTII,95,, | Performed by: SOCIAL WORKER

## 2023-04-13 PROCEDURE — 4010F PR ACE/ARB THEARPY RXD/TAKEN: ICD-10-PCS | Mod: CPTII,95,, | Performed by: SOCIAL WORKER

## 2023-04-13 SDOH — SOCIAL DETERMINANTS OF HEALTH (SDOH): PROBLEMS IN RELATIONSHIP WITH SPOUSE OR PARTNER: Z63.0
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
## 2023-04-13 NOTE — PROGRESS NOTES
"Pt was noted to have "left without being seen" for her virtual visit scheduled for 3/10/2023. No LOS.  "

## 2023-05-04 DIAGNOSIS — E78.5 HYPERLIPIDEMIA ASSOCIATED WITH TYPE 2 DIABETES MELLITUS: Chronic | ICD-10-CM

## 2023-05-04 DIAGNOSIS — E11.69 HYPERLIPIDEMIA ASSOCIATED WITH TYPE 2 DIABETES MELLITUS: Chronic | ICD-10-CM

## 2023-05-05 NOTE — TELEPHONE ENCOUNTER
Refill Routing Note   Medication(s) are not appropriate for processing by [facility] for the following reason(s):      Responsible provider unclear    [facility] action(s):  Defer            Appointments  past 12m or future 3m with PCP    Date Provider   Last Visit   8/24/2022 Tati Hansen MD   Next Visit   Visit date not found Tati Hansen MD   ED visits in past 90 days: 0        Note composed:8:26 PM 05/04/2023

## 2023-05-05 NOTE — TELEPHONE ENCOUNTER
No care due was identified.  [facility] Embedded Care Due Messages. Reference number: [id number].   5/04/2023 8:12:21 PM CDT

## 2023-05-07 RX ORDER — PRAVASTATIN SODIUM 40 MG/1
TABLET ORAL
Qty: 90 TABLET | Refills: 1 | Status: SHIPPED | OUTPATIENT
Start: 2023-05-07 | End: 2023-09-07 | Stop reason: ALTCHOICE

## 2023-05-07 NOTE — TELEPHONE ENCOUNTER
REFILL APPROVED. Will address further refills at upcoming appointment with me listed below.  Requested Prescriptions   Pending Prescriptions Disp Refills    pravastatin (PRAVACHOL) 40 MG tablet [Pharmacy Med Name: Pravastatin Sodium 40 MG Oral Tablet] 90 tablet 1     Sig: Take 1 tablet by mouth once daily    #LMRX   --------------------------------  Future Appointments   Date Time Provider Department Center   5/22/2023  7:55 AM LABORATORY, [facility] LAB [facility]   7/31/2023  8:00 AM Adelaida Coyne MD [facility] UROLOGY [facility]   8/16/2023  8:40 AM Arsalan Magallanes MD [facility] CARDIO [facility]   9/7/2023  9:30 AM JASWINDER Powers MD [facility] IM [facility]   12/14/2023  2:15 PM [facility] XR2 [facility] XRAY [facility]   12/14/2023  2:40 PM Keo Tavera MD [facility] SLEEP [facility]

## 2023-05-15 ENCOUNTER — TELEPHONE (OUTPATIENT)
Dept: CARDIOLOGY | Facility: CLINIC | Age: 58
End: 2023-05-15
Payer: COMMERCIAL

## 2023-05-15 NOTE — PROGRESS NOTES
5/15/23-Last seen 2/15/23-no issues of DONALDSON or  chest pain,  Cleared for procedure at low to moderate cardiac risk.

## 2023-05-15 NOTE — TELEPHONE ENCOUNTER
Spoke to patient to let her know she is cleared for surgery and that the note will be  faxed over today.   ----- Message from Mildred Amaya sent at 5/15/2023  9:33 AM CDT -----  Contact: foreign/dr rice office  Foreign stated that patient is scheduled for surgery tomorrow and is still waiting on clearance to be faxed over. Please fax over too [phone]. call back # [phone] ext [phone]          Thanks  DD

## 2023-06-09 ENCOUNTER — TELEPHONE (OUTPATIENT)
Dept: PSYCHIATRY | Facility: CLINIC | Age: 58
End: 2023-06-09
Payer: COMMERCIAL

## 2023-06-09 ENCOUNTER — OFFICE VISIT (OUTPATIENT)
Dept: PSYCHIATRY | Facility: CLINIC | Age: 58
End: 2023-06-09
Payer: COMMERCIAL

## 2023-06-09 VITALS — DIASTOLIC BLOOD PRESSURE: 80 MMHG | SYSTOLIC BLOOD PRESSURE: 131 MMHG | HEART RATE: 91 BPM

## 2023-06-09 DIAGNOSIS — F33.1 MODERATE EPISODE OF RECURRENT MAJOR DEPRESSIVE DISORDER: Primary | ICD-10-CM

## 2023-06-09 DIAGNOSIS — G47.00 INSOMNIA, UNSPECIFIED TYPE: ICD-10-CM

## 2023-06-09 PROCEDURE — 4010F PR ACE/ARB THEARPY RXD/TAKEN: ICD-10-PCS | Mod: CPTII,S$GLB,, | Performed by: PSYCHIATRY & NEUROLOGY

## 2023-06-09 PROCEDURE — 3079F DIAST BP 80-89 MM HG: CPT | Mod: CPTII,S$GLB,, | Performed by: PSYCHIATRY & NEUROLOGY

## 2023-06-09 PROCEDURE — 1160F RVW MEDS BY RX/DR IN RCRD: CPT | Mod: CPTII,S$GLB,, | Performed by: PSYCHIATRY & NEUROLOGY

## 2023-06-09 PROCEDURE — 3075F SYST BP GE 130 - 139MM HG: CPT | Mod: CPTII,S$GLB,, | Performed by: PSYCHIATRY & NEUROLOGY

## 2023-06-09 PROCEDURE — 1160F PR REVIEW ALL MEDS BY PRESCRIBER/CLIN PHARMACIST DOCUMENTED: ICD-10-PCS | Mod: CPTII,S$GLB,, | Performed by: PSYCHIATRY & NEUROLOGY

## 2023-06-09 PROCEDURE — 99999 PR PBB SHADOW E&M-EST. PATIENT-LVL II: CPT | Mod: PBBFAC,,, | Performed by: PSYCHIATRY & NEUROLOGY

## 2023-06-09 PROCEDURE — 99214 PR OFFICE/OUTPT VISIT, EST, LEVL IV, 30-39 MIN: ICD-10-PCS | Mod: S$GLB,,, | Performed by: PSYCHIATRY & NEUROLOGY

## 2023-06-09 PROCEDURE — 3072F PR LOW RISK FOR RETINOPATHY: ICD-10-PCS | Mod: CPTII,S$GLB,, | Performed by: PSYCHIATRY & NEUROLOGY

## 2023-06-09 PROCEDURE — 3072F LOW RISK FOR RETINOPATHY: CPT | Mod: CPTII,S$GLB,, | Performed by: PSYCHIATRY & NEUROLOGY

## 2023-06-09 PROCEDURE — 3044F HG A1C LEVEL LT 7.0%: CPT | Mod: CPTII,S$GLB,, | Performed by: PSYCHIATRY & NEUROLOGY

## 2023-06-09 PROCEDURE — 3075F PR MOST RECENT SYSTOLIC BLOOD PRESS GE 130-139MM HG: ICD-10-PCS | Mod: CPTII,S$GLB,, | Performed by: PSYCHIATRY & NEUROLOGY

## 2023-06-09 PROCEDURE — 3079F PR MOST RECENT DIASTOLIC BLOOD PRESSURE 80-89 MM HG: ICD-10-PCS | Mod: CPTII,S$GLB,, | Performed by: PSYCHIATRY & NEUROLOGY

## 2023-06-09 PROCEDURE — 99999 PR PBB SHADOW E&M-EST. PATIENT-LVL II: ICD-10-PCS | Mod: PBBFAC,,, | Performed by: PSYCHIATRY & NEUROLOGY

## 2023-06-09 PROCEDURE — 4010F ACE/ARB THERAPY RXD/TAKEN: CPT | Mod: CPTII,S$GLB,, | Performed by: PSYCHIATRY & NEUROLOGY

## 2023-06-09 PROCEDURE — 3044F PR MOST RECENT HEMOGLOBIN A1C LEVEL <7.0%: ICD-10-PCS | Mod: CPTII,S$GLB,, | Performed by: PSYCHIATRY & NEUROLOGY

## 2023-06-09 PROCEDURE — 1159F PR MEDICATION LIST DOCUMENTED IN MEDICAL RECORD: ICD-10-PCS | Mod: CPTII,S$GLB,, | Performed by: PSYCHIATRY & NEUROLOGY

## 2023-06-09 PROCEDURE — 99214 OFFICE O/P EST MOD 30 MIN: CPT | Mod: S$GLB,,, | Performed by: PSYCHIATRY & NEUROLOGY

## 2023-06-09 PROCEDURE — 1159F MED LIST DOCD IN RCRD: CPT | Mod: CPTII,S$GLB,, | Performed by: PSYCHIATRY & NEUROLOGY

## 2023-06-09 RX ORDER — DULOXETIN HYDROCHLORIDE 60 MG/1
120 CAPSULE, DELAYED RELEASE ORAL DAILY
Qty: 180 CAPSULE | Refills: 0 | Status: SHIPPED | OUTPATIENT
Start: 2023-06-09 | End: 2023-10-04

## 2023-06-09 RX ORDER — TRAZODONE HYDROCHLORIDE 100 MG/1
100 TABLET ORAL NIGHTLY
Qty: 90 TABLET | Refills: 0 | Status: SHIPPED | OUTPATIENT
Start: 2023-06-09 | End: 2023-12-19 | Stop reason: SDUPTHER

## 2023-06-09 NOTE — TELEPHONE ENCOUNTER
----- Message from Penny Zamudio sent at 6/9/2023  2:40 PM CDT -----  Contact: self  pt called at 2:40, will be 4 more mins late

## 2023-06-09 NOTE — PROGRESS NOTES
"    Ac Hayden   1965        CURRENT PRESENTATION:   The patient presents for follow-up of recurrent major depression.  At her initial visit 5 months ago, the patient described depression, anxiety, and a long history of grief and stress, including a change 5 years ago in her relationship with her  of 35 years when she discovered signs of possible affair, which he has denied and will not discuss.  She reported that she had had counseling with her  and had been taking Cymbalta 90 mg daily, with Wellbutrin later restarted with no further benefit.  Cymbalta was increased to 120 mg daily.  At her last visit 4 months ago:  The patient reports that mood and anxiety problems have come under very good control with no side effects with the increase of Cymbalta to 120 mg daily.  Her only complaint is continued problems with sleep.  Never with terence, hypomania, thoughts of harm to self or others, or psychosis.       The patient reports that she did not get the EKG but will do so soon, and when those results are available, decisions will be made with regards to sleep medications.      The patient reports that her  talk to me a little bit in a conversation after she told him she was ready to move out.  She reports that the conversation was positive, with him saying that he did not want to lose her, and he has been sleeping in the bed with me again."  Positives with her son in [place] (4 grandchildren) and with her grandchild by way of her  son.  The patient also has a friend since 4th grade.  Her naga remains important to her.  Her job as  at [employer] has been going okay.    Subsequent to that appointment, an EKG showed a QTC of 437 with normal OR and QRS.  The patient was started on trazodone 50 mg for sleep, and this was subsequently increased to 100 mg when the patient messaged that 50 mg had been ineffective.    On February 15, the patient had a cardiology " "follow-up visit, and there were no changes to her diagnoses or treatment.  On April 3 the patient had a preop exam (abdominoplasty, liposuction), and there were no changes to her diagnoses apart from urge incontinence, she was referred to urology, and the only medication change was an increase in vitamin-D dosing.  She saw urology on April 10 and was prescribed VESIcare for bladder spasms.    In the current session, the patient's only complaint is continued insomnia.  However, she never did increase the dose of trazodone 50 mg to 2 tablets at night.  No side effects with trazodone, including no orthostasis, unsteadiness, feeling like she will fall, or excessive sedation.  She indicates that when she wakes up during the night, she feels alert and steady.  No side effects of Cymbalta, and she is very pleased with her response to the medication.  I am happy where I am in life right now."  She reports remission of depression and no excessive anxiety.  Never with terence, hypomania, psychosis, feelings of aggression, or thoughts of harm to self or others with questioning.      The patient is pleased with the results of her tummy tuck surgery.  She returns to work on , and her job is  at [employer] has been positive.  She is proud of her granddaughter, who lives with the other grandmother in [place], for graduating high school.  Positives with her son in [place], his 4 children, and the grandchild of her  son.    The patient reports that she has made efforts to connect with her , but she has not seen him responding, and he will not go to couples therapy.  He seems somewhat better for a time after their son talk to him (the patient reports that she has been open with her son about the issues that she has had with his father).  The son is very supportive of the patient and has told her that she always has a place with him if she needed it and also said that if she wanted to separate, he " would help her in the process of getting his father out of the house so that she can stay in the house.  She says that her  is about to retire from doing floors overnight at [employer] and will be pursuing full-time his own lawn service.     Interim history:  Living situation/supports:  As above  Medical issues:  As above  Nonpsychotropic Medications:  As above   Allergies:  No change       Review of patient's allergies indicates:   Allergen Reactions    Codeine Edema and Itching    Pcn [penicillins] Rash    Sulfa (sulfonamide antibiotics) Edema and Rash      Alcohol use:  No change in her pattern of very rare use of small amount  Other substance use:  None    Mental Status Exam:   Appearance:  Appropriately groomed  Orientation:  X4  Attitude:  Cooperative, engaged   Eye Contact:  Appropriate  Behavior:  Calm, appropriate  Speech:     Rate - WNL    Volume - WNL    Quantity - WNL    Tone - relaxed, appropriate  Pressure - no  Thought Processes:  Goal-directed  Mood:  Euthymic   Affect:  Without distress, euthymic, including ability to brighten at appropriate times  SI:  No, and no thoughts of self-harm  HI:  No, and no thoughts of harm towards others  Paranoia:  No  Delusions:  No  Hallucinations:  No  Attention:  Intact over the course of the session  Cognition:  No deficits noted over the course of the session  Insight:  Intact   Judgment:  Intact  Impulse Control:  Intact          ASSESSMENT:   Encounter Diagnoses   Name Primary?    Moderate episode of recurrent major depressive disorder Yes    Insomnia, unspecified type          PLAN:     Follow up in 3 months.  The patient was directed to the  desk to schedule this appointment.  Otherwise, the patient is to call [facility] at [phone] (or go to [facility]  My Chart alex) and  arrange the next psychiatry appointment.     Psychiatry Medication:  Continue Cymbalta 120 mg daily.  Increase trazodone to 100 mg nightly.  Orthostasis,  excessive sedation, dizziness, unsteadiness, falls, decreased focus, and other potential side effects with the increase were reviewed again.    Reviewed with patient:  Report side effects or any other problems to the psychiatrist during clinic business hours.  Call 911 or go to an emergency department for any acute or urgent issues otherwise.  Follow up with primary care/MD specialist for continued monitoring of general health and wellness and any medical conditions.  Call  [facility] at [phone] or go to [facility] My [facility] if necessary for scheduling or rescheduling.  Understanding was expressed; and no further concerns or questions were raised at this time.     [address]  2 or more stable chronic illnesses and Prescription drug management    Large portions of this note were completed by way of voice recognition dictation software, and transcription errors are possible, such that specific information in the note should be considered in the context of the entire report.

## 2023-07-21 DIAGNOSIS — N32.89 BLADDER SPASMS: ICD-10-CM

## 2023-07-21 RX ORDER — SOLIFENACIN SUCCINATE 10 MG/1
TABLET, FILM COATED ORAL
Qty: 30 TABLET | Refills: 0 | Status: SHIPPED | OUTPATIENT
Start: 2023-07-21 | End: 2023-08-07 | Stop reason: SDUPTHER

## 2023-08-07 ENCOUNTER — OFFICE VISIT (OUTPATIENT)
Dept: UROLOGY | Facility: CLINIC | Age: 58
End: 2023-08-07
Payer: COMMERCIAL

## 2023-08-07 VITALS
WEIGHT: 280 LBS | HEART RATE: 116 BPM | SYSTOLIC BLOOD PRESSURE: 117 MMHG | BODY MASS INDEX: 46.59 KG/M2 | DIASTOLIC BLOOD PRESSURE: 72 MMHG

## 2023-08-07 DIAGNOSIS — N30.00 ACUTE CYSTITIS WITHOUT HEMATURIA: ICD-10-CM

## 2023-08-07 DIAGNOSIS — N32.81 OVERACTIVE BLADDER: Primary | ICD-10-CM

## 2023-08-07 DIAGNOSIS — N32.89 BLADDER SPASMS: ICD-10-CM

## 2023-08-07 LAB
BILIRUB SERPL-MCNC: ABNORMAL MG/DL
BLOOD URINE, POC: ABNORMAL
CLARITY, POC UA: CLEAR
COLOR, POC UA: YELLOW
GLUCOSE UR QL STRIP: ABNORMAL
KETONES UR QL STRIP: ABNORMAL
LEUKOCYTE ESTERASE URINE, POC: ABNORMAL
NITRITE, POC UA: POSITIVE
PH, POC UA: 7.5
PROTEIN, POC: 7.5
SPECIFIC GRAVITY, POC UA: 1.02
UROBILINOGEN, POC UA: 1

## 2023-08-07 PROCEDURE — 81002 URINALYSIS NONAUTO W/O SCOPE: CPT | Mod: S$GLB,,, | Performed by: UROLOGY

## 2023-08-07 PROCEDURE — 3074F PR MOST RECENT SYSTOLIC BLOOD PRESSURE < 130 MM HG: ICD-10-PCS | Mod: CPTII,S$GLB,, | Performed by: UROLOGY

## 2023-08-07 PROCEDURE — 87088 URINE BACTERIA CULTURE: CPT | Performed by: UROLOGY

## 2023-08-07 PROCEDURE — 1159F PR MEDICATION LIST DOCUMENTED IN MEDICAL RECORD: ICD-10-PCS | Mod: CPTII,S$GLB,, | Performed by: UROLOGY

## 2023-08-07 PROCEDURE — 3044F PR MOST RECENT HEMOGLOBIN A1C LEVEL <7.0%: ICD-10-PCS | Mod: CPTII,S$GLB,, | Performed by: UROLOGY

## 2023-08-07 PROCEDURE — 99999 PR PBB SHADOW E&M-EST. PATIENT-LVL IV: ICD-10-PCS | Mod: PBBFAC,,, | Performed by: UROLOGY

## 2023-08-07 PROCEDURE — 4010F ACE/ARB THERAPY RXD/TAKEN: CPT | Mod: CPTII,S$GLB,, | Performed by: UROLOGY

## 2023-08-07 PROCEDURE — 3008F BODY MASS INDEX DOCD: CPT | Mod: CPTII,S$GLB,, | Performed by: UROLOGY

## 2023-08-07 PROCEDURE — 3072F LOW RISK FOR RETINOPATHY: CPT | Mod: CPTII,S$GLB,, | Performed by: UROLOGY

## 2023-08-07 PROCEDURE — 99214 PR OFFICE/OUTPT VISIT, EST, LEVL IV, 30-39 MIN: ICD-10-PCS | Mod: S$GLB,,, | Performed by: UROLOGY

## 2023-08-07 PROCEDURE — 4010F PR ACE/ARB THEARPY RXD/TAKEN: ICD-10-PCS | Mod: CPTII,S$GLB,, | Performed by: UROLOGY

## 2023-08-07 PROCEDURE — 99214 OFFICE O/P EST MOD 30 MIN: CPT | Mod: S$GLB,,, | Performed by: UROLOGY

## 2023-08-07 PROCEDURE — 87086 URINE CULTURE/COLONY COUNT: CPT | Performed by: UROLOGY

## 2023-08-07 PROCEDURE — 87077 CULTURE AEROBIC IDENTIFY: CPT | Performed by: UROLOGY

## 2023-08-07 PROCEDURE — 3078F DIAST BP <80 MM HG: CPT | Mod: CPTII,S$GLB,, | Performed by: UROLOGY

## 2023-08-07 PROCEDURE — 1159F MED LIST DOCD IN RCRD: CPT | Mod: CPTII,S$GLB,, | Performed by: UROLOGY

## 2023-08-07 PROCEDURE — 3044F HG A1C LEVEL LT 7.0%: CPT | Mod: CPTII,S$GLB,, | Performed by: UROLOGY

## 2023-08-07 PROCEDURE — 99999 PR PBB SHADOW E&M-EST. PATIENT-LVL IV: CPT | Mod: PBBFAC,,, | Performed by: UROLOGY

## 2023-08-07 PROCEDURE — 3078F PR MOST RECENT DIASTOLIC BLOOD PRESSURE < 80 MM HG: ICD-10-PCS | Mod: CPTII,S$GLB,, | Performed by: UROLOGY

## 2023-08-07 PROCEDURE — 3008F PR BODY MASS INDEX (BMI) DOCUMENTED: ICD-10-PCS | Mod: CPTII,S$GLB,, | Performed by: UROLOGY

## 2023-08-07 PROCEDURE — 87186 SC STD MICRODIL/AGAR DIL: CPT | Performed by: UROLOGY

## 2023-08-07 PROCEDURE — 3072F PR LOW RISK FOR RETINOPATHY: ICD-10-PCS | Mod: CPTII,S$GLB,, | Performed by: UROLOGY

## 2023-08-07 PROCEDURE — 81002 POCT URINE DIPSTICK WITHOUT MICROSCOPE: ICD-10-PCS | Mod: S$GLB,,, | Performed by: UROLOGY

## 2023-08-07 PROCEDURE — 3074F SYST BP LT 130 MM HG: CPT | Mod: CPTII,S$GLB,, | Performed by: UROLOGY

## 2023-08-07 RX ORDER — SOLIFENACIN SUCCINATE 10 MG/1
10 TABLET, FILM COATED ORAL DAILY
Qty: 90 TABLET | Refills: 4 | Status: SHIPPED | OUTPATIENT
Start: 2023-08-07

## 2023-08-07 NOTE — PROGRESS NOTES
Chief Complaint   Patient presents with    Other     6 weeks       History of Present Illness:   Ac Hayden is a 57 y.o. female here for evaluation of Other (6 weeks)    8/7/23-vesicare is working well. Had abdominoplasty 2 months ago. No UUI if she goes to the bathroom when she gets the urge.No dysuria or gross hematuria.   4/10/23-56yo F, here for evaluation of urinary urgency with cramping and pelvic pressure. She reports that it feels like menstrual cramps. When the cramping starts, she has to sit for a minute until she can move, and then goes to the restroom. Ongoing x 2 weeks. Seems to have started after intercourse with her . No gross hematuria. She generally has issues with urgency and wears a pad.       Review of Systems   Constitutional:  Negative for chills and fever.   Respiratory:  Negative for shortness of breath.    Cardiovascular:  Negative for chest pain.   Gastrointestinal:  Negative for constipation and diarrhea.   Musculoskeletal:  Positive for back pain.   All other systems reviewed and are negative.        Past Medical History:   Diagnosis Date    Allergic rhinitis     Arthritis     Colon polyps     Diabetes mellitus 2002     am 01/05/2023    Diabetes mellitus, type 2     DM (diabetes mellitus) 18  years 2002     am 01/19/2022    Hyperlipidemia     Hypertension     Metabolic syndrome     Mixed anxiety and depressive disorder     Pneumonia     Prediabetes     Urine incontinence        Past Surgical History:   Procedure Laterality Date    BARIATRIC SURGERY  08/22/2018    [facility])    COLONOSCOPY N/A 10/19/2017    Procedure: COLONOSCOPY;  Surgeon: Jamal Cole MD;  Location: [facility] ENDO;  Service: Endoscopy;  Laterality: N/A;    COLONOSCOPY N/A 1/5/2023    Procedure: COLONOSCOPY;  Surgeon: Katelyn Hensley MD;  Location: [facility] ENDO;  Service: Endoscopy;  Laterality: N/A;    HYSTERECTOMY      PARTIAL HYSTERECTOMY      PLANTAR FASCIA SURGERY       TOTAL REDUCTION MAMMOPLASTY         Family History   Problem Relation Age of Onset    Diabetes Mother     Stroke Mother     Cancer Father     Diabetes Sister     Ovarian cancer Sister     Strabismus Sister     Diabetes Brother     Lupus Other     Eczema Other     Melanoma Neg Hx     Psoriasis Neg Hx        Social History     Tobacco Use    Smoking status: Former     Current packs/day: 0.00     Average packs/day: 0.3 packs/day for 13.0 years (3.3 ttl pk-yrs)     Types: Cigarettes     Start date: 10/19/1987     Quit date: 10/19/2000     Years since quittin.8    Smokeless tobacco: Never   Substance Use Topics    Alcohol use: Not Currently     Comment: occasionally    Drug use: No       Current Outpatient Medications   Medication Sig Dispense Refill    amLODIPine (NORVASC) 5 MG tablet Take 1 tablet (5 mg total) by mouth once daily. 90 tablet 3    azelastine (ASTELIN) 137 mcg (0.1 %) nasal spray 1 spray (137 mcg total) by Nasal route 2 (two) times daily. 30 mL 5    blood sugar diagnostic Strp To check BG one time daily, to use with insurance preferred meter 100 each 3    DULoxetine (CYMBALTA) 60 MG capsule Take 2 capsules (120 mg total) by mouth once daily. 180 capsule 0    ergocalciferol (ERGOCALCIFEROL) 50,000 unit Cap Take 2 capsules (100,000 Units total) by mouth every 7 days. 24 capsule 3    fluticasone propionate (FLONASE) 50 mcg/actuation nasal spray 2 sprays by Each Nostril route.      hydroCHLOROthiazide (HYDRODIURIL) 25 MG tablet Take 1 tablet (25 mg total) by mouth once daily. 90 tablet 3    lancets Misc To check BG one time daily, to use with insurance preferred meter 100 each 3    levocetirizine (XYZAL) 5 MG tablet Take 1 tablet (5 mg total) by mouth every evening. 90 tablet 3    losartan (COZAAR) 100 MG tablet Take 1 tablet (100 mg total) by mouth once daily. 90 tablet 3    moxifloxacin (VIGAMOX) 0.5 % ophthalmic solution Place 1 drop into the left eye every 2 (two) hours. 3 mL 3    naproxen  (NAPROSYN) 500 MG tablet Take 1 tablet (500 mg total) by mouth 2 (two) times daily with meals. Take with food 30 tablet 0    nystatin (MYCOSTATIN) cream Apply topically 2 (two) times daily. 30 g 3    pravastatin (PRAVACHOL) 40 MG tablet Take 1 tablet by mouth once daily 90 tablet 1    RELION PRIME METER Misc       semaglutide (OZEMPIC) 1 mg/dose (4 mg/3 mL) Inject 1 mg into the skin every 7 days. 9 mL 1    traZODone (DESYREL) 100 MG tablet Take 1 tablet (100 mg total) by mouth every evening. 90 tablet 0    albuterol 90 mcg/actuation inhaler Inhale 1-2 puffs into the lungs every 6 (six) hours as needed for Wheezing. 1 Inhaler 0    ciprofloxacin HCl (CIPRO) 500 MG tablet Take 1 tablet (500 mg total) by mouth every 12 (twelve) hours. for 5 days 10 tablet 0    solifenacin (VESICARE) 10 MG tablet Take 1 tablet (10 mg total) by mouth once daily. 90 tablet 4     No current facility-administered medications for this visit.       Review of patient's allergies indicates:   Allergen Reactions    Codeine Edema and Itching    Pcn [penicillins] Rash    Sulfa (sulfonamide antibiotics) Edema and Rash       Physical Exam  Vitals:    08/07/23 1214   BP: 117/72   Pulse: (!) 116     General: Well-developed, well-nourished, in no acute distress  HEENT: Normocephalic, atraumatic, extraocular movements intact  Neck: Supple, no supraclavicular or cervical lymphadenopathy, trachea midline  Respirations: even and unlabored  Back: midline spine, No CVA tenderness  Abdomen: soft, Non-tender, non-distended, no palpable masses, no rebound or guarding, moisture and slight irritation beneath pannus  Extremities: moves all equally, no clubbing, cyanosis or edema  Skin: Warm and dry. No lesions  Psych: normal affect  Neuro: Alert and oriented x 3. Cranial nerves II-XII intact      Urinalysis  Nit+, small LE, blood negative      Assessment:  1. Overactive bladder  POCT URINE DIPSTICK WITHOUT MICROSCOPE      2. Bladder spasms  solifenacin (VESICARE)  10 MG tablet    Urine culture      3. Acute cystitis without hematuria              Plan:   Overactive bladder  -     POCT URINE DIPSTICK WITHOUT MICROSCOPE    Bladder spasms  -     solifenacin (VESICARE) 10 MG tablet; Take 1 tablet (10 mg total) by mouth once daily.  Dispense: 90 tablet; Refill: 4  -     Urine culture    Acute cystitis without hematuria      Cipro sent to pharmacy for proteus UTI. If recurs, will need imaging.     Follow up in about 1 year (around 8/7/2024).

## 2023-08-10 ENCOUNTER — TELEPHONE (OUTPATIENT)
Dept: UROLOGY | Facility: CLINIC | Age: 58
End: 2023-08-10
Payer: COMMERCIAL

## 2023-08-10 LAB — BACTERIA UR CULT: ABNORMAL

## 2023-08-10 RX ORDER — CIPROFLOXACIN 500 MG/1
500 TABLET ORAL EVERY 12 HOURS
Qty: 10 TABLET | Refills: 0 | Status: SHIPPED | OUTPATIENT
Start: 2023-08-10 | End: 2023-08-16

## 2023-08-10 NOTE — TELEPHONE ENCOUNTER
Called informed pt of results per [facility]    ----- Message from Adelaida Coyne MD sent at 8/10/2023  9:20 AM CDT -----  Notify pt of UTI. Cipro sent to her pharmacy.

## 2023-08-16 ENCOUNTER — OFFICE VISIT (OUTPATIENT)
Dept: CARDIOLOGY | Facility: CLINIC | Age: 58
End: 2023-08-16
Payer: COMMERCIAL

## 2023-08-16 VITALS
WEIGHT: 266.13 LBS | OXYGEN SATURATION: 100 % | SYSTOLIC BLOOD PRESSURE: 120 MMHG | DIASTOLIC BLOOD PRESSURE: 80 MMHG | HEIGHT: 65 IN | HEART RATE: 88 BPM | BODY MASS INDEX: 44.34 KG/M2

## 2023-08-16 DIAGNOSIS — E11.59 HYPERTENSION ASSOCIATED WITH DIABETES: ICD-10-CM

## 2023-08-16 DIAGNOSIS — E66.01 CLASS 3 SEVERE OBESITY DUE TO EXCESS CALORIES WITH SERIOUS COMORBIDITY AND BODY MASS INDEX (BMI) OF 45.0 TO 49.9 IN ADULT: ICD-10-CM

## 2023-08-16 DIAGNOSIS — Z79.899 HIGH RISK MEDICATION USE: ICD-10-CM

## 2023-08-16 DIAGNOSIS — E11.69 HYPERLIPIDEMIA ASSOCIATED WITH TYPE 2 DIABETES MELLITUS: Primary | ICD-10-CM

## 2023-08-16 DIAGNOSIS — E11.59 TYPE 2 DIABETES MELLITUS WITH OTHER CIRCULATORY COMPLICATION, WITHOUT LONG-TERM CURRENT USE OF INSULIN: ICD-10-CM

## 2023-08-16 DIAGNOSIS — R94.31 ABNORMAL EKG: ICD-10-CM

## 2023-08-16 DIAGNOSIS — I15.2 HYPERTENSION ASSOCIATED WITH DIABETES: ICD-10-CM

## 2023-08-16 DIAGNOSIS — E78.5 HYPERLIPIDEMIA ASSOCIATED WITH TYPE 2 DIABETES MELLITUS: Primary | ICD-10-CM

## 2023-08-16 PROCEDURE — 99214 PR OFFICE/OUTPT VISIT, EST, LEVL IV, 30-39 MIN: ICD-10-PCS | Mod: S$GLB,,, | Performed by: INTERNAL MEDICINE

## 2023-08-16 PROCEDURE — 1159F PR MEDICATION LIST DOCUMENTED IN MEDICAL RECORD: ICD-10-PCS | Mod: CPTII,S$GLB,, | Performed by: INTERNAL MEDICINE

## 2023-08-16 PROCEDURE — 3008F PR BODY MASS INDEX (BMI) DOCUMENTED: ICD-10-PCS | Mod: CPTII,S$GLB,, | Performed by: INTERNAL MEDICINE

## 2023-08-16 PROCEDURE — 4010F PR ACE/ARB THEARPY RXD/TAKEN: ICD-10-PCS | Mod: CPTII,S$GLB,, | Performed by: INTERNAL MEDICINE

## 2023-08-16 PROCEDURE — 3044F PR MOST RECENT HEMOGLOBIN A1C LEVEL <7.0%: ICD-10-PCS | Mod: CPTII,S$GLB,, | Performed by: INTERNAL MEDICINE

## 2023-08-16 PROCEDURE — 99999 PR PBB SHADOW E&M-EST. PATIENT-LVL IV: ICD-10-PCS | Mod: PBBFAC,,, | Performed by: INTERNAL MEDICINE

## 2023-08-16 PROCEDURE — 1160F RVW MEDS BY RX/DR IN RCRD: CPT | Mod: CPTII,S$GLB,, | Performed by: INTERNAL MEDICINE

## 2023-08-16 PROCEDURE — 3074F PR MOST RECENT SYSTOLIC BLOOD PRESSURE < 130 MM HG: ICD-10-PCS | Mod: CPTII,S$GLB,, | Performed by: INTERNAL MEDICINE

## 2023-08-16 PROCEDURE — 99999 PR PBB SHADOW E&M-EST. PATIENT-LVL IV: CPT | Mod: PBBFAC,,, | Performed by: INTERNAL MEDICINE

## 2023-08-16 PROCEDURE — 3079F PR MOST RECENT DIASTOLIC BLOOD PRESSURE 80-89 MM HG: ICD-10-PCS | Mod: CPTII,S$GLB,, | Performed by: INTERNAL MEDICINE

## 2023-08-16 PROCEDURE — 3079F DIAST BP 80-89 MM HG: CPT | Mod: CPTII,S$GLB,, | Performed by: INTERNAL MEDICINE

## 2023-08-16 PROCEDURE — 3074F SYST BP LT 130 MM HG: CPT | Mod: CPTII,S$GLB,, | Performed by: INTERNAL MEDICINE

## 2023-08-16 PROCEDURE — 3044F HG A1C LEVEL LT 7.0%: CPT | Mod: CPTII,S$GLB,, | Performed by: INTERNAL MEDICINE

## 2023-08-16 PROCEDURE — 1159F MED LIST DOCD IN RCRD: CPT | Mod: CPTII,S$GLB,, | Performed by: INTERNAL MEDICINE

## 2023-08-16 PROCEDURE — 99214 OFFICE O/P EST MOD 30 MIN: CPT | Mod: S$GLB,,, | Performed by: INTERNAL MEDICINE

## 2023-08-16 PROCEDURE — 4010F ACE/ARB THERAPY RXD/TAKEN: CPT | Mod: CPTII,S$GLB,, | Performed by: INTERNAL MEDICINE

## 2023-08-16 PROCEDURE — 1160F PR REVIEW ALL MEDS BY PRESCRIBER/CLIN PHARMACIST DOCUMENTED: ICD-10-PCS | Mod: CPTII,S$GLB,, | Performed by: INTERNAL MEDICINE

## 2023-08-16 PROCEDURE — 3008F BODY MASS INDEX DOCD: CPT | Mod: CPTII,S$GLB,, | Performed by: INTERNAL MEDICINE

## 2023-08-16 NOTE — PROGRESS NOTES
Subjective:   Patient ID:  Ac Hayden is a 57 y.o. female who presents for follow-up of No chief complaint on file.     56 yo female referred for uncontrolled HTN  PMH DM 7 yrs, HTN, HLD anxiety obesity BNI 50 s/p sleeve in 2018, CHRIS not tolerate CPAP (given an used one and refused to use it). Quit smoking 20yrs and no drink  No chest pain, DONALDSON, dizziness, palpitation, leg swelling  Back muscles pain and knee pain,   Med compliance  No regular exercise  Works at [employer]  EKG NSR  No f./h premature CAD  BP high  A1c controlled. Off DM med after weight procedure              02/15/2023:   Patient has review everything including hypertension and cholesterol profiles are all doing well.    EKG shows minor abnormalities no acute changes and the patient is satisfied that there is no acute issues going on at this time.  Patient continue all medications.       08/16/2023 overall patient is doing well no recurrence symptoms chest pain shortness breath stable all medications cholesterol profiles been good no other changes.  No complaints today all medications reviewed and renewed as necessary follow-up evaluation 6 months sooner if acute recurrence symptoms.        Review of Systems   Constitutional: Negative for chills, diaphoresis, night sweats, weight gain and weight loss.   HENT:  Negative for congestion, hoarse voice, sore throat and stridor.    Eyes:  Negative for double vision and pain.   Cardiovascular:  Negative for chest pain, claudication, cyanosis, dyspnea on exertion, irregular heartbeat, leg swelling, near-syncope, orthopnea, palpitations, paroxysmal nocturnal dyspnea and syncope.   Respiratory:  Negative for cough, hemoptysis, shortness of breath, sleep disturbances due to breathing, snoring, sputum production and wheezing.    Endocrine: Negative for cold intolerance, heat intolerance and polydipsia.   Hematologic/Lymphatic: Negative for bleeding problem. Does not bruise/bleed easily.   Skin:   Negative for color change, dry skin and rash.   Musculoskeletal:  Negative for joint swelling and muscle cramps.   Gastrointestinal:  Negative for bloating, abdominal pain, constipation, diarrhea, dysphagia, melena, nausea and vomiting.   Genitourinary:  Negative for flank pain and urgency.   Neurological:  Negative for dizziness, focal weakness, headaches, light-headedness, loss of balance, seizures and weakness.   Psychiatric/Behavioral:  Negative for altered mental status and memory loss. The patient is not nervous/anxious.      Family History   Problem Relation Age of Onset    Diabetes Mother     Stroke Mother     Cancer Father     Diabetes Sister     Ovarian cancer Sister     Strabismus Sister     Diabetes Brother     Lupus Other     Eczema Other     Melanoma Neg Hx     Psoriasis Neg Hx      Past Medical History:   Diagnosis Date    Allergic rhinitis     Arthritis     Colon polyps     Diabetes mellitus      am 2023    Diabetes mellitus, type 2     DM (diabetes mellitus) 18  years      am 2022    Hyperlipidemia     Hypertension     Metabolic syndrome     Mixed anxiety and depressive disorder     Pneumonia     Prediabetes     Urine incontinence      Social History     Socioeconomic History    Marital status:    Occupational History     Employer: [id number]   Tobacco Use    Smoking status: Former     Current packs/day: 0.00     Average packs/day: 0.3 packs/day for 13.0 years (3.3 ttl pk-yrs)     Types: Cigarettes     Start date: 10/19/1987     Quit date: 10/19/2000     Years since quittin.8    Smokeless tobacco: Never   Substance and Sexual Activity    Alcohol use: Not Currently     Comment: occasionally    Drug use: No    Sexual activity: Never   Social History Narrative    Works at [employer] .     Current Outpatient Medications on File Prior to Visit   Medication Sig Dispense Refill    albuterol 90 mcg/actuation inhaler Inhale  1-2 puffs into the lungs every 6 (six) hours as needed for Wheezing. 1 Inhaler 0    amLODIPine (NORVASC) 5 MG tablet Take 1 tablet (5 mg total) by mouth once daily. 90 tablet 3    azelastine (ASTELIN) 137 mcg (0.1 %) nasal spray 1 spray (137 mcg total) by Nasal route 2 (two) times daily. 30 mL 5    blood sugar diagnostic Strp To check BG one time daily, to use with insurance preferred meter 100 each 3    ciprofloxacin HCl (CIPRO) 500 MG tablet Take 1 tablet (500 mg total) by mouth every 12 (twelve) hours. for 5 days 10 tablet 0    DULoxetine (CYMBALTA) 60 MG capsule Take 2 capsules (120 mg total) by mouth once daily. 180 capsule 0    ergocalciferol (ERGOCALCIFEROL) 50,000 unit Cap Take 2 capsules (100,000 Units total) by mouth every 7 days. 24 capsule 3    fluticasone propionate (FLONASE) 50 mcg/actuation nasal spray 2 sprays by Each Nostril route.      hydroCHLOROthiazide (HYDRODIURIL) 25 MG tablet Take 1 tablet (25 mg total) by mouth once daily. 90 tablet 3    lancets Misc To check BG one time daily, to use with insurance preferred meter 100 each 3    levocetirizine (XYZAL) 5 MG tablet Take 1 tablet (5 mg total) by mouth every evening. 90 tablet 3    losartan (COZAAR) 100 MG tablet Take 1 tablet (100 mg total) by mouth once daily. 90 tablet 3    moxifloxacin (VIGAMOX) 0.5 % ophthalmic solution Place 1 drop into the left eye every 2 (two) hours. 3 mL 3    naproxen (NAPROSYN) 500 MG tablet Take 1 tablet (500 mg total) by mouth 2 (two) times daily with meals. Take with food 30 tablet 0    nystatin (MYCOSTATIN) cream Apply topically 2 (two) times daily. 30 g 3    pravastatin (PRAVACHOL) 40 MG tablet Take 1 tablet by mouth once daily 90 tablet 1    RELION PRIME METER Misc       semaglutide (OZEMPIC) 1 mg/dose (4 mg/3 mL) Inject 1 mg into the skin every 7 days. 9 mL 1    solifenacin (VESICARE) 10 MG tablet Take 1 tablet (10 mg total) by mouth once daily. 90 tablet 4    traZODone (DESYREL) 100 MG tablet Take 1 tablet  (100 mg total) by mouth every evening. 90 tablet 0     No current facility-administered medications on file prior to visit.     Review of patient's allergies indicates:   Allergen Reactions    Codeine Edema and Itching    Pcn [penicillins] Rash    Sulfa (sulfonamide antibiotics) Edema and Rash       Objective:     Physical Exam  Eyes:      Pupils: Pupils are equal, round, and reactive to light.   Neck:      Trachea: No tracheal deviation.   Cardiovascular:      Rate and Rhythm: Normal rate and regular rhythm.      Pulses: Intact distal pulses.           Carotid pulses are 2+ on the right side and 2+ on the left side.       Radial pulses are 2+ on the right side and 2+ on the left side.        Femoral pulses are 2+ on the right side and 2+ on the left side.       Popliteal pulses are 2+ on the right side and 2+ on the left side.        Dorsalis pedis pulses are 2+ on the right side and 2+ on the left side.        Posterior tibial pulses are 2+ on the right side and 2+ on the left side.      Heart sounds: Normal heart sounds. No murmur heard.     No friction rub. No gallop.   Pulmonary:      Effort: Pulmonary effort is normal. No respiratory distress.      Breath sounds: Normal breath sounds. No stridor. No wheezing or rales.   Chest:      Chest wall: No tenderness.   Abdominal:      General: There is no distension.      Tenderness: There is no abdominal tenderness. There is no rebound.   Musculoskeletal:         General: No tenderness.      Cervical back: Normal range of motion.   Skin:     General: Skin is warm and dry.   Neurological:      Mental Status: She is alert and oriented to person, place, and time.         Assessment:     1. Hyperlipidemia associated with type 2 diabetes mellitus    2. Hypertension associated with diabetes    3. High risk medication use    4. Type 2 diabetes mellitus with other circulatory complication, without long-term current use of insulin    5. Class 3 severe obesity due to excess  calories with serious comorbidity and body mass index (BMI) of 45.0 to 49.9 in adult    6. Abnormal EKG        Plan:     Hyperlipidemia associated with type 2 diabetes mellitus    Hypertension associated with diabetes    High risk medication use    Type 2 diabetes mellitus with other circulatory complication, without long-term current use of insulin    Class 3 severe obesity due to excess calories with serious comorbidity and body mass index (BMI) of 45.0 to 49.9 in adult    Abnormal EKG    Impression 1 hyperlipidemia stable statin medications current patient continue statin medications.   2. Hypertension stable current medications: Patient continues on hydrochlorothiazide, amlodipine and losartan.   3.  Type 2 diabetes improved overall and hemoglobin A1cs are improved continue with improved diet : Patient continues on Ozempic.  All questions answered patient doing well follow-up evaluation 6 months sooner if acute recurrence symptoms.

## 2023-09-07 ENCOUNTER — LAB VISIT (OUTPATIENT)
Dept: LAB | Facility: HOSPITAL | Age: 58
End: 2023-09-07
Attending: FAMILY MEDICINE
Payer: COMMERCIAL

## 2023-09-07 ENCOUNTER — OFFICE VISIT (OUTPATIENT)
Dept: INTERNAL MEDICINE | Facility: CLINIC | Age: 58
End: 2023-09-07
Payer: COMMERCIAL

## 2023-09-07 VITALS
OXYGEN SATURATION: 99 % | SYSTOLIC BLOOD PRESSURE: 130 MMHG | HEART RATE: 103 BPM | HEIGHT: 65 IN | WEIGHT: 269.81 LBS | RESPIRATION RATE: 18 BRPM | TEMPERATURE: 97 F | BODY MASS INDEX: 44.95 KG/M2 | DIASTOLIC BLOOD PRESSURE: 86 MMHG

## 2023-09-07 DIAGNOSIS — Z23 NEED FOR INFLUENZA VACCINATION: ICD-10-CM

## 2023-09-07 DIAGNOSIS — I15.2 HYPERTENSION ASSOCIATED WITH DIABETES: Chronic | ICD-10-CM

## 2023-09-07 DIAGNOSIS — Z00.00 PREVENTATIVE HEALTH CARE: Primary | ICD-10-CM

## 2023-09-07 DIAGNOSIS — E66.01 CLASS 3 SEVERE OBESITY DUE TO EXCESS CALORIES WITH SERIOUS COMORBIDITY AND BODY MASS INDEX (BMI) OF 40.0 TO 44.9 IN ADULT: Chronic | ICD-10-CM

## 2023-09-07 DIAGNOSIS — E11.59 HYPERTENSION ASSOCIATED WITH DIABETES: Chronic | ICD-10-CM

## 2023-09-07 DIAGNOSIS — E78.5 HYPERLIPIDEMIA ASSOCIATED WITH TYPE 2 DIABETES MELLITUS: Chronic | ICD-10-CM

## 2023-09-07 DIAGNOSIS — H65.03 BILATERAL ACUTE SEROUS OTITIS MEDIA, RECURRENCE NOT SPECIFIED: ICD-10-CM

## 2023-09-07 DIAGNOSIS — E55.9 VITAMIN D DEFICIENCY: Chronic | ICD-10-CM

## 2023-09-07 DIAGNOSIS — E11.69 TYPE 2 DIABETES MELLITUS WITH OTHER SPECIFIED COMPLICATION, WITHOUT LONG-TERM CURRENT USE OF INSULIN: Chronic | ICD-10-CM

## 2023-09-07 DIAGNOSIS — E11.69 HYPERLIPIDEMIA ASSOCIATED WITH TYPE 2 DIABETES MELLITUS: Chronic | ICD-10-CM

## 2023-09-07 LAB
ALBUMIN/CREAT UR: 7.7 UG/MG (ref 0–30)
CREAT UR-MCNC: 248 MG/DL (ref 15–325)
MICROALBUMIN UR DL<=1MG/L-MCNC: 19 UG/ML

## 2023-09-07 PROCEDURE — 3079F DIAST BP 80-89 MM HG: CPT | Mod: CPTII,S$GLB,, | Performed by: FAMILY MEDICINE

## 2023-09-07 PROCEDURE — 1159F PR MEDICATION LIST DOCUMENTED IN MEDICAL RECORD: ICD-10-PCS | Mod: CPTII,S$GLB,, | Performed by: FAMILY MEDICINE

## 2023-09-07 PROCEDURE — 3044F PR MOST RECENT HEMOGLOBIN A1C LEVEL <7.0%: ICD-10-PCS | Mod: CPTII,S$GLB,, | Performed by: FAMILY MEDICINE

## 2023-09-07 PROCEDURE — 3066F PR DOCUMENTATION OF TREATMENT FOR NEPHROPATHY: ICD-10-PCS | Mod: CPTII,S$GLB,, | Performed by: FAMILY MEDICINE

## 2023-09-07 PROCEDURE — 3044F HG A1C LEVEL LT 7.0%: CPT | Mod: CPTII,S$GLB,, | Performed by: FAMILY MEDICINE

## 2023-09-07 PROCEDURE — 3061F NEG MICROALBUMINURIA REV: CPT | Mod: CPTII,S$GLB,, | Performed by: FAMILY MEDICINE

## 2023-09-07 PROCEDURE — 99396 PREV VISIT EST AGE 40-64: CPT | Mod: S$GLB,,, | Performed by: FAMILY MEDICINE

## 2023-09-07 PROCEDURE — 99999 PR PBB SHADOW E&M-EST. PATIENT-LVL V: ICD-10-PCS | Mod: PBBFAC,,, | Performed by: FAMILY MEDICINE

## 2023-09-07 PROCEDURE — 3061F PR NEG MICROALBUMINURIA RESULT DOCUMENTED/REVIEW: ICD-10-PCS | Mod: CPTII,S$GLB,, | Performed by: FAMILY MEDICINE

## 2023-09-07 PROCEDURE — 3008F BODY MASS INDEX DOCD: CPT | Mod: CPTII,S$GLB,, | Performed by: FAMILY MEDICINE

## 2023-09-07 PROCEDURE — 3079F PR MOST RECENT DIASTOLIC BLOOD PRESSURE 80-89 MM HG: ICD-10-PCS | Mod: CPTII,S$GLB,, | Performed by: FAMILY MEDICINE

## 2023-09-07 PROCEDURE — 99999 PR PBB SHADOW E&M-EST. PATIENT-LVL V: CPT | Mod: PBBFAC,,, | Performed by: FAMILY MEDICINE

## 2023-09-07 PROCEDURE — 82570 ASSAY OF URINE CREATININE: CPT | Performed by: FAMILY MEDICINE

## 2023-09-07 PROCEDURE — 1160F RVW MEDS BY RX/DR IN RCRD: CPT | Mod: CPTII,S$GLB,, | Performed by: FAMILY MEDICINE

## 2023-09-07 PROCEDURE — 1159F MED LIST DOCD IN RCRD: CPT | Mod: CPTII,S$GLB,, | Performed by: FAMILY MEDICINE

## 2023-09-07 PROCEDURE — 3075F SYST BP GE 130 - 139MM HG: CPT | Mod: CPTII,S$GLB,, | Performed by: FAMILY MEDICINE

## 2023-09-07 PROCEDURE — 99396 PR PREVENTIVE VISIT,EST,40-64: ICD-10-PCS | Mod: S$GLB,,, | Performed by: FAMILY MEDICINE

## 2023-09-07 PROCEDURE — 4010F ACE/ARB THERAPY RXD/TAKEN: CPT | Mod: CPTII,S$GLB,, | Performed by: FAMILY MEDICINE

## 2023-09-07 PROCEDURE — 4010F PR ACE/ARB THEARPY RXD/TAKEN: ICD-10-PCS | Mod: CPTII,S$GLB,, | Performed by: FAMILY MEDICINE

## 2023-09-07 PROCEDURE — 3075F PR MOST RECENT SYSTOLIC BLOOD PRESS GE 130-139MM HG: ICD-10-PCS | Mod: CPTII,S$GLB,, | Performed by: FAMILY MEDICINE

## 2023-09-07 PROCEDURE — 1160F PR REVIEW ALL MEDS BY PRESCRIBER/CLIN PHARMACIST DOCUMENTED: ICD-10-PCS | Mod: CPTII,S$GLB,, | Performed by: FAMILY MEDICINE

## 2023-09-07 PROCEDURE — 3008F PR BODY MASS INDEX (BMI) DOCUMENTED: ICD-10-PCS | Mod: CPTII,S$GLB,, | Performed by: FAMILY MEDICINE

## 2023-09-07 PROCEDURE — 3066F NEPHROPATHY DOC TX: CPT | Mod: CPTII,S$GLB,, | Performed by: FAMILY MEDICINE

## 2023-09-07 RX ORDER — AZELASTINE 1 MG/ML
1 SPRAY, METERED NASAL 2 TIMES DAILY
Qty: 30 ML | Refills: 5 | Status: SHIPPED | OUTPATIENT
Start: 2023-09-07 | End: 2024-03-07 | Stop reason: SDUPTHER

## 2023-09-07 RX ORDER — LEVOCETIRIZINE DIHYDROCHLORIDE 5 MG/1
5 TABLET, FILM COATED ORAL NIGHTLY
Qty: 90 TABLET | Refills: 3 | Status: SHIPPED | OUTPATIENT
Start: 2023-09-07 | End: 2024-09-06

## 2023-09-07 RX ORDER — ATORVASTATIN CALCIUM 40 MG/1
40 TABLET, FILM COATED ORAL NIGHTLY
Qty: 90 TABLET | Refills: 3 | Status: SHIPPED | OUTPATIENT
Start: 2023-09-07 | End: 2024-03-07 | Stop reason: SDUPTHER

## 2023-09-07 RX ORDER — AMLODIPINE BESYLATE 5 MG/1
5 TABLET ORAL DAILY
Qty: 90 TABLET | Refills: 3 | Status: SHIPPED | OUTPATIENT
Start: 2023-09-07 | End: 2024-03-07 | Stop reason: SDUPTHER

## 2023-09-07 RX ORDER — LOSARTAN POTASSIUM 100 MG/1
100 TABLET ORAL DAILY
Qty: 90 TABLET | Refills: 3 | Status: SHIPPED | OUTPATIENT
Start: 2023-09-07 | End: 2024-03-07 | Stop reason: SDUPTHER

## 2023-09-07 RX ORDER — HYDROCHLOROTHIAZIDE 25 MG/1
25 TABLET ORAL DAILY
Qty: 90 TABLET | Refills: 3 | Status: SHIPPED | OUTPATIENT
Start: 2023-09-07 | End: 2024-03-07 | Stop reason: SDUPTHER

## 2023-09-07 RX ORDER — ERGOCALCIFEROL 1.25 MG/1
100000 CAPSULE ORAL
Qty: 24 CAPSULE | Refills: 3 | Status: SHIPPED | OUTPATIENT
Start: 2023-09-07 | End: 2024-03-07 | Stop reason: SDUPTHER

## 2023-09-07 RX ORDER — SEMAGLUTIDE 2.68 MG/ML
2 INJECTION, SOLUTION SUBCUTANEOUS
Qty: 9 ML | Refills: 1 | Status: SHIPPED | OUTPATIENT
Start: 2023-09-07 | End: 2024-02-05

## 2023-09-07 NOTE — PATIENT INSTRUCTIONS
I have referred you to our excellent Lifestyle, Wellness, and Weight Management Program here at [facility]. The , Dr. Gale Vu, is top notch. You will like Dr. Vu, and she will take good care of you.    The mission of the program is to promote a healthy lifestyle and overall wellness, particularly assisting individuals who are overweight and obese in achieving a healthier weight.    Comprehensive Approach  They advocate for a comprehensive approach to weight management that encompasses healthy eating, consistent physical activity, and when necessary, weight loss medication. Their skilled team collaborates with you to develop a personalized plan accommodating your needs, preferences, and lifestyle.    Healthy Eating and Nutrition  At the heart of their program is a strong emphasis on healthy eating and nutrition. Their seasoned nutritionists provide guidance, support, and education to aid you in making healthier food choices and developing enduring healthy habits. They work with you with you to devise meal plans that are satisfying, balanced, and tailored to your unique needs.    Physical Activity  Regular physical activity is critical to achieving and maintaining a healthy weight. Their program offers basic exercise plans compatible with your current fitness level and personal goals. They'll help you find activities that you enjoy, making it easier to remain committed and motivated.    Weight Loss Medication  When appropriate, they may suggest medication to complement your healthy eating and exercise efforts. Their team will carefully evaluate your medical history, present health status, and weight loss ambitions to decide if medication is appropriate for you. If prescribed, they'll closely supervise your progress and adjust your treatment plan as needed.    Join Them on Your Wellness Journey  Starting on a path to a healthier you can be demanding, but you don't  have to face it alone. The dedicated team at the [facility] Program is available to offer you the support, guidance, and expertise you need to succeed, the goal being a happier, healthier, and more fulfilling life.    Someone from their office should be contacting you soon (by phone or by [facility] message) to help schedule your appointment. If you haven't been contacted within the next 3-4 business days, call [facility] [place] appointment desk ([phone]).

## 2023-09-07 NOTE — PROGRESS NOTES
"ANNUAL WELLNESS VISIT (PREVENTIVE SERVICES)  9/7/23  9:20 AM CDT    CHIEF COMPLAINT: Follow-up    HEALTH MAINTENANCE INTERVENTIONS - UP TO DATE  Health Maintenance Topics with due status: Not Due       Topic Last Completion Date    Pneumococcal Vaccines (Age 0-64) 10/04/2017    TETANUS VACCINE 07/13/2020    Foot Exam 11/28/2022    Colorectal Cancer Screening 01/05/2023    Mammogram 01/06/2023    Eye Exam 01/06/2023    Low Dose Statin 09/07/2023    Diabetes Urine Screening 09/07/2023    Lipid Panel 09/07/2023    Hemoglobin A1c 09/07/2023     HEALTH MAINTENANCE INTERVENTIONS - DUE OR DUE SOON  Health Maintenance Due   Topic Date Due    Influenza Vaccine (1) 09/01/2023      HPI  1. Preventative health care    2. Need for influenza vaccination    3. Class 3 severe obesity due to excess calories with serious comorbidity and body mass index (BMI) of 40.0 to 44.9 in adult  Assessment & Plan:  She is working on therapeutic lifestyle changes. Ac Youssef reports that they are doing well on Ozempic 1 mg/week, they perceive no adverse medication side-effects and they want to continue current treatment, except that they want to try a higher dose. No history or family history of thyroid cancer or multiple endocrine neoplasia syndrome.  Wt Readings from Last 6 Encounters:   09/07/23 122.4 kg (269 lb 13.5 oz)   08/16/23 120.7 kg (266 lb 1.5 oz)   08/07/23 127 kg (280 lb)   04/10/23 127.3 kg (280 lb 10.3 oz)   04/03/23 127.6 kg (281 lb 4.9 oz)   02/15/23 127.8 kg (281 lb 12 oz)     Estimated body mass index is 44.9 kg/m² as calculated from the following:    Height as of this encounter: 5' 5" (1.651 m).    Weight as of this encounter: 122.4 kg (269 lb 13.5 oz).     Orders:  -     semaglutide (OZEMPIC) 2 mg/dose (8 mg/3 mL) PnIj; Inject 2 mg into the skin every 7 days.  Dispense: 9 mL; Refill: 1  -     Ambulatory referral/consult to Diabetes Education; Future; Expected date: 09/14/2023  -     Ambulatory referral/consult to [facility] " Lifestyle and Wellness; Future; Expected date: 09/14/2023    4. Hyperlipidemia associated with type 2 diabetes mellitus  Assessment & Plan:  Lab Results   Component Value Date    CHOL 173 11/17/2022    CHOL 146 10/21/2021    TRIG 83 11/17/2022    TRIG 75 10/21/2021    HDL 48 11/17/2022    HDL 44 10/21/2021    LDLCALC 108.4 11/17/2022    LDLCALC 87.0 10/21/2021    NONHDLCHOL 125 11/17/2022    NONHDLCHOL 102 10/21/2021    AST 23 04/03/2023    ALT 22 04/03/2023     The 10-year ASCVD risk score (Shawn PEREZ, et al., 2019) is: 13.3%    Values used to calculate the score:      Age: 57 years      Sex: Female      Is Non- : Yes      Diabetic: Yes      Tobacco smoker: No      Systolic Blood Pressure: 130 mmHg      Is BP treated: Yes      HDL Cholesterol: 48 mg/dL      Total Cholesterol: 173 mg/dL     Orders:  -     atorvastatin (LIPITOR) 40 MG tablet; Take 1 tablet (40 mg total) by mouth every evening. (for cholesterol and heart health)  Dispense: 90 tablet; Refill: 3  -     Comprehensive Metabolic Panel; Standing  -     Lipid Panel; Standing    5. Hypertension associated with diabetes  Assessment & Plan:  BP Readings from Last 6 Encounters:   09/07/23 130/86   08/16/23 120/80   08/07/23 117/72   06/09/23 131/80   04/10/23 116/78   04/03/23 126/78     Lab Results   Component Value Date    EGFRNORACEVR >60.0 04/03/2023    CREATININE 0.9 04/03/2023    BUN 13 04/03/2023    K 3.9 04/03/2023     04/03/2023     04/03/2023     Results for orders placed or performed during the hospital encounter of 02/13/23   EKG 12-lead    Collection Time: 02/13/23 10:04 AM    Narrative    Test Reason : Z79.899,    Vent. Rate : 107 BPM     Atrial Rate : 107 BPM     P-R Int : 152 ms          QRS Dur : 078 ms      QT Int : 328 ms       P-R-T Axes : 081 044 055 degrees     QTc Int : 437 ms    Sinus tachycardia  Low voltage QRS  Cannot rule out Anterior infarct ,age undetermined  Abnormal ECG  When compared with ECG of  "11-OCT-2021 19:16,  Premature ventricular complexes are no longer Present  Confirmed by REBECA BOGGS MD ([id number]) on 2/13/2023 1:36:56 PM    Referred By: ARMANDO SMITH           Confirmed By:REBECA BOGGS MD        Orders:  -     amLODIPine (NORVASC) 5 MG tablet; Take 1 tablet (5 mg total) by mouth once daily.  Dispense: 90 tablet; Refill: 3  -     hydroCHLOROthiazide (HYDRODIURIL) 25 MG tablet; Take 1 tablet (25 mg total) by mouth once daily.  Dispense: 90 tablet; Refill: 3  -     losartan (COZAAR) 100 MG tablet; Take 1 tablet (100 mg total) by mouth once daily.  Dispense: 90 tablet; Refill: 3  -     Comprehensive Metabolic Panel; Standing  -     Lipid Panel; Standing    6. Type 2 diabetes mellitus with other specified complication, without long-term current use of insulin  Assessment & Plan:  Diabetes Management Status  No hypoglycemia reported.  Statin: Taking  ACE/ARB: Taking    Screening or Prevention Patient's value Goal Complete/Controlled?   HgA1C Testing and Control   Lab Results   Component Value Date    HGBA1C 5.7 (H) 04/03/2023      Annually/Less than 8% Yes   Lipid profile : 11/17/2022 Annually Yes   LDL control Lab Results   Component Value Date    LDLCALC 108.4 11/17/2022    Annually/Less than 100 mg/dl  No   Nephropathy screening Lab Results   Component Value Date    LABMICR 16.0 11/17/2022     Lab Results   Component Value Date    PROTEINUA Negative 10/28/2021    Annually Yes   Blood pressure BP Readings from Last 1 Encounters:   09/07/23 130/86    Less than 140/90 Yes   Dilated retinal exam : 01/06/2023 Annually Yes   Foot exam   : 11/28/2022 Annually Yes     Lab Results   Component Value Date    HGBA1C 5.7 (H) 04/03/2023    HGBA1C 5.7 (H) 11/17/2022    HGBA1C 6.3 (H) 02/11/2022    EGFRNORACEVR >60.0 04/03/2023    MICALBCREAT 4.6 11/17/2022    LDLCALC 108.4 11/17/2022     No results found for: "GLUTAMICACID", "CPEPTIDE"   Last 5 Patient Entered Readings                                          " "Most Recent A1c:            No data to display               HEALTH MAINTENANCE: Diabetic health maintenance interventions reviewed and are up to date except for:      Topic    Hemoglobin A1C         Orders:  -     semaglutide (OZEMPIC) 2 mg/dose (8 mg/3 mL) PnIj; Inject 2 mg into the skin every 7 days.  Dispense: 9 mL; Refill: 1  -     Ambulatory referral/consult to Diabetes Education; Future; Expected date: 09/14/2023  -     Hemoglobin A1C; Standing  -     Comprehensive Metabolic Panel; Standing  -     Lipid Panel; Standing  -     Microalbumin/Creatinine Ratio, Urine; Standing    7. Vitamin D deficiency  Assessment & Plan:  Lab Results   Component Value Date    [id number] 20 (L) 10/21/2021    [id number] 21 (L) 04/13/2021    [id number] 20 (L) 12/07/2020        Orders:  -     ergocalciferol (ERGOCALCIFEROL) 50,000 unit Cap; Take 2 capsules (100,000 Units total) by mouth every 7 days.  Dispense: 24 capsule; Refill: 3    8. Bilateral acute serous otitis media, recurrence not specified  -     azelastine (ASTELIN) 137 mcg (0.1 %) nasal spray; 1 spray (137 mcg total) by Nasal route 2 (two) times daily.  Dispense: 30 mL; Refill: 5  -     levocetirizine (XYZAL) 5 MG tablet; Take 1 tablet (5 mg total) by mouth every evening.  Dispense: 90 tablet; Refill: 3    Unless noted herein, any chronic conditions are represented as and appear compensated/controlled and stable, and no other significant complaints or concerns were reported.    Review of Systems   Respiratory:  Negative for chest tightness and shortness of breath.    Cardiovascular:  Negative for chest pain.   Endocrine: Negative for polydipsia and polyuria.       Vitals:    09/07/23 0943   BP: 130/86   BP Location: Right arm   Patient Position: Sitting   BP Method: Large (Manual)   Pulse: 103   Resp: 18   Temp: 97 °F (36.1 °C)   TempSrc: Tympanic   SpO2: 99%   Weight: 122.4 kg (269 lb 13.5 oz)   Height: 5' 5" (1.651 m)   Physical Exam  Vitals reviewed. " "  Constitutional:       General: She is not in acute distress.     Appearance: Normal appearance. She is not ill-appearing, toxic-appearing or diaphoretic.   Cardiovascular:      Rate and Rhythm: Normal rate and regular rhythm.   Pulmonary:      Effort: Pulmonary effort is normal.      Breath sounds: Normal breath sounds.   Skin:     General: Skin is warm and dry.   Neurological:      Mental Status: She is alert and oriented to person, place, and time. Mental status is at baseline.   Psychiatric:         Mood and Affect: Mood normal.         Behavior: Behavior normal.         Judgment: Judgment normal.       Follow up in about 6 months (around 3/4/2024).     Documentation entered by me for this encounter may have been done in part using speech-recognition technology. Although I have made an effort to ensure accuracy, "sound like" errors may exist and should be interpreted in context.   "

## 2023-09-07 NOTE — ASSESSMENT & PLAN NOTE
"She is working on therapeutic lifestyle changes. Ac Youssef reports that they are doing well on Ozempic 1 mg/week, they perceive no adverse medication side-effects and they want to continue current treatment, except that they want to try a higher dose. No history or family history of thyroid cancer or multiple endocrine neoplasia syndrome.  Wt Readings from Last 6 Encounters:   09/07/23 122.4 kg (269 lb 13.5 oz)   08/16/23 120.7 kg (266 lb 1.5 oz)   08/07/23 127 kg (280 lb)   04/10/23 127.3 kg (280 lb 10.3 oz)   04/03/23 127.6 kg (281 lb 4.9 oz)   02/15/23 127.8 kg (281 lb 12 oz)     Estimated body mass index is 44.9 kg/m² as calculated from the following:    Height as of this encounter: 5' 5" (1.651 m).    Weight as of this encounter: 122.4 kg (269 lb 13.5 oz).   "

## 2023-09-07 NOTE — ASSESSMENT & PLAN NOTE
Lab Results   Component Value Date    CHOL 173 11/17/2022    CHOL 146 10/21/2021    TRIG 83 11/17/2022    TRIG 75 10/21/2021    HDL 48 11/17/2022    HDL 44 10/21/2021    LDLCALC 108.4 11/17/2022    LDLCALC 87.0 10/21/2021    NONHDLCHOL 125 11/17/2022    NONHDLCHOL 102 10/21/2021    AST 23 04/03/2023    ALT 22 04/03/2023     The 10-year ASCVD risk score (Shawn PEREZ, et al., 2019) is: 13.3%    Values used to calculate the score:      Age: 57 years      Sex: Female      Is Non- : Yes      Diabetic: Yes      Tobacco smoker: No      Systolic Blood Pressure: 130 mmHg      Is BP treated: Yes      HDL Cholesterol: 48 mg/dL      Total Cholesterol: 173 mg/dL

## 2023-09-07 NOTE — ASSESSMENT & PLAN NOTE
Lab Results   Component Value Date    [id number] 20 (L) 10/21/2021    [id number] 21 (L) 04/13/2021    [id number] 20 (L) 12/07/2020

## 2023-09-07 NOTE — ASSESSMENT & PLAN NOTE
BP Readings from Last 6 Encounters:   09/07/23 130/86   08/16/23 120/80   08/07/23 117/72   06/09/23 131/80   04/10/23 116/78   04/03/23 126/78     Lab Results   Component Value Date    EGFRNORACEVR >60.0 04/03/2023    CREATININE 0.9 04/03/2023    BUN 13 04/03/2023    K 3.9 04/03/2023     04/03/2023     04/03/2023     Results for orders placed or performed during the hospital encounter of 02/13/23   EKG 12-lead    Collection Time: 02/13/23 10:04 AM    Narrative    Test Reason : Z79.899,    Vent. Rate : 107 BPM     Atrial Rate : 107 BPM     P-R Int : 152 ms          QRS Dur : 078 ms      QT Int : 328 ms       P-R-T Axes : 081 044 055 degrees     QTc Int : 437 ms    Sinus tachycardia  Low voltage QRS  Cannot rule out Anterior infarct ,age undetermined  Abnormal ECG  When compared with ECG of 11-OCT-2021 19:16,  Premature ventricular complexes are no longer Present  Confirmed by REBECA BOGGS MD ([id number]) on 2/13/2023 1:36:56 PM    Referred By: ARMANDO SMITH           Confirmed By:REBECA BOGGS MD

## 2023-09-07 NOTE — ASSESSMENT & PLAN NOTE
"Diabetes Management Status  No hypoglycemia reported.  Statin: Taking  ACE/ARB: Taking    Screening or Prevention Patient's value Goal Complete/Controlled?   HgA1C Testing and Control   Lab Results   Component Value Date    HGBA1C 5.7 (H) 04/03/2023      Annually/Less than 8% Yes   Lipid profile : 11/17/2022 Annually Yes   LDL control Lab Results   Component Value Date    LDLCALC 108.4 11/17/2022    Annually/Less than 100 mg/dl  No   Nephropathy screening Lab Results   Component Value Date    LABMICR 16.0 11/17/2022     Lab Results   Component Value Date    PROTEINUA Negative 10/28/2021    Annually Yes   Blood pressure BP Readings from Last 1 Encounters:   09/07/23 130/86    Less than 140/90 Yes   Dilated retinal exam : 01/06/2023 Annually Yes   Foot exam   : 11/28/2022 Annually Yes     Lab Results   Component Value Date    HGBA1C 5.7 (H) 04/03/2023    HGBA1C 5.7 (H) 11/17/2022    HGBA1C 6.3 (H) 02/11/2022    EGFRNORACEVR >60.0 04/03/2023    MICALBCREAT 4.6 11/17/2022    LDLCALC 108.4 11/17/2022     No results found for: "GLUTAMICACID", "CPEPTIDE"   Last 5 Patient Entered Readings                                          Most Recent A1c:          No data to display               HEALTH MAINTENANCE: Diabetic health maintenance interventions reviewed and are up to date except for:      Topic    Hemoglobin A1C       "

## 2023-10-13 ENCOUNTER — TELEPHONE (OUTPATIENT)
Dept: PSYCHIATRY | Facility: CLINIC | Age: 58
End: 2023-10-13
Payer: COMMERCIAL

## 2023-10-13 NOTE — TELEPHONE ENCOUNTER
----- Message from Jose Mendez sent at 10/13/2023 11:44 AM CDT -----  Contact: Ac Shen would like a call back at [phone] in regards to needing to speak with the office about rescheduling her appointment for today 10/13 for 1pm due to not being able to make it.  Thanks   Am

## 2023-11-01 ENCOUNTER — HOSPITAL ENCOUNTER (EMERGENCY)
Facility: HOSPITAL | Age: 58
Discharge: HOME | End: 2023-11-01
Attending: EMERGENCY MEDICINE
Payer: COMMERCIAL

## 2023-11-01 VITALS
OXYGEN SATURATION: 100 % | DIASTOLIC BLOOD PRESSURE: 75 MMHG | WEIGHT: 271.81 LBS | SYSTOLIC BLOOD PRESSURE: 110 MMHG | TEMPERATURE: 98 F | RESPIRATION RATE: 18 BRPM | BODY MASS INDEX: 43.68 KG/M2 | HEIGHT: 66 IN | HEART RATE: 86 BPM

## 2023-11-01 DIAGNOSIS — M25.511 RIGHT SHOULDER PAIN: ICD-10-CM

## 2023-11-01 DIAGNOSIS — M75.31 CALCIFIC TENDONITIS OF RIGHT SHOULDER REGION: Primary | ICD-10-CM

## 2023-11-01 PROCEDURE — 63600175 PHARM REV CODE 636 W HCPCS: Performed by: EMERGENCY MEDICINE

## 2023-11-01 PROCEDURE — 96372 THER/PROPH/DIAG INJ SC/IM: CPT | Performed by: EMERGENCY MEDICINE

## 2023-11-01 PROCEDURE — 99284 EMERGENCY DEPT VISIT MOD MDM: CPT

## 2023-11-01 PROCEDURE — 25000003 PHARM REV CODE 250: Performed by: EMERGENCY MEDICINE

## 2023-11-01 RX ORDER — MORPHINE SULFATE 4 MG/ML
4 INJECTION, SOLUTION INTRAMUSCULAR; INTRAVENOUS
Status: COMPLETED | OUTPATIENT
Start: 2023-11-01 | End: 2023-11-01

## 2023-11-01 RX ORDER — ONDANSETRON 4 MG/1
4 TABLET, ORALLY DISINTEGRATING ORAL
Status: COMPLETED | OUTPATIENT
Start: 2023-11-01 | End: 2023-11-01

## 2023-11-01 RX ORDER — HYDROCODONE BITARTRATE AND ACETAMINOPHEN 10; 325 MG/1; MG/1
1 TABLET ORAL EVERY 6 HOURS PRN
Qty: 12 TABLET | Refills: 0 | Status: SHIPPED | OUTPATIENT
Start: 2023-11-01

## 2023-11-01 RX ORDER — DEXAMETHASONE SODIUM PHOSPHATE 4 MG/ML
8 INJECTION, SOLUTION INTRA-ARTICULAR; INTRALESIONAL; INTRAMUSCULAR; INTRAVENOUS; SOFT TISSUE
Status: COMPLETED | OUTPATIENT
Start: 2023-11-01 | End: 2023-11-01

## 2023-11-01 RX ADMIN — ONDANSETRON 4 MG: 4 TABLET, ORALLY DISINTEGRATING ORAL at 06:11

## 2023-11-01 RX ADMIN — MORPHINE SULFATE 4 MG: 4 INJECTION INTRAVENOUS at 06:11

## 2023-11-01 RX ADMIN — DEXAMETHASONE SODIUM PHOSPHATE 8 MG: 4 INJECTION INTRA-ARTICULAR; INTRALESIONAL; INTRAMUSCULAR; INTRAVENOUS; SOFT TISSUE at 06:11

## 2023-11-01 NOTE — DISCHARGE INSTRUCTIONS
You have tendinitis your right shoulder.  Do range-of-motion exercises 4 times daily.  If you immobilizer shoulder and do not do the range-of-motion exercises you may wind up with a frozen shoulder.  This is very important.  You have received a steroid shot in the ER.  Take ibuprofen 400 mg orally 3 times daily on a schedule for 1 week.  Use Norco for breakthrough pain.  Do not drive or operate machinery or equipment while taking [place].  Follow up with Orthopedics at next available.  Return as needed for any worsening symptoms, problems, questions or concerns.

## 2023-11-01 NOTE — ED PROVIDER NOTES
SCRIBE #1 NOTE: IDavis, am scribing for, and in the presence of, Lance Montes Jr., MD. I have scribed the entire note.       History     Chief Complaint   Patient presents with    Shoulder Pain     Right shoulder pain radiating to back since Sunday with limited ROM d/t pain.     Review of patient's allergies indicates:   Allergen Reactions    Codeine Edema and Itching    Pcn [penicillins] Rash    Sulfa (sulfonamide antibiotics) Edema and Rash         History of Present Illness     HPI    11/1/2023, 6:24 AM  History obtained from the patient      History of Present Illness: Ac Hayden is a 57 y.o. female patient with a PMHx of DM, HLD, and HTN who presents to the Emergency Department for evaluation of right shoulder pain which onset Sunday. Pt reports that she has had chronic right shoulder pain that has worsened since Sunday. Symptoms are constant and moderate in severity. No mitigating or exacerbating factors reported. No associated sxs reported. Patient denies any numbness, fever, chills, HA, cough, and all other sxs at this time. No prior tx reported. No further complaints or concerns at this time.       Arrival mode: Personal vehicle    PCP: JASWINDER Powers MD        Past Medical History:  Past Medical History:   Diagnosis Date    Allergic rhinitis     Arthritis     Colon polyps     Diabetes mellitus 2002     am 01/05/2023    Diabetes mellitus, type 2     DM (diabetes mellitus) 18  years 2002     am 01/19/2022    Hyperlipidemia     Hypertension     Metabolic syndrome     Mixed anxiety and depressive disorder     Pneumonia     Prediabetes     Urine incontinence        Past Surgical History:  Past Surgical History:   Procedure Laterality Date    BARIATRIC SURGERY  08/22/2018    [facility])    COLONOSCOPY N/A 10/19/2017    Procedure: COLONOSCOPY;  Surgeon: Jamal Cole MD;  Location: [facility];  Service: Endoscopy;  Laterality: N/A;    COLONOSCOPY N/A  2023    Procedure: COLONOSCOPY;  Surgeon: Katelyn Hensley MD;  Location: [facility];  Service: Endoscopy;  Laterality: N/A;    HYSTERECTOMY      PARTIAL HYSTERECTOMY      PLANTAR FASCIA SURGERY      TOTAL REDUCTION MAMMOPLASTY           Family History:  Family History   Problem Relation Age of Onset    Diabetes Mother     Stroke Mother     Cancer Father     Diabetes Sister     Ovarian cancer Sister     Strabismus Sister     Diabetes Brother     Lupus Other     Eczema Other     Melanoma Neg Hx     Psoriasis Neg Hx        Social History:  Social History     Tobacco Use    Smoking status: Former     Current packs/day: 0.00     Average packs/day: 0.3 packs/day for 13.0 years (3.3 ttl pk-yrs)     Types: Cigarettes     Start date: 10/19/1987     Quit date: 10/19/2000     Years since quittin.0    Smokeless tobacco: Former   Substance and Sexual Activity    Alcohol use: Not Currently     Comment: occasionally    Drug use: No    Sexual activity: Never        Review of Systems     Review of Systems   Constitutional:  Negative for chills and fever.   HENT:  Negative for sore throat.    Respiratory:  Negative for cough and shortness of breath.    Cardiovascular:  Negative for chest pain.   Gastrointestinal:  Negative for nausea.   Genitourinary:  Negative for dysuria.   Musculoskeletal:  Positive for arthralgias (right shoulder). Negative for back pain.   Skin:  Negative for rash.   Neurological:  Negative for weakness, numbness and headaches.   Hematological:  Does not bruise/bleed easily.   All other systems reviewed and are negative.       Physical Exam     Initial Vitals [23 0430]   BP Pulse Resp Temp SpO2   106/72 98 18 98 °F (36.7 °C) 100 %      MAP       --          Physical Exam  Nursing Notes and Vital Signs Reviewed.  Constitutional: Patient is in no acute distress. Well-developed and well-nourished.  Head: Atraumatic. Normocephalic.  Eyes:  EOM intact.  No scleral icterus.  ENT: Mucous membranes  "are moist.  Nares clear   Neck:  Full ROM. No JVD.  Cardiovascular: Regular rate. Regular rhythm No murmurs, rubs, or gallops. Distal pulses are 2+ and symmetric  Musculoskeletal: Moves all extremities.  Patient has no gross bony deformity of the right shoulder.  There is no clavicular tenderness.  No acromion tenderness.  There is tenderness over the lateral aspect of the proximal humeral head.  There is pain exacerbation with passive range of motion however full passive range of motion is maintained.  There is no upper arm tenderness other than over the lateral humerus and no lower arm pain.  She is neurovascularly intact.  Full active range motion of the elbow in all fingers.  Skin: Warm and dry.  No rash or laceration  Neurological:  Alert, awake, and appropriate.  Normal speech.  No acute focal neurological deficits are appreciated.  Two through 12 intact bilaterally.  Right median radial ulnar musculocutaneous axillary nerves are intact on exam.  Psychiatric: Normal affect. Good eye contact. Appropriate in content.      ED Course   Procedures  ED Vital Signs:  Vitals:    11/01/23 0430 11/01/23 0637 11/01/23 0641   BP: 106/72  110/75   Pulse: 98  86   Resp: 18 20 18   Temp: 98 °F (36.7 °C)     TempSrc: Oral     SpO2: 100%  100%   Weight: 123.3 kg (271 lb 13.2 oz)     Height: 5' 6" (1.676 m)         Abnormal Lab Results:  Labs Reviewed - No data to display         Imaging Results:  Imaging Results              X-Ray Shoulder Complete 2 View Right (Preliminary result)  Result time 11/01/23 07:42:42      Wet Read by Lance Montes Jr., MD (11/01/23 06:08:11, [facility] - Emergency Dept[facility] Emergency Medicine)    Calcifications of the lateral humeral head.  No fracture dislocation.  DJD                                              The Emergency Provider reviewed the vital signs and test results, which are outlined above.     ED Discussion       6:52 AM: Reassessed pt at this time.  Pt states her condition has improved " at this time. Discussed with pt all pertinent ED information and results. Discussed pt dx and plan of tx. Gave pt all f/u and return to the ED instructions. All questions and concerns were addressed at this time. Pt expresses understanding of information and instructions, and is comfortable with plan to discharge. Pt is stable for discharge.    I discussed with patient and/or family/caretaker that evaluation in the ED does not suggest any emergent or life threatening medical conditions requiring immediate intervention beyond what was provided in the ED, and I believe patient is safe for discharge.  Regardless, an unremarkable evaluation in the ED does not preclude the development or presence of a serious of life threatening condition. As such, patient was instructed to return immediately for any worsening or change in current symptoms.        Medical Decision Making  Differential diagnosis:  Tendonitis, dislocation, DJD, calcific tendinitis, rotator cuff injury    Patient evaluated history and physical examination.  X-ray showed no acute findings other than some calcifications over the lateral aspect of the humeral head.  This is the site of her tenderness and would be consistent with calcific tendinitis.  She is full passive range motion but with pain.  Will treat with anti-inflammatories along with pain control.  Discussed all findings with the patient as well as plan of care.  I have advised against a lying have encouraged range-of-motion exercise.  She is aware of the risk of frozen shoulder.  She verbalized agreement understanding with all instructions.    Amount and/or Complexity of Data Reviewed  External Data Reviewed: notes.     Details:  reviewed  Radiology: ordered and independent interpretation performed. Decision-making details documented in ED Course.    Risk  OTC drugs.  Prescription drug management.  Parenteral controlled substances.                ED Medication(s):  Medications   dexAMETHasone  injection 8 mg (8 mg Intramuscular Given 11/1/23 0636)   morphine injection 4 mg (4 mg Intramuscular Given 11/1/23 0637)   ondansetron disintegrating tablet 4 mg (4 mg Oral Given 11/1/23 0637)       Discharge Medication List as of 11/1/2023  6:34 AM        START taking these medications    Details   HYDROcodone-acetaminophen (NORCO)  mg per tablet Take 1 tablet by mouth every 6 (six) hours as needed., Starting Wed 11/1/2023, Normal              Follow-up Information       JASWINDER Powers MD In 2 days.    Specialty: Family Medicine  Contact information:  [address]  [place] [id number] [phone]               Clinic, [facility].    Contact information:  [address]  [place] [id number] [phone]                                 Scribe Attestation:   Scribe #1: I performed the above scribed service and the documentation accurately describes the services I performed. I attest to the accuracy of the note.     Attending:   Physician Attestation Statement for Scribe #1: I, Lance Montes Jr., MD, personally performed the services described in this documentation, as scribed by Davis Lew, in my presence, and it is both accurate and complete.           Clinical Impression       ICD-10-CM ICD-9-CM   1. Calcific tendonitis of right shoulder region  M75.31 726.11   2. Right shoulder pain  M25.511 719.41       Disposition:   Disposition: Discharged  Condition: Stable        Lance Montes Jr., MD  11/01/23 0778

## 2023-11-17 ENCOUNTER — TELEPHONE (OUTPATIENT)
Dept: INTERNAL MEDICINE | Facility: CLINIC | Age: 58
End: 2023-11-17
Payer: COMMERCIAL

## 2023-11-17 NOTE — TELEPHONE ENCOUNTER
----- Message from Roya Soto sent at 11/17/2023 12:07 PM CST -----  Contact: Ac Youssef self [phone]  [email or web address]     Patient is calling for Medical Advice regarding:    How long has patient had these symptoms:    Pharmacy name and phone#:    Would like response via Pay4latert: call back    Comments: Pt is calling because she had death in the family and wants to know if she can get something for anxiety

## 2023-11-21 ENCOUNTER — TELEPHONE (OUTPATIENT)
Dept: INTERNAL MEDICINE | Facility: CLINIC | Age: 58
End: 2023-11-21
Payer: COMMERCIAL

## 2023-11-21 ENCOUNTER — TELEPHONE (OUTPATIENT)
Dept: PSYCHIATRY | Facility: CLINIC | Age: 58
End: 2023-11-21
Payer: COMMERCIAL

## 2023-11-21 DIAGNOSIS — F41.9 ANXIETY: Primary | ICD-10-CM

## 2023-11-21 RX ORDER — ALPRAZOLAM 0.5 MG/1
0.5 TABLET ORAL 2 TIMES DAILY PRN
Qty: 12 TABLET | Refills: 0 | Status: SHIPPED | OUTPATIENT
Start: 2023-11-21

## 2023-11-21 NOTE — TELEPHONE ENCOUNTER
Spoke with pt; MA explained to pt per Dr Esparza she would have to contact psychiatry to discuss anxiety & medications; pt verbalized understanding /LD

## 2023-11-21 NOTE — TELEPHONE ENCOUNTER
----- Message from Siva Esparza MD sent at 11/21/2023  4:19 PM CST -----  Regarding: Telemedicine visit for tomorrow  Telemedicine visit scheduled with me tomorrow for possible anxiety medication   She sees Psychiatry   She needs to contact them for any anxiety medications   I will not be able to fill any anxiety meds for her  Please contact patient and let her know she needs to contact Psychiatry  Please cancel the appointment

## 2023-11-30 ENCOUNTER — CLINICAL SUPPORT (OUTPATIENT)
Dept: DIABETES | Facility: CLINIC | Age: 58
End: 2023-11-30
Payer: COMMERCIAL

## 2023-11-30 DIAGNOSIS — E11.69 TYPE 2 DIABETES MELLITUS WITH OTHER SPECIFIED COMPLICATION, WITHOUT LONG-TERM CURRENT USE OF INSULIN: ICD-10-CM

## 2023-11-30 DIAGNOSIS — E66.01 CLASS 3 SEVERE OBESITY DUE TO EXCESS CALORIES WITH SERIOUS COMORBIDITY AND BODY MASS INDEX (BMI) OF 40.0 TO 44.9 IN ADULT: Primary | ICD-10-CM

## 2023-11-30 PROCEDURE — G0108 PR DIAB MANAGE TRN  PER INDIV: ICD-10-PCS | Mod: S$GLB,,, | Performed by: DIETITIAN, REGISTERED

## 2023-11-30 PROCEDURE — 99999 PR PBB SHADOW E&M-EST. PATIENT-LVL III: CPT | Mod: PBBFAC,,, | Performed by: DIETITIAN, REGISTERED

## 2023-11-30 PROCEDURE — 99999 PR PBB SHADOW E&M-EST. PATIENT-LVL III: ICD-10-PCS | Mod: PBBFAC,,, | Performed by: DIETITIAN, REGISTERED

## 2023-11-30 PROCEDURE — G0108 DIAB MANAGE TRN  PER INDIV: HCPCS | Mod: S$GLB,,, | Performed by: DIETITIAN, REGISTERED

## 2023-11-30 NOTE — PROGRESS NOTES
Diabetes Care Specialist Progress Note  Author: Shanna Blunt RD, CDE  Date: 11/30/2023    Program Intake  Reason for Diabetes Program Visit:: Initial Diabetes Assessment  Current diabetes risk level:: low  In the last 12 months, have you:: none  Permission to speak with others about care:: no    Lab Results   Component Value Date    HGBA1C 5.7 (H) 09/07/2023       Clinical      Problem Review  Reviewed Problem List with Patient: yes  Active comorbidities affecting diabetes self-care.: yes  Comorbidities: Hypertension    Clinical Assessment  Current Diabetes Treatment: Diet, Injectable  Have you ever experienced hypoglycemia (low blood sugar)?: yes  In the last month, how often have you experienced low blood sugar?: other (see comments) (has only had low blood sugar twice)  Are you able to tell when your blood sugar is low?: Yes  What symptoms do you experience?: Nausea  Have you ever been hospitalized because your blood sugar was too low?: no  How do you treat hypoglycemia (low blood sugar)?: 1/2 can soda/fruit juice, 5-6 pieces of hard candy  Have you ever experienced hyperglycemia (high blood sugar)?: no    Medication Information  How do you obtain your medications?: Patient drives  How many days a week do you miss your medications?: Never  Do you sometimes have difficulty refilling your medications?: No  Medication adherence impacting ability to self-manage diabetes?: No    Labs  Do you have regular lab work to monitor your medications?: Yes  Type of Regular Lab Work: A1c, Cholesterol, Microalbumin, CBC  Where do you get your labs drawn?: [facility]  Lab Compliance Barriers: No    Nutritional Status  Diet: Regular  Meal Plan 24 Hour Recall: Breakfast, Lunch, Dinner  Meal Plan 24 Hour Recall - Breakfast: pineapple sugar free monster energy drink  Meal Plan 24 Hour Recall - Lunch: nothing  Meal Plan 24 Hour Recall - Dinner: seafood gumbo with 1 cup of rice, water  Change in appetite?: No  Dentation:: Intact  Recent  Changes in Weight: Weight Loss  Was weight loss intentional or unintentional?: Intentional  Current nutritional status an area of need that is impacting patient's ability to self-manage diabetes?: No    Additional Social History    Support  Does anyone support you with your diabetes care?: yes  Who supports you?: self, spouse  Who takes you to your medical appointments?: self  Does the current support meet the patient's needs?: Yes  Is Support an area impacting ability to self-manage diabetes?: No    Access to Mass Media & Technology  Does the patient have access to any of the following devices or technologies?: Smart phone  Media or technology needs impacting ability to self-manage diabetes?: No    Cognitive/Behavioral Health  Alert and Oriented: Yes  Difficulty Thinking: No  Requires Prompting: No  Requires assistance for routine expression?: No  Cognitive or behavioral barriers impacting ability to self-manage diabetes?: No    Culture/Hindu  Culture or Mosque beliefs that may impact ability to access healthcare: No    Communication  Language preference: English  Hearing Problems: No  Vision Problems: Yes  Vision problem type:: Decreased Vision  Vision Assistance: Glasses  Communication needs impacting ability to self-manage diabetes?: No    Health Literacy  Preferred Learning Method: Face to Face  How often do you need to have someone help you read instructions, pamphlets, or written material from your doctor or pharmacy?: Never  Health literacy needs impacting ability to self-manage diabetes?: No      Diabetes Self-Management Skills Assessment    Diabetes Disease Process/Treatment Options  Patient/caregiver able to state what happens when someone has diabetes.: yes  Patient/caregiver knows what type of diabetes they have.: yes  Diabetes Type : Type II  Patient/caregiver able to identify at least three signs and symptoms of diabetes.: yes  Identified signs and symptoms:: fatigue, frequent urination  Patient  able to identify at least three risk factors for diabetes.: yes  Identified risk factors:: family history, being overweight  Diabetes Disease Process/Treatment Options: Skills Assessment Completed: Yes  Assessment indicates:: Adequate understanding  Area of need?: No    Nutrition/Healthy Eating  Method of carbohydrate measurement:: carb counting/reading labels  Patient can identify foods that impact blood sugar.: yes  Patient-identified foods:: soda, starches (bread, pasta, rice, cereal)  Nutrition/Healthy Eating Skills Assessment Completed:: Yes  Assessment indicates:: Instruction Needed  Area of need?: Yes    Physical Activity/Exercise  Patient's daily activity level:: lightly active  Patient formally exercises outside of work.: yes  Exercise Type: using exercise equipment (exercise bike)  Intensity: Moderate  Frequency: three times a week  Duration: 30 min  Patient can identify forms of physical activity.: yes  Stated forms of physical activity:: any movement performed by muscles that uses energy  Patient can identify reasons why exercise/physical activity is important in diabetes management.: yes  Identified reasons:: keeps body and joints flexible  Physical Activity/Exercise Skills Assessment Completed: : Yes  Assessment indicates:: Adequate understanding  Area of need?: No    Medications  Patient is able to describe current diabetes management routine.: yes  Diabetes management routine:: diet, injectable medications  Patient is able to identify current diabetes medications, dosages, and appropriate timing of medications.: yes (Ozempic 2mg q7d)  Patient understands the purpose of the medications taken for diabetes.: yes  Patient reports problems or concerns with current medication regimen.: no  Medication Skills Assessment Completed:: Yes  Assessment indicates:: Adequate understanding  Area of need?: No    Home Blood Glucose Monitoring  Patient states that blood sugar is checked at home daily.: yes  Monitoring  Method:: home glucometer  How often do you check your blood sugar?: Once a day  When you check what is your typical blood sugar range? : FBG and HS: low 100's  Blood glucose logs:: no  Home Blood Glucose Monitoring Skills Assessment Completed: : Yes  Assessment indicates:: Adequate understanding  Area of need?: No    Acute Complications  Patient is able to identify types of acute complications: Yes  Patient Identified:: Hypoglycemia  Patient is able to state the basic meaning of hypoglycemia?: Yes  Able to state the blood sugar range for hypoglycemia?: yes  Patient can identify general symptoms of hypoglycemia: yes  Patient identified:: nausea  Acute Complications Skills Assessment Completed: : Yes  Assessment indicates:: Instruction Needed  Area of need?: No    Chronic Complications  Patient can identify major chronic complications of diabetes.: yes  Stated chronic complications:: stroke  Patient can identify ways to prevent or delay diabetes complications.: yes  Stated ways to prevent complications:: controlling blood sugar  Patient is taking statin?: Yes  Chronic Complications Skills Assessment Completed: : Yes  Assessment indicates:: Adequate understanding  Area of need?: No    Assessment Summary and Plan    Based on today's diabetes care assessment, the following areas of need were identified:          11/30/2023    12:01 AM   Social   Support No   Access to Mass Media/Tech No   Cognitive/Behavioral Health No   Culture/Lutheran No   Communication No   Health Literacy No            11/30/2023    12:01 AM   Clinical   Medication Adherence No   Lab Compliance No   Nutritional Status No            11/30/2023    12:01 AM   Diabetes Self-Management Skills   Diabetes Disease Process/Treatment Options Reviewed pathophysiology of type 2 diabetes, complications related to the disease, importance of good BG control to prevent complications.    Nutrition/Healthy Eating Yes- see care plan.   Patient plans on preparing meals  with her granddaughter to teach her healthy nutrition habits.    Physical Activity/Exercise Educated on benefits of exercise.    Medication Discussed MOA, onset, side effects, dosage of Ozempic.    Home Blood Glucose Monitoring No   Acute Complications Reviewed blood glucose goals, prevention, detection, signs and symptoms, and treatment of hypoglycemia and hyperglycemia, and when to contact the clinic.     Chronic Complications Discussed importance of A1c less than 7 to reduce risk of micro and macro complications, including nephropathy, neuropathy, retinopathy, heart attack and stroke. Reviewed the importance of controlled Blood Pressure and Cholesterol Lab Values in preventing disease.   Health maintenance reviewed            Today's interventions were provided through individual discussion, instruction, and written materials were provided.      Patient verbalized understanding of instruction and written materials.  Pt was able to return back demonstration of instructions today. Patient understood key points, needs reinforcement and further instruction.     Diabetes Self-Management Care Plan:    Today's Diabetes Self-Management Care Plan was developed with Ac's input. Ac has agreed to work toward the following goal(s) to improve his/her overall diabetes control.      Care Plan: Diabetes Management   Updates made since 10/31/2023 12:00 AM        Problem: Healthy Eating         Goal: Eat 2-3meals daily with 15-45g/1-3servings of Carbohydrate per meal.    Start Date: 11/30/2023   Expected End Date: 3/30/2024   Priority: High   Barriers: No Barriers Identified   Note:    Patient had gastric sleeve surgery in 2018 and has lost over 100 pounds. Portions are small and she gets full quickly. Wants to improve the quality of food she is eating.        Task: Reviewed the sources and role of Carbohydrate, Protein, and Fat and how each nutrient impacts blood sugar. Completed 11/30/2023        Task: Provided visual  examples using dry measuring cups, food models, and other familiar objects such as computer mouse, deck or cards, tennis ball etc. to help with visualization of portions. Completed 11/30/2023        Task: Recommended replacing beverages containing high sugar content with noncaloric/sugar free options and/or water. Completed 11/30/2023        Task: Review the importance of balancing carbohydrates with each meal using portion control techniques to count servings of carbohydrate and label reading to identify serving size and amount of total carbs per serving. Completed 11/30/2023        Task: Provided Sample plate method and reviewed the use of the plate to estimate amounts of carbohydrate per meal. Completed 11/30/2023          Follow Up Plan     Follow up if symptoms worsen or fail to improve, for E66.01.    Today's care plan and follow up schedule was discussed with patient.  Ac verbalized understanding of the care plan, goals, and agrees to follow up plan.        The patient was encouraged to communicate with his/her health care provider/physician and care team regarding his/her condition(s) and treatment.  I provided the patient with my contact information today and encouraged to contact me via phone or [facility]'s Patient Portal as needed.     Length of Visit   Total Time: 60 Minutes

## 2023-12-15 ENCOUNTER — HOSPITAL ENCOUNTER (OUTPATIENT)
Dept: RADIOLOGY | Facility: HOSPITAL | Age: 58
Discharge: HOME | End: 2023-12-15
Attending: INTERNAL MEDICINE
Payer: COMMERCIAL

## 2023-12-15 ENCOUNTER — OFFICE VISIT (OUTPATIENT)
Dept: PULMONOLOGY | Facility: CLINIC | Age: 58
End: 2023-12-15
Payer: COMMERCIAL

## 2023-12-15 VITALS
WEIGHT: 274.5 LBS | DIASTOLIC BLOOD PRESSURE: 84 MMHG | RESPIRATION RATE: 18 BRPM | HEART RATE: 69 BPM | SYSTOLIC BLOOD PRESSURE: 122 MMHG | BODY MASS INDEX: 44.11 KG/M2 | OXYGEN SATURATION: 98 % | HEIGHT: 66 IN

## 2023-12-15 DIAGNOSIS — I15.2 HYPERTENSION ASSOCIATED WITH DIABETES: Chronic | ICD-10-CM

## 2023-12-15 DIAGNOSIS — R91.8 PULMONARY NODULES: ICD-10-CM

## 2023-12-15 DIAGNOSIS — E78.5 HYPERLIPIDEMIA ASSOCIATED WITH TYPE 2 DIABETES MELLITUS: Chronic | ICD-10-CM

## 2023-12-15 DIAGNOSIS — E11.69 TYPE 2 DIABETES MELLITUS WITH OTHER SPECIFIED COMPLICATION, WITHOUT LONG-TERM CURRENT USE OF INSULIN: Chronic | ICD-10-CM

## 2023-12-15 DIAGNOSIS — E66.01 CLASS 3 SEVERE OBESITY DUE TO EXCESS CALORIES WITH SERIOUS COMORBIDITY AND BODY MASS INDEX (BMI) OF 40.0 TO 44.9 IN ADULT: Chronic | ICD-10-CM

## 2023-12-15 DIAGNOSIS — Z23 NEED FOR VACCINATION: ICD-10-CM

## 2023-12-15 DIAGNOSIS — E11.59 HYPERTENSION ASSOCIATED WITH DIABETES: Chronic | ICD-10-CM

## 2023-12-15 DIAGNOSIS — E11.69 HYPERLIPIDEMIA ASSOCIATED WITH TYPE 2 DIABETES MELLITUS: Chronic | ICD-10-CM

## 2023-12-15 DIAGNOSIS — R91.8 PULMONARY NODULES: Primary | Chronic | ICD-10-CM

## 2023-12-15 PROCEDURE — 1159F MED LIST DOCD IN RCRD: CPT | Mod: CPTII,S$GLB,, | Performed by: INTERNAL MEDICINE

## 2023-12-15 PROCEDURE — 3061F NEG MICROALBUMINURIA REV: CPT | Mod: CPTII,S$GLB,, | Performed by: INTERNAL MEDICINE

## 2023-12-15 PROCEDURE — 71046 X-RAY EXAM CHEST 2 VIEWS: CPT | Mod: 26,,, | Performed by: RADIOLOGY

## 2023-12-15 PROCEDURE — 3074F PR MOST RECENT SYSTOLIC BLOOD PRESSURE < 130 MM HG: ICD-10-PCS | Mod: CPTII,S$GLB,, | Performed by: INTERNAL MEDICINE

## 2023-12-15 PROCEDURE — 3066F PR DOCUMENTATION OF TREATMENT FOR NEPHROPATHY: ICD-10-PCS | Mod: CPTII,S$GLB,, | Performed by: INTERNAL MEDICINE

## 2023-12-15 PROCEDURE — 90686 FLU VACCINE (QUAD) GREATER THAN OR EQUAL TO 3YO PRESERVATIVE FREE IM: ICD-10-PCS | Mod: S$GLB,,, | Performed by: INTERNAL MEDICINE

## 2023-12-15 PROCEDURE — 99999 PR PBB SHADOW E&M-EST. PATIENT-LVL V: CPT | Mod: PBBFAC,,, | Performed by: INTERNAL MEDICINE

## 2023-12-15 PROCEDURE — 71046 XR CHEST PA AND LATERAL: ICD-10-PCS | Mod: 26,,, | Performed by: RADIOLOGY

## 2023-12-15 PROCEDURE — 3044F HG A1C LEVEL LT 7.0%: CPT | Mod: CPTII,S$GLB,, | Performed by: INTERNAL MEDICINE

## 2023-12-15 PROCEDURE — 90471 FLU VACCINE (QUAD) GREATER THAN OR EQUAL TO 3YO PRESERVATIVE FREE IM: ICD-10-PCS | Mod: S$GLB,,, | Performed by: INTERNAL MEDICINE

## 2023-12-15 PROCEDURE — 90471 IMMUNIZATION ADMIN: CPT | Mod: S$GLB,,, | Performed by: INTERNAL MEDICINE

## 2023-12-15 PROCEDURE — 90686 IIV4 VACC NO PRSV 0.5 ML IM: CPT | Mod: S$GLB,,, | Performed by: INTERNAL MEDICINE

## 2023-12-15 PROCEDURE — 4010F ACE/ARB THERAPY RXD/TAKEN: CPT | Mod: CPTII,S$GLB,, | Performed by: INTERNAL MEDICINE

## 2023-12-15 PROCEDURE — 3008F PR BODY MASS INDEX (BMI) DOCUMENTED: ICD-10-PCS | Mod: CPTII,S$GLB,, | Performed by: INTERNAL MEDICINE

## 2023-12-15 PROCEDURE — 3079F PR MOST RECENT DIASTOLIC BLOOD PRESSURE 80-89 MM HG: ICD-10-PCS | Mod: CPTII,S$GLB,, | Performed by: INTERNAL MEDICINE

## 2023-12-15 PROCEDURE — 99214 OFFICE O/P EST MOD 30 MIN: CPT | Mod: 25,S$GLB,, | Performed by: INTERNAL MEDICINE

## 2023-12-15 PROCEDURE — 3008F BODY MASS INDEX DOCD: CPT | Mod: CPTII,S$GLB,, | Performed by: INTERNAL MEDICINE

## 2023-12-15 PROCEDURE — 71046 X-RAY EXAM CHEST 2 VIEWS: CPT | Mod: TC

## 2023-12-15 PROCEDURE — 3079F DIAST BP 80-89 MM HG: CPT | Mod: CPTII,S$GLB,, | Performed by: INTERNAL MEDICINE

## 2023-12-15 PROCEDURE — 3061F PR NEG MICROALBUMINURIA RESULT DOCUMENTED/REVIEW: ICD-10-PCS | Mod: CPTII,S$GLB,, | Performed by: INTERNAL MEDICINE

## 2023-12-15 PROCEDURE — 1159F PR MEDICATION LIST DOCUMENTED IN MEDICAL RECORD: ICD-10-PCS | Mod: CPTII,S$GLB,, | Performed by: INTERNAL MEDICINE

## 2023-12-15 PROCEDURE — 3044F PR MOST RECENT HEMOGLOBIN A1C LEVEL <7.0%: ICD-10-PCS | Mod: CPTII,S$GLB,, | Performed by: INTERNAL MEDICINE

## 2023-12-15 PROCEDURE — 4010F PR ACE/ARB THEARPY RXD/TAKEN: ICD-10-PCS | Mod: CPTII,S$GLB,, | Performed by: INTERNAL MEDICINE

## 2023-12-15 PROCEDURE — 99999 PR PBB SHADOW E&M-EST. PATIENT-LVL V: ICD-10-PCS | Mod: PBBFAC,,, | Performed by: INTERNAL MEDICINE

## 2023-12-15 PROCEDURE — 3066F NEPHROPATHY DOC TX: CPT | Mod: CPTII,S$GLB,, | Performed by: INTERNAL MEDICINE

## 2023-12-15 PROCEDURE — 3074F SYST BP LT 130 MM HG: CPT | Mod: CPTII,S$GLB,, | Performed by: INTERNAL MEDICINE

## 2023-12-15 PROCEDURE — 99214 PR OFFICE/OUTPT VISIT, EST, LEVL IV, 30-39 MIN: ICD-10-PCS | Mod: 25,S$GLB,, | Performed by: INTERNAL MEDICINE

## 2023-12-15 NOTE — PROGRESS NOTES
Subjective:       Patient ID: Ac Hayden is a 57 y.o. female.  Patient Active Problem List   Diagnosis    Degeneration of lumbar or lumbosacral intervertebral disc    Mixed anxiety and depressive disorder    Impingement syndrome, shoulder    Osteoarthritis of acromioclavicular joint    Urgency incontinence    Type 2 diabetes mellitus with other specified complication    Pulmonary nodules    Hyperlipidemia associated with type 2 diabetes mellitus    History of colon polyps    Primary snoring    S/P gastric surgery    Decreased ROM of right knee    Hypertension associated with diabetes    Class 3 severe obesity due to excess calories with serious comorbidity and body mass index (BMI) of 40.0 to 44.9 in adult    Vitamin D deficiency      Social History     Tobacco Use   Smoking Status Former    Current packs/day: 0.00    Average packs/day: 0.3 packs/day for 13.0 years (3.3 ttl pk-yrs)    Types: Cigarettes    Start date: 10/19/1987    Quit date: 10/19/2000    Years since quittin.1   Smokeless Tobacco Former      Immunization History   Administered Date(s) Administered    COVID-19 Vaccine 2022    COVID-19, MRNA, LN-S, PF (MODERNA FULL 0.5 ML DOSE) 2021, 2021    COVID-19, mRNA, LNP-S, bivalent booster, PF (PFIZER OMICRON) 2022    Hepatitis A, Adult 2019    Hepatitis B (recombinant) Adjuvanted, 2 dose 2022    Influenza 2010, 10/31/2011, 2012    Influenza - Quadrivalent 11/10/2014, 10/12/2015, 2016    Influenza - Quadrivalent - PF *Preferred* (6 months and older) 10/04/2017, 2019, 2020, 10/20/2021, 2022, 12/15/2023    Influenza Split 10/14/2013    Pneumococcal Conjugate - 13 Valent 2016    Pneumococcal Polysaccharide - 23 Valent 10/04/2017    Td (ADULT) 2008    Td - PF (ADULT) 2020    Tdap 2010    Zoster Recombinant 2022, 2022                  Chief Complaint: Pulmonary Nodules      Ms. Ac Hayden  is 54 y.o.   Last visit 09/25/2020  This appointment to review home sleep study.  Next the home sleep study was negative for obstructive sleep apnea  She also had chest CT scan to review pulmonary nodules   Pulmonary nodules do not show any detrimental change   chest CT reviewed with patient  Blood pressure elevated due to stress   Works as manager [employer]  Had Mild CHRIS on study 2014  Patient is asymptomatic no cough no wheezing or shortness of breath  Immunizations are up-to-date  I reviewed all her records in detail  I have reviewed the patient's medical history in detail and updated the computerized patient record.           12/09/2021  Follow up visit to review chest CT  Has 2 subcentimeter nodules right lung  CT reveiwed  Unchanged in size  Imaging since April 2016  Not current smoker  Low risk  Annual surveillance with CXR  Patient concurs      12/08/2022  Last visit 12/09/2021  No new respiratory symptoms  CXR revewied  Lost weight from 302lb to 285Lb      12/15/2023  Followup  Lost weight from 337 lb 274 lb  On Ozempic  No respiratory issues  No cough or SOB  7mm Nodule  Repeat Chest CT  Will get Flu shot        Review of Systems   Constitutional:  Positive for weight loss. Negative for weight gain.   HENT: Negative.     Eyes: Negative.    Respiratory:  Negative for apnea, snoring, sputum production, shortness of breath and wheezing.    Cardiovascular: Negative.    Genitourinary: Negative.    Endocrine: endocrine negative    Musculoskeletal: Negative.    Skin: Negative.    Gastrointestinal: Negative.    Neurological: Negative.    Psychiatric/Behavioral:  Negative for sleep disturbance.    All other systems reviewed and are negative.        The following portions of the patient's history were reviewed and updated as appropriate: She  has a past medical history of Allergic rhinitis, Arthritis, Colon polyps, Diabetes mellitus (2002), Diabetes mellitus, type 2, DM (diabetes mellitus) (18  years 2002),  Hyperlipidemia, Hypertension, Metabolic syndrome, Mixed anxiety and depressive disorder, Pneumonia, Prediabetes, and Urine incontinence.  She does not have any pertinent problems on file.  She  has a past surgical history that includes Plantar fascia surgery; Partial hysterectomy; Colonoscopy (N/A, 10/19/2017); Bariatric Surgery (08/22/2018); Hysterectomy; Total Reduction Mammoplasty (2014); and Colonoscopy (N/A, 1/5/2023).  Her family history includes Cancer in her father; Diabetes in her brother, mother, and sister; Eczema in an other family member; Lupus in an other family member; Ovarian cancer in her sister; Strabismus in her sister; Stroke in her mother.  She  reports that she quit smoking about 23 years ago. Her smoking use included cigarettes. She started smoking about 36 years ago. She has a 3.3 pack-year smoking history. She has quit using smokeless tobacco. She reports that she does not currently use alcohol. She reports that she does not use drugs.  She has a current medication list which includes the following prescription(s): albuterol, alprazolam, amlodipine, atorvastatin, azelastine, blood sugar diagnostic, duloxetine, ergocalciferol, fluticasone propionate, hydrochlorothiazide, hydrocodone-acetaminophen, lancets, levocetirizine, losartan, moxifloxacin, naproxen, relion prime meter, ozempic, solifenacin, trazodone, and nystatin.  Current Outpatient Medications on File Prior to Visit   Medication Sig Dispense Refill    albuterol 90 mcg/actuation inhaler Inhale 1-2 puffs into the lungs every 6 (six) hours as needed for Wheezing. 1 Inhaler 0    ALPRAZolam (XANAX) 0.5 MG tablet Take 1 tablet (0.5 mg total) by mouth 2 (two) times daily as needed for Anxiety. 12 tablet 0    amLODIPine (NORVASC) 5 MG tablet Take 1 tablet (5 mg total) by mouth once daily. 90 tablet 3    atorvastatin (LIPITOR) 40 MG tablet Take 1 tablet (40 mg total) by mouth every evening. (for cholesterol and heart health) 90 tablet 3     azelastine (ASTELIN) 137 mcg (0.1 %) nasal spray 1 spray (137 mcg total) by Nasal route 2 (two) times daily. 30 mL 5    blood sugar diagnostic Strp To check BG one time daily, to use with insurance preferred meter 100 each 3    DULoxetine (CYMBALTA) 60 MG capsule Take 2 capsules by mouth once daily 180 capsule 0    ergocalciferol (ERGOCALCIFEROL) 50,000 unit Cap Take 2 capsules (100,000 Units total) by mouth every 7 days. 24 capsule 3    fluticasone propionate (FLONASE) 50 mcg/actuation nasal spray 2 sprays by Each Nostril route.      hydroCHLOROthiazide (HYDRODIURIL) 25 MG tablet Take 1 tablet (25 mg total) by mouth once daily. 90 tablet 3    HYDROcodone-acetaminophen (NORCO)  mg per tablet Take 1 tablet by mouth every 6 (six) hours as needed. 12 tablet 0    lancets Misc To check BG one time daily, to use with insurance preferred meter 100 each 3    levocetirizine (XYZAL) 5 MG tablet Take 1 tablet (5 mg total) by mouth every evening. 90 tablet 3    losartan (COZAAR) 100 MG tablet Take 1 tablet (100 mg total) by mouth once daily. 90 tablet 3    moxifloxacin (VIGAMOX) 0.5 % ophthalmic solution Place 1 drop into the left eye every 2 (two) hours. 3 mL 3    naproxen (NAPROSYN) 500 MG tablet Take 1 tablet (500 mg total) by mouth 2 (two) times daily with meals. Take with food 30 tablet 0    RELION PRIME METER Misc       semaglutide (OZEMPIC) 2 mg/dose (8 mg/3 mL) PnIj Inject 2 mg into the skin every 7 days. 9 mL 1    solifenacin (VESICARE) 10 MG tablet Take 1 tablet (10 mg total) by mouth once daily. 90 tablet 4    traZODone (DESYREL) 100 MG tablet Take 1 tablet (100 mg total) by mouth every evening. 90 tablet 0    nystatin (MYCOSTATIN) cream Apply topically 2 (two) times daily. (Patient not taking: Reported on 12/15/2023) 30 g 3     No current facility-administered medications on file prior to visit.     She is allergic to codeine, pcn [penicillins], and sulfa (sulfonamide antibiotics)..    Objective:       Vitals:  "   12/15/23 0834   BP: 122/84   Pulse: 69   Resp: 18   SpO2: 98%   Weight: 124.5 kg (274 lb 7.6 oz)   Height: 5' 6" (1.676 m)           Physical Exam   Constitutional: She is oriented to person, place, and time. She appears well-developed and well-nourished. She is obese.   HENT:   Head: Normocephalic.   Mouth/Throat: Mallampati Score: IV.   Cardiovascular: Normal rate and normal heart sounds.   Pulmonary/Chest: Normal expansion, hyperinflation and symmetric chest wall expansion.   Musculoskeletal:         General: Normal range of motion.      Cervical back: Normal range of motion and neck supple.   Neurological: She is alert and oriented to person, place, and time.   Skin: Skin is warm and dry.   Psychiatric: She has a normal mood and affect.   Nursing note and vitals reviewed.    Personal Diagnostic Review     X-Ray Chest PA And Lateral  Narrative: EXAM:  XR CHEST PA AND LATERAL    CLINICAL HISTORY: [R91.8]-Other nonspecific abnormal finding of lung field.    COMPARISON: 12/08/2022    FINDINGS: This examination consists of frontal and lateral views of the chest.  The size and contour of the heart are normal.  There is no evidence of an acute pulmonary process.  There is no pneumothorax or pleural effusion.  There are moderate degenerative changes in the thoracic spine.  Impression: 1.  There is no evidence of an acute pulmonary process.  2.  There are moderate degenerative changes in the thoracic spine.    Finalized on: 12/15/2023 8:28 AM By:  Arsalan Ball MD  [facility]# [id number]      2023-12-15 08:30:36.323    [facility]       Assessment:       Problem List Items Addressed This Visit       Type 2 diabetes mellitus with other specified complication (Chronic)    Pulmonary nodules - Primary (Chronic)     We will repeat interval CT scan and if normal discontinue any subsequent follow-up chest CT scans: Patient in agreement         Relevant Orders    Spirometry with/without bronchodilator    CT Chest Without Contrast    " Hyperlipidemia associated with type 2 diabetes mellitus (Chronic)     Stable on Lipitor         Hypertension associated with diabetes (Chronic)     Stable on hydrochlorothiazide Cozaar and Norvasc         Class 3 severe obesity due to excess calories with serious comorbidity and body mass index (BMI) of 40.0 to 44.9 in adult (Chronic)     Weight loss exercise   Currently on Ozempic   Status post tummy tuck          Other Visit Diagnoses       Need for vaccination        Relevant Orders    Influenza - Quadrivalent (PF) (Completed)          Plan:        Follow up in about 1 year (around 12/15/2024), or Flu shot, Humberto, chest CT.    Imaging surveillance: Repeat CT chest in 12 months.   Fleischner Society Guidelines:    High risk patient:   Smoking history, history of malignancy or risk factors for malignancy.( non-solid ground glass opacities and partially solid nodules may require longer follow up to exclude indolent adenocarcinoma)      Nodule size Low risk patient High risk patient   4 mm  No follow up Follow up CT in 12 months. If unchanged, no further follow up.   4 - 6 mm Follow up CT in 12 months; if unchanged, no further follow up.  Initial follow up CT in 6 - 12 months, then at 18 - 24 months if no change.      6 - 8 mm  Initial follow up CT at 6 - 12 months and at 18 months if no change.  Initial follow up CT at 3 - 6 months. Then at 9-12 months and 24 months if no change.      > 8 mm Initial follow up CT at 3, 6, 9 and 24 months, dynamic contrast enhanced CT, PET-CT and/or biopsy. Initial follow up CT at 3, 6, 9 and 24 months, dynamic contrast enhanced CT, PET-CT and/or biopsy.             TIME SPENT WITH PATIENT:     Time spent: 20 minutes in face to face  discussion concerning diagnosis, prognosis, review of lab and test results, benefits of treatment as well as management of disease, counseling of patient and coordination of care between various health  care providers . Greater than half the time spent  was used for coordination of care and counseling of patient.     Keo Tavera    Pulmonary/Critical care/Sleepmedicine

## 2023-12-15 NOTE — ASSESSMENT & PLAN NOTE
We will repeat interval CT scan and if normal discontinue any subsequent follow-up chest CT scans: Patient in agreement

## 2023-12-18 NOTE — PROGRESS NOTES
Ac Hayden   1965        CURRENT PRESENTATION:   The patient presents for follow-up of recurrent major depression and insomnia.  When she was last seen 6 months ago, she reported good control of depression but problems with sleep.  Cymbalta was continued at 120 mg daily, and trazodone was increased to 100 mg nightly.   indicates that 12 tablets of Xanax 0.5 mg were authorized by me in filled on .  (The patient reports that she has funerals Friday and Saturday for her grandfather, who is death was not unexpected, and for her nephew, who  very unexpectedly in a motor vehicle accident.  She indicates that since the death of her son years ago, issues of deaths and funerals have been triggers for anxiety.  She reports in the past having been prescribed Xanax for such events; she says that it was helpful with no side effects. )     History includes no benefit with Wellbutrin and:  ...a long history of grief and stress.  She reports that her mother  when she was 16 years old, her son was shot and killed at 24 years old in , 1 of her 2 sisters whom she describes as her best friend  quickly of cancer in 2021, and a very close relationship with her  of 35 years (together 37 years) changed about 5 years ago.  Regarding the latter, she indicates that in  she saw a number on their phone bill repeatedly and asked him about the number, and on 1 occasion he told her it was a co-worker and on another occasion told her it was a friend, leading her to suspect that he was in communication with another woman.  She reports that she ultimately got past this because she saw no other changes in his behavior.  However, she says that 5 years ago she overheard him on the phone with a woman, referring to her as baby and speaking in very friendly fashion.  She says that her  will not discuss it, saying that there is nothing to discuss, and he is refused  couples counseling and counseling with her .    In the current session, the patient has no complaints, concerns, or questions.  She reports that she has done well in stable fashion, and this despite stressors.  No side effects of Cymbalta or trazodone, and  she is pleased with the response.Euthymic with no depression, excessively elevated moods, irritable moods, or manic signs or symptoms.  Anxiety is well controlled.  No psychosis.  No thoughts of harm to self or others or feelings of aggression.  Sleeping well.    The patient describes successfully dealing with the funerals of her nephew and grandfather     Interim history:  Living situation/supports:  The patient reports that she is trying to complete 4 years of taxes and manage a property renovation next door to rent (she and her  own property next door and across the street).  She indicates that much of this has fallen on her because in September her  had an episode of a seizure and/or heart attack.  She says that he has a appeared to be depressed.  He has been on leave from work since September, but his doctor has not completed the paperwork, such that he is not being paid.  She says that he was supposed to retire in October, but that is up in the year because he had stopped attending work in September.  She has been trying to take care of all the steps necessary to get him on medical leave and allow him to move towards senior living.  Some stress on her job in that her supervisors were not holding people accountable, an upper level supervisor talked with her supervisors in this regard, and there has been some tension between her and her supervisors.         Positively, her granddaughter who has been with the other grandmother in [place] has moved in with her, and the patient is very happy about this.  She is taking driving lessons, the patient has arranged for a job at [employer], and she will be taking online college classes paid for by  [employer]  Last session -  Her job is  at [employer] has been positive.  She is proud of her granddaughter, who lives with the other grandmother in [place], for graduating high school.  Positives with her son in [place], his 4 children, and the grandchild of her  son.   The patient reports that she has made efforts to connect with her , but she has not seen him responding, and he will not go to couples therapy.  He seems somewhat better for a time after their son talk to him (the patient reports that she has been open with her son about the issues that she has had with his father).  The son is very supportive of the patient and has told her that she always has a place with him if she needed it and also said that if she wanted to separate, he would help her in the process of getting his father out of the house so that she can stay in the house.  She says that her  is about to retire from doing floors overnight at [employer] and will be pursuing full-time his own lawn service.  Medical issues:  No change  Nonpsychotropic Medications:  Amlodipine, atorvastatin, azelastine, vitamin-D, Flonase, HCTZ, Xyzal, losartan, Ozempic, VESIcare   Allergies:  No change       Review of patient's allergies indicates:   Allergen Reactions    Codeine Edema and Itching    Pcn [penicillins] Rash    Sulfa (sulfonamide antibiotics) Edema and Rash      Alcohol use:  Pattern continues to be very rare small amount  Other substance use:  None    Mental Status Exam:   Appearance:  Appropriately groomed  Orientation:  X4  Attitude:  Cooperative, engaged   Eye Contact:  Appropriate  Behavior:  Calm, appropriate  Speech:     Rate - WNL    Volume - WNL    Quantity - WNL    Tone - relaxed, appropriate  Pressure - no  Thought Processes:  Goal-directed  Mood:  Euthymic   Affect:  Without distress, euthymic, including ability to brighten at appropriate times  SI:  No, and no thoughts of self-harm  HI:  No, and no  thoughts of harm towards others  Paranoia:  No  Delusions:  No  Hallucinations:  No  Attention:  Intact over the course of the session  Cognition:  No deficits noted over the course of the session  Insight:  Intact   Judgment:  Intact  Impulse Control:  Intact          ASSESSMENT:   Encounter Diagnoses   Name Primary?    Moderate episode of recurrent major depressive disorder Yes    Anxiety     Insomnia, unspecified type          PLAN:     Follow up in 4 months.      Psychiatry Medication:  Continue Cymbalta 120 mg daily and trazodone 100 at bedtime.    Reviewed with patient:  Report side effects or any other problems to the psychiatrist during clinic business hours.  Call 911 or go to an emergency department for any acute or urgent issues otherwise.  Follow up with primary care/MD specialist for continued monitoring of general health and wellness and any medical conditions.  Call  [facility] at [phone] or go to [facility] if necessary for scheduling or rescheduling.  Understanding was expressed; and no further concerns or questions were raised at this time.     [id number]  2 or more stable chronic illnesses and Prescription drug management    Large portions of this note were completed by way of voice recognition dictation software, and transcription errors are possible, such that specific information in the note should be considered in the context of the entire report.

## 2023-12-19 ENCOUNTER — OFFICE VISIT (OUTPATIENT)
Dept: PSYCHIATRY | Facility: CLINIC | Age: 58
End: 2023-12-19
Payer: COMMERCIAL

## 2023-12-19 VITALS — SYSTOLIC BLOOD PRESSURE: 138 MMHG | HEART RATE: 96 BPM | DIASTOLIC BLOOD PRESSURE: 83 MMHG

## 2023-12-19 DIAGNOSIS — F41.9 ANXIETY: ICD-10-CM

## 2023-12-19 DIAGNOSIS — F33.1 MODERATE EPISODE OF RECURRENT MAJOR DEPRESSIVE DISORDER: Primary | ICD-10-CM

## 2023-12-19 DIAGNOSIS — G47.00 INSOMNIA, UNSPECIFIED TYPE: ICD-10-CM

## 2023-12-19 PROCEDURE — 3066F PR DOCUMENTATION OF TREATMENT FOR NEPHROPATHY: ICD-10-PCS | Mod: CPTII,S$GLB,, | Performed by: PSYCHIATRY & NEUROLOGY

## 2023-12-19 PROCEDURE — 99214 PR OFFICE/OUTPT VISIT, EST, LEVL IV, 30-39 MIN: ICD-10-PCS | Mod: S$GLB,,, | Performed by: PSYCHIATRY & NEUROLOGY

## 2023-12-19 PROCEDURE — 99999 PR PBB SHADOW E&M-EST. PATIENT-LVL II: ICD-10-PCS | Mod: PBBFAC,,, | Performed by: PSYCHIATRY & NEUROLOGY

## 2023-12-19 PROCEDURE — 3075F PR MOST RECENT SYSTOLIC BLOOD PRESS GE 130-139MM HG: ICD-10-PCS | Mod: CPTII,S$GLB,, | Performed by: PSYCHIATRY & NEUROLOGY

## 2023-12-19 PROCEDURE — 99999 PR PBB SHADOW E&M-EST. PATIENT-LVL II: CPT | Mod: PBBFAC,,, | Performed by: PSYCHIATRY & NEUROLOGY

## 2023-12-19 PROCEDURE — 1160F PR REVIEW ALL MEDS BY PRESCRIBER/CLIN PHARMACIST DOCUMENTED: ICD-10-PCS | Mod: CPTII,S$GLB,, | Performed by: PSYCHIATRY & NEUROLOGY

## 2023-12-19 PROCEDURE — 1159F MED LIST DOCD IN RCRD: CPT | Mod: CPTII,S$GLB,, | Performed by: PSYCHIATRY & NEUROLOGY

## 2023-12-19 PROCEDURE — 3066F NEPHROPATHY DOC TX: CPT | Mod: CPTII,S$GLB,, | Performed by: PSYCHIATRY & NEUROLOGY

## 2023-12-19 PROCEDURE — 3079F PR MOST RECENT DIASTOLIC BLOOD PRESSURE 80-89 MM HG: ICD-10-PCS | Mod: CPTII,S$GLB,, | Performed by: PSYCHIATRY & NEUROLOGY

## 2023-12-19 PROCEDURE — 3044F PR MOST RECENT HEMOGLOBIN A1C LEVEL <7.0%: ICD-10-PCS | Mod: CPTII,S$GLB,, | Performed by: PSYCHIATRY & NEUROLOGY

## 2023-12-19 PROCEDURE — 1159F PR MEDICATION LIST DOCUMENTED IN MEDICAL RECORD: ICD-10-PCS | Mod: CPTII,S$GLB,, | Performed by: PSYCHIATRY & NEUROLOGY

## 2023-12-19 PROCEDURE — 1160F RVW MEDS BY RX/DR IN RCRD: CPT | Mod: CPTII,S$GLB,, | Performed by: PSYCHIATRY & NEUROLOGY

## 2023-12-19 PROCEDURE — 3061F PR NEG MICROALBUMINURIA RESULT DOCUMENTED/REVIEW: ICD-10-PCS | Mod: CPTII,S$GLB,, | Performed by: PSYCHIATRY & NEUROLOGY

## 2023-12-19 PROCEDURE — 3075F SYST BP GE 130 - 139MM HG: CPT | Mod: CPTII,S$GLB,, | Performed by: PSYCHIATRY & NEUROLOGY

## 2023-12-19 PROCEDURE — 4010F ACE/ARB THERAPY RXD/TAKEN: CPT | Mod: CPTII,S$GLB,, | Performed by: PSYCHIATRY & NEUROLOGY

## 2023-12-19 PROCEDURE — 99214 OFFICE O/P EST MOD 30 MIN: CPT | Mod: S$GLB,,, | Performed by: PSYCHIATRY & NEUROLOGY

## 2023-12-19 PROCEDURE — 4010F PR ACE/ARB THEARPY RXD/TAKEN: ICD-10-PCS | Mod: CPTII,S$GLB,, | Performed by: PSYCHIATRY & NEUROLOGY

## 2023-12-19 PROCEDURE — 3079F DIAST BP 80-89 MM HG: CPT | Mod: CPTII,S$GLB,, | Performed by: PSYCHIATRY & NEUROLOGY

## 2023-12-19 PROCEDURE — 3061F NEG MICROALBUMINURIA REV: CPT | Mod: CPTII,S$GLB,, | Performed by: PSYCHIATRY & NEUROLOGY

## 2023-12-19 PROCEDURE — 3044F HG A1C LEVEL LT 7.0%: CPT | Mod: CPTII,S$GLB,, | Performed by: PSYCHIATRY & NEUROLOGY

## 2023-12-19 RX ORDER — DULOXETIN HYDROCHLORIDE 60 MG/1
120 CAPSULE, DELAYED RELEASE ORAL DAILY
Qty: 180 CAPSULE | Refills: 1 | Status: SHIPPED | OUTPATIENT
Start: 2023-12-19

## 2023-12-19 RX ORDER — TRAZODONE HYDROCHLORIDE 100 MG/1
100 TABLET ORAL NIGHTLY
Qty: 90 TABLET | Refills: 1 | Status: SHIPPED | OUTPATIENT
Start: 2023-12-19

## 2024-01-17 DIAGNOSIS — Z12.31 OTHER SCREENING MAMMOGRAM: ICD-10-CM

## 2024-01-18 ENCOUNTER — TELEPHONE (OUTPATIENT)
Dept: INTERNAL MEDICINE | Facility: CLINIC | Age: 59
End: 2024-01-18
Payer: COMMERCIAL

## 2024-01-18 DIAGNOSIS — E11.69 TYPE 2 DIABETES MELLITUS WITH OTHER SPECIFIED COMPLICATION, WITHOUT LONG-TERM CURRENT USE OF INSULIN: Primary | Chronic | ICD-10-CM

## 2024-01-18 RX ORDER — LANCETS
EACH MISCELLANEOUS
Refills: 0 | Status: CANCELLED | OUTPATIENT
Start: 2024-01-18

## 2024-01-18 RX ORDER — INSULIN PUMP SYRINGE, 3 ML
EACH MISCELLANEOUS
Refills: 0 | Status: CANCELLED | OUTPATIENT
Start: 2024-01-18 | End: 2025-01-17

## 2024-01-18 NOTE — TELEPHONE ENCOUNTER
----- Message from Denise Rivera sent at 1/18/2024  9:32 AM CST -----  Regarding: rx  Name of who is calling:   [facility] [address] or [address]      What is the request in detail: Requesting a call back in ref to getting rx for diabetic supplies...meter, lantis and test strips      Can the clinic reply by [facility]:no      What number to call back if not [facility]:[phone]

## 2024-01-19 NOTE — TELEPHONE ENCOUNTER
----- Message from Ab Preston sent at 1/19/2024  3:31 PM CST -----  Contact: [phone]  [facility] pharmacy is calling in regards to getting prescriptions for patient diabetic testing supplies. Please call pharmacy back at [phone]. Thanks KB

## 2024-01-19 NOTE — TELEPHONE ENCOUNTER
Spoke with pharmacist. Informed pharmacist that provider has not addressed diabetic supplies request and that a response can take up to 72 hrs. Verbalized understanding.

## 2024-01-19 NOTE — TELEPHONE ENCOUNTER
----- Message from Donya Tatum sent at 1/19/2024  4:11 PM CST -----  Pt states she needs a prior auth for Ozempic,Please send to pharmacy [facility] [address].[phone]

## 2024-01-31 NOTE — TELEPHONE ENCOUNTER
Per the pt the PA's have been approved. Pharmacy is waiting on rx's to be sent electronically to fill order. Please see pended orders.//[email or web address]

## 2024-02-06 ENCOUNTER — TELEPHONE (OUTPATIENT)
Dept: INTERNAL MEDICINE | Facility: CLINIC | Age: 59
End: 2024-02-06
Payer: COMMERCIAL

## 2024-02-06 DIAGNOSIS — E11.69 TYPE 2 DIABETES MELLITUS WITH OTHER SPECIFIED COMPLICATION, WITHOUT LONG-TERM CURRENT USE OF INSULIN: Chronic | ICD-10-CM

## 2024-02-06 RX ORDER — LANCETS
EACH MISCELLANEOUS
Qty: 200 EACH | Refills: 11 | Status: SHIPPED | OUTPATIENT
Start: 2024-02-06

## 2024-02-06 RX ORDER — LANCING DEVICE
EACH MISCELLANEOUS
Qty: 1 EACH | Refills: 3 | Status: SHIPPED | OUTPATIENT
Start: 2024-02-06

## 2024-02-06 RX ORDER — INSULIN PUMP SYRINGE, 3 ML
EACH MISCELLANEOUS
Qty: 1 EACH | Refills: 0 | Status: SHIPPED | OUTPATIENT
Start: 2024-02-06

## 2024-02-06 RX ORDER — ISOPROPYL ALCOHOL 70 ML/100ML
SWAB TOPICAL
Qty: 200 EACH | Refills: 11 | Status: SHIPPED | OUTPATIENT
Start: 2024-02-06

## 2024-02-06 NOTE — TELEPHONE ENCOUNTER
Pt was informed that her prescription was sent to her pharmacy.    ----- Message from Kasia Leon sent at 2/6/2024  3:02 PM CST -----  Contact: [facility] is calling regarding prescription for patients Testing supplies, reports speaking with nurse merissa stating they were waiting on  Approval but stated its been over a month and they haven't received anything as of yet and pt has been calling . Please call pharmacist back at [phone]                   [facility] [place] (), [address]   [address] [place] [address] [address]. [place] () [place] [address]  Phone: [phone] Fax: [phone]

## 2024-02-06 NOTE — TELEPHONE ENCOUNTER
Medications Ordered This Encounter   Medications    alcohol swabs (ALCOHOL PREP PADS) PadM     Sig: Use to check blood glucose as directed.     Dispense:  200 each     Refill:  11     Generic/brand therapeutic substitution OK. Use insurance-preferred brand unless patient requests otherwise.    blood sugar diagnostic (BLOOD GLUCOSE TEST) Strp     Sig: Check blood glucose 1-2 times daily and as needed for symptoms of low or high blood glucose.     Dispense:  200 strip     Refill:  11     Dispense supplies corresponding with patient's glucometer.    blood-glucose meter kit     Sig: Use as instructed     Dispense:  1 each     Refill:  0     Generic/brand therapeutic substitution OK. Use insurance-preferred brand unless patient requests otherwise. Dispense supplies corresponding with patient's glucometer.    lancets (LANCETS,THIN) Misc     Sig: Check blood glucose 1-2 times daily and as needed for symptoms of low or high blood glucose.     Dispense:  200 each     Refill:  11     Dispense supplies corresponding with patient's lancing device.    lancing device Misc     Sig: Use as directed.     Dispense:  1 each     Refill:  3     Generic/brand therapeutic substitution OK. Use insurance-preferred brand unless patient requests otherwise.

## 2024-02-29 ENCOUNTER — LAB VISIT (OUTPATIENT)
Dept: LAB | Facility: HOSPITAL | Age: 59
End: 2024-02-29
Attending: FAMILY MEDICINE
Payer: COMMERCIAL

## 2024-02-29 DIAGNOSIS — E11.69 TYPE 2 DIABETES MELLITUS WITH OTHER SPECIFIED COMPLICATION, WITHOUT LONG-TERM CURRENT USE OF INSULIN: Chronic | ICD-10-CM

## 2024-02-29 LAB
ESTIMATED AVG GLUCOSE: 111 MG/DL (ref 68–131)
HBA1C MFR BLD: 5.5 % (ref 4–5.6)

## 2024-02-29 PROCEDURE — 83036 HEMOGLOBIN GLYCOSYLATED A1C: CPT | Performed by: FAMILY MEDICINE

## 2024-02-29 PROCEDURE — 36415 COLL VENOUS BLD VENIPUNCTURE: CPT | Performed by: FAMILY MEDICINE

## 2024-03-03 NOTE — PROGRESS NOTES
Results to be addressed at upcoming appointment(s) already scheduled.  Future Appointments  3/7/2024   11:00 AM   JASWINDER Powers MD      [facility] IM             [facility]  3/7/2024   3:00 PM    Sedrick Shoemaker MD    [facility] CARDIO         [facility]  3/12/2024  1:00 PM    Gale Vu MD     [facility] LIFE           [facility]  4/9/2024   3:00 PM    [facility] [id number]            [facility] MAMMO          [facility]  8/12/2024  11:30 AM   Adelaida Coyne MD     [facility] UROLOGY        [facility]  12/13/2024 10:15 AM   HG CT1 LIMIT 500 LBS     [facility] CT SCAN        [facility]  12/13/2024 11:00 AM   PULMONARY LAB 2, [facility]      [facility] PULMFUN        [facility]  12/13/2024 11:40 AM   Keo Tavera MD    [facility] PULMSVC        [facility]

## 2024-03-07 ENCOUNTER — OFFICE VISIT (OUTPATIENT)
Dept: INTERNAL MEDICINE | Facility: CLINIC | Age: 59
End: 2024-03-07
Payer: COMMERCIAL

## 2024-03-07 VITALS
TEMPERATURE: 98 F | OXYGEN SATURATION: 97 % | BODY MASS INDEX: 43.79 KG/M2 | HEART RATE: 112 BPM | HEIGHT: 66 IN | DIASTOLIC BLOOD PRESSURE: 68 MMHG | RESPIRATION RATE: 18 BRPM | WEIGHT: 272.5 LBS | SYSTOLIC BLOOD PRESSURE: 124 MMHG

## 2024-03-07 DIAGNOSIS — H65.03 BILATERAL ACUTE SEROUS OTITIS MEDIA, RECURRENCE NOT SPECIFIED: ICD-10-CM

## 2024-03-07 DIAGNOSIS — L08.9 PUSTULE OF NOSTRIL: Primary | ICD-10-CM

## 2024-03-07 DIAGNOSIS — E55.9 VITAMIN D DEFICIENCY: Chronic | ICD-10-CM

## 2024-03-07 DIAGNOSIS — J30.1 SEASONAL ALLERGIC RHINITIS DUE TO POLLEN: ICD-10-CM

## 2024-03-07 DIAGNOSIS — E78.5 HYPERLIPIDEMIA ASSOCIATED WITH TYPE 2 DIABETES MELLITUS: Chronic | ICD-10-CM

## 2024-03-07 DIAGNOSIS — E11.69 TYPE 2 DIABETES MELLITUS WITH OTHER SPECIFIED COMPLICATION, WITHOUT LONG-TERM CURRENT USE OF INSULIN: Chronic | ICD-10-CM

## 2024-03-07 DIAGNOSIS — E66.01 CLASS 3 SEVERE OBESITY DUE TO EXCESS CALORIES WITH SERIOUS COMORBIDITY AND BODY MASS INDEX (BMI) OF 40.0 TO 44.9 IN ADULT: Chronic | ICD-10-CM

## 2024-03-07 DIAGNOSIS — E11.59 HYPERTENSION ASSOCIATED WITH DIABETES: Chronic | ICD-10-CM

## 2024-03-07 DIAGNOSIS — Z01.00 DIABETIC EYE EXAM: ICD-10-CM

## 2024-03-07 DIAGNOSIS — E11.9 DIABETIC EYE EXAM: ICD-10-CM

## 2024-03-07 DIAGNOSIS — I15.2 HYPERTENSION ASSOCIATED WITH DIABETES: Chronic | ICD-10-CM

## 2024-03-07 DIAGNOSIS — E11.69 HYPERLIPIDEMIA ASSOCIATED WITH TYPE 2 DIABETES MELLITUS: Chronic | ICD-10-CM

## 2024-03-07 PROCEDURE — 3044F HG A1C LEVEL LT 7.0%: CPT | Mod: CPTII,S$GLB,, | Performed by: FAMILY MEDICINE

## 2024-03-07 PROCEDURE — 3072F LOW RISK FOR RETINOPATHY: CPT | Mod: CPTII,S$GLB,, | Performed by: FAMILY MEDICINE

## 2024-03-07 PROCEDURE — 1159F MED LIST DOCD IN RCRD: CPT | Mod: CPTII,S$GLB,, | Performed by: FAMILY MEDICINE

## 2024-03-07 PROCEDURE — 1160F RVW MEDS BY RX/DR IN RCRD: CPT | Mod: CPTII,S$GLB,, | Performed by: FAMILY MEDICINE

## 2024-03-07 PROCEDURE — 3074F SYST BP LT 130 MM HG: CPT | Mod: CPTII,S$GLB,, | Performed by: FAMILY MEDICINE

## 2024-03-07 PROCEDURE — 99999 PR PBB SHADOW E&M-EST. PATIENT-LVL V: CPT | Mod: PBBFAC,,, | Performed by: FAMILY MEDICINE

## 2024-03-07 PROCEDURE — 3008F BODY MASS INDEX DOCD: CPT | Mod: CPTII,S$GLB,, | Performed by: FAMILY MEDICINE

## 2024-03-07 PROCEDURE — 4010F ACE/ARB THERAPY RXD/TAKEN: CPT | Mod: CPTII,S$GLB,, | Performed by: FAMILY MEDICINE

## 2024-03-07 PROCEDURE — 3078F DIAST BP <80 MM HG: CPT | Mod: CPTII,S$GLB,, | Performed by: FAMILY MEDICINE

## 2024-03-07 PROCEDURE — 99214 OFFICE O/P EST MOD 30 MIN: CPT | Mod: S$GLB,,, | Performed by: FAMILY MEDICINE

## 2024-03-07 RX ORDER — AZELASTINE 1 MG/ML
1 SPRAY, METERED NASAL 2 TIMES DAILY
Qty: 30 ML | Refills: 5 | Status: SHIPPED | OUTPATIENT
Start: 2024-03-07 | End: 2025-03-07

## 2024-03-07 RX ORDER — ERGOCALCIFEROL 1.25 MG/1
100000 CAPSULE ORAL
Qty: 24 CAPSULE | Refills: 3 | Status: SHIPPED | OUTPATIENT
Start: 2024-03-07 | End: 2025-03-07

## 2024-03-07 RX ORDER — AMLODIPINE BESYLATE 5 MG/1
5 TABLET ORAL DAILY
Qty: 90 TABLET | Refills: 3 | Status: SHIPPED | OUTPATIENT
Start: 2024-03-07 | End: 2025-03-02

## 2024-03-07 RX ORDER — DOXYCYCLINE 100 MG/1
100 CAPSULE ORAL 2 TIMES DAILY
Qty: 20 CAPSULE | Refills: 0 | Status: SHIPPED | OUTPATIENT
Start: 2024-03-07 | End: 2024-03-17

## 2024-03-07 RX ORDER — ATORVASTATIN CALCIUM 40 MG/1
40 TABLET, FILM COATED ORAL NIGHTLY
Qty: 90 TABLET | Refills: 3 | Status: SHIPPED | OUTPATIENT
Start: 2024-03-07 | End: 2025-03-07

## 2024-03-07 RX ORDER — HYDROCHLOROTHIAZIDE 25 MG/1
25 TABLET ORAL DAILY
Qty: 90 TABLET | Refills: 3 | Status: SHIPPED | OUTPATIENT
Start: 2024-03-07 | End: 2025-03-07

## 2024-03-07 RX ORDER — FLUTICASONE PROPIONATE 50 MCG
2 SPRAY, SUSPENSION (ML) NASAL DAILY
Qty: 32 G | Refills: 5 | Status: SHIPPED | OUTPATIENT
Start: 2024-03-07 | End: 2025-03-07

## 2024-03-07 RX ORDER — LOSARTAN POTASSIUM 100 MG/1
100 TABLET ORAL DAILY
Qty: 90 TABLET | Refills: 3 | Status: SHIPPED | OUTPATIENT
Start: 2024-03-07 | End: 2025-03-07

## 2024-03-07 NOTE — ASSESSMENT & PLAN NOTE
"Wt Readings from Last 6 Encounters:   03/07/24 123.6 kg (272 lb 7.8 oz)   12/15/23 124.5 kg (274 lb 7.6 oz)   11/01/23 123.3 kg (271 lb 13.2 oz)   09/07/23 122.4 kg (269 lb 13.5 oz)   08/16/23 120.7 kg (266 lb 1.5 oz)   08/07/23 127 kg (280 lb)     Estimated body mass index is 43.98 kg/m² as calculated from the following:    Height as of this encounter: 5' 6" (1.676 m).    Weight as of this encounter: 123.6 kg (272 lb 7.8 oz).    "

## 2024-03-07 NOTE — PROGRESS NOTES
OFFICE VISIT 3/7/24 11:00 AM CST    History of Present Illness    Ac presents today for a follow-up and reevaluation on chronic health conditions, to discuss a problem with her nose she's recently noticed, and to assess current medications.    She recently noticed a very sore area within her nose, it feels like a bump and is very painful both when touched and untouched. This discomfort started on last Monday. She recalls a similar issue during a past upper respiratory infection, but it was not in the same exact location.    She reports a history of ear problems and had a tube placed in her ear which was subsequently removed last year.    She is adherent and reports no adverse effects with her current medications, which include Amlodipine, Hydrochlorothiazide, and Losartan for blood pressure management; Atorvastatin for cholesterol control; and Ozempic 2 mg for diabetes. For her nasal allergies, she takes prescription Flonase and Azelastine (also known as Astelin).    She has allergies to Penicillin and Sulfa medications, experiencing a bodily rash with Penicillin exposure.    Her blood sugar typically range from 98 to 104, but she has witnessed one spike to 238.    In the past, she was prescribed an albuterol inhaler during a respiratory infection and has not needed it since.       Review of Systems   Respiratory:  Negative for chest tightness and shortness of breath.    Cardiovascular:  Negative for chest pain.       Assessment & Plan    NASAL PUSTULE:   Confirmed the absence of ear infection unrelated to the patient's current symptoms.   Diagnosed nasal discomfort as a potential nasal pustule, not a sinus infection, based on the patient's description.   Clarified that such conditions typically resolve independently.   Prescribed an antibiotic, doxycycline, at a dosage of 100 mg twice daily for 10 days, but advised the patient to delay filling the prescription to monitor if the condition improves, and only fill  "it if the condition worsens.  HYPERTENSION:   Continue the patient's current blood pressure medications, amlodipine, hydrochlorothiazide, and losartan.  DIABETES:   Replaced the patient's diabetes medication, Ozempic, with Mounjaro at a dosage of 10 mg weekly, to be provided monthly with several refills included.   Scheduled a virtual video visit for 1 month later to assess the patient's progress and potentially adjust the Mounjaro dosage.   Ordered diabetes labs including A1c, CMP, lipid, urine, and microalbumin for mid-August, along with an EKG.   Scheduled another virtual visit a few days post mid-August to review the lab results.       1. Pustule of nasal passage  -     doxycycline (VIBRAMYCIN) 100 MG Cap; Take 1 capsule (100 mg total) by mouth 2 (two) times daily. for 10 days  Dispense: 20 capsule; Refill: 0    2. Hypertension associated with diabetes  -     amLODIPine (NORVASC) 5 MG tablet; Take 1 tablet (5 mg total) by mouth once daily.  Dispense: 90 tablet; Refill: 3  -     hydroCHLOROthiazide (HYDRODIURIL) 25 MG tablet; Take 1 tablet (25 mg total) by mouth once daily.  Dispense: 90 tablet; Refill: 3  -     losartan (COZAAR) 100 MG tablet; Take 1 tablet (100 mg total) by mouth once daily.  Dispense: 90 tablet; Refill: 3  -     SCHEDULED EKG 12-LEAD (to Muse); Future    3. Class 3 severe obesity due to excess calories with serious comorbidity and body mass index (BMI) of 40.0 to 44.9 in adult  Assessment & Plan:  Wt Readings from Last 6 Encounters:   03/07/24 123.6 kg (272 lb 7.8 oz)   12/15/23 124.5 kg (274 lb 7.6 oz)   11/01/23 123.3 kg (271 lb 13.2 oz)   09/07/23 122.4 kg (269 lb 13.5 oz)   08/16/23 120.7 kg (266 lb 1.5 oz)   08/07/23 127 kg (280 lb)     Estimated body mass index is 43.98 kg/m² as calculated from the following:    Height as of this encounter: 5' 6" (1.676 m).    Weight as of this encounter: 123.6 kg (272 lb 7.8 oz).      Orders:  -     tirzepatide 10 mg/0.5 mL PnIj; Inject 10 mg into the " "skin every 7 days.  Dispense: 4 Pen; Refill: 2    4. Type 2 diabetes mellitus with other specified complication, without long-term current use of insulin  Overview:  No history or family history of thyroid cancer or multiple endocrine neoplasia syndrome.     Orders:  -     tirzepatide 10 mg/0.5 mL PnIj; Inject 10 mg into the skin every 7 days.  Dispense: 4 Pen; Refill: 2    5. Hyperlipidemia associated with type 2 diabetes mellitus  -     atorvastatin (LIPITOR) 40 MG tablet; Take 1 tablet (40 mg total) by mouth every evening. (for cholesterol and heart health)  Dispense: 90 tablet; Refill: 3    6. Vitamin D deficiency  -     ergocalciferol (ERGOCALCIFEROL) 50,000 unit Cap; Take 2 capsules (100,000 Units total) by mouth every 7 days.  Dispense: 24 capsule; Refill: 3    7. Seasonal allergic rhinitis due to pollen  -     fluticasone propionate (FLONASE) 50 mcg/actuation nasal spray; 2 sprays (100 mcg total) by Each Nostril route once daily.  Dispense: 32 g; Refill: 5  -     azelastine (ASTELIN) 137 mcg (0.1 %) nasal spray; 1 spray (137 mcg total) by Nasal route 2 (two) times daily.  Dispense: 30 mL; Refill: 5    8. Bilateral acute serous otitis media, recurrence not specified    9. Diabetic eye exam  -     Ambulatory referral/consult to Ophthalmology; Future; Expected date: 03/14/2024       Unless noted herein, any chronic conditions are represented as and appear stable, and no other significant complaints or concerns were reported.    Vitals:    03/07/24 1107   BP: 124/68   BP Location: Right arm   Patient Position: Sitting   BP Method: Thigh Cuff (Manual)   Pulse: (!) 112   Resp: 18   Temp: 97.5 °F (36.4 °C)   TempSrc: Tympanic   SpO2: 97%   Weight: 123.6 kg (272 lb 7.8 oz)   Height: 5' 6" (1.676 m)   Physical Exam  Vitals reviewed.   Constitutional:       General: She is not in acute distress.     Appearance: Normal appearance. She is not ill-appearing or diaphoretic.   Cardiovascular:      Rate and Rhythm: Normal " "rate and regular rhythm.   Pulmonary:      Effort: Pulmonary effort is normal.      Breath sounds: Normal breath sounds.   Skin:     General: Skin is warm and dry.   Neurological:      Mental Status: She is alert and oriented to person, place, and time. Mental status is at baseline.   Psychiatric:         Mood and Affect: Mood normal.         Behavior: Behavior normal.         Judgment: Judgment normal.       DIABETIC FOOT EXAM:  Protective Sensation (w/ 10 gram monofilament):  Right: Intact  Left: Intact    Visual Inspection:  Normal -  Bilateral    Pedal Pulses:   Right: Present  Left: Present    Posterior Tibialis Pulses:   Right:Present  Left: Present       This note was generated with the assistance of ambient listening technology. Verbal consent was obtained by the patient and accompanying visitor(s) for the recording of patient appointment to facilitate this note. I attest to having reviewed and edited the generated note for accuracy, though some syntax or spelling errors may persist. Please contact the author of this note for any clarification.    Documentation entered by me for this encounter may have been done in part using speech-recognition technology. Although I have made an effort to ensure accuracy, "sound like" errors may exist and should be interpreted in context.   "

## 2024-03-08 ENCOUNTER — TELEPHONE (OUTPATIENT)
Dept: INTERNAL MEDICINE | Facility: CLINIC | Age: 59
End: 2024-03-08
Payer: COMMERCIAL

## 2024-03-08 DIAGNOSIS — E66.01 CLASS 3 SEVERE OBESITY DUE TO EXCESS CALORIES WITH SERIOUS COMORBIDITY AND BODY MASS INDEX (BMI) OF 40.0 TO 44.9 IN ADULT: Chronic | ICD-10-CM

## 2024-03-08 DIAGNOSIS — E11.69 TYPE 2 DIABETES MELLITUS WITH OTHER SPECIFIED COMPLICATION, WITHOUT LONG-TERM CURRENT USE OF INSULIN: Chronic | ICD-10-CM

## 2024-03-20 ENCOUNTER — TELEPHONE (OUTPATIENT)
Dept: INTERNAL MEDICINE | Facility: CLINIC | Age: 59
End: 2024-03-20
Payer: COMMERCIAL

## 2024-03-20 NOTE — TELEPHONE ENCOUNTER
----- Message from Enrike Zamudio sent at 3/20/2024  3:54 PM CDT -----  Contact: Domonique Colemane is needing a call back in regards to needing a PA for her Mounjaro medication. Please give her a call back at [phone]

## 2024-03-28 ENCOUNTER — TELEPHONE (OUTPATIENT)
Dept: INTERNAL MEDICINE | Facility: CLINIC | Age: 59
End: 2024-03-28
Payer: COMMERCIAL

## 2024-03-28 NOTE — TELEPHONE ENCOUNTER
----- Message from Katelyn Loomis sent at 3/28/2024  2:27 PM CDT -----  Contact: AMY ESTEBAN [[id number]]  ..Type:  Patient Requesting Call    Who Called: AMY ESTEBAN [[id number]]  Does the patient know what this is regarding?: needing prior auth mounjaro  Would the patient rather a call back or a response via [facility]?  call  Best Call Back Number: .[phone] (home)   Additional Information:

## 2024-04-03 ENCOUNTER — TELEPHONE (OUTPATIENT)
Dept: INTERNAL MEDICINE | Facility: CLINIC | Age: 59
End: 2024-04-03
Payer: COMMERCIAL

## 2024-04-08 ENCOUNTER — OFFICE VISIT (OUTPATIENT)
Dept: INTERNAL MEDICINE | Facility: CLINIC | Age: 59
End: 2024-04-08
Payer: COMMERCIAL

## 2024-04-08 ENCOUNTER — TELEPHONE (OUTPATIENT)
Dept: INTERNAL MEDICINE | Facility: CLINIC | Age: 59
End: 2024-04-08
Payer: COMMERCIAL

## 2024-04-08 VITALS — SYSTOLIC BLOOD PRESSURE: 124 MMHG | DIASTOLIC BLOOD PRESSURE: 68 MMHG

## 2024-04-08 DIAGNOSIS — E66.01 CLASS 3 SEVERE OBESITY DUE TO EXCESS CALORIES WITH SERIOUS COMORBIDITY AND BODY MASS INDEX (BMI) OF 40.0 TO 44.9 IN ADULT: Chronic | ICD-10-CM

## 2024-04-08 DIAGNOSIS — E11.69 TYPE 2 DIABETES MELLITUS WITH OTHER SPECIFIED COMPLICATION, WITHOUT LONG-TERM CURRENT USE OF INSULIN: Primary | Chronic | ICD-10-CM

## 2024-04-08 DIAGNOSIS — E11.59 HYPERTENSION ASSOCIATED WITH DIABETES: Chronic | ICD-10-CM

## 2024-04-08 DIAGNOSIS — E11.69 HYPERLIPIDEMIA ASSOCIATED WITH TYPE 2 DIABETES MELLITUS: Chronic | ICD-10-CM

## 2024-04-08 DIAGNOSIS — E78.5 HYPERLIPIDEMIA ASSOCIATED WITH TYPE 2 DIABETES MELLITUS: Chronic | ICD-10-CM

## 2024-04-08 DIAGNOSIS — I15.2 HYPERTENSION ASSOCIATED WITH DIABETES: Chronic | ICD-10-CM

## 2024-04-08 PROCEDURE — 3074F SYST BP LT 130 MM HG: CPT | Mod: CPTII,95,, | Performed by: FAMILY MEDICINE

## 2024-04-08 PROCEDURE — 3044F HG A1C LEVEL LT 7.0%: CPT | Mod: CPTII,95,, | Performed by: FAMILY MEDICINE

## 2024-04-08 PROCEDURE — 3072F LOW RISK FOR RETINOPATHY: CPT | Mod: CPTII,95,, | Performed by: FAMILY MEDICINE

## 2024-04-08 PROCEDURE — 1160F RVW MEDS BY RX/DR IN RCRD: CPT | Mod: CPTII,95,, | Performed by: FAMILY MEDICINE

## 2024-04-08 PROCEDURE — 1159F MED LIST DOCD IN RCRD: CPT | Mod: CPTII,95,, | Performed by: FAMILY MEDICINE

## 2024-04-08 PROCEDURE — G2211 COMPLEX E/M VISIT ADD ON: HCPCS | Mod: 95,,, | Performed by: FAMILY MEDICINE

## 2024-04-08 PROCEDURE — 3078F DIAST BP <80 MM HG: CPT | Mod: CPTII,95,, | Performed by: FAMILY MEDICINE

## 2024-04-08 PROCEDURE — 99214 OFFICE O/P EST MOD 30 MIN: CPT | Mod: 95,,, | Performed by: FAMILY MEDICINE

## 2024-04-08 PROCEDURE — 4010F ACE/ARB THERAPY RXD/TAKEN: CPT | Mod: CPTII,95,, | Performed by: FAMILY MEDICINE

## 2024-04-08 NOTE — PROGRESS NOTES
TELEMEDICINE VIRTUAL VIDEO VISIT  4/8/24  1:40 PM CDT    Visit Type: Audiovisual    Patient's Location: Domonique represents that they are located within the state [facility].    History of Present Illness    Unless specified otherwise, chronic conditions are represented as and appear to be compensated/controlled and stable.  Domonique reports that she is doing well on current medicines, she perceives no adverse side effects, and she wants to continue her current treatment.  No new complaints or concerns reported.       Ac presents today for a follow-up on current medication.    She has taken two doses of Mounjaro for diabetes management with no significant side effects reported. In the past, she used Ozempic, but reported frequent bowel movements soon after ingestion. She is considering alternative medication options due to the significant cost of Mounjaro, despite her insurance covering approximately 85% of the total cost.    She has been checking her own blood pressure at home. She owns a home scale but does not use it frequently.    She confirms that her last mammogram appointment is scheduled. She missed her last eye exam due to a scheduling error and intends to reschedule.    She plans to contact her pharmacy and insurance provider regarding the high cost of Mounjaro. Despite the delay in preauthorization affecting her medication dosage, she prefers to continue on Mounjaro due to its effectiveness and tolerability.       Review of Systems   Constitutional:  Negative for unexpected weight change.   HENT:  Negative for hearing loss, rhinorrhea and trouble swallowing.    Eyes:  Negative for discharge and visual disturbance.   Respiratory:  Negative for chest tightness and wheezing.    Cardiovascular:  Negative for chest pain and palpitations.   Gastrointestinal:  Negative for blood in stool, constipation, diarrhea and vomiting.   Endocrine: Negative for polydipsia and polyuria.   Genitourinary:  Negative for difficulty  urinating, dysuria, hematuria and menstrual problem.   Musculoskeletal:  Negative for arthralgias, joint swelling and neck pain.   Neurological:  Negative for weakness and headaches.   Psychiatric/Behavioral:  Negative for confusion and dysphoric mood.        Assessment & Plan     Continue with Mounjaro and utilize the $25 coupon.   Encourage the patient to collaborate with the pharmacist and insurance company for affordable alternatives if needed.   Refill Mounjaro prescription until August.   Maintain amlodipine, hydrochlorothiazide, and losartan for blood pressure management.   Schedule labs and annual wellness visit in August.   Order hemoglobin A1C, comprehensive metabolic panel, lipid panel, and urine microalbumin for the August 7th appointment.   Instruct the patient to provide blood and urine samples.   Advise the patient to reschedule the eye exam with Dr. David Alvarez, an optometrist, and attend the mammogram appointment.   Recommend consistent weight measurements in the morning after emptying the bladder for accurate tracking.   Emphasize the importance of regular weight monitoring and synchronizing their digital scale with their phone for easy observation.       1. Type 2 diabetes mellitus with other specified complication, without long-term current use of insulin  Overview:  Lab Results   Component Value Date    HGBA1C 6.5 (H) 10/21/2021    HGBA1C 8.0 (H) 08/09/2018    HGBA1C 8.0 (H) 11/22/2017    HGBA1C 9.0 (H) 09/27/2017    HGBA1C 6.8 (H) 01/06/2017   No history or family history of thyroid cancer or multiple endocrine neoplasia syndrome.     Assessment & Plan:  Diabetes Management Status    Statin: Taking  ACE/ARB: Taking    Screening or Prevention Patient's value Goal Complete/Controlled?   HgA1C Testing and Control   Lab Results   Component Value Date    HGBA1C 5.5 02/29/2024      Annually/Less than 8% Yes   Lipid profile : 09/07/2023 Annually Yes   LDL control Lab Results   Component Value Date  "   LDLCALC 109.0 09/07/2023    Annually/Less than 100 mg/dl  No   Nephropathy screening Lab Results   Component Value Date    LABMICR 19.0 09/07/2023     Lab Results   Component Value Date    PROTEINUA Negative 10/28/2021    Annually Yes   Blood pressure BP Readings from Last 1 Encounters:   03/07/24 124/68    Less than 140/90 Yes   Dilated retinal exam : 01/06/2023 Annually Yes   Foot exam   : 03/07/2024 Annually Yes     Lab Results   Component Value Date    HGBA1C 5.5 02/29/2024    HGBA1C 5.7 (H) 09/07/2023    HGBA1C 5.7 (H) 04/03/2023    EGFRNORACEVR >60.0 09/07/2023    MICALBCREAT 7.7 09/07/2023    LDLCALC 109.0 09/07/2023     No results found for: "GLUTAMICACID", "CPEPTIDE"   Last 5 Patient Entered Readings                                          Most Recent A1c:            No data to display               HEALTH MAINTENANCE: Diabetic health maintenance interventions reviewed and are up to date except for:      Topic    Eye Exam         Orders:  -     tirzepatide 10 mg/0.5 mL PnIj; Inject 10 mg into the skin every 7 days.  Dispense: 4 Pen; Refill: 4    2. Hypertension associated with diabetes    3. Class 3 severe obesity due to excess calories with serious comorbidity and body mass index (BMI) of 40.0 to 44.9 in adult  -     tirzepatide 10 mg/0.5 mL PnIj; Inject 10 mg into the skin every 7 days.  Dispense: 4 Pen; Refill: 4    4. Hyperlipidemia associated with type 2 diabetes mellitus      PRESCRIPTION DRUG MANAGEMENT  Domonique was instructed to continue current prescription medications previously prescribed by me. No additional refills required at this time.      Today's visit involved the intricate management of episodic problem(s) and the ongoing care for the patient's serious or complex condition(s) listed above, reflecting the inherent complexity of providing longitudinal, comprehensive evaluation and management as the central hub for the patient's primary care services.  No other significant complaints or " "concerns were reported.    Vitals:    04/08/24 1353   BP: 124/68     Physical Exam    Constitutional: No acute distress. Normal appearance. Not ill-appearing.  Pulmonary: Pulmonary effort is normal. No respiratory distress.  Skin: Skin is not jaundiced.  Neurological: Alert. Mental status is at baseline.  Psychiatric: Mood normal. Behavior normal. Thought content normal.         This note was generated with the assistance of ambient listening technology. Verbal consent was obtained by the patient and accompanying visitor(s) for the recording of patient appointment to facilitate this note. I attest to having reviewed and edited the generated note for accuracy, though some syntax or spelling errors may persist. Please contact the author of this note for any clarification.      TOTAL TIME evaluating and managing this patient for this encounter was greater than or equal to 21 minutes.  This time was exclusive of any separately billable procedures for this patient and exclusive of time spent treating any other patient.    Documentation entered by me for this encounter may have been done in part using speech-recognition technology. Although I have made an effort to ensure accuracy, "sound like" errors may exist and should be interpreted in context.    Each patient to whom medical services are provided by telemedicine is: (1) informed of the relationship between the physician and patient and the respective role of any other health care provider with respect to management of the patient; and (2) notified that he or she may decline to receive medical services by telemedicine and may withdraw from such care at any time.   "

## 2024-04-08 NOTE — ASSESSMENT & PLAN NOTE
"Diabetes Management Status    Statin: Taking  ACE/ARB: Taking    Screening or Prevention Patient's value Goal Complete/Controlled?   HgA1C Testing and Control   Lab Results   Component Value Date    HGBA1C 5.5 02/29/2024      Annually/Less than 8% Yes   Lipid profile : 09/07/2023 Annually Yes   LDL control Lab Results   Component Value Date    LDLCALC 109.0 09/07/2023    Annually/Less than 100 mg/dl  No   Nephropathy screening Lab Results   Component Value Date    LABMICR 19.0 09/07/2023     Lab Results   Component Value Date    PROTEINUA Negative 10/28/2021    Annually Yes   Blood pressure BP Readings from Last 1 Encounters:   03/07/24 124/68    Less than 140/90 Yes   Dilated retinal exam : 01/06/2023 Annually Yes   Foot exam   : 03/07/2024 Annually Yes     Lab Results   Component Value Date    HGBA1C 5.5 02/29/2024    HGBA1C 5.7 (H) 09/07/2023    HGBA1C 5.7 (H) 04/03/2023    EGFRNORACEVR >60.0 09/07/2023    MICALBCREAT 7.7 09/07/2023    LDLCALC 109.0 09/07/2023     No results found for: "GLUTAMICACID", "CPEPTIDE"   Last 5 Patient Entered Readings                                          Most Recent A1c:            No data to display               HEALTH MAINTENANCE: Diabetic health maintenance interventions reviewed and are up to date except for:      Topic    Eye Exam       "

## 2024-04-09 ENCOUNTER — HOSPITAL ENCOUNTER (OUTPATIENT)
Dept: RADIOLOGY | Facility: HOSPITAL | Age: 59
Discharge: HOME | End: 2024-04-09
Attending: FAMILY MEDICINE
Payer: COMMERCIAL

## 2024-04-09 DIAGNOSIS — Z12.31 OTHER SCREENING MAMMOGRAM: ICD-10-CM

## 2024-04-09 PROCEDURE — 77067 SCR MAMMO BI INCL CAD: CPT | Mod: 26,,, | Performed by: RADIOLOGY

## 2024-04-09 PROCEDURE — 77063 BREAST TOMOSYNTHESIS BI: CPT | Mod: 26,,, | Performed by: RADIOLOGY

## 2024-04-09 PROCEDURE — 77063 BREAST TOMOSYNTHESIS BI: CPT | Mod: TC

## 2024-04-10 NOTE — PROGRESS NOTES
Domonique Corrigan.    I am happy to report that your recent breast imaging did NOT show evidence of cancer.    An annual mammogram is the best test to screen for breast cancer, but it is not perfect, and it can miss some cancers. So, even though your mammogram was normal, if you notice any lump or change in one of your breasts, please schedule an appointment with me for a proper evaluation.    Thanks for letting me care for you, and thanks for trusting [facility] with your healthcare needs.    Sincerely,    ROJELIO Powers MD

## 2024-04-29 ENCOUNTER — HOSPITAL ENCOUNTER (OUTPATIENT)
Dept: CARDIOLOGY | Facility: HOSPITAL | Age: 59
Discharge: HOME | End: 2024-04-29
Attending: STUDENT IN AN ORGANIZED HEALTH CARE EDUCATION/TRAINING PROGRAM
Payer: COMMERCIAL

## 2024-04-29 ENCOUNTER — OFFICE VISIT (OUTPATIENT)
Dept: CARDIOLOGY | Facility: CLINIC | Age: 59
End: 2024-04-29
Payer: COMMERCIAL

## 2024-04-29 VITALS
HEART RATE: 74 BPM | SYSTOLIC BLOOD PRESSURE: 122 MMHG | BODY MASS INDEX: 43.77 KG/M2 | OXYGEN SATURATION: 97 % | WEIGHT: 271.19 LBS | DIASTOLIC BLOOD PRESSURE: 84 MMHG

## 2024-04-29 DIAGNOSIS — I15.2 HYPERTENSION ASSOCIATED WITH DIABETES: ICD-10-CM

## 2024-04-29 DIAGNOSIS — E11.59 HYPERTENSION ASSOCIATED WITH DIABETES: Primary | Chronic | ICD-10-CM

## 2024-04-29 DIAGNOSIS — R94.31 ABNORMAL EKG: Primary | ICD-10-CM

## 2024-04-29 DIAGNOSIS — E11.59 HYPERTENSION ASSOCIATED WITH DIABETES: Chronic | ICD-10-CM

## 2024-04-29 DIAGNOSIS — E11.59 TYPE 2 DIABETES MELLITUS WITH OTHER CIRCULATORY COMPLICATION, WITHOUT LONG-TERM CURRENT USE OF INSULIN: ICD-10-CM

## 2024-04-29 DIAGNOSIS — E11.69 HYPERLIPIDEMIA ASSOCIATED WITH TYPE 2 DIABETES MELLITUS: ICD-10-CM

## 2024-04-29 DIAGNOSIS — E78.5 HYPERLIPIDEMIA ASSOCIATED WITH TYPE 2 DIABETES MELLITUS: ICD-10-CM

## 2024-04-29 DIAGNOSIS — Z13.6 ENCOUNTER FOR SCREENING FOR CARDIOVASCULAR DISORDERS: ICD-10-CM

## 2024-04-29 DIAGNOSIS — E66.01 CLASS 3 SEVERE OBESITY DUE TO EXCESS CALORIES WITH SERIOUS COMORBIDITY AND BODY MASS INDEX (BMI) OF 45.0 TO 49.9 IN ADULT: ICD-10-CM

## 2024-04-29 DIAGNOSIS — E11.59 HYPERTENSION ASSOCIATED WITH DIABETES: ICD-10-CM

## 2024-04-29 DIAGNOSIS — I15.2 HYPERTENSION ASSOCIATED WITH DIABETES: Primary | Chronic | ICD-10-CM

## 2024-04-29 DIAGNOSIS — I15.2 HYPERTENSION ASSOCIATED WITH DIABETES: Chronic | ICD-10-CM

## 2024-04-29 DIAGNOSIS — M19.019 OSTEOARTHRITIS OF ACROMIOCLAVICULAR JOINT: ICD-10-CM

## 2024-04-29 PROCEDURE — 3079F DIAST BP 80-89 MM HG: CPT | Mod: CPTII,S$GLB,, | Performed by: STUDENT IN AN ORGANIZED HEALTH CARE EDUCATION/TRAINING PROGRAM

## 2024-04-29 PROCEDURE — 99999 PR PBB SHADOW E&M-EST. PATIENT-LVL IV: CPT | Mod: PBBFAC,,, | Performed by: STUDENT IN AN ORGANIZED HEALTH CARE EDUCATION/TRAINING PROGRAM

## 2024-04-29 PROCEDURE — 99214 OFFICE O/P EST MOD 30 MIN: CPT | Mod: S$GLB,,, | Performed by: STUDENT IN AN ORGANIZED HEALTH CARE EDUCATION/TRAINING PROGRAM

## 2024-04-29 PROCEDURE — 3008F BODY MASS INDEX DOCD: CPT | Mod: CPTII,S$GLB,, | Performed by: STUDENT IN AN ORGANIZED HEALTH CARE EDUCATION/TRAINING PROGRAM

## 2024-04-29 PROCEDURE — 93010 ELECTROCARDIOGRAM REPORT: CPT | Mod: ,,, | Performed by: INTERNAL MEDICINE

## 2024-04-29 PROCEDURE — 1159F MED LIST DOCD IN RCRD: CPT | Mod: CPTII,S$GLB,, | Performed by: STUDENT IN AN ORGANIZED HEALTH CARE EDUCATION/TRAINING PROGRAM

## 2024-04-29 PROCEDURE — 3044F HG A1C LEVEL LT 7.0%: CPT | Mod: CPTII,S$GLB,, | Performed by: STUDENT IN AN ORGANIZED HEALTH CARE EDUCATION/TRAINING PROGRAM

## 2024-04-29 PROCEDURE — 3074F SYST BP LT 130 MM HG: CPT | Mod: CPTII,S$GLB,, | Performed by: STUDENT IN AN ORGANIZED HEALTH CARE EDUCATION/TRAINING PROGRAM

## 2024-04-29 PROCEDURE — 3072F LOW RISK FOR RETINOPATHY: CPT | Mod: CPTII,S$GLB,, | Performed by: STUDENT IN AN ORGANIZED HEALTH CARE EDUCATION/TRAINING PROGRAM

## 2024-04-29 PROCEDURE — 4010F ACE/ARB THERAPY RXD/TAKEN: CPT | Mod: CPTII,S$GLB,, | Performed by: STUDENT IN AN ORGANIZED HEALTH CARE EDUCATION/TRAINING PROGRAM

## 2024-04-29 PROCEDURE — 93005 ELECTROCARDIOGRAM TRACING: CPT

## 2024-04-29 NOTE — PROGRESS NOTES
Section of Cardiology                  Cardiac Clinic Note        HPI:   Ac Hayden is a 58 y.o. female     PMH DM 7 yrs, HTN, HLD anxiety obesity BMI 50 s/p sleeve in 2018, CHRIS not tolerate CPAP (given an used one and refused to use it). Quit smoking 20yrs and no drink  No chest pain, DONALDSON, dizziness, palpitation, leg swelling  Back muscles pain and knee pain,   Med compliance  No regular exercise  Works at [employer]  EKG NSR  No f./h premature CAD  BP high  A1c controlled. Off DM med after weight procedure        4/29/24  Doing well  Chaka SOB, chest pain, palpitations, dizziness   Rides bike for exercise- 30 min, 2 days a week  Diet is not good- does not eat much since gastric sleeve (lost 82 lbs)  Weight stable at 271 lbs   Mounjaro- on for 4 w now    at [facility]           EKG 4/29/24 NSR, inferior infarct  (new)    ECHO  Results for orders placed during the hospital encounter of 09/03/20    Echo Color Flow Doppler? Yes    Interpretation Summary  · Concentric left ventricular remodeling.  · Normal left ventricular systolic function. The estimated ejection fraction is 60%.  · Normal LV diastolic function.  · Normal central venous pressure (3 mmHg).  · The estimated PA systolic pressure is 30 mmHg.  · Normal right ventricular systolic function.       STRESS TEST No results found for this or any previous visit.       LHC No results found for this or any previous visit.            ROS: All 10 systems reviewed. Please refer to the HPI for pertinent positives. All other systems negative.     Past Medical History  Past Medical History:   Diagnosis Date    Allergic rhinitis     Arthritis     Colon polyps     Diabetes mellitus 2002     am 01/05/2023    Diabetes mellitus, type 2     DM (diabetes mellitus) 18  years 2002     am 01/19/2022    Hyperlipidemia     Hypertension     Metabolic syndrome     Mixed anxiety and depressive disorder     Pneumonia     Prediabetes      Urine incontinence        Surgical History  Past Surgical History:   Procedure Laterality Date    BARIATRIC SURGERY  2018    [facility])    COLONOSCOPY N/A 10/19/2017    Procedure: COLONOSCOPY;  Surgeon: Jamal Cole MD;  Location: [facility] ENDO;  Service: Endoscopy;  Laterality: N/A;    COLONOSCOPY N/A 2023    Procedure: COLONOSCOPY;  Surgeon: Katelyn Hensley MD;  Location: [facility] ENDO;  Service: Endoscopy;  Laterality: N/A;    HYSTERECTOMY      PARTIAL HYSTERECTOMY      PLANTAR FASCIA SURGERY      TOTAL REDUCTION MAMMOPLASTY            Allergies:   Review of patient's allergies indicates:   Allergen Reactions    Codeine Edema and Itching    Pcn [penicillins] Rash    Sulfa (sulfonamide antibiotics) Edema and Rash       Social History:  Social History     Socioeconomic History    Marital status:    Occupational History     Employer: [id number]   Tobacco Use    Smoking status: Former     Current packs/day: 0.00     Average packs/day: 0.3 packs/day for 13.0 years (3.3 ttl pk-yrs)     Types: Cigarettes     Start date: 10/19/1987     Quit date: 10/19/2000     Years since quittin.5    Smokeless tobacco: Former   Substance and Sexual Activity    Alcohol use: Not Currently     Comment: occasionally    Drug use: No    Sexual activity: Never   Social History Narrative    Works at [employer], [employer] .     Social Determinants of Health     Financial Resource Strain: Medium Risk (2023)    Overall Financial Resource Strain (CARDIA)     Difficulty of Paying Living Expenses: Somewhat hard   Food Insecurity: No Food Insecurity (2023)    Hunger Vital Sign     Worried About Running Out of Food in the Last Year: Never true     Ran Out of Food in the Last Year: Never true   Transportation Needs: No Transportation Needs (2023)    PRAPARE - Transportation     Lack of Transportation (Medical): No     Lack of Transportation (Non-Medical): No    Physical Activity: Insufficiently Active (11/21/2023)    Exercise Vital Sign     Days of Exercise per Week: 2 days     Minutes of Exercise per Session: 30 min   Stress: Stress Concern Present (11/21/2023)    [place] [employer] - Occupational Stress Questionnaire     Feeling of Stress : Very much   Social Connections: Unknown (11/21/2023)    Social Connection and Isolation Panel [NHANES]     Frequency of Communication with Friends and Family: Three times a week     Frequency of Social Gatherings with Friends and Family: Once a week     Active Member of Clubs or Organizations: No     Attends Club or Organization Meetings: Never     Marital Status:    Housing Stability: High Risk (11/21/2023)    Housing Stability Vital Sign     Unable to Pay for Housing in the Last Year: Yes     Number of Places Lived in the Last Year: 1     Unstable Housing in the Last Year: No       Family History:  family history includes Cancer in her father; Diabetes in her brother, mother, and sister; Eczema in an other family member; Lupus in an other family member; Ovarian cancer in her sister; Strabismus in her sister; Stroke in her mother.    Home Medications:  Current Outpatient Medications on File Prior to Visit   Medication Sig Dispense Refill    alcohol swabs (ALCOHOL PREP PADS) PadM Use to check blood glucose as directed. 200 each 11    ALPRAZolam (XANAX) 0.5 MG tablet Take 1 tablet (0.5 mg total) by mouth 2 (two) times daily as needed for Anxiety. 12 tablet 0    amLODIPine (NORVASC) 5 MG tablet Take 1 tablet (5 mg total) by mouth once daily. 90 tablet 3    atorvastatin (LIPITOR) 40 MG tablet Take 1 tablet (40 mg total) by mouth every evening. (for cholesterol and heart health) 90 tablet 3    azelastine (ASTELIN) 137 mcg (0.1 %) nasal spray 1 spray (137 mcg total) by Nasal route 2 (two) times daily. 30 mL 5    blood sugar diagnostic (BLOOD GLUCOSE TEST) Strp Check blood glucose 1-2 times daily and as needed  for symptoms of low or high blood glucose. 200 strip 11    blood-glucose meter kit Use as instructed 1 each 0    DULoxetine (CYMBALTA) 60 MG capsule Take 2 capsules (120 mg total) by mouth once daily. 180 capsule 1    ergocalciferol (ERGOCALCIFEROL) 50,000 unit Cap Take 2 capsules (100,000 Units total) by mouth every 7 days. 24 capsule 3    fluticasone propionate (FLONASE) 50 mcg/actuation nasal spray 2 sprays (100 mcg total) by Each Nostril route once daily. 32 g 5    hydroCHLOROthiazide (HYDRODIURIL) 25 MG tablet Take 1 tablet (25 mg total) by mouth once daily. 90 tablet 3    HYDROcodone-acetaminophen (NORCO)  mg per tablet Take 1 tablet by mouth every 6 (six) hours as needed. 12 tablet 0    lancets (LANCETS,THIN) Misc Check blood glucose 1-2 times daily and as needed for symptoms of low or high blood glucose. 200 each 11    lancing device Misc Use as directed. 1 each 3    levocetirizine (XYZAL) 5 MG tablet Take 1 tablet (5 mg total) by mouth every evening. 90 tablet 3    losartan (COZAAR) 100 MG tablet Take 1 tablet (100 mg total) by mouth once daily. 90 tablet 3    moxifloxacin (VIGAMOX) 0.5 % ophthalmic solution Place 1 drop into the left eye every 2 (two) hours. 3 mL 3    naproxen (NAPROSYN) 500 MG tablet Take 1 tablet (500 mg total) by mouth 2 (two) times daily with meals. Take with food 30 tablet 0    nystatin (MYCOSTATIN) cream Apply topically 2 (two) times daily. 30 g 3    solifenacin (VESICARE) 10 MG tablet Take 1 tablet (10 mg total) by mouth once daily. 90 tablet 4    tirzepatide 10 mg/0.5 mL PnIj Inject 10 mg into the skin every 7 days. 4 Pen 4    traZODone (DESYREL) 100 MG tablet Take 1 tablet (100 mg total) by mouth every evening. 90 tablet 1     No current facility-administered medications on file prior to visit.       Physical exam:  /84   Pulse 74   Wt 123 kg (271 lb 2.7 oz)   SpO2 97%   BMI 43.77 kg/m²         General: Pt is a 58 y.o. year old female who is AAOx3, in NAD, is  pleasant, well nourished, looks stated age  HEENT: PERRL, EOMI, Oral mucosa pink & moist  CVS  No abnormal cardiac pulsations noted on inspection. JVP not raised. The apical impulse is normal on palpation, and is located in the left 5th intercostal space in the mid - clavicular line. No palpable thrills or abnormal pulsations noted. RR, S1 - S2 heard, no murmurs, rubs or gallops appreciated.   PUL : CTA B/L. No wheezes/crackles heard   ABD : BS +, soft. No tenderness elicited   LE : No C/C/E. Distal Pulses palpable B/L         LABS:    Chemistry:   Lab Results   Component Value Date     09/07/2023    K 3.4 (L) 09/07/2023    CL 99 09/07/2023    CO2 30 (H) 09/07/2023    BUN 11 09/07/2023    CREATININE 0.9 09/07/2023    CALCIUM 9.7 09/07/2023     Cardiac Markers:   Lab Results   Component Value Date    TROPONINI <0.006 10/11/2021     Cardiac Markers (Last 3):   Lab Results   Component Value Date    TROPONINI <0.006 10/11/2021    TROPONINI <0.006 05/30/2018    TROPONINI 0.010 09/06/2014     CBC:   Lab Results   Component Value Date    WBC 8.37 04/03/2023    HGB 13.7 04/03/2023    HCT 43.2 04/03/2023    MCV 96 04/03/2023     04/03/2023     Lipids:   Lab Results   Component Value Date    CHOL 175 09/07/2023    TRIG 90 09/07/2023    HDL 48 09/07/2023     Coagulation:   Lab Results   Component Value Date    INR 1.0 05/23/2018    APTT 26.3 05/23/2018           Assessment        1. Abnormal EKG    2. Hyperlipidemia associated with type 2 diabetes mellitus    3. Hypertension associated with diabetes    4. Type 2 diabetes mellitus with other circulatory complication, without long-term current use of insulin    5. Class 3 severe obesity due to excess calories with serious comorbidity and body mass index (BMI) of 45.0 to 49.9 in adult    6. Osteoarthritis of acromioclavicular joint    7. Encounter for screening for cardiovascular disorders         Plan:      Abnormal EKG  Obtain CT calcium score     HLD   as  of 9/23  Continue lipitor     DM  A1c 5.5   Continue tx     HTN  Stable  Continue Amlodipine, hctz, losartan     OA  Stable  Continue prn pain meds     Obesity, Body mass index is 43.77 kg/m².   Low salt, low fat diet  Exercise as tolerated, at least 30 min daily     This note was prepared using voice recognition system and is likely to have sound alike errors that may have been overlooked even after proofreading.     I have reviewed all pertinent chart information.  Plans and recommendations have been formulated under my direct supervision. All questions answered and patient voiced understanding.   If symptoms persist go to the ED.    RTC in 6m        Sedrick Shoemaker MD  Cardiology

## 2024-04-30 LAB
OHS QRS DURATION: 82 MS
OHS QTC CALCULATION: 449 MS

## 2024-05-02 ENCOUNTER — TELEPHONE (OUTPATIENT)
Dept: INTERNAL MEDICINE | Facility: CLINIC | Age: 59
End: 2024-05-02
Payer: COMMERCIAL

## 2024-05-02 ENCOUNTER — OFFICE VISIT (OUTPATIENT)
Dept: URGENT CARE | Facility: CLINIC | Age: 59
End: 2024-05-02
Payer: COMMERCIAL

## 2024-05-02 VITALS
BODY MASS INDEX: 46.26 KG/M2 | OXYGEN SATURATION: 99 % | HEART RATE: 99 BPM | DIASTOLIC BLOOD PRESSURE: 70 MMHG | HEIGHT: 64 IN | RESPIRATION RATE: 18 BRPM | TEMPERATURE: 99 F | WEIGHT: 271 LBS | SYSTOLIC BLOOD PRESSURE: 130 MMHG

## 2024-05-02 DIAGNOSIS — R05.9 COUGH, UNSPECIFIED TYPE: ICD-10-CM

## 2024-05-02 DIAGNOSIS — R09.81 NASAL CONGESTION: Primary | ICD-10-CM

## 2024-05-02 DIAGNOSIS — H66.91 RIGHT OTITIS MEDIA, UNSPECIFIED OTITIS MEDIA TYPE: ICD-10-CM

## 2024-05-02 LAB
CTP QC/QA: YES
SARS-COV-2 AG RESP QL IA.RAPID: NEGATIVE

## 2024-05-02 PROCEDURE — 99213 OFFICE O/P EST LOW 20 MIN: CPT | Mod: S$GLB,,, | Performed by: NURSE PRACTITIONER

## 2024-05-02 PROCEDURE — 87811 SARS-COV-2 COVID19 W/OPTIC: CPT | Mod: QW,S$GLB,, | Performed by: NURSE PRACTITIONER

## 2024-05-02 RX ORDER — AZITHROMYCIN 250 MG/1
TABLET, FILM COATED ORAL
Qty: 6 TABLET | Refills: 0 | Status: SHIPPED | OUTPATIENT
Start: 2024-05-02

## 2024-05-02 RX ORDER — GUAIFENESIN 1200 MG/1
1200 TABLET, EXTENDED RELEASE ORAL 2 TIMES DAILY
Qty: 20 TABLET | Refills: 0 | Status: SHIPPED | OUTPATIENT
Start: 2024-05-02 | End: 2024-05-12

## 2024-05-02 RX ORDER — BENZONATATE 200 MG/1
200 CAPSULE ORAL 3 TIMES DAILY PRN
Qty: 25 CAPSULE | Refills: 0 | Status: SHIPPED | OUTPATIENT
Start: 2024-05-02 | End: 2024-05-12

## 2024-05-02 NOTE — PATIENT INSTRUCTIONS
Ear Infection:  Take full course of antibiotics as prescribed.  Flonase Nasal [place] as directed for nasal congestion  Over the Counter Claritin or Zyrtec (plain) as directed for allergy symptoms  Tylenol or Motrin every 4 - 6 hours as needed for fever or ear pain.  Humidifier use at home.  Apply a warm compresses to affected ear  Elevate head on a pillow at night     Follow up with your PCP in 1 week of initiating antibiotics or sooner for no improvement in symptoms    Follow up in the ER for any worsening of symptoms such as new fever, increasing ear pain, neck stiffness, shortness of breath, etc.  If you smoke, stop smoking.    Please arrange follow up with your primary medical clinic as soon as possible. You must understand that you've received an Urgent Care treatment only and that you may be released before all of your medical problems are known or treated. You, the patient, will arrange for follow up as instructed. If your symptoms worsen or fail to improve you should go to the Emergency Room..

## 2024-05-02 NOTE — PROGRESS NOTES
"Subjective:      Patient ID: Ac Hayden is a 58 y.o. female.    Vitals:  height is 5' 4" (1.626 m) and weight is 122.9 kg (271 lb). Her temperature is 98.8 °F (37.1 °C). Her blood pressure is 130/70 and her pulse is 99. Her respiration is 18 and oxygen saturation is 99%.     Chief Complaint: Cough    Patient is a 58-year-old female presents for evaluation of right ear pain with associated nasal congestion, cough and sore throat.  Sick for 3-5 days progressively worse in the last 2 days.  Treating with over-the-counter medication.  Denies recent travel.  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  Denies fever, chills, nausea, vomiting, dyspnea, wheezing or rash.  No other concerns voiced.    Cough  This is a new problem. The current episode started in the past 7 days. The problem has been gradually worsening. The cough is Productive of sputum. Associated symptoms include ear congestion, ear pain, headaches, nasal congestion and a sore throat. Pertinent negatives include no chest pain, chills, fever, heartburn, hemoptysis, myalgias, postnasal drip, rash, rhinorrhea, shortness of breath, sweats, weight loss or wheezing. She has tried OTC cough suppressant for the symptoms.       Constitution: Negative for chills and fever.   HENT:  Positive for ear pain and sore throat. Negative for postnasal drip.    Cardiovascular:  Negative for chest pain.   Respiratory:  Positive for cough. Negative for bloody sputum, shortness of breath and wheezing.    Gastrointestinal:  Negative for heartburn.   Musculoskeletal:  Negative for muscle ache.   Skin:  Negative for rash.   Neurological:  Positive for headaches.      Objective:     Physical Exam   Constitutional: She is oriented to person, place, and time. She appears well-developed. She is cooperative.  Non-toxic appearance. She does not appear ill. No distress.   HENT:   Head: Normocephalic and atraumatic.   Ears:   Right Ear: Hearing, " external ear and ear canal normal. There is tenderness. No no drainage or swelling. No mastoid tenderness. Tympanic membrane is erythematous and bulging. Tympanic membrane is not perforated. No hemotympanum.   Left Ear: Hearing, tympanic membrane, external ear and ear canal normal.   Nose: Congestion present. No mucosal edema, rhinorrhea or nasal deformity. No epistaxis. Right sinus exhibits no maxillary sinus tenderness and no frontal sinus tenderness. Left sinus exhibits no maxillary sinus tenderness and no frontal sinus tenderness.   Mouth/Throat: Uvula is midline and mucous membranes are normal. No trismus in the jaw. Normal dentition. No uvula swelling. Posterior oropharyngeal erythema present. No oropharyngeal exudate or posterior oropharyngeal edema.   Eyes: Conjunctivae and lids are normal. No scleral icterus.   Neck: Trachea normal and phonation normal. Neck supple. No edema present. No erythema present. No neck rigidity present.   Cardiovascular: Normal rate, regular rhythm, normal heart sounds and normal pulses.   Pulmonary/Chest: Effort normal and breath sounds normal. No respiratory distress. She has no decreased breath sounds. She has no wheezes. She has no rhonchi. She has no rales.   Abdominal: Normal appearance.   Musculoskeletal: Normal range of motion.         General: No deformity. Normal range of motion.   Neurological: She is alert and oriented to person, place, and time. She exhibits normal muscle tone. Coordination normal.   Skin: Skin is warm, dry, intact, not diaphoretic and not pale.   Psychiatric: Her speech is normal and behavior is normal. Judgment and thought content normal.   Nursing note and vitals reviewed.      Assessment:     1. Nasal congestion    2. Right otitis media, unspecified otitis media type    3. Cough, unspecified type        Plan:       Nasal congestion  -     SARS Coronavirus 2 Antigen, POCT Manual Read    Right otitis media, unspecified otitis media type    Cough,  unspecified type    Other orders  -     guaiFENesin (MUCINEX) 1,200 mg Ta12; Take 1,200 mg by mouth 2 (two) times a day. for 10 days  Dispense: 20 tablet; Refill: 0  -     benzonatate (TESSALON) 200 MG capsule; Take 1 capsule (200 mg total) by mouth 3 (three) times daily as needed for Cough.  Dispense: 25 capsule; Refill: 0  -     azithromycin (Z-MELI) 250 MG tablet; Take 2 tablets by mouth on day 1; Take 1 tablet by mouth on days 2-5  Dispense: 6 tablet; Refill: 0          Medical Decision Making:   Initial Assessment:   Nontoxic appearing 57 yo female c/o ear pain and congestion .  Exam and history most consistent with AOM. I have a low suspicion at this time for mastoiditis, malignant otitis externa, herpes or bronson hunt syndrome, or retained foreign body.  In the clinic today testing for covid is negative.  Given clinical presentation and duration of illness I have provided patient with prescription for azithromycin.  Instructions for supportive care to include use of Tylenol/ibuprofen for fever and pain control as well as Mucinex for congestion.  Cautious return precautions discussed with full understanding.    Clinical Tests:   Lab Tests: Reviewed       <> Summary of Lab: Covid negative        Results for orders placed or performed in visit on 05/02/24   SARS Coronavirus 2 Antigen, POCT Manual Read   Result Value Ref Range    SARS Coronavirus 2 Antigen Negative Negative     Acceptable Yes      Patient Instructions   Ear Infection:  Take full course of antibiotics as prescribed.  Flonase Nasal [place] as directed for nasal congestion  Over the Counter Claritin or Zyrtec (plain) as directed for allergy symptoms  Tylenol or Motrin every 4 - 6 hours as needed for fever or ear pain.  Humidifier use at home.  Apply a warm compresses to affected ear  Elevate head on a pillow at night     Follow up with your PCP in 1 week of initiating antibiotics or sooner for no improvement in symptoms    Follow up in  the ER for any worsening of symptoms such as new fever, increasing ear pain, neck stiffness, shortness of breath, etc.  If you smoke, stop smoking.    Please arrange follow up with your primary medical clinic as soon as possible. You must understand that you've received an Urgent Care treatment only and that you may be released before all of your medical problems are known or treated. You, the patient, will arrange for follow up as instructed. If your symptoms worsen or fail to improve you should go to the Emergency Room..

## 2024-05-02 NOTE — TELEPHONE ENCOUNTER
----- Message from Linette Sosa sent at 5/2/2024  2:06 PM CDT -----  Contact: [phone]  Patient called in requesting a call back about an appointment to be schedule, but nothing populated in the system, she been congested, coughing,  right ear ache, soar throat, can  please call back [phone]        [facility] [place] (), [place] [address]   [address] [place] [address] [address]. [place] () [place] [address]  Phone: [phone] Fax: [phone]

## 2024-05-02 NOTE — LETTER
May 2, 2024      [facility]  [address], [address]  [facility] [address]  Phone: [phone]  Fax: [phone]       Patient: Ac Hayden   YOB: 1965  Date of Visit: 05/02/2024    To Whom It May Concern:    Saida Hayden  was at [facility] on 05/02/2024. The patient may return to work on 05/04/24 with no restrictions. If you have any questions or concerns, or if I can be of further assistance, please do not hesitate to contact me.    Sincerely,      Alcon Cabral MA

## 2024-05-07 ENCOUNTER — TELEPHONE (OUTPATIENT)
Dept: PSYCHIATRY | Facility: CLINIC | Age: 59
End: 2024-05-07
Payer: COMMERCIAL

## 2024-06-17 ENCOUNTER — TELEPHONE (OUTPATIENT)
Dept: INTERNAL MEDICINE | Facility: CLINIC | Age: 59
End: 2024-06-17
Payer: COMMERCIAL

## 2024-06-17 NOTE — TELEPHONE ENCOUNTER
----- Message from Katelyn Loomis sent at 6/17/2024  3:27 PM CDT -----  Contact: AMY ESTEBAN [[id number]]  ..Type:  Patient Requesting Call    Who Called: AMY ESTEBAN [[id number]]  Does the patient know what this is regarding?: pt couldn't get mounjaro medication due to cost, wants to speak with provider regarding alternative options  Would the patient rather a call back or a response via [facility]?  call  Best Call Back Number: .[phone] (home)   Additional Information:

## 2024-06-17 NOTE — TELEPHONE ENCOUNTER
S/w pt and was informed that she could not afford the Mounjaro at this time due to one person income at the moment and wanted to know if there was something cheaper she could afford. Informed pt to contact pharmacy to get cost of alternatives to where pt stated she did and the cost is about what and what. Pt stated that she will figure out what to do about getting medication./Kmw

## 2024-07-12 ENCOUNTER — TELEPHONE (OUTPATIENT)
Dept: INTERNAL MEDICINE | Facility: CLINIC | Age: 59
End: 2024-07-12
Payer: COMMERCIAL

## 2024-07-12 DIAGNOSIS — F33.1 MODERATE EPISODE OF RECURRENT MAJOR DEPRESSIVE DISORDER: ICD-10-CM

## 2024-07-12 RX ORDER — DULOXETIN HYDROCHLORIDE 60 MG/1
120 CAPSULE, DELAYED RELEASE ORAL DAILY
Qty: 180 CAPSULE | Refills: 0 | Status: SHIPPED | OUTPATIENT
Start: 2024-07-12

## 2024-07-12 NOTE — TELEPHONE ENCOUNTER
REFILL APPROVED. Will address further refills at upcoming appointment with me listed below.  #LMRX   --------------------------------  Future Appointments   Date Time Provider Department Center   8/7/2024  8:00 AM EKG, [facility] SPECCPR [facility]   8/7/2024  9:50 AM LABORATORY, [facility] LAB [facility]   8/12/2024 11:30 AM Adelaida Coyne MD [facility] UROLOGY [facility]   8/14/2024  2:00 PM JASWINDER Powers MD [facility] IM [facility]   11/7/2024  1:00 PM Sedrick Shoemaker MD [facility] CARDIO [facility]   12/13/2024 10:15 AM [facility] CT1 LIMIT 500 LBS V CT SCAN [facility]   12/13/2024 11:00 AM PULMONARY LAB 2, [facility] PULMFUN [facility]   12/13/2024 11:40 AM Keo Tavera MD [facility] PULMSVC [facility]

## 2024-07-12 NOTE — TELEPHONE ENCOUNTER
Refill Routing Note   Medication(s) are not appropriate for processing by [facility] for the following reason(s):        No active prescription written by provider    [facility] action(s):  Defer        Medication Therapy Plan: FLOS      Appointments  past 12m or future 3m with PCP    Date Provider   Last Visit   4/8/2024 JASWINDER Powers MD   Next Visit   8/14/2024 JASWINDER Powers MD   ED visits in past 90 days: 0        Note composed:12:53 PM 07/12/2024

## 2024-07-12 NOTE — TELEPHONE ENCOUNTER
Care Due:                  Date            Visit Type   Department     Provider  --------------------------------------------------------------------------------                                ESTABLISHED                              PATIENT -    [facility] INTERNAL  Last Visit: 04-      VIRTUAL      MEDICINE       [place] JASWINDER Powers                              ESTABLISHED                              PATIENT -    [facility] INTER[facility]  Next Visit: 08-      [facility]       Mont[place] Powers                                                            Last  Test          Frequency    Reason                     Performed    Due Date  --------------------------------------------------------------------------------    CMP.........  12 months..  atorvastatin,              09- 09-                             ergocalciferol,                             hydroCHLOROthiazide,                             losartan.................    HBA1C.......  6 months...  tirzepatide..............  03- 08-    Lipid Panel.  12 months..  atorvastatin.............  09- 09-    Vitamin D...  12 months..  ergocalciferol...........  Not Found    Overdue    [employer] Embedded Care Due Messages. Reference number: [id number].   7/12/2024 12:31:02 PM CDT

## 2024-07-12 NOTE — TELEPHONE ENCOUNTER
----- Message from Lana Moody sent at 7/12/2024 12:26 PM CDT -----  Contact: [phone]  Requesting an RX refill or new RX.    Is this a refill or new RX: refill    RX name and strength (copy/paste from chart):  DULoxetine (CYMBALTA) 60 MG capsule     Is this a 30 day or 90 day RX: 90    Pharmacy name and phone # (copy/paste from chart):    [id number]  [place] (), [address]   [facility] [address] [place] () [place] [address]  Phone: [phone] Fax: [phone]    The doctors have asked that we provide their patients with the following 2 reminders -- prescription refills can take up to 72 hours, and a friendly reminder that in the future you can use your [facility] account to request refills: yes

## 2024-07-12 NOTE — TELEPHONE ENCOUNTER
----- Message from Lana Moody sent at 7/12/2024 12:26 PM CDT -----  Contact: [phone]  Requesting an RX refill or new RX.    Is this a refill or new RX: refill    RX name and strength (copy/paste from chart):  DULoxetine (CYMBALTA) 60 MG capsule     Is this a 30 day or 90 day RX: 90    Pharmacy name and phone # (copy/paste from chart):    [id number]  [place] (), [address]   [facility] [facility] [address] [place] () [place] [address]  Phone: [phone] Fax: [phone]    The doctors have asked that we provide their patients with the following 2 reminders -- prescription refills can take up to 72 hours, and a friendly reminder that in the future you can use your [facility] account to request refills: yes

## 2024-08-07 ENCOUNTER — LAB VISIT (OUTPATIENT)
Dept: LAB | Facility: HOSPITAL | Age: 59
End: 2024-08-07
Attending: FAMILY MEDICINE
Payer: COMMERCIAL

## 2024-08-07 ENCOUNTER — HOSPITAL ENCOUNTER (OUTPATIENT)
Dept: CARDIOLOGY | Facility: HOSPITAL | Age: 59
Discharge: HOME | End: 2024-08-07
Attending: FAMILY MEDICINE
Payer: COMMERCIAL

## 2024-08-07 DIAGNOSIS — I15.2 HYPERTENSION ASSOCIATED WITH DIABETES: Chronic | ICD-10-CM

## 2024-08-07 DIAGNOSIS — E78.5 HYPERLIPIDEMIA ASSOCIATED WITH TYPE 2 DIABETES MELLITUS: Chronic | ICD-10-CM

## 2024-08-07 DIAGNOSIS — E11.69 HYPERLIPIDEMIA ASSOCIATED WITH TYPE 2 DIABETES MELLITUS: Chronic | ICD-10-CM

## 2024-08-07 DIAGNOSIS — E11.59 HYPERTENSION ASSOCIATED WITH DIABETES: Chronic | ICD-10-CM

## 2024-08-07 DIAGNOSIS — E11.69 TYPE 2 DIABETES MELLITUS WITH OTHER SPECIFIED COMPLICATION, WITHOUT LONG-TERM CURRENT USE OF INSULIN: Chronic | ICD-10-CM

## 2024-08-07 LAB
ALBUMIN SERPL BCP-MCNC: 3.4 G/DL (ref 3.5–5.2)
ALBUMIN/CREAT UR: 3.2 UG/MG (ref 0–30)
ALP SERPL-CCNC: 93 U/L (ref 55–135)
ALT SERPL W/O P-5'-P-CCNC: 30 U/L (ref 10–44)
ANION GAP SERPL CALC-SCNC: 11 MMOL/L (ref 8–16)
AST SERPL-CCNC: 27 U/L (ref 10–40)
BILIRUB SERPL-MCNC: 0.3 MG/DL (ref 0.1–1)
BUN SERPL-MCNC: 14 MG/DL (ref 6–20)
CALCIUM SERPL-MCNC: 9.6 MG/DL (ref 8.7–10.5)
CHLORIDE SERPL-SCNC: 103 MMOL/L (ref 95–110)
CHOLEST SERPL-MCNC: 152 MG/DL (ref 120–199)
CHOLEST/HDLC SERPL: 4.5 {RATIO} (ref 2–5)
CO2 SERPL-SCNC: 27 MMOL/L (ref 23–29)
CREAT SERPL-MCNC: 1 MG/DL (ref 0.5–1.4)
CREAT UR-MCNC: 311 MG/DL (ref 15–325)
EST. GFR  (NO RACE VARIABLE): >60 ML/MIN/1.73 M^2
ESTIMATED AVG GLUCOSE: 114 MG/DL (ref 68–131)
GLUCOSE SERPL-MCNC: 102 MG/DL (ref 70–110)
HBA1C MFR BLD: 5.6 % (ref 4–5.6)
HDLC SERPL-MCNC: 34 MG/DL (ref 40–75)
HDLC SERPL: 22.4 % (ref 20–50)
LDLC SERPL CALC-MCNC: 99 MG/DL (ref 63–159)
MICROALBUMIN UR DL<=1MG/L-MCNC: 10 UG/ML
NONHDLC SERPL-MCNC: 118 MG/DL
OHS QRS DURATION: 82 MS
OHS QTC CALCULATION: 440 MS
POTASSIUM SERPL-SCNC: 3.5 MMOL/L (ref 3.5–5.1)
PROT SERPL-MCNC: 7.3 G/DL (ref 6–8.4)
SODIUM SERPL-SCNC: 141 MMOL/L (ref 136–145)
TRIGL SERPL-MCNC: 95 MG/DL (ref 30–150)

## 2024-08-07 PROCEDURE — 82570 ASSAY OF URINE CREATININE: CPT | Performed by: FAMILY MEDICINE

## 2024-08-07 PROCEDURE — 80053 COMPREHEN METABOLIC PANEL: CPT | Performed by: FAMILY MEDICINE

## 2024-08-07 PROCEDURE — 93005 ELECTROCARDIOGRAM TRACING: CPT

## 2024-08-07 PROCEDURE — 36415 COLL VENOUS BLD VENIPUNCTURE: CPT | Performed by: FAMILY MEDICINE

## 2024-08-07 PROCEDURE — 93010 ELECTROCARDIOGRAM REPORT: CPT | Mod: ,,, | Performed by: INTERNAL MEDICINE

## 2024-08-07 PROCEDURE — 80061 LIPID PANEL: CPT | Performed by: FAMILY MEDICINE

## 2024-08-07 PROCEDURE — 83036 HEMOGLOBIN GLYCOSYLATED A1C: CPT | Performed by: FAMILY MEDICINE

## 2024-08-12 ENCOUNTER — OFFICE VISIT (OUTPATIENT)
Dept: UROLOGY | Facility: CLINIC | Age: 59
End: 2024-08-12
Payer: COMMERCIAL

## 2024-08-12 VITALS
WEIGHT: 275.38 LBS | DIASTOLIC BLOOD PRESSURE: 77 MMHG | SYSTOLIC BLOOD PRESSURE: 112 MMHG | BODY MASS INDEX: 47.01 KG/M2 | HEIGHT: 64 IN | HEART RATE: 120 BPM

## 2024-08-12 DIAGNOSIS — R68.2 DRY MOUTH: ICD-10-CM

## 2024-08-12 DIAGNOSIS — N32.81 OVERACTIVE BLADDER: Primary | ICD-10-CM

## 2024-08-12 LAB — POC RESIDUAL URINE VOLUME: 8 ML (ref 0–100)

## 2024-08-12 PROCEDURE — 99999 PR PBB SHADOW E&M-EST. PATIENT-LVL V: CPT | Mod: PBBFAC,,, | Performed by: UROLOGY

## 2024-08-12 PROCEDURE — 3008F BODY MASS INDEX DOCD: CPT | Mod: CPTII,S$GLB,, | Performed by: UROLOGY

## 2024-08-12 PROCEDURE — 3074F SYST BP LT 130 MM HG: CPT | Mod: CPTII,S$GLB,, | Performed by: UROLOGY

## 2024-08-12 PROCEDURE — 4010F ACE/ARB THERAPY RXD/TAKEN: CPT | Mod: CPTII,S$GLB,, | Performed by: UROLOGY

## 2024-08-12 PROCEDURE — 3072F LOW RISK FOR RETINOPATHY: CPT | Mod: CPTII,S$GLB,, | Performed by: UROLOGY

## 2024-08-12 PROCEDURE — 51798 US URINE CAPACITY MEASURE: CPT | Mod: S$GLB,,, | Performed by: UROLOGY

## 2024-08-12 PROCEDURE — 3066F NEPHROPATHY DOC TX: CPT | Mod: CPTII,S$GLB,, | Performed by: UROLOGY

## 2024-08-12 PROCEDURE — 1159F MED LIST DOCD IN RCRD: CPT | Mod: CPTII,S$GLB,, | Performed by: UROLOGY

## 2024-08-12 PROCEDURE — 3044F HG A1C LEVEL LT 7.0%: CPT | Mod: CPTII,S$GLB,, | Performed by: UROLOGY

## 2024-08-12 PROCEDURE — 3061F NEG MICROALBUMINURIA REV: CPT | Mod: CPTII,S$GLB,, | Performed by: UROLOGY

## 2024-08-12 PROCEDURE — 99214 OFFICE O/P EST MOD 30 MIN: CPT | Mod: S$GLB,,, | Performed by: UROLOGY

## 2024-08-12 PROCEDURE — 3078F DIAST BP <80 MM HG: CPT | Mod: CPTII,S$GLB,, | Performed by: UROLOGY

## 2024-08-12 RX ORDER — VIBEGRON 75 MG/1
75 TABLET, FILM COATED ORAL DAILY
Qty: 30 TABLET | Refills: 11 | Status: SHIPPED | OUTPATIENT
Start: 2024-08-12

## 2024-08-12 NOTE — PROGRESS NOTES
Chief Complaint   Patient presents with    Follow-up       History of Present Illness:   Ac Hayden is a 58 y.o. female here for evaluation of Follow-up    8/12/24-Vesicare 10mg working well. She does have dry mouth. No UTIs, dysuria, gross hematuria or difficulty urinating. No constipation.     8/7/23-vesicare is working well. Had abdominoplasty 2 months ago. No UUI if she goes to the bathroom when she gets the urge.No dysuria or gross hematuria.   4/10/23-58yo F, here for evaluation of urinary urgency with cramping and pelvic pressure. She reports that it feels like menstrual cramps. When the cramping starts, she has to sit for a minute until she can move, and then goes to the restroom. Ongoing x 2 weeks. Seems to have started after intercourse with her . No gross hematuria. She generally has issues with urgency and wears a pad.       Review of Systems   Constitutional:  Negative for chills and fever.   Respiratory:  Negative for shortness of breath.    Cardiovascular:  Negative for chest pain.   Gastrointestinal:  Negative for constipation and diarrhea.   All other systems reviewed and are negative.        Past Medical History:   Diagnosis Date    Allergic rhinitis     Arthritis     Colon polyps     Diabetes mellitus 2002     am 01/05/2023    Diabetes mellitus, type 2     DM (diabetes mellitus) 18  years 2002     am 01/19/2022    Hyperlipidemia     Hypertension     Metabolic syndrome     Mixed anxiety and depressive disorder     Pneumonia     Prediabetes     Urine incontinence        Past Surgical History:   Procedure Laterality Date    BARIATRIC SURGERY  08/22/2018    [facility])    COLONOSCOPY N/A 10/19/2017    Procedure: COLONOSCOPY;  Surgeon: Jamal Cole MD;  Location: [facility];  Service: Endoscopy;  Laterality: N/A;    COLONOSCOPY N/A 01/05/2023    Procedure: COLONOSCOPY;  Surgeon: Katelyn Hensley MD;  Location: [facility];  Service: Endoscopy;   Laterality: N/A;    HYSTERECTOMY      PARTIAL HYSTERECTOMY      PLANTAR FASCIA SURGERY      TOTAL REDUCTION MAMMOPLASTY         Family History   Problem Relation Name Age of Onset    Diabetes Mother      Stroke Mother      Cancer Father      Diabetes Sister      Ovarian cancer Sister      Strabismus Sister      Diabetes Brother      Lupus Other niece     Eczema Other niece     Melanoma Neg Hx      Psoriasis Neg Hx         Social History     Tobacco Use    Smoking status: Former     Current packs/day: 0.00     Average packs/day: 0.3 packs/day for 13.0 years (3.3 ttl pk-yrs)     Types: Cigarettes     Start date: 10/19/1987     Quit date: 10/19/2000     Years since quittin.8    Smokeless tobacco: Former   Substance Use Topics    Alcohol use: Not Currently     Comment: occasionally    Drug use: No       Current Outpatient Medications   Medication Sig Dispense Refill    alcohol swabs (ALCOHOL PREP PADS) PadM Use to check blood glucose as directed. 200 each 11    ALPRAZolam (XANAX) 0.5 MG tablet Take 1 tablet (0.5 mg total) by mouth 2 (two) times daily as needed for Anxiety. 12 tablet 0    amLODIPine (NORVASC) 5 MG tablet Take 1 tablet (5 mg total) by mouth once daily. 90 tablet 3    atorvastatin (LIPITOR) 40 MG tablet Take 1 tablet (40 mg total) by mouth every evening. (for cholesterol and heart health) 90 tablet 3    azelastine (ASTELIN) 137 mcg (0.1 %) nasal spray 1 spray (137 mcg total) by Nasal route 2 (two) times daily. 30 mL 5    azithromycin (Z-MELI) 250 MG tablet Take 2 tablets by mouth on day 1; Take 1 tablet by mouth on days 2-5 6 tablet 0    blood sugar diagnostic (BLOOD GLUCOSE TEST) Strp Check blood glucose 1-2 times daily and as needed for symptoms of low or high blood glucose. 200 strip 11    blood-glucose meter kit Use as instructed 1 each 0    DULoxetine (CYMBALTA) 60 MG capsule Take 2 capsules (120 mg total) by mouth once daily. 180 capsule 0    ergocalciferol (ERGOCALCIFEROL) 50,000 unit Cap  Take 2 capsules (100,000 Units total) by mouth every 7 days. 24 capsule 3    fluticasone propionate (FLONASE) 50 mcg/actuation nasal spray 2 sprays (100 mcg total) by Each Nostril route once daily. 32 g 5    hydroCHLOROthiazide (HYDRODIURIL) 25 MG tablet Take 1 tablet (25 mg total) by mouth once daily. 90 tablet 3    HYDROcodone-acetaminophen (NORCO)  mg per tablet Take 1 tablet by mouth every 6 (six) hours as needed. 12 tablet 0    lancets (LANCETS,THIN) Misc Check blood glucose 1-2 times daily and as needed for symptoms of low or high blood glucose. 200 each 11    lancing device Misc Use as directed. 1 each 3    levocetirizine (XYZAL) 5 MG tablet Take 1 tablet (5 mg total) by mouth every evening. 90 tablet 3    losartan (COZAAR) 100 MG tablet Take 1 tablet (100 mg total) by mouth once daily. 90 tablet 3    moxifloxacin (VIGAMOX) 0.5 % ophthalmic solution Place 1 drop into the left eye every 2 (two) hours. 3 mL 3    naproxen (NAPROSYN) 500 MG tablet Take 1 tablet (500 mg total) by mouth 2 (two) times daily with meals. Take with food 30 tablet 0    nystatin (MYCOSTATIN) cream Apply topically 2 (two) times daily. 30 g 3    solifenacin (VESICARE) 10 MG tablet Take 1 tablet (10 mg total) by mouth once daily. 90 tablet 4    tirzepatide 10 mg/0.5 mL PnIj Inject 10 mg into the skin every 7 days. 4 Pen 4    traZODone (DESYREL) 100 MG tablet Take 1 tablet (100 mg total) by mouth every evening. 90 tablet 1    vibegron (GEMTESA) 75 mg Tab Take 1 tablet (75 mg total) by mouth once daily. 30 tablet 11     No current facility-administered medications for this visit.       Review of patient's allergies indicates:   Allergen Reactions    Codeine Edema and Itching    Pcn [penicillins] Rash    Sulfa (sulfonamide antibiotics) Edema and Rash       Physical Exam  Vitals:    08/12/24 1148   BP: 112/77   Pulse: (!) 120       General: Well-developed, well-nourished, in no acute distress  HEENT: Normocephalic, atraumatic, extraocular  movements intact  Neck: Supple, no supraclavicular or cervical lymphadenopathy, trachea midline  Respirations: even and unlabored  Extremities: moves all equally, no clubbing, cyanosis or edema  Skin: Warm and dry. No lesions  Psych: normal affect  Neuro: Alert and oriented x 3. Cranial nerves II-XII intact    PVR: 8mL      Assessment:  1. Overactive bladder  POCT Bladder Scan      2. Dry mouth                Plan:   Overactive bladder  -     POCT Bladder Scan    Dry mouth    Other orders  -     vibegron (GEMTESA) 75 mg Tab; Take 1 tablet (75 mg total) by mouth once daily.  Dispense: 30 tablet; Refill: 11          Follow up in about 1 year (around 8/12/2025).

## 2024-08-14 ENCOUNTER — TELEPHONE (OUTPATIENT)
Dept: INTERNAL MEDICINE | Facility: CLINIC | Age: 59
End: 2024-08-14
Payer: COMMERCIAL

## 2024-08-14 NOTE — TELEPHONE ENCOUNTER
Left voicemail in regards to lab results.  Patient needs to schedule an appointment to review results.

## 2024-08-21 ENCOUNTER — OFFICE VISIT (OUTPATIENT)
Dept: INTERNAL MEDICINE | Facility: CLINIC | Age: 59
End: 2024-08-21
Payer: COMMERCIAL

## 2024-08-21 VITALS
HEIGHT: 60 IN | OXYGEN SATURATION: 97 % | DIASTOLIC BLOOD PRESSURE: 78 MMHG | HEART RATE: 90 BPM | WEIGHT: 272.94 LBS | TEMPERATURE: 97 F | BODY MASS INDEX: 53.58 KG/M2 | SYSTOLIC BLOOD PRESSURE: 116 MMHG

## 2024-08-21 DIAGNOSIS — F43.21 GRIEVING: ICD-10-CM

## 2024-08-21 DIAGNOSIS — Z13.29 SCREENING FOR THYROID DISORDER: ICD-10-CM

## 2024-08-21 DIAGNOSIS — F41.9 ANXIETY: ICD-10-CM

## 2024-08-21 DIAGNOSIS — E11.69 HYPERLIPIDEMIA ASSOCIATED WITH TYPE 2 DIABETES MELLITUS: Chronic | ICD-10-CM

## 2024-08-21 DIAGNOSIS — E66.01 CLASS 3 SEVERE OBESITY DUE TO EXCESS CALORIES WITH SERIOUS COMORBIDITY AND BODY MASS INDEX (BMI) OF 40.0 TO 44.9 IN ADULT: Chronic | ICD-10-CM

## 2024-08-21 DIAGNOSIS — H65.03 BILATERAL ACUTE SEROUS OTITIS MEDIA, RECURRENCE NOT SPECIFIED: ICD-10-CM

## 2024-08-21 DIAGNOSIS — Z13.0 SCREENING FOR IRON DEFICIENCY ANEMIA: ICD-10-CM

## 2024-08-21 DIAGNOSIS — J30.1 SEASONAL ALLERGIC RHINITIS DUE TO POLLEN: ICD-10-CM

## 2024-08-21 DIAGNOSIS — Z86.010 HISTORY OF COLON POLYPS: ICD-10-CM

## 2024-08-21 DIAGNOSIS — E11.69 TYPE 2 DIABETES MELLITUS WITH OTHER SPECIFIED COMPLICATION, WITHOUT LONG-TERM CURRENT USE OF INSULIN: Chronic | ICD-10-CM

## 2024-08-21 DIAGNOSIS — F41.8 MIXED ANXIETY AND DEPRESSIVE DISORDER: Chronic | ICD-10-CM

## 2024-08-21 DIAGNOSIS — E11.59 HYPERTENSION ASSOCIATED WITH DIABETES: Primary | Chronic | ICD-10-CM

## 2024-08-21 DIAGNOSIS — E78.5 HYPERLIPIDEMIA ASSOCIATED WITH TYPE 2 DIABETES MELLITUS: Chronic | ICD-10-CM

## 2024-08-21 DIAGNOSIS — I15.2 HYPERTENSION ASSOCIATED WITH DIABETES: Primary | Chronic | ICD-10-CM

## 2024-08-21 DIAGNOSIS — F33.1 MODERATE EPISODE OF RECURRENT MAJOR DEPRESSIVE DISORDER: ICD-10-CM

## 2024-08-21 DIAGNOSIS — E55.9 VITAMIN D INSUFFICIENCY: ICD-10-CM

## 2024-08-21 DIAGNOSIS — E55.9 VITAMIN D DEFICIENCY: Chronic | ICD-10-CM

## 2024-08-21 PROCEDURE — 1160F RVW MEDS BY RX/DR IN RCRD: CPT | Mod: CPTII,S$GLB,, | Performed by: NURSE PRACTITIONER

## 2024-08-21 PROCEDURE — 3066F NEPHROPATHY DOC TX: CPT | Mod: CPTII,S$GLB,, | Performed by: NURSE PRACTITIONER

## 2024-08-21 PROCEDURE — 3008F BODY MASS INDEX DOCD: CPT | Mod: CPTII,S$GLB,, | Performed by: NURSE PRACTITIONER

## 2024-08-21 PROCEDURE — 3061F NEG MICROALBUMINURIA REV: CPT | Mod: CPTII,S$GLB,, | Performed by: NURSE PRACTITIONER

## 2024-08-21 PROCEDURE — 3074F SYST BP LT 130 MM HG: CPT | Mod: CPTII,S$GLB,, | Performed by: NURSE PRACTITIONER

## 2024-08-21 PROCEDURE — 3044F HG A1C LEVEL LT 7.0%: CPT | Mod: CPTII,S$GLB,, | Performed by: NURSE PRACTITIONER

## 2024-08-21 PROCEDURE — 99214 OFFICE O/P EST MOD 30 MIN: CPT | Mod: S$GLB,,, | Performed by: NURSE PRACTITIONER

## 2024-08-21 PROCEDURE — 3072F LOW RISK FOR RETINOPATHY: CPT | Mod: CPTII,S$GLB,, | Performed by: NURSE PRACTITIONER

## 2024-08-21 PROCEDURE — 1159F MED LIST DOCD IN RCRD: CPT | Mod: CPTII,S$GLB,, | Performed by: NURSE PRACTITIONER

## 2024-08-21 PROCEDURE — G2211 COMPLEX E/M VISIT ADD ON: HCPCS | Mod: S$GLB,,, | Performed by: NURSE PRACTITIONER

## 2024-08-21 PROCEDURE — 3078F DIAST BP <80 MM HG: CPT | Mod: CPTII,S$GLB,, | Performed by: NURSE PRACTITIONER

## 2024-08-21 PROCEDURE — 99999 PR PBB SHADOW E&M-EST. PATIENT-LVL V: CPT | Mod: PBBFAC,,, | Performed by: NURSE PRACTITIONER

## 2024-08-21 PROCEDURE — 4010F ACE/ARB THERAPY RXD/TAKEN: CPT | Mod: CPTII,S$GLB,, | Performed by: NURSE PRACTITIONER

## 2024-08-21 RX ORDER — HYDROCHLOROTHIAZIDE 25 MG/1
25 TABLET ORAL DAILY
Qty: 90 TABLET | Refills: 3 | Status: SHIPPED | OUTPATIENT
Start: 2024-08-21 | End: 2025-08-21

## 2024-08-21 RX ORDER — ATORVASTATIN CALCIUM 40 MG/1
40 TABLET, FILM COATED ORAL NIGHTLY
Qty: 90 TABLET | Refills: 3 | Status: SHIPPED | OUTPATIENT
Start: 2024-08-21 | End: 2025-08-21

## 2024-08-21 RX ORDER — DULOXETIN HYDROCHLORIDE 60 MG/1
120 CAPSULE, DELAYED RELEASE ORAL DAILY
Qty: 180 CAPSULE | Refills: 0 | Status: SHIPPED | OUTPATIENT
Start: 2024-08-21

## 2024-08-21 RX ORDER — ALPRAZOLAM 0.5 MG/1
0.5 TABLET ORAL 2 TIMES DAILY PRN
Qty: 12 TABLET | Refills: 0 | Status: SHIPPED | OUTPATIENT
Start: 2024-08-21

## 2024-08-21 RX ORDER — ISOPROPYL ALCOHOL 70 ML/100ML
SWAB TOPICAL
Qty: 200 EACH | Refills: 11 | Status: SHIPPED | OUTPATIENT
Start: 2024-08-21

## 2024-08-21 RX ORDER — ERGOCALCIFEROL 1.25 MG/1
100000 CAPSULE ORAL
Qty: 24 CAPSULE | Refills: 3 | Status: SHIPPED | OUTPATIENT
Start: 2024-08-21 | End: 2025-08-21

## 2024-08-21 RX ORDER — TRAZODONE HYDROCHLORIDE 100 MG/1
100 TABLET ORAL NIGHTLY
Qty: 90 TABLET | Refills: 1 | Status: SHIPPED | OUTPATIENT
Start: 2024-08-21

## 2024-08-21 RX ORDER — LEVOCETIRIZINE DIHYDROCHLORIDE 5 MG/1
5 TABLET, FILM COATED ORAL NIGHTLY
Qty: 90 TABLET | Refills: 3 | Status: SHIPPED | OUTPATIENT
Start: 2024-08-21 | End: 2025-08-21

## 2024-08-21 RX ORDER — LANCING DEVICE
EACH MISCELLANEOUS
Qty: 1 EACH | Refills: 3 | Status: SHIPPED | OUTPATIENT
Start: 2024-08-21

## 2024-08-21 RX ORDER — AZELASTINE 1 MG/ML
1 SPRAY, METERED NASAL 2 TIMES DAILY
Qty: 30 ML | Refills: 5 | Status: SHIPPED | OUTPATIENT
Start: 2024-08-21 | End: 2025-08-21

## 2024-08-21 RX ORDER — AMLODIPINE BESYLATE 5 MG/1
5 TABLET ORAL DAILY
Qty: 90 TABLET | Refills: 3 | Status: SHIPPED | OUTPATIENT
Start: 2024-08-21 | End: 2025-08-16

## 2024-08-21 RX ORDER — LOSARTAN POTASSIUM 100 MG/1
100 TABLET ORAL DAILY
Qty: 90 TABLET | Refills: 3 | Status: SHIPPED | OUTPATIENT
Start: 2024-08-21 | End: 2025-08-21

## 2024-08-21 RX ORDER — INSULIN PUMP SYRINGE, 3 ML
EACH MISCELLANEOUS
Qty: 1 EACH | Refills: 0 | Status: SHIPPED | OUTPATIENT
Start: 2024-08-21

## 2024-08-21 RX ORDER — FLUTICASONE PROPIONATE 50 MCG
2 SPRAY, SUSPENSION (ML) NASAL DAILY
Qty: 32 G | Refills: 5 | Status: SHIPPED | OUTPATIENT
Start: 2024-08-21 | End: 2025-08-21

## 2024-08-21 RX ORDER — LANCETS
EACH MISCELLANEOUS
Qty: 200 EACH | Refills: 11 | Status: SHIPPED | OUTPATIENT
Start: 2024-08-21

## 2024-08-21 NOTE — PROGRESS NOTES
"  Subjective:      Patient ID: Ac Hayden is a 58 y.o. female.    Chief Complaint: Follow-up (Pt present today for f/u on test results, requesting a refill on anxiety medication due to the passing of her mother )    History of Present Illness    CHIEF COMPLAINT:  Ac presents today for follow up.    RECENT LOSS AND ANXIETY:  She reports recent loss of her mother, causing significant emotional distress. She is experiencing anxiety attacks related to the loss and stress of managing  arrangements. She had a particularly severe anxiety attack yesterday that required her to lie down. She describes feeling overwhelmed with the responsibilities of organizing a "good celebration" for her mother's life. She expresses that coping with her mother's loss is particularly challenging, describing it as "a whole different kind of course of living without her." She is requesting medication to manage her anxiety symptoms.    MENTAL HEALTH HISTORY:  She has a history of using Xanax for anxiety related to attending funerals since her son's death 12 years ago. She reports difficulty attending most funerals without medication since that time.    DIABETES MANAGEMENT:  She reports being a diabetic in remission with an A1C level of 5.6. She has a history of gastric sleeve surgery in 2018, resulting in limited food intake. She can only eat small amounts due to having only 30% of her abdomen remaining after the surgery. She reports being vigilant about her diet, watching what and how much she eats to maintain her diabetes remission status.    MEDICATIONS:  She reports her blood pressure is controlled and she takes it at home. She is currently taking Mounjaro for diabetes management, recently increased to 10 mg. She denies constipation or vomiting on Mounjaro. She takes Trazodone as needed for sleep issues. She takes vitamin D supplementation, two pills every Monday. She continues Cymbalta twice daily. For sinus problems, she " "uses both Flonase and Astelin, noting that Astelin works better but has an unpleasant taste. She confirms she is still taking Lipitor (statin). She continues to take Xanax for anxiety.    SLEEP ISSUES:  She reports ongoing difficulty falling asleep at night. She has been prescribed Trazodone to take as needed for sleep, but expresses concern about taking it if she waits too late in the evening. She states that sometimes she is able to fall asleep without medication, but frequently experiences insomnia.    SWELLING:  She reports occasional swelling in her leg, specifically on one side. She denies any known history of injury or twisting of the affected leg or ankle.    FAMILY HISTORY:  Her mother recently passed away due to complications from refusing dialysis. Her mother had been advised to start dialysis for almost two years but had consistently refused. Eventually, her mother's condition deteriorated, resulting in toxin buildup in her body, inability to eat or drink, and severe dehydration. Despite a late attempt at dialysis, it was ultimately ineffective.    SOCIAL HISTORY:  Her mother figure (her biological mother's best friend who took her in at age 16) recently passed away. She is responsible for  arrangements, which she describes as stressful. The  is scheduled . Her  recently retired in March of this year, earlier than initially planned due to health issues. He had worked at [employer] for 35 years. She reports her  is experiencing depression related to his FCI and physical limitations. She mentions he has a "short arm" that has been causing him significant problems. She expresses concern about her 's mental state, noting that he feels a strong need to be productive and support his family, which his current situation prevents.    PREVENTIVE CARE:  She acknowledges the need for an eye exam and agrees to schedule it in a few weeks. She reports having " completed a mammogram in March or April of this year. She also confirms having undergone a colonoscopy in January of last year.    VACCINATIONS:  She is due for pneumococcal vaccine and agrees to have it ordered. Her tetanus vaccine is up to date. Flu vaccine is recommended to start in September.  ROS:  General: -fever, -chills, -fatigue, -weight gain, -weight loss  Eyes: -vision changes, -redness, -discharge  ENT: -ear pain, -nasal congestion, -sore throat  Cardiovascular: -chest pain, -palpitations, -lower extremity edema  Respiratory: -cough, -shortness of breath  Gastrointestinal: -abdominal pain, -nausea, -vomiting, -diarrhea, -constipation, -blood in stool  Genitourinary: -dysuria, -hematuria, -frequency  Musculoskeletal: -joint pain, -muscle pain, +joint swelling  Skin: -rash, -lesion  Neurological: -headache, -dizziness, -numbness, -tingling  Psychiatric: +anxiety, -depression, +sleep difficulty          Patient Active Problem List   Diagnosis    Degeneration of lumbar or lumbosacral intervertebral disc    Mixed anxiety and depressive disorder    Impingement syndrome, shoulder    Osteoarthritis of acromioclavicular joint    Urgency incontinence    Type 2 diabetes mellitus with other specified complication    Pulmonary nodules    Hyperlipidemia associated with type 2 diabetes mellitus    History of colon polyps    Primary snoring    S/P gastric surgery    Decreased ROM of right knee    Hypertension associated with diabetes    Class 3 severe obesity due to excess calories with serious comorbidity and body mass index (BMI) of 40.0 to 44.9 in adult    Vitamin D deficiency    Seasonal allergic rhinitis due to pollen         Current Outpatient Medications:     moxifloxacin (VIGAMOX) 0.5 % ophthalmic solution, Place 1 drop into the left eye every 2 (two) hours., Disp: 3 mL, Rfl: 3    solifenacin (VESICARE) 10 MG tablet, Take 1 tablet (10 mg total) by mouth once daily., Disp: 90 tablet, Rfl: 4    alcohol swabs  (ALCOHOL PREP PADS) PadM, Use to check blood glucose as directed., Disp: 200 each, Rfl: 11    ALPRAZolam (XANAX) 0.5 MG tablet, Take 1 tablet (0.5 mg total) by mouth 2 (two) times daily as needed for Anxiety., Disp: 12 tablet, Rfl: 0    amLODIPine (NORVASC) 5 MG tablet, Take 1 tablet (5 mg total) by mouth once daily., Disp: 90 tablet, Rfl: 3    atorvastatin (LIPITOR) 40 MG tablet, Take 1 tablet (40 mg total) by mouth every evening. (for cholesterol and heart health), Disp: 90 tablet, Rfl: 3    azelastine (ASTELIN) 137 mcg (0.1 %) nasal spray, 1 spray (137 mcg total) by Nasal route 2 (two) times daily., Disp: 30 mL, Rfl: 5    blood sugar diagnostic (BLOOD GLUCOSE TEST) Strp, Check blood glucose 1-2 times daily and as needed for symptoms of low or high blood glucose., Disp: 200 strip, Rfl: 11    blood-glucose meter kit, Use as instructed, Disp: 1 each, Rfl: 0    DULoxetine (CYMBALTA) 60 MG capsule, Take 2 capsules (120 mg total) by mouth once daily., Disp: 180 capsule, Rfl: 0    ergocalciferol (ERGOCALCIFEROL) 50,000 unit Cap, Take 2 capsules (100,000 Units total) by mouth every 7 days., Disp: 24 capsule, Rfl: 3    fluticasone propionate (FLONASE) 50 mcg/actuation nasal spray, 2 sprays (100 mcg total) by Each Nostril route once daily., Disp: 32 g, Rfl: 5    hydroCHLOROthiazide (HYDRODIURIL) 25 MG tablet, Take 1 tablet (25 mg total) by mouth once daily., Disp: 90 tablet, Rfl: 3    lancets (LANCETS,THIN) Misc, Check blood glucose 1-2 times daily and as needed for symptoms of low or high blood glucose., Disp: 200 each, Rfl: 11    lancing device Misc, Use as directed., Disp: 1 each, Rfl: 3    levocetirizine (XYZAL) 5 MG tablet, Take 1 tablet (5 mg total) by mouth every evening., Disp: 90 tablet, Rfl: 3    losartan (COZAAR) 100 MG tablet, Take 1 tablet (100 mg total) by mouth once daily., Disp: 90 tablet, Rfl: 3    tirzepatide 12.5 mg/0.5 mL PnIj, Inject 12.5 mg into the skin every 7 days., Disp: 4 Pen, Rfl: 3    traZODone  (DESYREL) 100 MG tablet, Take 1 tablet (100 mg total) by mouth every evening., Disp: 90 tablet, Rfl: 1    vibegron (GEMTESA) 75 mg Tab, Take 1 tablet (75 mg total) by mouth once daily. (Patient not taking: Reported on 8/21/2024), Disp: 30 tablet, Rfl: 11      Objective:   /78 (BP Location: Right arm, Patient Position: Sitting, BP Method: Large (Manual))   Pulse 90   Temp 96.5 °F (35.8 °C) (Tympanic)   Ht 5' (1.524 m)   Wt 123.8 kg (272 lb 14.9 oz)   SpO2 97%   BMI 53.30 kg/m²     Physical Exam    General: In no acute distress.  Head: Normocephalic. Non traumatic.  Eyes: PERRLA. EOMs full. Conjunctivae clear. Fundi grossly normal.  Ears: EACs clear. TMs normal.  Nose: Mucosa pink. Mucosa moist. No obstruction.  Throat: Clear. No exudates. No lesions.  Neck: Supple. No masses. No thyromegaly. No bruits.  Chest: Lungs clear. No rales. No rhonchi. No wheezes.  Heart: RRR. No murmurs. No rubs. No gallops.  Abdomen: Soft. No tenderness. No masses. BS normal.  : Normal external genitalia. No lesions. No discharge. No hernias  noted.  Back: Normal curvature. No scoliosis. No tenderness.  Extremities: Warm. Well perfused. No upper extremity edema. No lower extremity edema. FROM. No deformities. No joint erythema.  Neuro: No focal deficits appreciated. Good muscle tone. Normal response to visual stimuli. Normal response to auditory stimuli.  Skin: Normal. No rashes. No lesions noted.          Assessment:     1. Hypertension associated with diabetes    2. Hyperlipidemia associated with type 2 diabetes mellitus    3. Mixed anxiety and depressive disorder    4. Type 2 diabetes mellitus with other specified complication, without long-term current use of insulin    5. Vitamin D deficiency    6. History of colon polyps    7. Moderate episode of recurrent major depressive disorder    8. Class 3 severe obesity due to excess calories with serious comorbidity and body mass index (BMI) of 40.0 to 44.9 in adult    9.  "Bilateral acute serous otitis media, recurrence not specified    10. Seasonal allergic rhinitis due to pollen    11. Anxiety    12. Grieving    13. Screening for thyroid disorder    14. Vitamin D insufficiency    15. Screening for iron deficiency anemia      Plan:   Assessment & Plan    Assessed patient's diabetes management, noting excellent control with A1C of 5.6, indicating diabetic remission status  Evaluated blood pressure control, determining current medication regimen is effective  Reviewed patient's anxiety symptoms, likely exacerbated by recent loss of mother figure and  planning stress  Conducted foot exam to assess for diabetic neuropathy  Performed focused cardiopulmonary exam, noting clear lungs and regular heart rhythm without murmurs  DIABETES:  - Explained concept of "diabetic in remission," emphasizing importance of continued dietary vigilance to maintain current status.  - Discussed importance of regular eye exams for diabetic patients.  - Scheduled eye exam.  - Increased Mounjaro to 12.5 mg, with instructions to notify if dose is too strong.  - Ordered A1C test in 6 months.  HYPERTENSION:  - Continued Cozaar (losartan) for blood pressure management.  - Continued hydrochlorothiazide for blood pressure management.  - Continued amlodipine for blood pressure management.  HYPERLIPIDEMIA:  - Continued Lipitor (statin) for cholesterol management.  DEPRESSION:  - Continued Cymbalta, 2 pills daily.  ANXIETY:  - Continued Xanax as needed for anxiety.  ALLERGIES:  - Refilled Xyzal for allergy management.  - Refilled Flonase nasal spray.  - Refilled Astelin nasal spray.  VITAMIN D DEFICIENCY:  - Refilled vitamin D, 2 pills weekly on .  - Ordered vitamin D level.  INSOMNIA:  - Refilled trazodone for sleep, to be taken as needed.  PNEUMOCOCCAL VACCINATION:  - Ordered pneumococcal vaccine.  BREAST CANCER SCREENING:  - Obtained copy of recent mammogram results and upload to [facility].  FOLLOW UP:  - " Follow up in 6 months unless concerns arise sooner.  - Contact office if any issues with increased Mounjaro dose.          Follow up if symptoms worsen or fail to improve.

## 2024-10-29 DIAGNOSIS — N32.89 BLADDER SPASMS: ICD-10-CM

## 2024-10-29 RX ORDER — SOLIFENACIN SUCCINATE 10 MG/1
10 TABLET, FILM COATED ORAL
Qty: 90 TABLET | Refills: 3 | Status: SHIPPED | OUTPATIENT
Start: 2024-10-29

## 2024-11-15 ENCOUNTER — TELEPHONE (OUTPATIENT)
Dept: PULMONOLOGY | Facility: CLINIC | Age: 59
End: 2024-11-15
Payer: COMMERCIAL

## 2024-11-21 ENCOUNTER — PATIENT MESSAGE (OUTPATIENT)
Dept: INTERNAL MEDICINE | Facility: CLINIC | Age: 59
End: 2024-11-21
Payer: COMMERCIAL

## 2024-11-22 NOTE — TELEPHONE ENCOUNTER
Good morning Mrs Shen,     It has been over 3 months since your last visit and labs are needed to monitor your Diabetic status.  Please make an appointment to be seen.  Call your insurance to see what is covered as I am unaware of your plan.  I will then consult with Dr Powers if needed.      Kindly,   Sonido MAY NP

## 2024-11-25 ENCOUNTER — OFFICE VISIT (OUTPATIENT)
Dept: CARDIOLOGY | Facility: CLINIC | Age: 59
End: 2024-11-25
Payer: COMMERCIAL

## 2024-11-25 ENCOUNTER — OFFICE VISIT (OUTPATIENT)
Dept: OPHTHALMOLOGY | Facility: CLINIC | Age: 59
End: 2024-11-25
Payer: COMMERCIAL

## 2024-11-25 VITALS
SYSTOLIC BLOOD PRESSURE: 117 MMHG | WEIGHT: 275.13 LBS | HEART RATE: 101 BPM | OXYGEN SATURATION: 100 % | DIASTOLIC BLOOD PRESSURE: 78 MMHG | BODY MASS INDEX: 54.02 KG/M2 | HEIGHT: 60 IN

## 2024-11-25 DIAGNOSIS — H52.4 MYOPIA WITH ASTIGMATISM AND PRESBYOPIA, BILATERAL: Primary | ICD-10-CM

## 2024-11-25 DIAGNOSIS — E11.9 DIABETES MELLITUS TYPE 2 WITHOUT RETINOPATHY: ICD-10-CM

## 2024-11-25 DIAGNOSIS — E11.59 HYPERTENSION ASSOCIATED WITH DIABETES: Chronic | ICD-10-CM

## 2024-11-25 DIAGNOSIS — H40.023 OPEN ANGLE WITH BORDERLINE FINDINGS AND HIGH GLAUCOMA RISK IN BOTH EYES: ICD-10-CM

## 2024-11-25 DIAGNOSIS — I15.2 HYPERTENSION ASSOCIATED WITH DIABETES: Chronic | ICD-10-CM

## 2024-11-25 DIAGNOSIS — R00.0 TACHYCARDIA: ICD-10-CM

## 2024-11-25 DIAGNOSIS — E11.9 DIABETES MELLITUS TYPE 2 WITHOUT RETINOPATHY: Primary | ICD-10-CM

## 2024-11-25 DIAGNOSIS — H52.13 MYOPIA WITH ASTIGMATISM AND PRESBYOPIA, BILATERAL: Primary | ICD-10-CM

## 2024-11-25 DIAGNOSIS — H52.203 MYOPIA WITH ASTIGMATISM AND PRESBYOPIA, BILATERAL: Primary | ICD-10-CM

## 2024-11-25 DIAGNOSIS — H25.13 NUCLEAR SCLEROSIS OF BOTH EYES: ICD-10-CM

## 2024-11-25 DIAGNOSIS — E11.69 HYPERLIPIDEMIA ASSOCIATED WITH TYPE 2 DIABETES MELLITUS: Primary | Chronic | ICD-10-CM

## 2024-11-25 DIAGNOSIS — E78.5 HYPERLIPIDEMIA ASSOCIATED WITH TYPE 2 DIABETES MELLITUS: Primary | Chronic | ICD-10-CM

## 2024-11-25 PROCEDURE — 3066F NEPHROPATHY DOC TX: CPT | Mod: CPTII,S$GLB,,

## 2024-11-25 PROCEDURE — 3078F DIAST BP <80 MM HG: CPT | Mod: CPTII,S$GLB,,

## 2024-11-25 PROCEDURE — 99999 PR PBB SHADOW E&M-EST. PATIENT-LVL IV: CPT | Mod: PBBFAC,,,

## 2024-11-25 PROCEDURE — 3008F BODY MASS INDEX DOCD: CPT | Mod: CPTII,S$GLB,,

## 2024-11-25 PROCEDURE — 3066F NEPHROPATHY DOC TX: CPT | Mod: CPTII,S$GLB,, | Performed by: OPHTHALMOLOGY

## 2024-11-25 PROCEDURE — 1159F MED LIST DOCD IN RCRD: CPT | Mod: CPTII,S$GLB,, | Performed by: OPHTHALMOLOGY

## 2024-11-25 PROCEDURE — 4010F ACE/ARB THERAPY RXD/TAKEN: CPT | Mod: CPTII,S$GLB,, | Performed by: OPHTHALMOLOGY

## 2024-11-25 PROCEDURE — 2023F DILAT RTA XM W/O RTNOPTHY: CPT | Mod: CPTII,S$GLB,, | Performed by: OPHTHALMOLOGY

## 2024-11-25 PROCEDURE — 3044F HG A1C LEVEL LT 7.0%: CPT | Mod: CPTII,S$GLB,,

## 2024-11-25 PROCEDURE — 99499 UNLISTED E&M SERVICE: CPT | Mod: ,,, | Performed by: OPTOMETRIST

## 2024-11-25 PROCEDURE — 1160F RVW MEDS BY RX/DR IN RCRD: CPT | Mod: CPTII,S$GLB,, | Performed by: OPHTHALMOLOGY

## 2024-11-25 PROCEDURE — 3061F NEG MICROALBUMINURIA REV: CPT | Mod: CPTII,S$GLB,, | Performed by: OPHTHALMOLOGY

## 2024-11-25 PROCEDURE — 1160F RVW MEDS BY RX/DR IN RCRD: CPT | Mod: CPTII,S$GLB,,

## 2024-11-25 PROCEDURE — 4010F ACE/ARB THERAPY RXD/TAKEN: CPT | Mod: CPTII,S$GLB,,

## 2024-11-25 PROCEDURE — 99999 PR PBB SHADOW E&M-EST. PATIENT-LVL III: CPT | Mod: PBBFAC,,, | Performed by: OPHTHALMOLOGY

## 2024-11-25 PROCEDURE — 3044F HG A1C LEVEL LT 7.0%: CPT | Mod: CPTII,S$GLB,, | Performed by: OPHTHALMOLOGY

## 2024-11-25 PROCEDURE — 92015 DETERMINE REFRACTIVE STATE: CPT | Mod: ,,, | Performed by: OPTOMETRIST

## 2024-11-25 PROCEDURE — 99214 OFFICE O/P EST MOD 30 MIN: CPT | Mod: S$GLB,,,

## 2024-11-25 PROCEDURE — 3061F NEG MICROALBUMINURIA REV: CPT | Mod: CPTII,S$GLB,,

## 2024-11-25 PROCEDURE — 92014 COMPRE OPH EXAM EST PT 1/>: CPT | Mod: S$GLB,,, | Performed by: OPHTHALMOLOGY

## 2024-11-25 PROCEDURE — 1159F MED LIST DOCD IN RCRD: CPT | Mod: CPTII,S$GLB,,

## 2024-11-25 PROCEDURE — 3074F SYST BP LT 130 MM HG: CPT | Mod: CPTII,S$GLB,,

## 2024-11-25 PROCEDURE — 99999 PR PBB SHADOW E&M-EST. PATIENT-LVL III: CPT | Mod: PBBFAC,,, | Performed by: OPTOMETRIST

## 2024-11-25 NOTE — PROGRESS NOTES
Subjective:   Patient ID:  Ac Hayden is a 58 y.o. female who presents for evaluation of No chief complaint on file.      HPI 58-year-old female whose current medical conditions include HTN, HLD anxiety obesity BMI 50 s/p sleeve in 2018, CHRIS not tolerate CPAP (given an used one and refused to use it). Quit smoking 20yrs and no drink followed by Dr. Shoemaker in [facility].  Here today for CV follow-up denies any chest pain, angina or anginal equivalent.  Blood pressure stable, heart rate elevated from 0 1 upon review previous heart rates at clinic visits ranging from 90s to 120s.  Patient denies any palpitations or increased fatigue      Echo 22' with normal EF  Past Medical History:   Diagnosis Date    Allergic rhinitis     Arthritis     Colon polyps     Diabetes mellitus      am 2023    Diabetes mellitus, type 2     DM (diabetes mellitus) 18  years      am 2022    Hyperlipidemia     Hypertension     Metabolic syndrome     Mixed anxiety and depressive disorder     Pneumonia     Prediabetes     Urine incontinence        Past Surgical History:   Procedure Laterality Date    BARIATRIC SURGERY  2018    [facility])    COLONOSCOPY N/A 10/19/2017    Procedure: COLONOSCOPY;  Surgeon: Jamal Cole MD;  Location: [facility] ENDO;  Service: Endoscopy;  Laterality: N/A;    COLONOSCOPY N/A 2023    Procedure: COLONOSCOPY;  Surgeon: Katelyn Hensley MD;  Location: [facility];  Service: Endoscopy;  Laterality: N/A;    HYSTERECTOMY      PARTIAL HYSTERECTOMY      PLANTAR FASCIA SURGERY      TOTAL REDUCTION MAMMOPLASTY         Social History     Tobacco Use    Smoking status: Former     Current packs/day: 0.00     Average packs/day: 0.3 packs/day for 13.0 years (3.3 ttl pk-yrs)     Types: Cigarettes     Start date: 10/19/1987     Quit date: 10/19/2000     Years since quittin.1    Smokeless tobacco: Former   Substance Use Topics    Alcohol use: Not  Currently     Comment: occasionally    Drug use: No       Family History   Problem Relation Name Age of Onset    Diabetes Mother      Stroke Mother      Cancer Father      Diabetes Sister      Ovarian cancer Sister      Strabismus Sister      Diabetes Brother      Lupus Other niece     Eczema Other niece     Melanoma Neg Hx      Psoriasis Neg Hx         Current Outpatient Medications on File Prior to Visit   Medication Sig Dispense Refill    alcohol swabs (ALCOHOL PREP PADS) PadM Use to check blood glucose as directed. 200 each 11    ALPRAZolam (XANAX) 0.5 MG tablet Take 1 tablet (0.5 mg total) by mouth 2 (two) times daily as needed for Anxiety. 12 tablet 0    amLODIPine (NORVASC) 5 MG tablet Take 1 tablet (5 mg total) by mouth once daily. 90 tablet 3    atorvastatin (LIPITOR) 40 MG tablet Take 1 tablet (40 mg total) by mouth every evening. (for cholesterol and heart health) 90 tablet 3    azelastine (ASTELIN) 137 mcg (0.1 %) nasal spray 1 spray (137 mcg total) by Nasal route 2 (two) times daily. 30 mL 5    blood sugar diagnostic (BLOOD GLUCOSE TEST) Strp Check blood glucose 1-2 times daily and as needed for symptoms of low or high blood glucose. 200 strip 11    blood-glucose meter kit Use as instructed 1 each 0    DULoxetine (CYMBALTA) 60 MG capsule Take 2 capsules (120 mg total) by mouth once daily. 180 capsule 0    ergocalciferol (ERGOCALCIFEROL) 50,000 unit Cap Take 2 capsules (100,000 Units total) by mouth every 7 days. 24 capsule 3    fluticasone propionate (FLONASE) 50 mcg/actuation nasal spray 2 sprays (100 mcg total) by Each Nostril route once daily. 32 g 5    hydroCHLOROthiazide (HYDRODIURIL) 25 MG tablet Take 1 tablet (25 mg total) by mouth once daily. 90 tablet 3    lancets (LANCETS,THIN) Misc Check blood glucose 1-2 times daily and as needed for symptoms of low or high blood glucose. 200 each 11    lancing device Misc Use as directed. 1 each 3    levocetirizine (XYZAL) 5 MG tablet Take 1 tablet (5 mg  total) by mouth every evening. 90 tablet 3    losartan (COZAAR) 100 MG tablet Take 1 tablet (100 mg total) by mouth once daily. 90 tablet 3    moxifloxacin (VIGAMOX) 0.5 % ophthalmic solution Place 1 drop into the left eye every 2 (two) hours. 3 mL 3    solifenacin (VESICARE) 10 MG tablet Take 1 tablet by mouth once daily 90 tablet 3    tirzepatide 12.5 mg/0.5 mL PnIj Inject 12.5 mg into the skin every 7 days. 4 Pen 3    traZODone (DESYREL) 100 MG tablet Take 1 tablet (100 mg total) by mouth every evening. 90 tablet 1    vibegron (GEMTESA) 75 mg Tab Take 1 tablet (75 mg total) by mouth once daily. 30 tablet 11     No current facility-administered medications on file prior to visit.      Wt Readings from Last 3 Encounters:   11/25/24 124.8 kg (275 lb 2.2 oz)   08/21/24 123.8 kg (272 lb 14.9 oz)   08/12/24 124.9 kg (275 lb 5.7 oz)     Temp Readings from Last 3 Encounters:   08/21/24 96.5 °F (35.8 °C) (Tympanic)   05/02/24 98.8 °F (37.1 °C)   03/07/24 97.5 °F (36.4 °C) (Tympanic)     BP Readings from Last 3 Encounters:   11/25/24 117/78   08/21/24 116/78   08/12/24 112/77     Pulse Readings from Last 3 Encounters:   11/25/24 101   08/21/24 90   08/12/24 (!) 120        Review of Systems   Constitutional: Negative.   HENT: Negative.     Eyes: Negative.    Cardiovascular: Negative.    Respiratory: Negative.     Skin: Negative.    Musculoskeletal: Negative.    Gastrointestinal: Negative.    Genitourinary: Negative.    Neurological: Negative.    Psychiatric/Behavioral: Negative.         Objective:   Physical Exam  Vitals and nursing note reviewed.   Constitutional:       Appearance: Normal appearance.   HENT:      Head: Normocephalic.   Eyes:      Pupils: Pupils are equal, round, and reactive to light.   Cardiovascular:      Rate and Rhythm: Normal rate and regular rhythm.      Heart sounds: Normal heart sounds, S1 normal and S2 normal. No murmur heard.     No S3 or S4 sounds.   Pulmonary:      Effort: Pulmonary effort is  normal.      Breath sounds: Normal breath sounds.   Abdominal:      General: Bowel sounds are normal.      Palpations: Abdomen is soft.   Musculoskeletal:         General: Normal range of motion.      Cervical back: Normal range of motion.   Skin:     Capillary Refill: Capillary refill takes less than 2 seconds.   Neurological:      General: No focal deficit present.      Mental Status: She is alert and oriented to person, place, and time.   Psychiatric:         Mood and Affect: Mood normal.         Behavior: Behavior normal.         Thought Content: Thought content normal.         Lab Results   Component Value Date    CHOL 152 08/07/2024    CHOL 175 09/07/2023    CHOL 173 11/17/2022     Lab Results   Component Value Date    HDL 34 (L) 08/07/2024    HDL 48 09/07/2023    HDL 48 11/17/2022     Lab Results   Component Value Date    LDLCALC 99.0 08/07/2024    LDLCALC 109.0 09/07/2023    LDLCALC 108.4 11/17/2022     Lab Results   Component Value Date    TRIG 95 08/07/2024    TRIG 90 09/07/2023    TRIG 83 11/17/2022     Lab Results   Component Value Date    CHOLHDL 22.4 08/07/2024    CHOLHDL 27.4 09/07/2023    CHOLHDL 27.7 11/17/2022       Chemistry        Component Value Date/Time     08/07/2024 0825    K 3.5 08/07/2024 0825     08/07/2024 0825    CO2 27 08/07/2024 0825    BUN 14 08/07/2024 0825    CREATININE 1.0 08/07/2024 0825     08/07/2024 0825        Component Value Date/Time    CALCIUM 9.6 08/07/2024 0825    ALKPHOS 93 08/07/2024 0825    AST 27 08/07/2024 0825    ALT 30 08/07/2024 0825    BILITOT 0.3 08/07/2024 0825    ESTGFRAFRICA >60.0 02/11/2022 1022    EGFRNONAA >60.0 02/11/2022 1022          Lab Results   Component Value Date    TSH 0.990 11/17/2022     Lab Results   Component Value Date    INR 1.0 05/23/2018     @RESUFAST(WBC,HGB,HCT,MCV,PLT)  @LABRCNTIP(BNP,BNPTRIAGEBLO)@  CrCl cannot be calculated (Patient's most recent lab result is older than the maximum 7 days allowed.).     Results  for orders placed during the hospital encounter of 09/03/20    Echo Color Flow Doppler? Yes    Interpretation Summary  · Concentric left ventricular remodeling.  · Normal left ventricular systolic function. The estimated ejection fraction is 60%.  · Normal LV diastolic function.  · Normal central venous pressure (3 mmHg).  · The estimated PA systolic pressure is 30 mmHg.  · Normal right ventricular systolic function.     No results found for this or any previous visit.     Assessment:      1. Hyperlipidemia associated with type 2 diabetes mellitus    2. Hypertension associated with diabetes    3. Tachycardia        Plan:   Hyperlipidemia associated with type 2 diabetes mellitus  -     Cardiac Monitor - 3-15 Day Adult (Cupid Only); Future    Hypertension associated with diabetes  -     Cardiac Monitor - 3-15 Day Adult (Cupid Only); Future    Tachycardia  -     Cardiac Monitor - 3-15 Day Adult (Cupid Only); Future      Amlodipine, hydrochlorothiazide, losartan, low-sodium diet, profile blood pressure- HTN  Atorvastatin, low-fat diet- Hlp  Risk factor modifications   Daily exercise as tolerated     Vital connect  Return to clinic in 2 months or sooner if needed to follow up on heart rate    Courtney Guillot, FNP-C [facility], Cardiology

## 2024-11-25 NOTE — PROGRESS NOTES
HPI     Diabetic Eye Exam     Additional comments: Pt reports for diabetic eye exam  Pt states her VA has been stable and noticed a small change in her far   vision. Pt is using FlickIM full time and OTC +2.00 readers.  Pt states that has noticed a mole/bump develop over the last 5 months and   has progressively become more notiable.   Pt denies any eye pain.           Comments    1. DM2  Hemoglobin A1C           08/07/2024               5.6              Last edited by Kaye Osborne on 11/25/2024  9:12 AM.            Assessment /Plan     For exam results, see Encounter Report.    Diabetes mellitus type 2 without retinopathy  Last A1c 5.6 . Diabetes controlled with no diabetic retinopathy on dilated exam. Reviewed diabetic eye precautions including excellent blood sugar control, and importance of regular follow up.       Open angle with borderline findings and high glaucoma risk in both eyes  No evidence of glaucoma at this time but based on risk factors recommend to continue monitoring.    Nuclear sclerosis of both eyes  Cataracts are present but not visually significant. Will continue to monitor. Will see Dr. Barreto for MRx.      Return to clinic in 1 year with GOCT or sooner PRN

## 2024-11-25 NOTE — PROGRESS NOTES
HPI     Contact Lens Fit            Comments: 1. DM2  Hemoglobin A1C           08/07/2024               5.6            Comments    Pt here for contact lens fitting   Wears freshlook color blends   Pt wears reading glasses with her contact lens when on the computer   Denies ocular pain          Last edited by Isabel Alvarez on 11/25/2024  9:51 AM.            Assessment /Plan     For exam results, see Encounter Report.    Myopia with astigmatism and presbyopia, bilateral  Contact Lens Final Rx       Final Contact Lens Rx         Brand Base Curve Diameter Sphere    Right Air Optix Colors 8.6 14.2 -0.75    Left Air Optix Colors 8.6 14.2 -2.00      Expiration Date: 11/25/2025    Replacement: Monthly    Solutions: OptiFree PureMoist    Wearing Schedule: Daily Wear                OU distance CTL fitted today, pt will require OTC readers on top for near.     Contact lens trials fitted in office today. Contact lens hygiene reviewed. Patient able to insert the lenses themselves with minimal difficulty. Patient ok to finalize Contact lens after 1 week of wear. RTC if still having difficulty with CTL trial after 1 week.     Diabetes mellitus type 2 without retinopathy  Sees Dr Khan, continue per Dr Khan.     Open angle with borderline findings and high glaucoma risk in both eyes  Continue observation per Dr Khan at this time.    Nuclear sclerosis of both eyes  Observe    RTC 1 yr for dilated eye exam or sooner if any changes to vision.   Discussed above and answered questions.

## 2024-12-04 ENCOUNTER — OFFICE VISIT (OUTPATIENT)
Dept: INTERNAL MEDICINE | Facility: CLINIC | Age: 59
End: 2024-12-04
Payer: COMMERCIAL

## 2024-12-04 ENCOUNTER — DOCUMENTATION ONLY (OUTPATIENT)
Dept: INTERNAL MEDICINE | Facility: CLINIC | Age: 59
End: 2024-12-04
Payer: COMMERCIAL

## 2024-12-04 ENCOUNTER — TELEPHONE (OUTPATIENT)
Dept: INTERNAL MEDICINE | Facility: CLINIC | Age: 59
End: 2024-12-04

## 2024-12-04 ENCOUNTER — HOSPITAL ENCOUNTER (OUTPATIENT)
Dept: CARDIOLOGY | Facility: HOSPITAL | Age: 59
Discharge: HOME | End: 2024-12-04
Payer: COMMERCIAL

## 2024-12-04 VITALS
DIASTOLIC BLOOD PRESSURE: 80 MMHG | SYSTOLIC BLOOD PRESSURE: 110 MMHG | OXYGEN SATURATION: 99 % | HEART RATE: 118 BPM | WEIGHT: 272.94 LBS | TEMPERATURE: 97 F | HEIGHT: 60 IN | BODY MASS INDEX: 53.58 KG/M2

## 2024-12-04 DIAGNOSIS — Z23 NEED FOR INFLUENZA VACCINATION: ICD-10-CM

## 2024-12-04 DIAGNOSIS — I15.2 HYPERTENSION ASSOCIATED WITH DIABETES: Chronic | ICD-10-CM

## 2024-12-04 DIAGNOSIS — E11.69 HYPERLIPIDEMIA ASSOCIATED WITH TYPE 2 DIABETES MELLITUS: Chronic | ICD-10-CM

## 2024-12-04 DIAGNOSIS — R00.0 TACHYCARDIA: ICD-10-CM

## 2024-12-04 DIAGNOSIS — E11.59 HYPERTENSION ASSOCIATED WITH DIABETES: Chronic | ICD-10-CM

## 2024-12-04 DIAGNOSIS — E66.813 CLASS 3 SEVERE OBESITY DUE TO EXCESS CALORIES WITH SERIOUS COMORBIDITY AND BODY MASS INDEX (BMI) OF 50.0 TO 59.9 IN ADULT: ICD-10-CM

## 2024-12-04 DIAGNOSIS — E78.5 HYPERLIPIDEMIA ASSOCIATED WITH TYPE 2 DIABETES MELLITUS: Chronic | ICD-10-CM

## 2024-12-04 DIAGNOSIS — E66.01 CLASS 3 SEVERE OBESITY DUE TO EXCESS CALORIES WITH SERIOUS COMORBIDITY AND BODY MASS INDEX (BMI) OF 50.0 TO 59.9 IN ADULT: ICD-10-CM

## 2024-12-04 DIAGNOSIS — E11.69 TYPE 2 DIABETES MELLITUS WITH OTHER SPECIFIED COMPLICATION, WITHOUT LONG-TERM CURRENT USE OF INSULIN: Primary | Chronic | ICD-10-CM

## 2024-12-04 LAB
ALBUMIN SERPL BCP-MCNC: 3.6 G/DL (ref 3.5–5.2)
ALBUMIN/CREAT UR: 3.7 UG/MG (ref 0–30)
ALP SERPL-CCNC: 74 U/L (ref 40–150)
ALT SERPL W/O P-5'-P-CCNC: 19 U/L (ref 10–44)
ANION GAP SERPL CALC-SCNC: 15 MMOL/L (ref 8–16)
AST SERPL-CCNC: 19 U/L (ref 10–40)
BILIRUB SERPL-MCNC: 0.6 MG/DL (ref 0.1–1)
BUN SERPL-MCNC: 15 MG/DL (ref 6–20)
CALCIUM SERPL-MCNC: 10.2 MG/DL (ref 8.7–10.5)
CHLORIDE SERPL-SCNC: 102 MMOL/L (ref 95–110)
CHOLEST SERPL-MCNC: 169 MG/DL (ref 120–199)
CHOLEST/HDLC SERPL: 3.9 {RATIO} (ref 2–5)
CO2 SERPL-SCNC: 24 MMOL/L (ref 23–29)
CREAT SERPL-MCNC: 1 MG/DL (ref 0.5–1.4)
CREAT UR-MCNC: 219 MG/DL (ref 15–325)
EST. GFR  (NO RACE VARIABLE): >60 ML/MIN/1.73 M^2
ESTIMATED AVG GLUCOSE: 111 MG/DL (ref 68–131)
GLUCOSE SERPL-MCNC: 36 MG/DL (ref 70–110)
HBA1C MFR BLD: 5.5 % (ref 4–5.6)
HDLC SERPL-MCNC: 43 MG/DL (ref 40–75)
HDLC SERPL: 25.4 % (ref 20–50)
LDLC SERPL CALC-MCNC: 107.8 MG/DL (ref 63–159)
MICROALBUMIN UR DL<=1MG/L-MCNC: 8 UG/ML
NONHDLC SERPL-MCNC: 126 MG/DL
POTASSIUM SERPL-SCNC: 3 MMOL/L (ref 3.5–5.1)
PROT SERPL-MCNC: 7.5 G/DL (ref 6–8.4)
SODIUM SERPL-SCNC: 141 MMOL/L (ref 136–145)
TRIGL SERPL-MCNC: 91 MG/DL (ref 30–150)

## 2024-12-04 PROCEDURE — 82043 UR ALBUMIN QUANTITATIVE: CPT | Performed by: FAMILY MEDICINE

## 2024-12-04 PROCEDURE — 80053 COMPREHEN METABOLIC PANEL: CPT | Performed by: FAMILY MEDICINE

## 2024-12-04 PROCEDURE — 99999 PR PBB SHADOW E&M-EST. PATIENT-LVL V: CPT | Mod: PBBFAC,,, | Performed by: NURSE PRACTITIONER

## 2024-12-04 PROCEDURE — 83036 HEMOGLOBIN GLYCOSYLATED A1C: CPT | Performed by: FAMILY MEDICINE

## 2024-12-04 PROCEDURE — 80061 LIPID PANEL: CPT | Performed by: FAMILY MEDICINE

## 2024-12-04 NOTE — PROGRESS NOTES
Subjective:      Patient ID: Ac Hayden is a 58 y.o. female.    Chief Complaint: Weight Loss    History of Present Illness    CHIEF COMPLAINT:  Ac presents today for weight management.    WEIGHT MANAGEMENT:  She is currently taking Mounjaro (Tirzepatide) 12.5 mg for weight management, having previously taken Ozempic. She reports feeling at a standstill with her current dosage and expresses a desire to increase Mounjaro to 15 mg, which is the maximum dose.    DIET AND EXERCISE:  She drinks only water and exercises by biking for 30 minutes daily, except on weekends. Her breakfast typically consists of oatmeal or toast. She incorporates a significant amount of fresh fruits into her diet. She reports a preference for rice and gravy but can only eat a few spoonfuls, identifying rice as a dietary challenge.    SOCIAL HISTORY:  She has been employed at [employer] for almost 29 years and attends [employer].      ROS:  General: -fever, -chills, -fatigue, -weight gain, -weight loss, -change in weight  Eyes: -vision changes, -redness, -discharge  ENT: -ear pain, -nasal congestion, -sore throat  Cardiovascular: -chest pain, -palpitations, -lower extremity edema  Respiratory: -cough, -shortness of breath  Gastrointestinal: -abdominal pain, -nausea, -vomiting, -diarrhea, -constipation, -blood in stool  Genitourinary: -dysuria, -hematuria, -frequency  Musculoskeletal: -joint pain, -muscle pain  Skin: -rash, -lesion  Neurological: -headache, -dizziness, -numbness, -tingling  Psychiatric: -anxiety, -depression, -sleep difficulty          Patient Active Problem List   Diagnosis    Degeneration of lumbar or lumbosacral intervertebral disc    Mixed anxiety and depressive disorder    Impingement syndrome, shoulder    Osteoarthritis of acromioclavicular joint    Urgency incontinence    Type 2 diabetes mellitus with other specified complication    Pulmonary nodules    Hyperlipidemia associated with type 2 diabetes  mellitus    History of colon polyps    Primary snoring    S/P gastric surgery    Decreased ROM of right knee    Hypertension associated with diabetes    Class 3 severe obesity due to excess calories with serious comorbidity and body mass index (BMI) of 40.0 to 44.9 in adult    Vitamin D deficiency    Seasonal allergic rhinitis due to pollen         Current Outpatient Medications:     ALPRAZolam (XANAX) 0.5 MG tablet, Take 1 tablet (0.5 mg total) by mouth 2 (two) times daily as needed for Anxiety., Disp: 12 tablet, Rfl: 0    amLODIPine (NORVASC) 5 MG tablet, Take 1 tablet (5 mg total) by mouth once daily., Disp: 90 tablet, Rfl: 3    atorvastatin (LIPITOR) 40 MG tablet, Take 1 tablet (40 mg total) by mouth every evening. (for cholesterol and heart health), Disp: 90 tablet, Rfl: 3    azelastine (ASTELIN) 137 mcg (0.1 %) nasal spray, 1 spray (137 mcg total) by Nasal route 2 (two) times daily., Disp: 30 mL, Rfl: 5    DULoxetine (CYMBALTA) 60 MG capsule, Take 2 capsules (120 mg total) by mouth once daily., Disp: 180 capsule, Rfl: 0    ergocalciferol (ERGOCALCIFEROL) 50,000 unit Cap, Take 2 capsules (100,000 Units total) by mouth every 7 days., Disp: 24 capsule, Rfl: 3    fluticasone propionate (FLONASE) 50 mcg/actuation nasal spray, 2 sprays (100 mcg total) by Each Nostril route once daily., Disp: 32 g, Rfl: 5    hydroCHLOROthiazide (HYDRODIURIL) 25 MG tablet, Take 1 tablet (25 mg total) by mouth once daily., Disp: 90 tablet, Rfl: 3    levocetirizine (XYZAL) 5 MG tablet, Take 1 tablet (5 mg total) by mouth every evening., Disp: 90 tablet, Rfl: 3    losartan (COZAAR) 100 MG tablet, Take 1 tablet (100 mg total) by mouth once daily., Disp: 90 tablet, Rfl: 3    moxifloxacin (VIGAMOX) 0.5 % ophthalmic solution, Place 1 drop into the left eye every 2 (two) hours., Disp: 3 mL, Rfl: 3    solifenacin (VESICARE) 10 MG tablet, Take 1 tablet by mouth once daily, Disp: 90 tablet, Rfl: 3    traZODone (DESYREL) 100 MG tablet, Take 1  tablet (100 mg total) by mouth every evening., Disp: 90 tablet, Rfl: 1    vibegron (GEMTESA) 75 mg Tab, Take 1 tablet (75 mg total) by mouth once daily., Disp: 30 tablet, Rfl: 11    alcohol swabs (ALCOHOL PREP PADS) PadM, Use to check blood glucose as directed., Disp: 200 each, Rfl: 11    blood sugar diagnostic (BLOOD GLUCOSE TEST) Strp, Check blood glucose 1-2 times daily and as needed for symptoms of low or high blood glucose., Disp: 200 strip, Rfl: 11    blood-glucose meter kit, Use as instructed, Disp: 1 each, Rfl: 0    lancets (LANCETS,THIN) Misc, Check blood glucose 1-2 times daily and as needed for symptoms of low or high blood glucose., Disp: 200 each, Rfl: 11    lancing device Misc, Use as directed., Disp: 1 each, Rfl: 3    tirzepatide 15 mg/0.5 mL PnIj, Inject 15 mg into the skin every 7 days. for 12 doses, Disp: 2 mL, Rfl: 2  No current facility-administered medications for this visit.      Objective:   /80 (BP Location: Left arm, Patient Position: Sitting)   Pulse (!) 118   Temp 96.6 °F (35.9 °C) (Tympanic)   Ht 5' (1.524 m)   Wt 123.8 kg (272 lb 14.9 oz)   SpO2 99%   BMI 53.30 kg/m²     Physical Exam             Physical Exam  Vitals and nursing note reviewed.   Constitutional:       General: She is awake. She is not in acute distress.     Appearance: Normal appearance. She is well-developed and well-groomed. She is not ill-appearing, toxic-appearing or diaphoretic.   HENT:      Head: Normocephalic and atraumatic.      Right Ear: External ear normal.      Left Ear: Ear canal and external ear normal.   Cardiovascular:      Rate and Rhythm: Regular rhythm. Tachycardia present.      Heart sounds: Normal heart sounds. No murmur heard.  Pulmonary:      Effort: Pulmonary effort is normal. No tachypnea, bradypnea, accessory muscle usage, prolonged expiration, respiratory distress or retractions.      Breath sounds: Normal breath sounds. No stridor, decreased air movement or transmitted upper airway  sounds. No decreased breath sounds, wheezing, rhonchi or rales.   Musculoskeletal:         General: No swelling, tenderness, deformity or signs of injury.      Cervical back: Normal range of motion and neck supple.      Right lower leg: No edema.      Left lower leg: No edema.   Skin:     General: Skin is warm and dry.      Capillary Refill: Capillary refill takes less than 2 seconds.   Neurological:      General: No focal deficit present.      Mental Status: She is alert and oriented to person, place, and time.      GCS: GCS eye subscore is 4. GCS verbal subscore is 5. GCS motor subscore is 6.      Gait: Gait normal.   Psychiatric:         Attention and Perception: Attention and perception normal.         Mood and Affect: Mood and affect normal.         Speech: Speech normal.         Behavior: Behavior normal. Behavior is cooperative.         Thought Content: Thought content normal.         Cognition and Memory: Cognition normal.          Assessment/Plan :   1. Type 2 diabetes mellitus with other specified complication, without long-term current use of insulin  Overview:  Lab Results   Component Value Date    HGBA1C 6.5 (H) 10/21/2021    HGBA1C 8.0 (H) 08/09/2018    HGBA1C 8.0 (H) 11/22/2017    HGBA1C 9.0 (H) 09/27/2017    HGBA1C 6.8 (H) 01/06/2017   No history or family history of thyroid cancer or multiple endocrine neoplasia syndrome.     Orders:  -     tirzepatide 15 mg/0.5 mL PnIj; Inject 15 mg into the skin every 7 days. for 12 doses  Dispense: 2 mL; Refill: 2  -     Discontinue: phentermine (LOMAIRA) 8 mg Tab; Take 1 tablet by mouth Daily.  Dispense: 30 each; Refill: 0  -     Microalbumin/Creatinine Ratio, Urine  -     Lipid Panel  -     Comprehensive Metabolic Panel  -     Hemoglobin A1C    2. Class 3 severe obesity due to excess calories with serious comorbidity and body mass index (BMI) of 50.0 to 59.9 in adult  -     Discontinue: phentermine (LOMAIRA) 8 mg Tab; Take 1 tablet by mouth Daily.  Dispense: 30  each; Refill: 0    3. Need for influenza vaccination  -     influenza (Flulaval, Fluzone, Fluarix) 45 mcg/0.5 mL IM vaccine (> or = 6 mo) 0.5 mL    4. Hyperlipidemia associated with type 2 diabetes mellitus  -     Lipid Panel  -     Comprehensive Metabolic Panel    5. Hypertension associated with diabetes  -     Lipid Panel  -     Comprehensive Metabolic Panel         Assessment & Plan    Considered increasing Tirzepatide (Mounjaro) dose from 12.5mg to 15mg (max dose) due to patient's weight loss plateau  Evaluated patient's current exercise regimen and dietary habits  Assessed need for A1C testing  Patient is requesting phentermine to help assist her with weight lost.  Discussed with patient she has open angle and phentermine will increase her risk for glaucoma.  Discussed with patient will discontinue drug.    INAPPROPRIATE DIET AND EATING HABITS:  - Educated on importance of dietary changes, specifically reducing rice intake.  - Explained that craving for rice may subside after 30 days of abstinence.  - Ac to discontinue rice consumption, including shrimp fried rice.  - Recommend increasing intake of vegetables and fresh fruits.  - Recommend increasing water intake.  - Discussed importance of hydration and regular physical activity while on appetite-suppressing medication.  - Ac to continue daily 30-minute bike rides (except weekends).  - Increased Tirzepatide (Mounjaro) from 12.5mg to 15mg.  - A1C test ordered.    OTHER CONSTIPATION:  - Started Colace (docusate sodium) twice daily.          Follow up if symptoms worsen or fail to improve.    This note was generated with the assistance of ambient listening technology. Verbal consent was obtained by the patient and accompanying visitor(s) for the recording of patient appointment to facilitate this note. I attest to having reviewed and edited the generated note for accuracy, though some syntax or spelling errors may persist. Please contact the author of  this note for any clarification.

## 2024-12-05 ENCOUNTER — TELEPHONE (OUTPATIENT)
Dept: INTERNAL MEDICINE | Facility: CLINIC | Age: 59
End: 2024-12-05
Payer: COMMERCIAL

## 2024-12-05 NOTE — TELEPHONE ENCOUNTER
----- Message from Monique Heller PA-C sent at 12/5/2024  8:41 AM CST -----  I am uncertain why these results were sent to me. I have not seen her since 2022. There is a critical glucose and I see Lifestyle provider just increased her tirzepatide yesterday.     Staff: Please call patient to see how she is feeling and let her know that her glucose reading was extremely low. She needs to f/u with her PCP in a few days. If she is symptomatic of hypoglycemia (sweats, fast heart rate, nausea, mood changes, changes in awareness), I would suggest she hold her Tirzepatide shot at this time as this can make it worse and make sure she is eating 3 meals daily with complex carbs like fruits, veggies and grains, and keep snacks on hand like fruit and fruit juice if she is feeling like her blood sugar is dropping.     We can gala nurse visit to do finger glucose check ASAP. Please assist with scheduling  ----- Message -----  From: [facility] Interface  Sent: 12/4/2024  10:14 PM CST  To: Monique Heller PA-C

## 2024-12-05 NOTE — PROGRESS NOTES
Patient was called at 10:25 Pm regarding the  critical lab values for her glucose being 36 mg/dl . She was asked  about if she is having any symptoms including but not limiting shakiness, nausea, vomiting light headedness, headache, irritability, dizziness, , palpitation, blurry vision, abnormal body movement. She is not having any symptoms at this time.    Patient was asked to monitor her blood glucose at home and if it <60 mg/dl or if she were to do have any of those symptoms mentioned above then, she needs to go to ER. She voiced understanding.      Jamari Montes MD

## 2024-12-05 NOTE — TELEPHONE ENCOUNTER
I attempted to reach Ms. Hayden but no one answered. I LVM for her to reach back out to us or Dr. Powers's office to get her critical lab results.

## 2024-12-10 ENCOUNTER — TELEPHONE (OUTPATIENT)
Dept: PULMONOLOGY | Facility: CLINIC | Age: 59
End: 2024-12-10
Payer: COMMERCIAL

## 2024-12-10 NOTE — TELEPHONE ENCOUNTER
Attempt to contact pt in regards to 12/13/24 appts needing rescheduling. Left vm to contact office to reschedule.

## 2024-12-11 ENCOUNTER — TELEPHONE (OUTPATIENT)
Dept: PULMONOLOGY | Facility: CLINIC | Age: 59
End: 2024-12-11
Payer: COMMERCIAL

## 2024-12-11 NOTE — TELEPHONE ENCOUNTER
Spoke with pt. Pt is rescheduled to 02/28/25 at the [facility]. Pt verbalized understanding and confirmed appt.

## 2024-12-18 ENCOUNTER — PATIENT MESSAGE (OUTPATIENT)
Dept: OPTOMETRY | Facility: CLINIC | Age: 59
End: 2024-12-18
Payer: COMMERCIAL

## 2024-12-27 ENCOUNTER — PATIENT MESSAGE (OUTPATIENT)
Dept: OPTOMETRY | Facility: CLINIC | Age: 59
End: 2024-12-27
Payer: COMMERCIAL

## 2024-12-27 ENCOUNTER — TELEPHONE (OUTPATIENT)
Dept: CARDIOLOGY | Facility: CLINIC | Age: 59
End: 2024-12-27
Payer: COMMERCIAL

## 2024-12-27 NOTE — TELEPHONE ENCOUNTER
"Pt was contacted about results:       Average heart rate is on higher side. Is she symptomatic? (Palpitations, fatigue)?     Pt stated that she has not experienced any palpitations, fatigue or fluttering. Stated that she "feel fine.     Pt verbalized understanding with no questions or concerns.      ----- Message from Mayra Salgado NP sent at 12/27/2024  7:09 AM CST -----  Average heart rate is on higher side. Is she symptomatic? (Palpitations, fatigue)?  "

## 2025-01-02 DIAGNOSIS — F33.1 MODERATE EPISODE OF RECURRENT MAJOR DEPRESSIVE DISORDER: ICD-10-CM

## 2025-01-03 RX ORDER — DULOXETIN HYDROCHLORIDE 60 MG/1
120 CAPSULE, DELAYED RELEASE ORAL
Qty: 180 CAPSULE | Refills: 0 | Status: SHIPPED | OUTPATIENT
Start: 2025-01-03

## 2025-01-29 DIAGNOSIS — E87.6 DIURETIC-INDUCED HYPOKALEMIA: Primary | ICD-10-CM

## 2025-01-29 DIAGNOSIS — T50.2X5A DIURETIC-INDUCED HYPOKALEMIA: Primary | ICD-10-CM

## 2025-01-29 DIAGNOSIS — E11.69 TYPE 2 DIABETES MELLITUS WITH OTHER SPECIFIED COMPLICATION, WITHOUT LONG-TERM CURRENT USE OF INSULIN: ICD-10-CM

## 2025-02-03 ENCOUNTER — OFFICE VISIT (OUTPATIENT)
Dept: CARDIOLOGY | Facility: CLINIC | Age: 60
End: 2025-02-03
Payer: COMMERCIAL

## 2025-02-03 ENCOUNTER — LAB VISIT (OUTPATIENT)
Dept: LAB | Facility: HOSPITAL | Age: 60
End: 2025-02-03
Attending: NURSE PRACTITIONER
Payer: COMMERCIAL

## 2025-02-03 VITALS
WEIGHT: 269.38 LBS | BODY MASS INDEX: 52.89 KG/M2 | HEART RATE: 102 BPM | DIASTOLIC BLOOD PRESSURE: 70 MMHG | SYSTOLIC BLOOD PRESSURE: 102 MMHG | HEIGHT: 60 IN

## 2025-02-03 DIAGNOSIS — E78.5 HYPERLIPIDEMIA ASSOCIATED WITH TYPE 2 DIABETES MELLITUS: Chronic | ICD-10-CM

## 2025-02-03 DIAGNOSIS — I15.2 HYPERTENSION ASSOCIATED WITH DIABETES: Chronic | ICD-10-CM

## 2025-02-03 DIAGNOSIS — E11.59 HYPERTENSION ASSOCIATED WITH DIABETES: Chronic | ICD-10-CM

## 2025-02-03 DIAGNOSIS — E11.69 HYPERLIPIDEMIA ASSOCIATED WITH TYPE 2 DIABETES MELLITUS: Chronic | ICD-10-CM

## 2025-02-03 DIAGNOSIS — R00.0 TACHYCARDIA: Primary | ICD-10-CM

## 2025-02-03 DIAGNOSIS — E11.69 TYPE 2 DIABETES MELLITUS WITH OTHER SPECIFIED COMPLICATION, WITHOUT LONG-TERM CURRENT USE OF INSULIN: ICD-10-CM

## 2025-02-03 DIAGNOSIS — T50.2X5A DIURETIC-INDUCED HYPOKALEMIA: ICD-10-CM

## 2025-02-03 DIAGNOSIS — E87.6 DIURETIC-INDUCED HYPOKALEMIA: ICD-10-CM

## 2025-02-03 LAB
ESTIMATED AVG GLUCOSE: 111 MG/DL (ref 68–131)
HBA1C MFR BLD: 5.5 % (ref 4–5.6)

## 2025-02-03 PROCEDURE — 80053 COMPREHEN METABOLIC PANEL: CPT | Performed by: NURSE PRACTITIONER

## 2025-02-03 PROCEDURE — 3074F SYST BP LT 130 MM HG: CPT | Mod: CPTII,S$GLB,,

## 2025-02-03 PROCEDURE — 36415 COLL VENOUS BLD VENIPUNCTURE: CPT | Performed by: NURSE PRACTITIONER

## 2025-02-03 PROCEDURE — 99999 PR PBB SHADOW E&M-EST. PATIENT-LVL IV: CPT | Mod: PBBFAC,,,

## 2025-02-03 PROCEDURE — 1160F RVW MEDS BY RX/DR IN RCRD: CPT | Mod: CPTII,S$GLB,,

## 2025-02-03 PROCEDURE — 3072F LOW RISK FOR RETINOPATHY: CPT | Mod: CPTII,S$GLB,,

## 2025-02-03 PROCEDURE — 3008F BODY MASS INDEX DOCD: CPT | Mod: CPTII,S$GLB,,

## 2025-02-03 PROCEDURE — 1159F MED LIST DOCD IN RCRD: CPT | Mod: CPTII,S$GLB,,

## 2025-02-03 PROCEDURE — 83036 HEMOGLOBIN GLYCOSYLATED A1C: CPT | Performed by: NURSE PRACTITIONER

## 2025-02-03 PROCEDURE — 4010F ACE/ARB THERAPY RXD/TAKEN: CPT | Mod: CPTII,S$GLB,,

## 2025-02-03 PROCEDURE — 3078F DIAST BP <80 MM HG: CPT | Mod: CPTII,S$GLB,,

## 2025-02-03 PROCEDURE — 99214 OFFICE O/P EST MOD 30 MIN: CPT | Mod: S$GLB,,,

## 2025-02-03 RX ORDER — LOSARTAN POTASSIUM 100 MG/1
50 TABLET ORAL DAILY
Qty: 45 TABLET | Refills: 3 | Status: SHIPPED | OUTPATIENT
Start: 2025-02-03 | End: 2026-02-03

## 2025-02-03 RX ORDER — METOPROLOL SUCCINATE 25 MG/1
25 TABLET, EXTENDED RELEASE ORAL DAILY
Qty: 90 TABLET | Refills: 3 | Status: SHIPPED | OUTPATIENT
Start: 2025-02-03 | End: 2026-02-03

## 2025-02-03 NOTE — PROGRESS NOTES
Subjective:   Patient ID:  Ac Hayden is a 59 y.o. female who presents for evaluation of No chief complaint on file.      HPI 58-year-old female whose current medical conditions include HTN, HLD anxiety obesity BMI 50 s/p sleeve in 2018, CHRIS not tolerate CPAP (given an used one and refused to use it). Quit smoking 20yrs and no drink followed by Dr. Shoemaker in [facility].  Here today for CV follow-up denies any chest pain, angina or anginal equivalent.  Blood pressure stable, heart rate elevated from 0 1 upon review previous heart rates at clinic visits ranging from 90s to 120s.  Patient denies any palpitations or increased fatigue    Echo 22' with normal EF    02/03/2025   Here today for CV follow-up, denies any CP, angina or anginal equivalent. HR elevated at baseline. BP very well controlled. Tries to watch her salt intake, works at [employer]    12/04/2024 3 day Holter monitor with no concerning arrhythmias average heart rate on the higher side  Past Medical History:   Diagnosis Date    Allergic rhinitis     Arthritis     Colon polyps     Diabetes mellitus 2002     am 01/05/2023    Diabetes mellitus, type 2     DM (diabetes mellitus) 18  years 2002     am 01/19/2022    Hyperlipidemia     Hypertension     Metabolic syndrome     Mixed anxiety and depressive disorder     Pneumonia     Prediabetes     Urine incontinence        Past Surgical History:   Procedure Laterality Date    BARIATRIC SURGERY  08/22/2018    [facility])    COLONOSCOPY N/A 10/19/2017    Procedure: COLONOSCOPY;  Surgeon: Jamal Cole MD;  Location: [facility] [place] ENDO;  Service: Endoscopy;  Laterality: N/A;    COLONOSCOPY N/A 01/05/2023    Procedure: COLONOSCOPY;  Surgeon: Katelyn Hensley MD;  Location: [facility] [place] ENDO;  Service: Endoscopy;  Laterality: N/A;    HYSTERECTOMY      PARTIAL HYSTERECTOMY      PLANTAR FASCIA SURGERY      TOTAL REDUCTION MAMMOPLASTY  2014       Social History     Tobacco Use    Smoking  status: Former     Current packs/day: 0.00     Average packs/day: 0.3 packs/day for 13.0 years (3.3 ttl pk-yrs)     Types: Cigarettes     Start date: 10/19/1987     Quit date: 10/19/2000     Years since quittin.3    Smokeless tobacco: Former   Substance Use Topics    Alcohol use: Not Currently     Comment: occasionally    Drug use: No       Family History   Problem Relation Name Age of Onset    Diabetes Mother      Stroke Mother      Cancer Father      Diabetes Sister      Ovarian cancer Sister      Strabismus Sister      Diabetes Brother      Lupus Other niece     Eczema Other niece     Melanoma Neg Hx      Psoriasis Neg Hx         Current Outpatient Medications on File Prior to Visit   Medication Sig Dispense Refill    alcohol swabs (ALCOHOL PREP PADS) PadM Use to check blood glucose as directed. 200 each 11    ALPRAZolam (XANAX) 0.5 MG tablet Take 1 tablet (0.5 mg total) by mouth 2 (two) times daily as needed for Anxiety. 12 tablet 0    amLODIPine (NORVASC) 5 MG tablet Take 1 tablet (5 mg total) by mouth once daily. 90 tablet 3    atorvastatin (LIPITOR) 40 MG tablet Take 1 tablet (40 mg total) by mouth every evening. (for cholesterol and heart health) 90 tablet 3    azelastine (ASTELIN) 137 mcg (0.1 %) nasal spray 1 spray (137 mcg total) by Nasal route 2 (two) times daily. 30 mL 5    blood sugar diagnostic (BLOOD GLUCOSE TEST) Strp Check blood glucose 1-2 times daily and as needed for symptoms of low or high blood glucose. 200 strip 11    blood-glucose meter kit Use as instructed 1 each 0    DULoxetine (CYMBALTA) 60 MG capsule Take 2 capsules by mouth once daily 180 capsule 0    ergocalciferol (ERGOCALCIFEROL) 50,000 unit Cap Take 2 capsules (100,000 Units total) by mouth every 7 days. 24 capsule 3    fluticasone propionate (FLONASE) 50 mcg/actuation nasal spray 2 sprays (100 mcg total) by Each Nostril route once daily. 32 g 5    hydroCHLOROthiazide (HYDRODIURIL) 25 MG tablet Take 1 tablet (25 mg total) by  mouth once daily. 90 tablet 3    lancets (LANCETS,THIN) Misc Check blood glucose 1-2 times daily and as needed for symptoms of low or high blood glucose. 200 each 11    lancing device Misc Use as directed. 1 each 3    levocetirizine (XYZAL) 5 MG tablet Take 1 tablet (5 mg total) by mouth every evening. 90 tablet 3    losartan (COZAAR) 100 MG tablet Take 1 tablet (100 mg total) by mouth once daily. 90 tablet 3    moxifloxacin (VIGAMOX) 0.5 % ophthalmic solution Place 1 drop into the left eye every 2 (two) hours. 3 mL 3    potassium chloride SA (K-DUR,KLOR-CON) 20 MEQ tablet Take 1 tablet (20 mEq total) by mouth 2 (two) times daily. 60 tablet 2    solifenacin (VESICARE) 10 MG tablet Take 1 tablet by mouth once daily 90 tablet 3    tirzepatide 15 mg/0.5 mL PnIj Inject 15 mg into the skin every 7 days. for 12 doses 2 mL 2    traZODone (DESYREL) 100 MG tablet Take 1 tablet (100 mg total) by mouth every evening. 90 tablet 1    vibegron (GEMTESA) 75 mg Tab Take 1 tablet (75 mg total) by mouth once daily. 30 tablet 11     No current facility-administered medications on file prior to visit.      Wt Readings from Last 3 Encounters:   12/04/24 123.8 kg (272 lb 14.9 oz)   11/25/24 124.8 kg (275 lb 2.2 oz)   08/21/24 123.8 kg (272 lb 14.9 oz)     Temp Readings from Last 3 Encounters:   12/04/24 96.6 °F (35.9 °C) (Tympanic)   08/21/24 96.5 °F (35.8 °C) (Tympanic)   05/02/24 98.8 °F (37.1 °C)     BP Readings from Last 3 Encounters:   12/04/24 110/80   11/25/24 117/78   08/21/24 116/78     Pulse Readings from Last 3 Encounters:   12/04/24 (!) 118   11/25/24 101   08/21/24 90        Review of Systems   Constitutional: Positive for malaise/fatigue.   HENT: Negative.     Eyes: Negative.    Cardiovascular: Negative.    Respiratory: Negative.     Skin: Negative.    Musculoskeletal: Negative.    Gastrointestinal: Negative.    Genitourinary: Negative.    Neurological: Negative.    Psychiatric/Behavioral: Negative.         Objective:    Physical Exam  Vitals and nursing note reviewed.   Constitutional:       Appearance: Normal appearance.   HENT:      Head: Normocephalic.   Eyes:      Pupils: Pupils are equal, round, and reactive to light.   Cardiovascular:      Rate and Rhythm: Regular rhythm. Tachycardia present.      Heart sounds: Normal heart sounds, S1 normal and S2 normal. No murmur heard.     No S3 or S4 sounds.   Pulmonary:      Effort: Pulmonary effort is normal.      Breath sounds: Normal breath sounds.   Abdominal:      General: Bowel sounds are normal.      Palpations: Abdomen is soft.   Musculoskeletal:         General: Normal range of motion.      Cervical back: Normal range of motion.   Skin:     Capillary Refill: Capillary refill takes less than 2 seconds.   Neurological:      General: No focal deficit present.      Mental Status: She is alert and oriented to person, place, and time.   Psychiatric:         Mood and Affect: Mood normal.         Behavior: Behavior normal.         Thought Content: Thought content normal.         Lab Results   Component Value Date    CHOL 169 12/04/2024    CHOL 152 08/07/2024    CHOL 175 09/07/2023     Lab Results   Component Value Date    HDL 43 12/04/2024    HDL 34 (L) 08/07/2024    HDL 48 09/07/2023     Lab Results   Component Value Date    LDLCALC 107.8 12/04/2024    LDLCALC 99.0 08/07/2024    LDLCALC 109.0 09/07/2023     Lab Results   Component Value Date    TRIG 91 12/04/2024    TRIG 95 08/07/2024    TRIG 90 09/07/2023     Lab Results   Component Value Date    CHOLHDL 25.4 12/04/2024    CHOLHDL 22.4 08/07/2024    CHOLHDL 27.4 09/07/2023       Chemistry        Component Value Date/Time     12/04/2024 0932    K 3.0 (L) 12/04/2024 0932     12/04/2024 0932    CO2 24 12/04/2024 0932    BUN 15 12/04/2024 0932    CREATININE 1.0 12/04/2024 0932    GLU 36 (LL) 12/04/2024 0932        Component Value Date/Time    CALCIUM 10.2 12/04/2024 0932    ALKPHOS 74 12/04/2024 0932    AST 19 12/04/2024  0932    ALT 19 12/04/2024 0932    BILITOT 0.6 12/04/2024 0932    ESTGFRAFRICA >60.0 02/11/2022 1022    EGFRNONAA >60.0 02/11/2022 1022          Lab Results   Component Value Date    TSH 0.990 11/17/2022     Lab Results   Component Value Date    INR 1.0 05/23/2018     @RESUFAST(WBC,HGB,HCT,MCV,PLT)  @LABRCNTIP(BNP,BNPTRIAGEBLO)@  CrCl cannot be calculated (Patient's most recent lab result is older than the maximum 7 days allowed.).     Results for orders placed during the hospital encounter of 09/03/20    Echo Color Flow Doppler? Yes    Interpretation Summary  · Concentric left ventricular remodeling.  · Normal left ventricular systolic function. The estimated ejection fraction is 60%.  · Normal LV diastolic function.  · Normal central venous pressure (3 mmHg).  · The estimated PA systolic pressure is 30 mmHg.  · Normal right ventricular systolic function.     No results found for this or any previous visit.     Assessment:      1. Hyperlipidemia associated with type 2 diabetes mellitus    2. Hypertension associated with diabetes        Plan:   Hyperlipidemia associated with type 2 diabetes mellitus    Hypertension associated with diabetes      Start ToprolXL 25mg nightly  Dec Losartan 50mg daily    Amlodipine, hydrochlorothiazide, losartan, Metoprolol, low-sodium diet, profile blood pressure- HTN  Atorvastatin, low-fat diet- Hlp  Risk factor modifications   Daily exercise as tolerated     Follow up in 1m with Nurse call regarding BP/HR med changes    See Dr. Walker in 3m or sooner if needed    Courtney Guillot, FNP-C [facility], Cardiology

## 2025-02-04 LAB
ALBUMIN SERPL BCP-MCNC: 3.5 G/DL (ref 3.5–5.2)
ALP SERPL-CCNC: 86 U/L (ref 40–150)
ALT SERPL W/O P-5'-P-CCNC: 25 U/L (ref 10–44)
ANION GAP SERPL CALC-SCNC: 11 MMOL/L (ref 8–16)
AST SERPL-CCNC: 21 U/L (ref 10–40)
BILIRUB SERPL-MCNC: 0.3 MG/DL (ref 0.1–1)
BUN SERPL-MCNC: 11 MG/DL (ref 6–20)
CALCIUM SERPL-MCNC: 9.7 MG/DL (ref 8.7–10.5)
CHLORIDE SERPL-SCNC: 105 MMOL/L (ref 95–110)
CO2 SERPL-SCNC: 25 MMOL/L (ref 23–29)
CREAT SERPL-MCNC: 1 MG/DL (ref 0.5–1.4)
EST. GFR  (NO RACE VARIABLE): >60 ML/MIN/1.73 M^2
GLUCOSE SERPL-MCNC: 61 MG/DL (ref 70–110)
POTASSIUM SERPL-SCNC: 3.6 MMOL/L (ref 3.5–5.1)
PROT SERPL-MCNC: 7.7 G/DL (ref 6–8.4)
SODIUM SERPL-SCNC: 141 MMOL/L (ref 136–145)

## 2025-02-10 ENCOUNTER — OFFICE VISIT (OUTPATIENT)
Dept: INTERNAL MEDICINE | Facility: CLINIC | Age: 60
End: 2025-02-10
Payer: COMMERCIAL

## 2025-02-10 ENCOUNTER — LAB VISIT (OUTPATIENT)
Dept: LAB | Facility: HOSPITAL | Age: 60
End: 2025-02-10
Attending: NURSE PRACTITIONER
Payer: COMMERCIAL

## 2025-02-10 VITALS
SYSTOLIC BLOOD PRESSURE: 122 MMHG | TEMPERATURE: 97 F | OXYGEN SATURATION: 98 % | BODY MASS INDEX: 53.16 KG/M2 | HEART RATE: 98 BPM | DIASTOLIC BLOOD PRESSURE: 70 MMHG | WEIGHT: 270.75 LBS | HEIGHT: 60 IN

## 2025-02-10 DIAGNOSIS — E11.59 HYPERTENSION ASSOCIATED WITH DIABETES: Chronic | ICD-10-CM

## 2025-02-10 DIAGNOSIS — T50.2X5A DIURETIC-INDUCED HYPOKALEMIA: Primary | ICD-10-CM

## 2025-02-10 DIAGNOSIS — E55.9 VITAMIN D DEFICIENCY: Chronic | ICD-10-CM

## 2025-02-10 DIAGNOSIS — Z29.9 PREVENTIVE MEASURE: ICD-10-CM

## 2025-02-10 DIAGNOSIS — E87.6 DIURETIC-INDUCED HYPOKALEMIA: Primary | ICD-10-CM

## 2025-02-10 DIAGNOSIS — E66.813 CLASS 3 SEVERE OBESITY DUE TO EXCESS CALORIES WITH SERIOUS COMORBIDITY AND BODY MASS INDEX (BMI) OF 50.0 TO 59.9 IN ADULT: ICD-10-CM

## 2025-02-10 DIAGNOSIS — J30.1 SEASONAL ALLERGIC RHINITIS DUE TO POLLEN: ICD-10-CM

## 2025-02-10 DIAGNOSIS — F43.21 GRIEVING: Chronic | ICD-10-CM

## 2025-02-10 DIAGNOSIS — I15.2 HYPERTENSION ASSOCIATED WITH DIABETES: Chronic | ICD-10-CM

## 2025-02-10 DIAGNOSIS — E66.01 CLASS 3 SEVERE OBESITY DUE TO EXCESS CALORIES WITH SERIOUS COMORBIDITY AND BODY MASS INDEX (BMI) OF 50.0 TO 59.9 IN ADULT: ICD-10-CM

## 2025-02-10 DIAGNOSIS — E78.5 HYPERLIPIDEMIA ASSOCIATED WITH TYPE 2 DIABETES MELLITUS: Chronic | ICD-10-CM

## 2025-02-10 DIAGNOSIS — F33.1 MODERATE EPISODE OF RECURRENT MAJOR DEPRESSIVE DISORDER: ICD-10-CM

## 2025-02-10 DIAGNOSIS — E11.69 HYPERLIPIDEMIA ASSOCIATED WITH TYPE 2 DIABETES MELLITUS: Chronic | ICD-10-CM

## 2025-02-10 DIAGNOSIS — E11.69 TYPE 2 DIABETES MELLITUS WITH OTHER SPECIFIED COMPLICATION, WITHOUT LONG-TERM CURRENT USE OF INSULIN: Chronic | ICD-10-CM

## 2025-02-10 DIAGNOSIS — F41.9 ANXIETY: Chronic | ICD-10-CM

## 2025-02-10 PROCEDURE — 3078F DIAST BP <80 MM HG: CPT | Mod: CPTII,S$GLB,, | Performed by: NURSE PRACTITIONER

## 2025-02-10 PROCEDURE — 99214 OFFICE O/P EST MOD 30 MIN: CPT | Mod: S$GLB,,, | Performed by: NURSE PRACTITIONER

## 2025-02-10 PROCEDURE — 3072F LOW RISK FOR RETINOPATHY: CPT | Mod: CPTII,S$GLB,, | Performed by: NURSE PRACTITIONER

## 2025-02-10 PROCEDURE — 1160F RVW MEDS BY RX/DR IN RCRD: CPT | Mod: CPTII,S$GLB,, | Performed by: NURSE PRACTITIONER

## 2025-02-10 PROCEDURE — 3044F HG A1C LEVEL LT 7.0%: CPT | Mod: CPTII,S$GLB,, | Performed by: NURSE PRACTITIONER

## 2025-02-10 PROCEDURE — 4010F ACE/ARB THERAPY RXD/TAKEN: CPT | Mod: CPTII,S$GLB,, | Performed by: NURSE PRACTITIONER

## 2025-02-10 PROCEDURE — 36415 COLL VENOUS BLD VENIPUNCTURE: CPT | Performed by: NURSE PRACTITIONER

## 2025-02-10 PROCEDURE — 3074F SYST BP LT 130 MM HG: CPT | Mod: CPTII,S$GLB,, | Performed by: NURSE PRACTITIONER

## 2025-02-10 PROCEDURE — 99999 PR PBB SHADOW E&M-EST. PATIENT-LVL IV: CPT | Mod: PBBFAC,,, | Performed by: NURSE PRACTITIONER

## 2025-02-10 PROCEDURE — G2211 COMPLEX E/M VISIT ADD ON: HCPCS | Mod: S$GLB,,, | Performed by: NURSE PRACTITIONER

## 2025-02-10 PROCEDURE — 82306 VITAMIN D 25 HYDROXY: CPT | Performed by: NURSE PRACTITIONER

## 2025-02-10 PROCEDURE — 3008F BODY MASS INDEX DOCD: CPT | Mod: CPTII,S$GLB,, | Performed by: NURSE PRACTITIONER

## 2025-02-10 PROCEDURE — 1159F MED LIST DOCD IN RCRD: CPT | Mod: CPTII,S$GLB,, | Performed by: NURSE PRACTITIONER

## 2025-02-10 RX ORDER — HYDROCHLOROTHIAZIDE 25 MG/1
25 TABLET ORAL DAILY
Qty: 90 TABLET | Refills: 3 | Status: SHIPPED | OUTPATIENT
Start: 2025-02-10 | End: 2026-02-10

## 2025-02-10 RX ORDER — AMLODIPINE BESYLATE 5 MG/1
5 TABLET ORAL DAILY
Qty: 90 TABLET | Refills: 3 | Status: SHIPPED | OUTPATIENT
Start: 2025-02-10 | End: 2026-02-05

## 2025-02-10 RX ORDER — ALPRAZOLAM 0.5 MG/1
0.5 TABLET ORAL 2 TIMES DAILY PRN
Qty: 12 TABLET | Refills: 0 | Status: SHIPPED | OUTPATIENT
Start: 2025-02-10

## 2025-02-10 RX ORDER — LANCING DEVICE
EACH MISCELLANEOUS
Qty: 1 EACH | Refills: 3 | Status: SHIPPED | OUTPATIENT
Start: 2025-02-10

## 2025-02-10 RX ORDER — DULOXETIN HYDROCHLORIDE 60 MG/1
120 CAPSULE, DELAYED RELEASE ORAL DAILY
Qty: 180 CAPSULE | Refills: 3 | Status: SHIPPED | OUTPATIENT
Start: 2025-02-10

## 2025-02-10 RX ORDER — LANCETS
EACH MISCELLANEOUS
Qty: 200 EACH | Refills: 11 | Status: SHIPPED | OUTPATIENT
Start: 2025-02-10

## 2025-02-10 RX ORDER — INSULIN PUMP SYRINGE, 3 ML
EACH MISCELLANEOUS
Qty: 1 EACH | Refills: 0 | Status: SHIPPED | OUTPATIENT
Start: 2025-02-10

## 2025-02-10 RX ORDER — LEVOCETIRIZINE DIHYDROCHLORIDE 5 MG/1
5 TABLET, FILM COATED ORAL NIGHTLY
Qty: 90 TABLET | Refills: 3 | Status: SHIPPED | OUTPATIENT
Start: 2025-02-10 | End: 2026-02-10

## 2025-02-10 RX ORDER — FLUTICASONE PROPIONATE 50 MCG
2 SPRAY, SUSPENSION (ML) NASAL DAILY
Qty: 32 G | Refills: 5 | Status: SHIPPED | OUTPATIENT
Start: 2025-02-10 | End: 2026-02-10

## 2025-02-10 RX ORDER — ATORVASTATIN CALCIUM 40 MG/1
40 TABLET, FILM COATED ORAL NIGHTLY
Qty: 90 TABLET | Refills: 3 | Status: SHIPPED | OUTPATIENT
Start: 2025-02-10 | End: 2026-02-10

## 2025-02-10 RX ORDER — AZELASTINE 1 MG/ML
1 SPRAY, METERED NASAL 2 TIMES DAILY
Qty: 30 ML | Refills: 5 | Status: SHIPPED | OUTPATIENT
Start: 2025-02-10 | End: 2026-02-10

## 2025-02-10 RX ORDER — ERGOCALCIFEROL 1.25 MG/1
100000 CAPSULE ORAL
Qty: 24 CAPSULE | Refills: 3 | Status: SHIPPED | OUTPATIENT
Start: 2025-02-10 | End: 2025-02-12

## 2025-02-10 RX ORDER — TRAZODONE HYDROCHLORIDE 100 MG/1
100 TABLET ORAL NIGHTLY
Qty: 90 TABLET | Refills: 1 | Status: SHIPPED | OUTPATIENT
Start: 2025-02-10

## 2025-02-10 NOTE — PROGRESS NOTES
Subjective:      Patient ID: Ac Hayden is a 59 y.o. female.    Chief Complaint: Follow-up    History of Present Illness    CHIEF COMPLAINT:  Ac presents today for follow up of potassium levels    MEDICATIONS:  She takes potassium pills twice daily with reported compliance despite difficulty with taste. Current medications include Cymbalta 2 capsules daily, Hydrochlorothiazide 25 mg, Vitamin D twice weekly on Mondays, and Xanax as needed for funerals and pain management. She was recently prescribed a new cardiac medication to be taken in the afternoon along with potassium.    CARDIOVASCULAR:  Heart monitor showed elevated heart rate.      ROS:  General: -fever, -chills, -fatigue, -weight gain, -weight loss  Eyes: -vision changes, -redness, -discharge  ENT: -ear pain, -nasal congestion, -sore throat  Cardiovascular: -chest pain, -palpitations, -lower extremity edema  Respiratory: -cough, -shortness of breath  Gastrointestinal: -abdominal pain, -nausea, -vomiting, -diarrhea, -constipation, -blood in stool  Genitourinary: -dysuria, -hematuria, -frequency  Musculoskeletal: -joint pain, -muscle pain  Skin: -rash, -lesion  Neurological: -headache, -dizziness, -numbness, -tingling  Psychiatric: -anxiety, -depression, -sleep difficulty          Patient Active Problem List   Diagnosis    Degeneration of lumbar or lumbosacral intervertebral disc    Mixed anxiety and depressive disorder    Impingement syndrome, shoulder    Osteoarthritis of acromioclavicular joint    Urgency incontinence    Type 2 diabetes mellitus with other specified complication    Pulmonary nodules    Hyperlipidemia associated with type 2 diabetes mellitus    History of colon polyps    Primary snoring    S/P gastric surgery    Decreased ROM of right knee    Hypertension associated with diabetes    Class 3 severe obesity due to excess calories with serious comorbidity and body mass index (BMI) of 40.0 to 44.9 in adult    Vitamin D deficiency     Seasonal allergic rhinitis due to pollen         Current Outpatient Medications:     alcohol swabs (ALCOHOL PREP PADS) PadM, Use to check blood glucose as directed., Disp: 200 each, Rfl: 11    losartan (COZAAR) 100 MG tablet, Take 0.5 tablets (50 mg total) by mouth once daily., Disp: 45 tablet, Rfl: 3    metoprolol succinate (TOPROL-XL) 25 MG 24 hr tablet, Take 1 tablet (25 mg total) by mouth once daily., Disp: 90 tablet, Rfl: 3    moxifloxacin (VIGAMOX) 0.5 % ophthalmic solution, Place 1 drop into the left eye every 2 (two) hours., Disp: 3 mL, Rfl: 3    potassium chloride SA (K-DUR,KLOR-CON) 20 MEQ tablet, Take 1 tablet (20 mEq total) by mouth 2 (two) times daily., Disp: 60 tablet, Rfl: 2    solifenacin (VESICARE) 10 MG tablet, Take 1 tablet by mouth once daily, Disp: 90 tablet, Rfl: 3    vibegron (GEMTESA) 75 mg Tab, Take 1 tablet (75 mg total) by mouth once daily., Disp: 30 tablet, Rfl: 11    ALPRAZolam (XANAX) 0.5 MG tablet, Take 1 tablet (0.5 mg total) by mouth 2 (two) times daily as needed for Anxiety., Disp: 12 tablet, Rfl: 0    amLODIPine (NORVASC) 5 MG tablet, Take 1 tablet (5 mg total) by mouth once daily., Disp: 90 tablet, Rfl: 3    atorvastatin (LIPITOR) 40 MG tablet, Take 1 tablet (40 mg total) by mouth every evening. (for cholesterol and heart health), Disp: 90 tablet, Rfl: 3    azelastine (ASTELIN) 137 mcg (0.1 %) nasal spray, 1 spray (137 mcg total) by Nasal route 2 (two) times daily., Disp: 30 mL, Rfl: 5    blood sugar diagnostic (BLOOD GLUCOSE TEST) Strp, Check blood glucose 1-2 times daily and as needed for symptoms of low or high blood glucose., Disp: 200 strip, Rfl: 11    blood-glucose meter kit, Use as instructed, Disp: 1 each, Rfl: 0    DULoxetine (CYMBALTA) 60 MG capsule, Take 2 capsules (120 mg total) by mouth once daily., Disp: 180 capsule, Rfl: 3    ergocalciferol (ERGOCALCIFEROL) 50,000 unit Cap, Take 2 capsules (100,000 Units total) by mouth every 7 days., Disp: 24 capsule, Rfl: 3     fluticasone propionate (FLONASE) 50 mcg/actuation nasal spray, 2 sprays (100 mcg total) by Each Nostril route once daily., Disp: 32 g, Rfl: 5    hydroCHLOROthiazide (HYDRODIURIL) 25 MG tablet, Take 1 tablet (25 mg total) by mouth once daily., Disp: 90 tablet, Rfl: 3    lancets (LANCETS,THIN) Misc, Check blood glucose 1-2 times daily and as needed for symptoms of low or high blood glucose., Disp: 200 each, Rfl: 11    lancing device Misc, Use as directed., Disp: 1 each, Rfl: 3    levocetirizine (XYZAL) 5 MG tablet, Take 1 tablet (5 mg total) by mouth every evening., Disp: 90 tablet, Rfl: 3    semaglutide, weight loss, 0.5 mg/0.5 mL PnIj, Inject 0.5 mg into the skin every 7 days. for 4 doses, Disp: 2 mL, Rfl: 0    traZODone (DESYREL) 100 MG tablet, Take 1 tablet (100 mg total) by mouth every evening., Disp: 90 tablet, Rfl: 1      Objective:   /70 (BP Location: Left arm, Patient Position: Sitting)   Pulse 98   Temp 96.9 °F (36.1 °C) (Tympanic)   Ht 5' (1.524 m)   Wt 122.8 kg (270 lb 11.6 oz)   SpO2 98%   BMI 52.87 kg/m²     Physical Exam             Physical Exam  Vitals and nursing note reviewed.   Constitutional:       General: She is awake. She is not in acute distress.     Appearance: Normal appearance. She is well-developed and well-groomed. She is not ill-appearing, toxic-appearing or diaphoretic.   HENT:      Head: Normocephalic and atraumatic.      Right Ear: External ear normal.      Left Ear: External ear normal.   Eyes:      Conjunctiva/sclera: Conjunctivae normal.      Pupils: Pupils are equal, round, and reactive to light.   Neck:      Thyroid: No thyroid mass, thyromegaly or thyroid tenderness.   Cardiovascular:      Rate and Rhythm: Normal rate and regular rhythm.      Heart sounds: Normal heart sounds. No murmur heard.  Pulmonary:      Effort: Pulmonary effort is normal. No tachypnea, bradypnea, accessory muscle usage, prolonged expiration, respiratory distress or retractions.      Breath  sounds: Normal breath sounds. No stridor, decreased air movement or transmitted upper airway sounds. No decreased breath sounds, wheezing, rhonchi or rales.   Abdominal:      General: Bowel sounds are normal.      Palpations: Abdomen is soft.   Musculoskeletal:         General: No swelling, tenderness, deformity or signs of injury.      Cervical back: Full passive range of motion without pain, normal range of motion and neck supple.      Right lower le+ Edema present.      Left lower le+ Edema present.   Skin:     General: Skin is warm and dry.      Capillary Refill: Capillary refill takes less than 2 seconds.   Neurological:      General: No focal deficit present.      Mental Status: She is alert and oriented to person, place, and time.      Gait: Gait normal.   Psychiatric:         Attention and Perception: Attention and perception normal.         Mood and Affect: Mood and affect normal.         Speech: Speech normal.         Behavior: Behavior normal. Behavior is cooperative.         Cognition and Memory: Cognition normal.          Assessment/Plan :   1. Diuretic-induced hypokalemia  -     Cancel: POTASSIUM; Future; Expected date: 03/10/2025  -     Magnesium; Future; Expected date: 03/10/2025    2. Hypertension associated with diabetes  -     amLODIPine (NORVASC) 5 MG tablet; Take 1 tablet (5 mg total) by mouth once daily.  Dispense: 90 tablet; Refill: 3  -     hydroCHLOROthiazide (HYDRODIURIL) 25 MG tablet; Take 1 tablet (25 mg total) by mouth once daily.  Dispense: 90 tablet; Refill: 3    3. Hyperlipidemia associated with type 2 diabetes mellitus  -     atorvastatin (LIPITOR) 40 MG tablet; Take 1 tablet (40 mg total) by mouth every evening. (for cholesterol and heart health)  Dispense: 90 tablet; Refill: 3    4. Seasonal allergic rhinitis due to pollen  -     azelastine (ASTELIN) 137 mcg (0.1 %) nasal spray; 1 spray (137 mcg total) by Nasal route 2 (two) times daily.  Dispense: 30 mL; Refill: 5  -      fluticasone propionate (FLONASE) 50 mcg/actuation nasal spray; 2 sprays (100 mcg total) by Each Nostril route once daily.  Dispense: 32 g; Refill: 5  -     levocetirizine (XYZAL) 5 MG tablet; Take 1 tablet (5 mg total) by mouth every evening.  Dispense: 90 tablet; Refill: 3    5. Type 2 diabetes mellitus with other specified complication, without long-term current use of insulin  Overview:  Lab Results   Component Value Date    HGBA1C 6.5 (H) 10/21/2021    HGBA1C 8.0 (H) 08/09/2018    HGBA1C 8.0 (H) 11/22/2017    HGBA1C 9.0 (H) 09/27/2017    HGBA1C 6.8 (H) 01/06/2017   No history or family history of thyroid cancer or multiple endocrine neoplasia syndrome.     Orders:  -     blood sugar diagnostic (BLOOD GLUCOSE TEST) Strp; Check blood glucose 1-2 times daily and as needed for symptoms of low or high blood glucose.  Dispense: 200 strip; Refill: 11  -     blood-glucose meter kit; Use as instructed  Dispense: 1 each; Refill: 0  -     lancets (LANCETS,THIN) Misc; Check blood glucose 1-2 times daily and as needed for symptoms of low or high blood glucose.  Dispense: 200 each; Refill: 11  -     lancing device Misc; Use as directed.  Dispense: 1 each; Refill: 3    6. Moderate episode of recurrent major depressive disorder  -     DULoxetine (CYMBALTA) 60 MG capsule; Take 2 capsules (120 mg total) by mouth once daily.  Dispense: 180 capsule; Refill: 3  -     traZODone (DESYREL) 100 MG tablet; Take 1 tablet (100 mg total) by mouth every evening.  Dispense: 90 tablet; Refill: 1    7. Anxiety  -     ALPRAZolam (XANAX) 0.5 MG tablet; Take 1 tablet (0.5 mg total) by mouth 2 (two) times daily as needed for Anxiety.  Dispense: 12 tablet; Refill: 0    8. Grieving  -     ALPRAZolam (XANAX) 0.5 MG tablet; Take 1 tablet (0.5 mg total) by mouth 2 (two) times daily as needed for Anxiety.  Dispense: 12 tablet; Refill: 0    9. Vitamin D deficiency  -     ergocalciferol (ERGOCALCIFEROL) 50,000 unit Cap; Take 2 capsules (100,000 Units  total) by mouth every 7 days.  Dispense: 24 capsule; Refill: 3  -     Cancel: Vitamin D; Future; Expected date: 03/10/2025  -     Vitamin D; Future; Expected date: 02/10/2025    10. Class 3 severe obesity due to excess calories with serious comorbidity and body mass index (BMI) of 50.0 to 59.9 in adult  -     semaglutide, weight loss, 0.5 mg/0.5 mL PnIj; Inject 0.5 mg into the skin every 7 days. for 4 doses  Dispense: 2 mL; Refill: 0         Assessment & Plan    Assessed patient's potassium levels, noting low-normal range  Continued potassium supplementation due to persistent hypokalemia, likely caused by hydrochlorothiazide use  Reviewed cardiologist's recommendations for heart rate management  Evaluated current blood pressure control and kidney function  Considered weight loss medication, but deferred to Dr. Powers due to glucose concerns    HYPOKALEMIA:  - Emphasized the importance of potassium for heart function and potential cardiac abnormalities with imbalanced levels.  - Clarified that dietary potassium intake should be balanced with supplementation to avoid excessive levels.  - Continued potassium supplements, to be taken twice daily.  - Ordered potassium level check in 1 month.  - Scheduled follow up in 1 month for lab work (potassium levels).  - Instructed the patient to contact the office for lab results.  - Arranged follow-up with Dr. Powers for virtual visit after 1 month of potassium treatment.  - Noted that patient's potassium levels are low normal, close to being low again.  - Requested patient to return sooner to recheck potassium levels.  - Discussed high potassium diet options with the patient as an alternative to supplements.  - Ac to incorporate high-potassium foods into diet, including okra, vegetable juices, tomatoes, spinach, refried beans, broccoli, potatoes, raw carrots, [place] sprouts, artichoke, orange juice, and half a banana.  - Discussed high-potassium foods and  drinks.    HYPERTENSION:  - Prescribed Toprol 25 mg to be taken at night.  - Adjusted Lisinopril dosage to half of the previous dose.  - Decreased Lisinopril to 50 mg.  - Continued hydrochlorothiazide 25 mg for blood pressure control.  - Noted that patient's blood pressure is currently controlled.    TACHYCARDIA:  - Noted that cardiologist monitored patient's heart rate using a monitor for several days.  - Observed that patient's heart rate remained elevated after monitoring.    ANXIETY:  - Continued Xanax as needed for specific situations (e.g., funerals).    VITAMIN D DEFICIENCY:  - Continued Vitamin D supplementation, two capsules every Monday.  - Ordered Vitamin D level check in 1 month.  - Scheduled follow up in 1 month for lab work (Vitamin D levels).  - Planned to check vitamin D levels at the next potassium check in a month.    MEDICATIONS/SUPPLEMENTS:  - Continued Symbauta, two capsules daily.    OTHER INSTRUCTIONS:  - Noted that patient's kidney function is normal based on recent CMP results.  - Mentioned potential increase in A1C level due to high potassium diet.          Follow up if symptoms worsen or fail to improve.    This note was generated with the assistance of ambient listening technology. Verbal consent was obtained by the patient and accompanying visitor(s) for the recording of patient appointment to facilitate this note. I attest to having reviewed and edited the generated note for accuracy, though some syntax or spelling errors may persist. Please contact the author of this note for any clarification.

## 2025-02-11 DIAGNOSIS — G47.33 OSA (OBSTRUCTIVE SLEEP APNEA): ICD-10-CM

## 2025-02-11 DIAGNOSIS — E66.813 CLASS 3 SEVERE OBESITY DUE TO EXCESS CALORIES WITH SERIOUS COMORBIDITY AND BODY MASS INDEX (BMI) OF 50.0 TO 59.9 IN ADULT: Primary | ICD-10-CM

## 2025-02-11 DIAGNOSIS — E88.810 METABOLIC SYNDROME: ICD-10-CM

## 2025-02-11 DIAGNOSIS — E66.01 CLASS 3 SEVERE OBESITY DUE TO EXCESS CALORIES WITH SERIOUS COMORBIDITY AND BODY MASS INDEX (BMI) OF 50.0 TO 59.9 IN ADULT: Primary | ICD-10-CM

## 2025-02-11 LAB — 25(OH)D3+25(OH)D2 SERPL-MCNC: 40 NG/ML (ref 30–96)

## 2025-02-11 RX ORDER — SEMAGLUTIDE 0.68 MG/ML
0.25 INJECTION, SOLUTION SUBCUTANEOUS
Qty: 1.5 ML | Refills: 0 | Status: SHIPPED | OUTPATIENT
Start: 2025-02-11 | End: 2025-03-13

## 2025-02-12 ENCOUNTER — TELEPHONE (OUTPATIENT)
Dept: INTERNAL MEDICINE | Facility: CLINIC | Age: 60
End: 2025-02-12
Payer: COMMERCIAL

## 2025-02-12 NOTE — TELEPHONE ENCOUNTER
Sent [employer] message that pt was taken off vit D because level was within normal limits and pt was switched from mounjaro due to cost to ozempic.

## 2025-02-12 NOTE — TELEPHONE ENCOUNTER
----- Message from Diamond sent at 2/12/2025  4:04 PM CST -----  Contact: Ac Shen needs a call back at [phone] in regards to her appointment she had on yesterday with Mrs. Montelongo. She has some questions and concerns she needs to discuss.    Thanks

## 2025-02-12 NOTE — TELEPHONE ENCOUNTER
Spoke with pt, pt wants to know why she was taken off of vitamin D and why she was taken off of mounjaro and put on ozempic, I stated that I would find out and send a mychart message.

## 2025-02-14 ENCOUNTER — TELEPHONE (OUTPATIENT)
Dept: INTERNAL MEDICINE | Facility: CLINIC | Age: 60
End: 2025-02-14
Payer: COMMERCIAL

## 2025-02-14 NOTE — TELEPHONE ENCOUNTER
----- Message from Marleny sent at 2/14/2025  7:58 AM CST -----  Contact: Ac  .Patient is calling to speak with the nurse regarding questions and concerns . Reports wanting to speak with someone about the medication . Please give patient a call back at   [phone].

## 2025-02-14 NOTE — TELEPHONE ENCOUNTER
Spoke with pt, pt wanted to know since she has been on ozempic before does she have to start at the lowest dose again and move up or can she start where she left off?? I will send a message and ask and send a [employer] message to pt with the answer.

## 2025-02-14 NOTE — TELEPHONE ENCOUNTER
Sent mychart message to pt that for the ozempic pt will need to start from the low dose and move up from there.

## 2025-02-27 ENCOUNTER — TELEPHONE (OUTPATIENT)
Dept: PULMONOLOGY | Facility: CLINIC | Age: 60
End: 2025-02-27
Payer: COMMERCIAL

## 2025-02-28 ENCOUNTER — OFFICE VISIT (OUTPATIENT)
Dept: PULMONOLOGY | Facility: CLINIC | Age: 60
End: 2025-02-28
Payer: COMMERCIAL

## 2025-02-28 VITALS
OXYGEN SATURATION: 99 % | DIASTOLIC BLOOD PRESSURE: 72 MMHG | BODY MASS INDEX: 42.45 KG/M2 | WEIGHT: 264.13 LBS | SYSTOLIC BLOOD PRESSURE: 122 MMHG | RESPIRATION RATE: 18 BRPM | HEART RATE: 90 BPM | HEIGHT: 66 IN

## 2025-02-28 DIAGNOSIS — E11.69 HYPERLIPIDEMIA ASSOCIATED WITH TYPE 2 DIABETES MELLITUS: Chronic | ICD-10-CM

## 2025-02-28 DIAGNOSIS — I15.2 HYPERTENSION ASSOCIATED WITH DIABETES: Chronic | ICD-10-CM

## 2025-02-28 DIAGNOSIS — F51.04 CHRONIC INSOMNIA: ICD-10-CM

## 2025-02-28 DIAGNOSIS — E66.813 CLASS 3 SEVERE OBESITY DUE TO EXCESS CALORIES WITH SERIOUS COMORBIDITY AND BODY MASS INDEX (BMI) OF 40.0 TO 44.9 IN ADULT: Chronic | ICD-10-CM

## 2025-02-28 DIAGNOSIS — E11.69 TYPE 2 DIABETES MELLITUS WITH OTHER SPECIFIED COMPLICATION, WITHOUT LONG-TERM CURRENT USE OF INSULIN: Chronic | ICD-10-CM

## 2025-02-28 DIAGNOSIS — E11.59 HYPERTENSION ASSOCIATED WITH DIABETES: Chronic | ICD-10-CM

## 2025-02-28 DIAGNOSIS — E66.01 CLASS 3 SEVERE OBESITY DUE TO EXCESS CALORIES WITH SERIOUS COMORBIDITY AND BODY MASS INDEX (BMI) OF 40.0 TO 44.9 IN ADULT: Chronic | ICD-10-CM

## 2025-02-28 DIAGNOSIS — R91.8 PULMONARY NODULES: Primary | Chronic | ICD-10-CM

## 2025-02-28 DIAGNOSIS — E78.5 HYPERLIPIDEMIA ASSOCIATED WITH TYPE 2 DIABETES MELLITUS: Chronic | ICD-10-CM

## 2025-02-28 PROCEDURE — 99999 PR PBB SHADOW E&M-EST. PATIENT-LVL V: CPT | Mod: PBBFAC,,, | Performed by: INTERNAL MEDICINE

## 2025-02-28 NOTE — PROGRESS NOTES
Subjective:       Patient ID: Ac Hayden is a 59 y.o. female.  Patient Active Problem List   Diagnosis    Degeneration of lumbar or lumbosacral intervertebral disc    Mixed anxiety and depressive disorder    Impingement syndrome, shoulder    Osteoarthritis of acromioclavicular joint    Urgency incontinence    Type 2 diabetes mellitus with other specified complication    Pulmonary nodules    Hyperlipidemia associated with type 2 diabetes mellitus    History of colon polyps    Primary snoring    S/P gastric surgery    Decreased ROM of right knee    Hypertension associated with diabetes    Class 3 severe obesity due to excess calories with serious comorbidity and body mass index (BMI) of 40.0 to 44.9 in adult    Vitamin D deficiency    Seasonal allergic rhinitis due to pollen    Chronic insomnia      Social History     Tobacco Use   Smoking Status Former    Current packs/day: 0.00    Average packs/day: 0.3 packs/day for 13.0 years (3.3 ttl pk-yrs)    Types: Cigarettes    Start date: 10/19/1987    Quit date: 10/19/2000    Years since quittin.3   Smokeless Tobacco Former      Immunization History   Administered Date(s) Administered    COVID-19 Vaccine 2022    COVID-19, MRNA, LN-S, PF (MODERNA FULL 0.5 ML DOSE) 2021, 2021    COVID-19, mRNA, LNP-S, bivalent booster, PF (PFIZER OMICRON) 2022    Hepatitis A, Adult 2019    Hepatitis B (recombinant) Adjuvanted, 2 dose 2022    Influenza 2010, 10/31/2011, 2012    Influenza - Quadrivalent 11/10/2014, 10/12/2015, 2016    Influenza - Quadrivalent - PF *Preferred* (6 months and older) 10/04/2017, 2019, 2020, 10/20/2021, 2022, 12/15/2023    Influenza - Trivalent - Fluarix, Flulaval, Fluzone, Afluria - PF 2024    Influenza Split 10/14/2013    Pneumococcal Conjugate - 13 Valent 2016    Pneumococcal Polysaccharide - 23 Valent 10/04/2017    Td (ADULT) 2008    Td - PF (ADULT) 2020     Tdap 12/14/2010    Zoster Recombinant 08/02/2022, 11/09/2022                  Chief Complaint: Pulmonary Nodules      Ms. Ac Hayden is 54 y.o.   Last visit 09/25/2020  This appointment to review home sleep study.  Next the home sleep study was negative for obstructive sleep apnea  She also had chest CT scan to review pulmonary nodules   Pulmonary nodules do not show any detrimental change   chest CT reviewed with patient  Blood pressure elevated due to stress   Works as manager [employer]  Had Mild CHRIS on study 2014  Patient is asymptomatic no cough no wheezing or shortness of breath  Immunizations are up-to-date  I reviewed all her records in detail  I have reviewed the patient's medical history in detail and updated the computerized patient record.           12/09/2021  Follow up visit to review chest CT  Has 2 subcentimeter nodules right lung  CT reveiwed  Unchanged in size  Imaging since April 2016  Not current smoker  Low risk  Annual surveillance with CXR  Patient concurs      12/08/2022  Last visit 12/09/2021  No new respiratory symptoms  CXR revewied  Lost weight from 302lb to 285Lb      12/15/2023  Followup  Lost weight from 337 lb 274 lb  On Ozempic  No respiratory issues  No cough or SOB  7mm Nodule  Repeat Chest CT  Will get Flu shot      02/28/2025  Followup  No respiratory issues since last visit   Was supposed to have repeated CT scan and spirometry   Unable to do them due to co-pay   No cough no wheezing no shortness a breath   Has continued to have success with weight loss   We will follow up after imaging and testing      Review of Systems   Constitutional:  Positive for weight loss. Negative for weight gain.   HENT: Negative.     Eyes: Negative.    Respiratory:  Negative for apnea, snoring, sputum production, shortness of breath and wheezing.    Cardiovascular: Negative.    Genitourinary: Negative.    Endocrine: endocrine negative    Musculoskeletal: Negative.    Skin: Negative.     Gastrointestinal: Negative.    Neurological: Negative.    Psychiatric/Behavioral:  Negative for sleep disturbance.    All other systems reviewed and are negative.        The following portions of the patient's history were reviewed and updated as appropriate: She  has a past medical history of Allergic rhinitis, Arthritis, Colon polyps, Diabetes mellitus (2002), Diabetes mellitus, type 2, DM (diabetes mellitus) (18  years 2002), Hyperlipidemia, Hypertension, Metabolic syndrome, Mixed anxiety and depressive disorder, Pneumonia, Prediabetes, and Urine incontinence.  She does not have any pertinent problems on file.  She  has a past surgical history that includes Plantar fascia surgery; Partial hysterectomy; Colonoscopy (N/A, 10/19/2017); Bariatric Surgery (08/22/2018); Hysterectomy; Total Reduction Mammoplasty (2014); and Colonoscopy (N/A, 01/05/2023).  Her family history includes Cancer in her father; Diabetes in her brother, mother, and sister; Eczema in an other family member; Lupus in an other family member; Ovarian cancer in her sister; Strabismus in her sister; Stroke in her mother.  She  reports that she quit smoking about 24 years ago. Her smoking use included cigarettes. She started smoking about 37 years ago. She has a 3.3 pack-year smoking history. She has quit using smokeless tobacco. She reports that she does not currently use alcohol. She reports that she does not use drugs.  She has a current medication list which includes the following prescription(s): alcohol swabs, alprazolam, amlodipine, atorvastatin, azelastine, blood sugar diagnostic, blood-glucose meter, duloxetine, fluticasone propionate, hydrochlorothiazide, lancets, lancing device, levocetirizine, losartan, metoprolol succinate, moxifloxacin, potassium chloride sa, ozempic, solifenacin, trazodone, and gemtesa.  Current Outpatient Medications on File Prior to Visit   Medication Sig Dispense Refill    alcohol swabs (ALCOHOL PREP PADS) PadM Use  to check blood glucose as directed. 200 each 11    ALPRAZolam (XANAX) 0.5 MG tablet Take 1 tablet (0.5 mg total) by mouth 2 (two) times daily as needed for Anxiety. 12 tablet 0    amLODIPine (NORVASC) 5 MG tablet Take 1 tablet (5 mg total) by mouth once daily. 90 tablet 3    atorvastatin (LIPITOR) 40 MG tablet Take 1 tablet (40 mg total) by mouth every evening. (for cholesterol and heart health) 90 tablet 3    azelastine (ASTELIN) 137 mcg (0.1 %) nasal spray 1 spray (137 mcg total) by Nasal route 2 (two) times daily. 30 mL 5    blood sugar diagnostic (BLOOD GLUCOSE TEST) Strp Check blood glucose 1-2 times daily and as needed for symptoms of low or high blood glucose. 200 strip 11    blood-glucose meter kit Use as instructed 1 each 0    DULoxetine (CYMBALTA) 60 MG capsule Take 2 capsules (120 mg total) by mouth once daily. 180 capsule 3    fluticasone propionate (FLONASE) 50 mcg/actuation nasal spray 2 sprays (100 mcg total) by Each Nostril route once daily. 32 g 5    hydroCHLOROthiazide (HYDRODIURIL) 25 MG tablet Take 1 tablet (25 mg total) by mouth once daily. 90 tablet 3    lancets (LANCETS,THIN) Misc Check blood glucose 1-2 times daily and as needed for symptoms of low or high blood glucose. 200 each 11    lancing device Misc Use as directed. 1 each 3    levocetirizine (XYZAL) 5 MG tablet Take 1 tablet (5 mg total) by mouth every evening. 90 tablet 3    losartan (COZAAR) 100 MG tablet Take 0.5 tablets (50 mg total) by mouth once daily. 45 tablet 3    metoprolol succinate (TOPROL-XL) 25 MG 24 hr tablet Take 1 tablet (25 mg total) by mouth once daily. 90 tablet 3    moxifloxacin (VIGAMOX) 0.5 % ophthalmic solution Place 1 drop into the left eye every 2 (two) hours. 3 mL 3    potassium chloride SA (K-DUR,KLOR-CON) 20 MEQ tablet Take 1 tablet (20 mEq total) by mouth 2 (two) times daily. 60 tablet 2    semaglutide (OZEMPIC) 0.25 mg or 0.5 mg (2 mg/3 mL) pen injector Inject 0.25 mg into the skin every 7 days. 1.5 mL 0  "   solifenacin (VESICARE) 10 MG tablet Take 1 tablet by mouth once daily 90 tablet 3    traZODone (DESYREL) 100 MG tablet Take 1 tablet (100 mg total) by mouth every evening. 90 tablet 1    vibegron (GEMTESA) 75 mg Tab Take 1 tablet (75 mg total) by mouth once daily. 30 tablet 11     No current facility-administered medications on file prior to visit.     She is allergic to codeine, pcn [penicillins], and sulfa (sulfonamide antibiotics)..    Objective:       Vitals:    02/28/25 1401   BP: 122/72   Pulse: 90   Resp: 18   SpO2: 99%   Weight: 119.8 kg (264 lb 1.8 oz)   Height: 5' 6" (1.676 m)             Physical Exam   Constitutional: She is oriented to person, place, and time. She appears well-developed and well-nourished. She is obese.   HENT:   Head: Normocephalic.   Mouth/Throat: Mallampati Score: IV.   Cardiovascular: Normal rate and normal heart sounds.   Pulmonary/Chest: Normal expansion, hyperinflation and symmetric chest wall expansion.   Musculoskeletal:         General: Normal range of motion.      Cervical back: Normal range of motion and neck supple.   Neurological: She is alert and oriented to person, place, and time.   Skin: Skin is warm and dry.   Psychiatric: She has a normal mood and affect.   Nursing note and vitals reviewed.    Personal Diagnostic Review     Cardiac Monitor - 3-15 Day Adult (Cupid Only)    The predominant rhythm is sinus.    The patient was monitored for a total of 3d 14h, underlying rhythm is   Sinus.  The minimum heart rate was 79 bpm; the maximum 152 bpm; the average 98   bpm.  0 % of Atrial fibrillation/Atrial flutter with longest episode of 0 ms.  The total burden of AV Block present was 0 % [Complete Heart Block: 0 %;   Advanced (High Grade):  0 %; 2nd Degree, Mobitz II: 0 %; 2nd Degree, Mobitz I: 0 %].  There were 0 pauses, the longest pause was 0 ms at --.  Total count of Ventricular Tachycardia (VT): 1 episode(s). Longest VT: 5   beats on Day 1 / 12:01:48  pm. Fastest VT: " 112 bpm on Day 1 / 12:01:48 pm.  2 supraventricular episodes were found. Longest SVT Episode 9 beats,   Fastest  bpm  There were a total of 5514 PVCs with 3 morphologies and 86 couplets.   Overall PVC [place] at 1.08 %  There were a total of 0 Other Beats. There were 0 total number of paced   beats.  There were a total of 156 PSVCs with 2 couplets. Overall PSVC [place] at  0.03 %  There is a total of 0 patient events.       Assessment:       Problem List Items Addressed This Visit       Hyperlipidemia associated with type 2 diabetes mellitus (Chronic)    Hypertension associated with diabetes (Chronic)    Type 2 diabetes mellitus with other specified complication (Chronic)    Pulmonary nodules - Primary (Chronic)    We will review interval chest CT scan and discussed with patient         Relevant Orders    Spirometry without Bronchodilator    Class 3 severe obesity due to excess calories with serious comorbidity and body mass index (BMI) of 40.0 to 44.9 in adult (Chronic)    Currently on Ozempic         Chronic insomnia    Stable on trazodone          Plan:        Follow up in about 4 weeks (around 3/28/2025), or chest CT and Humberto.    Imaging surveillance: Repeat CT chest in 12 months.   Fleischner Society Guidelines:    High risk patient:   Smoking history, history of malignancy or risk factors for malignancy.( non-solid ground glass opacities and partially solid nodules may require longer follow up to exclude indolent adenocarcinoma)      Nodule size Low risk patient High risk patient   4 mm  No follow up Follow up CT in 12 months. If unchanged, no further follow up.   4 - 6 mm Follow up CT in 12 months; if unchanged, no further follow up.  Initial follow up CT in 6 - 12 months, then at 18 - 24 months if no change.      6 - 8 mm  Initial follow up CT at 6 - 12 months and at 18 months if no change.  Initial follow up CT at 3 - 6 months. Then at 9-12 months and 24 months if no change.      > 8 mm Initial follow  up CT at 3, 6, 9 and 24 months, dynamic contrast enhanced CT, PET-CT and/or biopsy. Initial follow up CT at 3, 6, 9 and 24 months, dynamic contrast enhanced CT, PET-CT and/or biopsy.             TIME SPENT WITH PATIENT:     Time spent: 20 minutes in face to face  discussion concerning diagnosis, prognosis, review of lab and test results, benefits of treatment as well as management of disease, counseling of patient and coordination of care between various health  care providers . Greater than half the time spent was used for coordination of care and counseling of patient.     Keo Tavera    Pulmonary/Critical care/Sleepmedicine

## 2025-03-03 ENCOUNTER — TELEPHONE (OUTPATIENT)
Dept: CARDIOLOGY | Facility: CLINIC | Age: 60
End: 2025-03-03
Payer: COMMERCIAL

## 2025-03-03 NOTE — TELEPHONE ENCOUNTER
Contacted pt for a 1m BP review. Pt states she has been doing good w/ her BP averaging around 120's/70-80. She is unsure of her HR, but has been feeling good. Pt vu w/o q/c    ----- Message from Med Assistant Diamond sent at 2/3/2025  1:14 PM CST -----  Regardinm Phone Review  ---- Mayra Salgado NP 25 ----  Follow up in 1m with Nurse call regarding BP/HR med changes - switched Losartan to Toprol XL

## 2025-05-05 DIAGNOSIS — E66.01 CLASS 3 SEVERE OBESITY DUE TO EXCESS CALORIES WITH SERIOUS COMORBIDITY AND BODY MASS INDEX (BMI) OF 50.0 TO 59.9 IN ADULT: ICD-10-CM

## 2025-05-05 DIAGNOSIS — E88.810 METABOLIC SYNDROME: ICD-10-CM

## 2025-05-05 DIAGNOSIS — E66.813 CLASS 3 SEVERE OBESITY DUE TO EXCESS CALORIES WITH SERIOUS COMORBIDITY AND BODY MASS INDEX (BMI) OF 50.0 TO 59.9 IN ADULT: ICD-10-CM

## 2025-05-05 DIAGNOSIS — G47.33 OSA (OBSTRUCTIVE SLEEP APNEA): ICD-10-CM

## 2025-05-05 NOTE — TELEPHONE ENCOUNTER
Care Due:                  Date            Visit Type   Department     Provider  --------------------------------------------------------------------------------                                ESTABLISHED                              PATIENT -    [facility]  Last Visit: 04-      VIRTUAL      MEDICINE       Montrell Powers                              EP -                              PRIMARY      [facility]  Next Visit: 08-      CARE ([facility])   MEDICINE       Montrell Powers                                                            Last  Test          Frequency    Reason                     Performed    Due Date  --------------------------------------------------------------------------------    Office Visit  15 months..  potassium................  04- 07-    [facility] Embedded Care Due Messages. Reference number: [id number].   5/05/2025 2:23:00 PM CDT

## 2025-05-08 DIAGNOSIS — I15.2 HYPERTENSION ASSOCIATED WITH DIABETES: ICD-10-CM

## 2025-05-08 DIAGNOSIS — R00.0 TACHYCARDIA: ICD-10-CM

## 2025-05-08 DIAGNOSIS — E11.69 HYPERLIPIDEMIA ASSOCIATED WITH TYPE 2 DIABETES MELLITUS: Primary | ICD-10-CM

## 2025-05-08 DIAGNOSIS — E11.59 HYPERTENSION ASSOCIATED WITH DIABETES: ICD-10-CM

## 2025-05-08 DIAGNOSIS — E78.5 HYPERLIPIDEMIA ASSOCIATED WITH TYPE 2 DIABETES MELLITUS: Primary | ICD-10-CM

## 2025-05-08 NOTE — TELEPHONE ENCOUNTER
Refill Routing Note   Medication(s) are not appropriate for processing by [facility] for the following reason(s):        New or recently adjusted medication  No active prescription written by provider    [facility] action(s):  Defer        Medication Therapy Plan: Ordered under PAULINE Montelongo NP. Pt is following with PCP. FOV with PCP      Appointments  past 12m or future 3m with PCP    Date Provider   Last Visit   4/8/2024 JASWINDER Powers MD   Next Visit   8/11/2025 JASWINDER Powers MD   ED visits in past 90 days: 0        Note composed:8:36 AM 05/08/2025

## 2025-05-09 RX ORDER — SEMAGLUTIDE 0.68 MG/ML
0.25 INJECTION, SOLUTION SUBCUTANEOUS
Qty: 1 EACH | Refills: 2 | Status: SHIPPED | OUTPATIENT
Start: 2025-05-09

## 2025-05-09 NOTE — TELEPHONE ENCOUNTER
Please see this refill request from your patient.  Requested Prescriptions     Pending Prescriptions Disp Refills    semaglutide (OZEMPIC) 0.25 mg or 0.5 mg (2 mg/3 mL) pen injector 1 each 0     Sig: Inject 0.25 mg into the skin every 7 days.

## 2025-05-09 NOTE — TELEPHONE ENCOUNTER
REFILL REQUEST APPROVED  Requested Prescriptions     Pending Prescriptions Disp Refills    semaglutide (OZEMPIC) 0.25 mg or 0.5 mg (2 mg/3 mL) pen injector 1 each 0     Sig: Inject 0.25 mg into the skin every 7 days.

## 2025-08-05 DIAGNOSIS — G47.33 OSA (OBSTRUCTIVE SLEEP APNEA): ICD-10-CM

## 2025-08-05 DIAGNOSIS — E88.810 METABOLIC SYNDROME: ICD-10-CM

## 2025-08-05 DIAGNOSIS — E66.813 CLASS 3 SEVERE OBESITY DUE TO EXCESS CALORIES WITH SERIOUS COMORBIDITY AND BODY MASS INDEX (BMI) OF 50.0 TO 59.9 IN ADULT: ICD-10-CM

## 2025-08-05 NOTE — TELEPHONE ENCOUNTER
Refill Routing Note   Medication(s) are not appropriate for processing by [facility] for the following reason(s):        Patient not seen by provider within 15 months  Required labs outdated    [facility] action(s):  Defer             Appointments  past 12m or future 3m with PCP    Date Provider   Last Visit   4/8/2024 JASWINDER Powers MD   Next Visit   8/11/2025 JASWINDER Powers MD   ED visits in past 90 days: 0        Note composed:4:44 PM 08/05/2025

## 2025-08-05 NOTE — TELEPHONE ENCOUNTER
Care Due:                  Date            Visit Type   Department     Provider  --------------------------------------------------------------------------------                                ESTABLISHED                              PATIENT -    [facility]  Last Visit: 04-      [facility]      MEDICINE       Montrell Powers                               -                              PRIMARY      [facility]  Next Visit: 08-      CARE ([facility])   MEDICINE       Montrell Powers                                                            Last  Test          Frequency    Reason                     Performed    Due Date  --------------------------------------------------------------------------------    HBA1C.......  6 months...  semaglutide..............  02- 08-    [facility] Embedded Care Due Messages. Reference number: [id number].   8/05/2025 6:01:02 AM CDT

## 2025-08-08 ENCOUNTER — PATIENT MESSAGE (OUTPATIENT)
Dept: INTERNAL MEDICINE | Facility: CLINIC | Age: 60
End: 2025-08-08
Payer: COMMERCIAL

## 2025-08-08 RX ORDER — SEMAGLUTIDE 0.68 MG/ML
0.5 INJECTION, SOLUTION SUBCUTANEOUS
Qty: 3 ML | Refills: 0 | Status: SHIPPED | OUTPATIENT
Start: 2025-08-08

## 2025-08-11 ENCOUNTER — HOSPITAL ENCOUNTER (OUTPATIENT)
Dept: CARDIOLOGY | Facility: HOSPITAL | Age: 60
Discharge: HOME | End: 2025-08-11
Attending: FAMILY MEDICINE
Payer: COMMERCIAL

## 2025-08-11 ENCOUNTER — OFFICE VISIT (OUTPATIENT)
Dept: INTERNAL MEDICINE | Facility: CLINIC | Age: 60
End: 2025-08-11
Payer: COMMERCIAL

## 2025-08-11 VITALS
WEIGHT: 293 LBS | RESPIRATION RATE: 18 BRPM | BODY MASS INDEX: 47.09 KG/M2 | HEIGHT: 66 IN | SYSTOLIC BLOOD PRESSURE: 122 MMHG | HEART RATE: 100 BPM | TEMPERATURE: 97 F | DIASTOLIC BLOOD PRESSURE: 60 MMHG | OXYGEN SATURATION: 100 %

## 2025-08-11 DIAGNOSIS — I15.2 HYPERTENSION ASSOCIATED WITH DIABETES: ICD-10-CM

## 2025-08-11 DIAGNOSIS — I15.2 HYPERTENSION ASSOCIATED WITH DIABETES: Chronic | ICD-10-CM

## 2025-08-11 DIAGNOSIS — E11.59 HYPERTENSION ASSOCIATED WITH DIABETES: Chronic | ICD-10-CM

## 2025-08-11 DIAGNOSIS — E78.5 HYPERLIPIDEMIA ASSOCIATED WITH TYPE 2 DIABETES MELLITUS: Chronic | ICD-10-CM

## 2025-08-11 DIAGNOSIS — E11.69 TYPE 2 DIABETES MELLITUS WITH OTHER SPECIFIED COMPLICATION, WITHOUT LONG-TERM CURRENT USE OF INSULIN: Primary | Chronic | ICD-10-CM

## 2025-08-11 DIAGNOSIS — E11.69 TYPE 2 DIABETES MELLITUS WITH MORBID OBESITY: Chronic | ICD-10-CM

## 2025-08-11 DIAGNOSIS — E66.813 CLASS 3 SEVERE OBESITY DUE TO EXCESS CALORIES WITH SERIOUS COMORBIDITY AND BODY MASS INDEX (BMI) OF 45.0 TO 49.9 IN ADULT: Chronic | ICD-10-CM

## 2025-08-11 DIAGNOSIS — E11.69 HYPERLIPIDEMIA ASSOCIATED WITH TYPE 2 DIABETES MELLITUS: Chronic | ICD-10-CM

## 2025-08-11 DIAGNOSIS — E55.9 VITAMIN D DEFICIENCY: Chronic | ICD-10-CM

## 2025-08-11 DIAGNOSIS — E66.01 TYPE 2 DIABETES MELLITUS WITH MORBID OBESITY: Chronic | ICD-10-CM

## 2025-08-11 DIAGNOSIS — E11.59 HYPERTENSION ASSOCIATED WITH DIABETES: ICD-10-CM

## 2025-08-11 DIAGNOSIS — Z98.84 BARIATRIC SURGERY STATUS: Chronic | ICD-10-CM

## 2025-08-11 DIAGNOSIS — Z12.31 BREAST CANCER SCREENING BY MAMMOGRAM: ICD-10-CM

## 2025-08-11 DIAGNOSIS — G47.33 OBSTRUCTIVE SLEEP APNEA HYPOPNEA, MILD: Chronic | ICD-10-CM

## 2025-08-11 DIAGNOSIS — L60.0 INGROWING NAIL: ICD-10-CM

## 2025-08-11 PROBLEM — R06.83 PRIMARY SNORING: Chronic | Status: ACTIVE | Noted: 2018-04-19

## 2025-08-11 PROBLEM — Z98.890 S/P GASTRIC SURGERY: Chronic | Status: ACTIVE | Noted: 2018-09-25

## 2025-08-11 PROBLEM — M25.661 DECREASED ROM OF RIGHT KNEE: Status: RESOLVED | Noted: 2020-03-02 | Resolved: 2025-08-11

## 2025-08-11 PROBLEM — Z86.0100 HISTORY OF COLON POLYPS: Chronic | Status: ACTIVE | Noted: 2017-10-19

## 2025-08-11 LAB
ALBUMIN SERPL BCP-MCNC: 3.8 G/DL (ref 3.5–5.2)
ALBUMIN/CREAT UR: 3.7 UG/MG
ALP SERPL-CCNC: 83 UNIT/L (ref 40–150)
ALT SERPL W/O P-5'-P-CCNC: 23 UNIT/L (ref 0–55)
ANION GAP (OHS): 9 MMOL/L (ref 8–16)
AST SERPL-CCNC: 25 UNIT/L (ref 0–50)
BILIRUB SERPL-MCNC: 0.4 MG/DL (ref 0.1–1)
BUN SERPL-MCNC: 11 MG/DL (ref 6–20)
CALCIUM SERPL-MCNC: 9.3 MG/DL (ref 8.7–10.5)
CHLORIDE SERPL-SCNC: 102 MMOL/L (ref 95–110)
CHOLEST SERPL-MCNC: 156 MG/DL (ref 120–199)
CHOLEST/HDLC SERPL: 3.8 {RATIO} (ref 2–5)
CO2 SERPL-SCNC: 28 MMOL/L (ref 23–29)
CREAT SERPL-MCNC: 0.9 MG/DL (ref 0.5–1.4)
CREAT UR-MCNC: 214 MG/DL (ref 15–325)
EAG (OHS): 140 MG/DL (ref 68–131)
GFR SERPLBLD CREATININE-BSD FMLA CKD-EPI: >60 ML/MIN/1.73/M2
GLUCOSE SERPL-MCNC: 145 MG/DL (ref 70–110)
HBA1C MFR BLD: 6.5 % (ref 4–5.6)
HDLC SERPL-MCNC: 41 MG/DL (ref 40–75)
HDLC SERPL: 26.3 % (ref 20–50)
LDLC SERPL CALC-MCNC: 95.4 MG/DL (ref 63–159)
MICROALBUMIN UR-MCNC: 8 UG/ML (ref ?–5000)
NONHDLC SERPL-MCNC: 115 MG/DL
OHS QRS DURATION: 84 MS
OHS QTC CALCULATION: 430 MS
POTASSIUM SERPL-SCNC: 3.9 MMOL/L (ref 3.5–5.1)
PROT SERPL-MCNC: 7.4 GM/DL (ref 6–8.4)
SODIUM SERPL-SCNC: 139 MMOL/L (ref 136–145)
TRIGL SERPL-MCNC: 98 MG/DL (ref 30–150)

## 2025-08-11 PROCEDURE — 3008F BODY MASS INDEX DOCD: CPT | Mod: CPTII,S$GLB,, | Performed by: FAMILY MEDICINE

## 2025-08-11 PROCEDURE — 80053 COMPREHEN METABOLIC PANEL: CPT | Performed by: FAMILY MEDICINE

## 2025-08-11 PROCEDURE — 99999 PR PBB SHADOW E&M-EST. PATIENT-LVL V: CPT | Mod: PBBFAC,,, | Performed by: FAMILY MEDICINE

## 2025-08-11 PROCEDURE — 3066F NEPHROPATHY DOC TX: CPT | Mod: CPTII,S$GLB,, | Performed by: FAMILY MEDICINE

## 2025-08-11 PROCEDURE — 83036 HEMOGLOBIN GLYCOSYLATED A1C: CPT | Performed by: FAMILY MEDICINE

## 2025-08-11 PROCEDURE — 3072F LOW RISK FOR RETINOPATHY: CPT | Mod: CPTII,S$GLB,, | Performed by: FAMILY MEDICINE

## 2025-08-11 PROCEDURE — 99214 OFFICE O/P EST MOD 30 MIN: CPT | Mod: S$GLB,,, | Performed by: FAMILY MEDICINE

## 2025-08-11 PROCEDURE — G2211 COMPLEX E/M VISIT ADD ON: HCPCS | Mod: S$GLB,,, | Performed by: FAMILY MEDICINE

## 2025-08-11 PROCEDURE — 82043 UR ALBUMIN QUANTITATIVE: CPT | Performed by: FAMILY MEDICINE

## 2025-08-11 PROCEDURE — 4010F ACE/ARB THERAPY RXD/TAKEN: CPT | Mod: CPTII,S$GLB,, | Performed by: FAMILY MEDICINE

## 2025-08-11 PROCEDURE — 3044F HG A1C LEVEL LT 7.0%: CPT | Mod: CPTII,S$GLB,, | Performed by: FAMILY MEDICINE

## 2025-08-11 PROCEDURE — 3078F DIAST BP <80 MM HG: CPT | Mod: CPTII,S$GLB,, | Performed by: FAMILY MEDICINE

## 2025-08-11 PROCEDURE — 93005 ELECTROCARDIOGRAM TRACING: CPT

## 2025-08-11 PROCEDURE — 80061 LIPID PANEL: CPT | Performed by: FAMILY MEDICINE

## 2025-08-11 PROCEDURE — 3074F SYST BP LT 130 MM HG: CPT | Mod: CPTII,S$GLB,, | Performed by: FAMILY MEDICINE

## 2025-08-11 PROCEDURE — 93010 ELECTROCARDIOGRAM REPORT: CPT | Mod: ,,, | Performed by: INTERNAL MEDICINE

## 2025-08-11 PROCEDURE — 3061F NEG MICROALBUMINURIA REV: CPT | Mod: CPTII,S$GLB,, | Performed by: FAMILY MEDICINE

## 2025-08-11 RX ORDER — TIRZEPATIDE 2.5 MG/.5ML
2.5 INJECTION, SOLUTION SUBCUTANEOUS
Qty: 2 ML | Refills: 0 | Status: SHIPPED | OUTPATIENT
Start: 2025-08-11

## 2025-08-11 RX ORDER — ERGOCALCIFEROL 1.25 MG/1
50000 CAPSULE ORAL
Qty: 12 CAPSULE | Refills: 3 | Status: SHIPPED | OUTPATIENT
Start: 2025-08-11

## 2025-08-14 ENCOUNTER — OFFICE VISIT (OUTPATIENT)
Dept: PODIATRY | Facility: CLINIC | Age: 60
End: 2025-08-14
Payer: COMMERCIAL

## 2025-08-14 ENCOUNTER — HOSPITAL ENCOUNTER (OUTPATIENT)
Dept: RADIOLOGY | Facility: HOSPITAL | Age: 60
Discharge: HOME | End: 2025-08-14
Attending: FAMILY MEDICINE
Payer: COMMERCIAL

## 2025-08-14 VITALS — WEIGHT: 293 LBS | BODY MASS INDEX: 47.09 KG/M2 | HEIGHT: 66 IN

## 2025-08-14 DIAGNOSIS — M20.5X2 ACQUIRED ADDUCTOVARUS ROTATION OF TOE OF LEFT FOOT: ICD-10-CM

## 2025-08-14 DIAGNOSIS — M20.5X1 ADDUCTOVARUS ROTATION OF TOE, ACQUIRED, RIGHT: ICD-10-CM

## 2025-08-14 DIAGNOSIS — L60.0 INGROWING NAIL: Primary | ICD-10-CM

## 2025-08-14 PROCEDURE — 99203 OFFICE O/P NEW LOW 30 MIN: CPT | Mod: S$GLB,,, | Performed by: PODIATRIST

## 2025-08-14 PROCEDURE — 1159F MED LIST DOCD IN RCRD: CPT | Mod: CPTII,S$GLB,, | Performed by: PODIATRIST

## 2025-08-14 PROCEDURE — 3008F BODY MASS INDEX DOCD: CPT | Mod: CPTII,S$GLB,, | Performed by: PODIATRIST

## 2025-08-14 PROCEDURE — 77063 BREAST TOMOSYNTHESIS BI: CPT | Mod: 26,,, | Performed by: STUDENT IN AN ORGANIZED HEALTH CARE EDUCATION/TRAINING PROGRAM

## 2025-08-14 PROCEDURE — 3044F HG A1C LEVEL LT 7.0%: CPT | Mod: CPTII,S$GLB,, | Performed by: PODIATRIST

## 2025-08-14 PROCEDURE — 77067 SCR MAMMO BI INCL CAD: CPT | Mod: TC

## 2025-08-14 PROCEDURE — 3061F NEG MICROALBUMINURIA REV: CPT | Mod: CPTII,S$GLB,, | Performed by: PODIATRIST

## 2025-08-14 PROCEDURE — 99999 PR PBB SHADOW E&M-EST. PATIENT-LVL IV: CPT | Mod: PBBFAC,,, | Performed by: PODIATRIST

## 2025-08-14 PROCEDURE — 3066F NEPHROPATHY DOC TX: CPT | Mod: CPTII,S$GLB,, | Performed by: PODIATRIST

## 2025-08-14 PROCEDURE — 3072F LOW RISK FOR RETINOPATHY: CPT | Mod: CPTII,S$GLB,, | Performed by: PODIATRIST

## 2025-08-14 PROCEDURE — 77067 SCR MAMMO BI INCL CAD: CPT | Mod: 26,,, | Performed by: STUDENT IN AN ORGANIZED HEALTH CARE EDUCATION/TRAINING PROGRAM

## 2025-08-14 PROCEDURE — 4010F ACE/ARB THERAPY RXD/TAKEN: CPT | Mod: CPTII,S$GLB,, | Performed by: PODIATRIST

## 2025-08-18 ENCOUNTER — OFFICE VISIT (OUTPATIENT)
Dept: UROLOGY | Facility: CLINIC | Age: 60
End: 2025-08-18
Payer: COMMERCIAL

## 2025-08-18 VITALS — SYSTOLIC BLOOD PRESSURE: 162 MMHG | DIASTOLIC BLOOD PRESSURE: 71 MMHG | HEART RATE: 106 BPM

## 2025-08-18 DIAGNOSIS — N32.89 BLADDER SPASMS: ICD-10-CM

## 2025-08-18 DIAGNOSIS — N32.81 OVERACTIVE BLADDER: Primary | ICD-10-CM

## 2025-08-18 PROCEDURE — 3078F DIAST BP <80 MM HG: CPT | Mod: CPTII,S$GLB,, | Performed by: UROLOGY

## 2025-08-18 PROCEDURE — 3061F NEG MICROALBUMINURIA REV: CPT | Mod: CPTII,S$GLB,, | Performed by: UROLOGY

## 2025-08-18 PROCEDURE — 3066F NEPHROPATHY DOC TX: CPT | Mod: CPTII,S$GLB,, | Performed by: UROLOGY

## 2025-08-18 PROCEDURE — 4010F ACE/ARB THERAPY RXD/TAKEN: CPT | Mod: CPTII,S$GLB,, | Performed by: UROLOGY

## 2025-08-18 PROCEDURE — 99999 PR PBB SHADOW E&M-EST. PATIENT-LVL IV: CPT | Mod: PBBFAC,,, | Performed by: UROLOGY

## 2025-08-18 PROCEDURE — 3077F SYST BP >= 140 MM HG: CPT | Mod: CPTII,S$GLB,, | Performed by: UROLOGY

## 2025-08-18 PROCEDURE — 99213 OFFICE O/P EST LOW 20 MIN: CPT | Mod: S$GLB,,, | Performed by: UROLOGY

## 2025-08-18 PROCEDURE — 3044F HG A1C LEVEL LT 7.0%: CPT | Mod: CPTII,S$GLB,, | Performed by: UROLOGY

## 2025-08-18 PROCEDURE — 3072F LOW RISK FOR RETINOPATHY: CPT | Mod: CPTII,S$GLB,, | Performed by: UROLOGY

## 2025-08-18 PROCEDURE — 1159F MED LIST DOCD IN RCRD: CPT | Mod: CPTII,S$GLB,, | Performed by: UROLOGY

## 2025-08-18 RX ORDER — SOLIFENACIN SUCCINATE 10 MG/1
10 TABLET, FILM COATED ORAL DAILY
Qty: 90 TABLET | Refills: 4 | Status: SHIPPED | OUTPATIENT
Start: 2025-08-18

## 2025-08-20 ENCOUNTER — CLINICAL SUPPORT (OUTPATIENT)
Dept: DIABETES | Facility: CLINIC | Age: 60
End: 2025-08-20
Payer: COMMERCIAL

## 2025-08-20 VITALS — BODY MASS INDEX: 47.36 KG/M2 | WEIGHT: 293 LBS

## 2025-08-20 DIAGNOSIS — E11.69 TYPE 2 DIABETES MELLITUS WITH OTHER SPECIFIED COMPLICATION, WITHOUT LONG-TERM CURRENT USE OF INSULIN: Primary | Chronic | ICD-10-CM

## 2025-08-20 PROCEDURE — 99999 PR PBB SHADOW E&M-EST. PATIENT-LVL III: CPT | Mod: PBBFAC,,,

## 2025-08-20 PROCEDURE — G0108 DIAB MANAGE TRN  PER INDIV: HCPCS | Mod: S$GLB,,, | Performed by: PEDIATRICS

## 2025-08-25 ENCOUNTER — OFFICE VISIT (OUTPATIENT)
Dept: INTERNAL MEDICINE | Facility: CLINIC | Age: 60
End: 2025-08-25
Payer: COMMERCIAL

## 2025-08-25 ENCOUNTER — HOSPITAL ENCOUNTER (OUTPATIENT)
Dept: RADIOLOGY | Facility: HOSPITAL | Age: 60
Discharge: HOME | End: 2025-08-25
Attending: PHYSICIAN ASSISTANT
Payer: COMMERCIAL

## 2025-08-25 VITALS
WEIGHT: 292.75 LBS | DIASTOLIC BLOOD PRESSURE: 82 MMHG | OXYGEN SATURATION: 100 % | TEMPERATURE: 98 F | BODY MASS INDEX: 47.25 KG/M2 | SYSTOLIC BLOOD PRESSURE: 122 MMHG

## 2025-08-25 DIAGNOSIS — M25.562 ACUTE PAIN OF LEFT KNEE: ICD-10-CM

## 2025-08-25 DIAGNOSIS — G47.33 OBSTRUCTIVE SLEEP APNEA HYPOPNEA, MILD: Chronic | ICD-10-CM

## 2025-08-25 DIAGNOSIS — E11.69 TYPE 2 DIABETES MELLITUS WITH OTHER SPECIFIED COMPLICATION, WITHOUT LONG-TERM CURRENT USE OF INSULIN: Primary | Chronic | ICD-10-CM

## 2025-08-25 PROCEDURE — 1159F MED LIST DOCD IN RCRD: CPT | Mod: CPTII,S$GLB,, | Performed by: PHYSICIAN ASSISTANT

## 2025-08-25 PROCEDURE — 73562 X-RAY EXAM OF KNEE 3: CPT | Mod: 26,RT,, | Performed by: STUDENT IN AN ORGANIZED HEALTH CARE EDUCATION/TRAINING PROGRAM

## 2025-08-25 PROCEDURE — 3079F DIAST BP 80-89 MM HG: CPT | Mod: CPTII,S$GLB,, | Performed by: PHYSICIAN ASSISTANT

## 2025-08-25 PROCEDURE — G2211 COMPLEX E/M VISIT ADD ON: HCPCS | Mod: S$GLB,,, | Performed by: PHYSICIAN ASSISTANT

## 2025-08-25 PROCEDURE — 3074F SYST BP LT 130 MM HG: CPT | Mod: CPTII,S$GLB,, | Performed by: PHYSICIAN ASSISTANT

## 2025-08-25 PROCEDURE — 73564 X-RAY EXAM KNEE 4 OR MORE: CPT | Mod: 26,LT,, | Performed by: STUDENT IN AN ORGANIZED HEALTH CARE EDUCATION/TRAINING PROGRAM

## 2025-08-25 PROCEDURE — 4010F ACE/ARB THERAPY RXD/TAKEN: CPT | Mod: CPTII,S$GLB,, | Performed by: PHYSICIAN ASSISTANT

## 2025-08-25 PROCEDURE — 3008F BODY MASS INDEX DOCD: CPT | Mod: CPTII,S$GLB,, | Performed by: PHYSICIAN ASSISTANT

## 2025-08-25 PROCEDURE — 3072F LOW RISK FOR RETINOPATHY: CPT | Mod: CPTII,S$GLB,, | Performed by: PHYSICIAN ASSISTANT

## 2025-08-25 PROCEDURE — 3066F NEPHROPATHY DOC TX: CPT | Mod: CPTII,S$GLB,, | Performed by: PHYSICIAN ASSISTANT

## 2025-08-25 PROCEDURE — 73564 X-RAY EXAM KNEE 4 OR MORE: CPT | Mod: TC,LT

## 2025-08-25 PROCEDURE — 3044F HG A1C LEVEL LT 7.0%: CPT | Mod: CPTII,S$GLB,, | Performed by: PHYSICIAN ASSISTANT

## 2025-08-25 PROCEDURE — 3061F NEG MICROALBUMINURIA REV: CPT | Mod: CPTII,S$GLB,, | Performed by: PHYSICIAN ASSISTANT

## 2025-08-25 PROCEDURE — 99999 PR PBB SHADOW E&M-EST. PATIENT-LVL V: CPT | Mod: PBBFAC,,, | Performed by: PHYSICIAN ASSISTANT

## 2025-08-25 PROCEDURE — 99214 OFFICE O/P EST MOD 30 MIN: CPT | Mod: S$GLB,,, | Performed by: PHYSICIAN ASSISTANT

## 2025-08-25 RX ORDER — TIRZEPATIDE 5 MG/.5ML
5 INJECTION, SOLUTION SUBCUTANEOUS
Qty: 2 ML | Refills: 2 | Status: SHIPPED | OUTPATIENT
Start: 2025-08-25 | End: 2025-10-24

## 2025-08-28 ENCOUNTER — OFFICE VISIT (OUTPATIENT)
Dept: SPORTS MEDICINE | Facility: CLINIC | Age: 60
End: 2025-08-28
Payer: COMMERCIAL

## 2025-08-28 DIAGNOSIS — M17.11 PRIMARY OSTEOARTHRITIS OF RIGHT KNEE: ICD-10-CM

## 2025-08-28 DIAGNOSIS — M76.31 IT BAND SYNDROME, RIGHT: Primary | ICD-10-CM

## 2025-08-28 DIAGNOSIS — E11.69 TYPE 2 DIABETES MELLITUS WITH OTHER SPECIFIED COMPLICATION, WITHOUT LONG-TERM CURRENT USE OF INSULIN: Chronic | ICD-10-CM

## 2025-08-28 DIAGNOSIS — G89.29 CHRONIC PAIN OF RIGHT KNEE: ICD-10-CM

## 2025-08-28 DIAGNOSIS — M25.561 CHRONIC PAIN OF RIGHT KNEE: ICD-10-CM

## 2025-08-28 DIAGNOSIS — Z98.84 BARIATRIC SURGERY STATUS: Chronic | ICD-10-CM

## 2025-08-28 PROCEDURE — 99999 PR PBB SHADOW E&M-EST. PATIENT-LVL IV: CPT | Mod: PBBFAC,,, | Performed by: STUDENT IN AN ORGANIZED HEALTH CARE EDUCATION/TRAINING PROGRAM

## 2025-08-28 RX ORDER — TRIAMCINOLONE ACETONIDE 40 MG/ML
40 INJECTION, SUSPENSION INTRA-ARTICULAR; INTRAMUSCULAR
Status: DISCONTINUED | OUTPATIENT
Start: 2025-08-28 | End: 2025-08-28 | Stop reason: HOSPADM

## 2025-08-28 RX ADMIN — TRIAMCINOLONE ACETONIDE 40 MG: 40 INJECTION, SUSPENSION INTRA-ARTICULAR; INTRAMUSCULAR at 10:08
